# Patient Record
Sex: MALE | Race: WHITE | NOT HISPANIC OR LATINO | Employment: OTHER | URBAN - METROPOLITAN AREA
[De-identification: names, ages, dates, MRNs, and addresses within clinical notes are randomized per-mention and may not be internally consistent; named-entity substitution may affect disease eponyms.]

---

## 2018-05-09 ENCOUNTER — TRANSFERRED RECORDS (OUTPATIENT)
Dept: HEALTH INFORMATION MANAGEMENT | Facility: CLINIC | Age: 78
End: 2018-05-09

## 2019-05-06 ENCOUNTER — TRANSFERRED RECORDS (OUTPATIENT)
Dept: HEALTH INFORMATION MANAGEMENT | Facility: CLINIC | Age: 79
End: 2019-05-06

## 2021-09-30 ENCOUNTER — HOSPITAL ENCOUNTER (OUTPATIENT)
Dept: NUCLEAR MEDICINE | Facility: HOSPITAL | Age: 81
End: 2021-09-30
Attending: UROLOGY
Payer: COMMERCIAL

## 2021-09-30 ENCOUNTER — HOSPITAL ENCOUNTER (OUTPATIENT)
Dept: CT IMAGING | Facility: HOSPITAL | Age: 81
End: 2021-09-30
Attending: UROLOGY
Payer: COMMERCIAL

## 2021-09-30 DIAGNOSIS — C61 MALIGNANT NEOPLASM OF PROSTATE (H): ICD-10-CM

## 2021-09-30 LAB
CREAT BLD-MCNC: 1.3 MG/DL (ref 0.7–1.3)
GFR SERPL CREATININE-BSD FRML MDRD: 52 ML/MIN/1.73M2

## 2021-09-30 PROCEDURE — 74178 CT ABD&PLV WO CNTR FLWD CNTR: CPT

## 2021-09-30 PROCEDURE — A9503 TC99M MEDRONATE: HCPCS | Performed by: UROLOGY

## 2021-09-30 PROCEDURE — 343N000001 HC RX 343: Performed by: UROLOGY

## 2021-09-30 PROCEDURE — 250N000011 HC RX IP 250 OP 636: Performed by: UROLOGY

## 2021-09-30 PROCEDURE — 78306 BONE IMAGING WHOLE BODY: CPT

## 2021-09-30 PROCEDURE — 82565 ASSAY OF CREATININE: CPT

## 2021-09-30 RX ORDER — IOPAMIDOL 755 MG/ML
75 INJECTION, SOLUTION INTRAVASCULAR ONCE
Status: COMPLETED | OUTPATIENT
Start: 2021-09-30 | End: 2021-09-30

## 2021-09-30 RX ORDER — TC 99M MEDRONATE 20 MG/10ML
23-27 INJECTION, POWDER, LYOPHILIZED, FOR SOLUTION INTRAVENOUS ONCE
Status: COMPLETED | OUTPATIENT
Start: 2021-09-30 | End: 2021-09-30

## 2021-09-30 RX ADMIN — TC 99M MEDRONATE 26.7 MCI.: 20 INJECTION, POWDER, LYOPHILIZED, FOR SOLUTION INTRAVENOUS at 11:44

## 2021-09-30 RX ADMIN — IOPAMIDOL 75 ML: 755 INJECTION, SOLUTION INTRAVENOUS at 13:05

## 2021-10-17 ENCOUNTER — HEALTH MAINTENANCE LETTER (OUTPATIENT)
Age: 81
End: 2021-10-17

## 2022-10-01 ENCOUNTER — HEALTH MAINTENANCE LETTER (OUTPATIENT)
Age: 82
End: 2022-10-01

## 2023-02-05 ENCOUNTER — HEALTH MAINTENANCE LETTER (OUTPATIENT)
Age: 83
End: 2023-02-05

## 2023-08-02 ENCOUNTER — TRANSFERRED RECORDS (OUTPATIENT)
Dept: HEALTH INFORMATION MANAGEMENT | Facility: CLINIC | Age: 83
End: 2023-08-02

## 2023-08-09 ENCOUNTER — TRANSFERRED RECORDS (OUTPATIENT)
Dept: HEALTH INFORMATION MANAGEMENT | Facility: CLINIC | Age: 83
End: 2023-08-09

## 2023-08-10 ENCOUNTER — TRANSFERRED RECORDS (OUTPATIENT)
Dept: HEALTH INFORMATION MANAGEMENT | Facility: CLINIC | Age: 83
End: 2023-08-10

## 2023-08-23 ENCOUNTER — TRANSFERRED RECORDS (OUTPATIENT)
Dept: HEALTH INFORMATION MANAGEMENT | Facility: CLINIC | Age: 83
End: 2023-08-23
Payer: COMMERCIAL

## 2023-09-20 ENCOUNTER — MEDICAL CORRESPONDENCE (OUTPATIENT)
Dept: HEALTH INFORMATION MANAGEMENT | Facility: CLINIC | Age: 83
End: 2023-09-20
Payer: COMMERCIAL

## 2023-09-20 ENCOUNTER — TRANSFERRED RECORDS (OUTPATIENT)
Dept: HEALTH INFORMATION MANAGEMENT | Facility: CLINIC | Age: 83
End: 2023-09-20
Payer: COMMERCIAL

## 2023-09-20 ENCOUNTER — TRANSCRIBE ORDERS (OUTPATIENT)
Dept: OTHER | Age: 83
End: 2023-09-20

## 2023-09-20 DIAGNOSIS — C61 PRIMARY MALIGNANT NEOPLASM OF PROSTATE (H): Primary | ICD-10-CM

## 2023-09-21 ENCOUNTER — PRE VISIT (OUTPATIENT)
Dept: ONCOLOGY | Facility: CLINIC | Age: 83
End: 2023-09-21
Payer: COMMERCIAL

## 2023-09-21 ENCOUNTER — PATIENT OUTREACH (OUTPATIENT)
Dept: ONCOLOGY | Facility: CLINIC | Age: 83
End: 2023-09-21
Payer: COMMERCIAL

## 2023-09-21 NOTE — PROGRESS NOTES
New Patient Oncology Nurse Navigator Note     Referring provider:   Dr Parker Valente at [facility]     Referred to (specialty): Medical Oncology    Date Referral Received:   9/20/23     Evaluation for :   Prostate cancer- consideration for Pluvicto     Clinical History (per Nurse review of records provided):  Prostate cancer diagnosed 4/2009, initial treatment with XRT with Dr. Baca completed 9/23/09.    Patient had further radiation and treatment.   Now being referred for consideration of Pluvicto.    See [facility] notes for full history (media section).        Pertinent urology/oncology notes, pathology/labs & imaging bookmarked**    Records Location (Care Everywhere, Media, etc.):   [facility]  [facility]  Care Everywhere  Epic      I called Kilo to discuss referral to oncology.  I introduced my role and reviewed what this consult visit will entail/what to expect.  I reviewed the location and gave contact numbers including new patient scheduling and clinic phone numbers.   Kilo, has no other questions at this time.    I forwarded on referral with scheduling instructions for the following     PLAN: HOLD: Dr. Mason, 10/13, [facility], [phone], NEW in person    I transferred call to our new patient scheduling to finalize appointment.    Tamika Jose, RN, BSN  Oncology New Patient Nurse Navigator   [facility]  [phone]

## 2023-09-21 NOTE — TELEPHONE ENCOUNTER
Referring Provider/Location:  Dr Parker Valente at [facility]   Dx and Code: Primary malignant neoplasm of prostate (H) [C61]  - Primary  Appt Date:  10.13.23  Provider:  Peter Mason    Action September 21, 2023 7:32 AM KAMILAH   Incoming Records Records received from [facility] and sent to [facility] for upload       Action Taken  Date/Description  KAMILAH     N Navigator September 21, 2023  10:46 AM   Call to [facility] and they have images from 2018 and 2019 they are pushing over.  The most recent images were at [facility] ([facility])    Call to [facility] and they are pushing the PET from 8/2023 now  Surgical Path/Bx done here at [facility] 10.9.13 Specimen #: [id number]      Action September 22, 2023 1:18 PM KAMILAH   Incoming Records Records received from [facility] and sent to [facility] for upload     Records Requested     October 5, 2023 3:10 PM    TJ   Clinic name Comments/Action Taken   [facility] Called and placed 2nd request for images and they are sending over now.     Imaging Received  October 9, 2023    12:31 PM  KAMILAH   Action: Images from [facility] and [facility] received and resolved to PACS.

## 2023-10-11 NOTE — PROGRESS NOTES
[facility] Medical Oncology Note       Date of visit: October 13, 2023  New Outpatient Clinic Note        Assessment:     Progressive metastatic castrate resistant prostate cancer.  As I described for Samia today, unfortunately this is a disease for which we do not have a cure.  Therefore the goals of therapy are to help him live as long as possible, while at the same time have the highest quality of life.  He has done very well under the care of Dr. Parker Valente.  Currently on pembrolizumab due to his high tumor mutational burden.  There is an expectation that he is going to respond to this treatment.  It is too soon for him to feel that he is not.  While his PSA is up, they did not get 1 to my knowledge just prior to cycle 1.  He is tolerating therapy well with the occasional loose stool.  In terms of Pluvicto, he technically meets criteria for this treatment.  However we have given it with frequency here at the  [place], and we are starting to understand just who. and who does not, respond to this therapy.  It has been our experience that the more indolent the disease is at the time of the initiation, the better the responses are.  He would be absolutely appropriate to get started on this now.  However he and Ellie are going to [place] for the next 5 months.  Therefore I think it is reasonable to just continue on with the pembrolizumab.  If or when he gets back to [place], and his disease is still relatively quiescent, then I think Pluvicto would make a lot of sense.  But again, I did warn them both today that it is not something that works very well in patients whose disease is rapidly progressive.  The clinical trial upon which the approval was based but had 50% of patients who  had only received 1 prior line of therapy. Kilo has done well but has had 4 prior lines of therapy and so is not exactly representative of the population that was studied in the publication.    I had a long  and involved conversation with Kilo and his wife Ellie today.  Many questions were asked and hopefully answered to their satisfaction.  Again, I think Dr. Parker Valente has done a wonderful job in management.    Plan:     Continue with pembrolizumab  Whn pembro is completed, RTC for consideration of Pluvicto. It wouild be preferred if the disease is not rapidly progressive at that point.  Continue to follow-up with Dr. Ambrosio Mason MD, MSc  Associate Professor of Medicine  [employer]  [facility] [place] [address]  [phone]    __________________________________________________________________    CHIEF COMPLAINT       We are asked to see this patient in consultation by Dr. Stevens for consideration of Pluvicto in the setting of metastatic castrate resistant prostate cancer.    History of Present Illness:   Kilo Palomino is a delightful 83-year-old man whose oncologic history is as below: Olaparib    9/23/2009 EBRT (No ADT)  8/2013 PET shows uptake in prostate, c/w recurrence PSA 4.6 prior  11/2013 Brachytherapy PSA 0/69, 8/2014 2015 Bone mets in Lumbar spine L2-4 EBRT  2016 Mmore bone mets on PET ( 3/16, T 624)  3/23/2016--ADT  9/2016 Provenge  11/2016 Abiraterone--> Enzalutamide--> olaparib  9-10/2022 Docetaxel x 3  PSA 83 just prior.  It did drop to 6.4 on 5/17/2023.  Unfortunately it melody to 13.7, 7/27/2023.   GUARDANT 8/2023 TMB burden at 17.86 mut/MB  PSMA PET multifocal PSMA positive bone mets  Pembro started 9/20/2023  PSA was 22,  8/23/23. It was 38 when checked yesterday, prior to receiving cycle 2 of pembro.      Interval history/ROS:     Kilo has been having some loose stools that he is attributing to the pembrolizumab.  He has significant fatigue,  He has intermittent low back pain.  He is not taking a lot of medications for this.  His appetite has been little if he and it is difficult for him to gain  weight.  He and Ellie are leaving very soon to go to [place] where they are going to be for 5 months.  There is another oncologist with whom they work there.  He denies neuropathy.  He can ambulate independently.  He denies cough or shortness of breath.    Past Medical History:   I have reviewed this patient's past medical history   Past Medical History:   Diagnosis Date    Anxiety     Hypothyroid     Melanoma (H)     excised from abdomen and wrist    Melanoma of wrist (H)     Prostate cancer (H)     Thyroid disease           Past Surgical History:    This patient has no significant past surgical history       Social History:   Tobacco, ETOH, and rec drugs reviewed and as noted below with the following exceptions:  Kilo grew up in [place] and then went to [employer] where he studied education.  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  He is retired.  He is  to Ellie, whom he met on a blind date when both were in their later 20s.  They have 2 children, a son who is a pediatrician in [facility], and a daughter who is a teacher who lives nearby.  They also have a little dog named Anna who is a Pekingese.          Family History:     Family History   Problem Relation Age of Onset    Breast Cancer Mother 75            Medications:     Current Outpatient Medications   Medication Sig Dispense Refill    ALPRAZolam (XANAX PO) Take 0.5 mg by mouth Before flying      aspirin 81 MG tablet Take 1 tablet by mouth daily.      fish oil-omega-3 fatty acids (FISH OIL) 1000 MG capsule Take 2 g by mouth daily.      levothyroxine (SYNTHROID, LEVOTHROID) 100 MCG tablet Take 100 mcg by mouth daily.      Multiple Vitamin (MULTIVITAMINS) CAPS Take 1 capsule by mouth daily.      tamsulosin (FLOMAX) 0.4 MG 24 hr capsule Take 0.4 mg by mouth 2 times daily.                 Physical Exam:   There were no vitals taken for this visit.      Constitutional: WDWN male in [facility], pleasant  "and appropriate he is pretty slender, but does look younger than his age.  HEENT:  NC/AT, no icterus, OP clear, MMM  Skin: No jaundice nor ecchymoses  Lungs: CTAB, no w/r/r, nonlabored breathing  Cardiovascular: RRR, S1, S2, no m/r/g  Abdomen: +BS, soft, nontender, nondistended, no organomegaly nor masses  MSK/Extremities: Warm, well perfused. No edema  LN: no cervical, supraclavicular, axillary, nor inguinal lymphadenopathy  Neurologic: alert, answering questions appropriately, moving all extremities spontaneously. CN 2-12 grossly intact.  Psych: appropriate affect  Data:   No results for input(s): \"WBC\", \"NEUTROPHIL\", \"HGB\", \"PLT\" in the last 72936 hours.  Recent Labs   Lab Test 09/30/21  1155   CR 1.3     No results for input(s): \"MAG\", \"PHOS\", \"LDH\", \"URIC\" in the last 05117 hours.  No results for input(s): \"BILITOTAL\", \"ALKPHOS\", \"ALT\", \"AST\", \"ALBUMIN\", \"LDH\" in the last 91791 hours.  @LABRCNT(PSAtumormarker:7)    No results found for this or any previous visit (from the past 24 hour(s)).    Other Data           Labs, imaging and treatment plan reviewed with patient. All questions answered.        75 minutes spent on the date of the encounter doing chart review, review of outside records, review of test results, interpretation of tests, patient visit, documentation, discussion with other provider(s), and discussion with family           "

## 2023-10-13 ENCOUNTER — ONCOLOGY VISIT (OUTPATIENT)
Dept: ONCOLOGY | Facility: CLINIC | Age: 83
End: 2023-10-13
Attending: INTERNAL MEDICINE
Payer: COMMERCIAL

## 2023-10-13 VITALS
SYSTOLIC BLOOD PRESSURE: 125 MMHG | TEMPERATURE: 98.2 F | HEART RATE: 62 BPM | HEIGHT: 67 IN | OXYGEN SATURATION: 98 % | WEIGHT: 126 LBS | BODY MASS INDEX: 19.78 KG/M2 | DIASTOLIC BLOOD PRESSURE: 72 MMHG

## 2023-10-13 DIAGNOSIS — C61 PRIMARY MALIGNANT NEOPLASM OF PROSTATE (H): ICD-10-CM

## 2023-10-13 PROCEDURE — G0463 HOSPITAL OUTPT CLINIC VISIT: HCPCS | Performed by: INTERNAL MEDICINE

## 2023-10-13 PROCEDURE — 99205 OFFICE O/P NEW HI 60 MIN: CPT | Performed by: INTERNAL MEDICINE

## 2023-10-13 RX ORDER — VIT C/B6/B5/MAGNESIUM/HERB 173 50-5-6-5MG
1 CAPSULE ORAL DAILY
COMMUNITY

## 2023-10-13 ASSESSMENT — PAIN SCALES - GENERAL: PAINLEVEL: NO PAIN (0)

## 2023-10-13 NOTE — LETTER
10/13/2023         RE: Kilo Montiel  [address] [place] [address]        Dear Colleague,    Thank you for referring your patient, Kilo Montiel, to the [facility] [place]. Please see a copy of my visit note below.      [facility] Medical Oncology Note       Date of visit: October 13, 2023  New Outpatient Clinic Note        Assessment:     Progressive metastatic castrate resistant prostate cancer.  As I described for Samia today, unfortunately this is a disease for which we do not have a cure.  Therefore the goals of therapy are to help him live as long as possible, while at the same time have the highest quality of life.  He has done very well under the care of Dr. Parker Valente.  Currently on pembrolizumab due to his high tumor mutational burden.  There is an expectation that he is going to respond to this treatment.  It is too soon for him to feel that he is not.  While his PSA is up, they did not get 1 to my knowledge just prior to cycle 1.  He is tolerating therapy well with the occasional loose stool.  In terms of Pluvicto, he technically meets criteria for this treatment.  However we have given it with frequency here at the  [facility], and we are starting to understand just who. and who does not, respond to this therapy.  It has been our experience that the more indolent the disease is at the time of the initiation, the better the responses are.  He would be absolutely appropriate to get started on this now.  However he and Ellie are going to [place] for the next 5 months.  Therefore I think it is reasonable to just continue on with the pembrolizumab.  If or when he gets back to [place], and his disease is still relatively quiescent, then I think Pluvicto would make a lot of sense.  But again, I did warn them both today that it is not something that works very well in patients whose disease is rapidly progressive.  The clinical trial upon which  the approval was based but had 50% of patients who  had only received 1 prior line of therapy. Kilo has done well but has had 4 prior lines of therapy and so is not exactly representative of the population that was studied in the publication.    I had a long and involved conversation with Kilo and his wife Ellie today.  Many questions were asked and hopefully answered to their satisfaction.  Again, I think Dr. Parker Valente has done a wonderful job in management.    Plan:     Continue with pembrolizumab  Whn pembro is completed, RTC for consideration of Pluvicto. It wouild be preferred if the disease is not rapidly progressive at that point.  Continue to follow-up with Dr. Ambrosio Mason MD, MSc  Associate Professor of Medicine  [employer] [place] [address]  [phone]    __________________________________________________________________    CHIEF COMPLAINT       We are asked to see this patient in consultation by Dr. Stevens for consideration of Pluvicto in the setting of metastatic castrate resistant prostate cancer.    History of Present Illness:   Kilo Palomino is a delightful 83-year-old man whose oncologic history is as below: Olaparib    9/23/2009 EBRT (No ADT)  8/2013 PET shows uptake in prostate, c/w recurrence PSA 4.6 prior  11/2013 Brachytherapy PSA 0/69, 8/2014  2015 Bone mets in Lumbar spine L2-4 EBRT  2016 Mmore bone mets on PET ( 3/16, T 624)  3/23/2016--ADT  9/2016 Provenge  11/2016 Abiraterone--> Enzalutamide--> olaparib  9-10/2022 Docetaxel x 3  PSA 83 just prior.  It did drop to 6.4 on 5/17/2023.  Unfortunately it melody to 13.7, 7/27/2023.   GUARDANT 8/2023 TMB burden at 17.86 mut/MB  PSMA PET multifocal PSMA positive bone mets  Pembro started 9/20/2023  PSA was 22,  8/23/23. It was 38 when checked yesterday, prior to receiving cycle 2 of pembro.      Interval history/ROS:     Kilo has  been having some loose stools that he is attributing to the pembrolizumab.  He has significant fatigue,  He has intermittent low back pain.  He is not taking a lot of medications for this.  His appetite has been little if he and it is difficult for him to gain weight.  He and Ellie are leaving very soon to go to [place] where they are going to be for 5 months.  There is another oncologist with whom they work there.  He denies neuropathy.  He can ambulate independently.  He denies cough or shortness of breath.    Past Medical History:   I have reviewed this patient's past medical history   Past Medical History:   Diagnosis Date    Anxiety     Hypothyroid     Melanoma (H)     excised from abdomen and wrist    Melanoma of wrist (H)     Prostate cancer (H)     Thyroid disease           Past Surgical History:    This patient has no significant past surgical history       Social History:   Tobacco, ETOH, and rec drugs reviewed and as noted below with the following exceptions:  Kilo grew up in [place] and then went to [employer] where he studied education.  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  He is retired.  He is  to Ellie, whom he met on a blind date when both were in their later 20s.  They have 2 children, a son who is a pediatrician in [facility], and a daughter who is a teacher who lives nearby.  They also have a little dog named Anna who is a Pekingese.          Family History:     Family History   Problem Relation Age of Onset    Breast Cancer Mother 75            Medications:     Current Outpatient Medications   Medication Sig Dispense Refill    ALPRAZolam (XANAX PO) Take 0.5 mg by mouth Before flying      aspirin 81 MG tablet Take 1 tablet by mouth daily.      fish oil-omega-3 fatty acids (FISH OIL) 1000 MG capsule Take 2 g by mouth daily.      levothyroxine (SYNTHROID, LEVOTHROID) 100 MCG tablet Take 100 mcg by mouth daily.      Multiple  "Vitamin (MULTIVITAMINS) CAPS Take 1 capsule by mouth daily.      tamsulosin (FLOMAX) 0.4 MG 24 hr capsule Take 0.4 mg by mouth 2 times daily.                 Physical Exam:   There were no vitals taken for this visit.      Constitutional: WDWN male in NAD, pleasant and appropriate he is pretty slender, but does look younger than his age.  HEENT:  NC/AT, no icterus, OP clear, MMM  Skin: No jaundice nor ecchymoses  Lungs: CTAB, no w/r/r, nonlabored breathing  Cardiovascular: RRR, S1, S2, no m/r/g  Abdomen: +BS, soft, nontender, nondistended, no organomegaly nor masses  MSK/Extremities: Warm, well perfused. No edema  LN: no cervical, supraclavicular, axillary, nor inguinal lymphadenopathy  Neurologic: alert, answering questions appropriately, moving all extremities spontaneously. CN 2-12 grossly intact.  Psych: appropriate affect  Data:   No results for input(s): \"WBC\", \"NEUTROPHIL\", \"HGB\", \"PLT\" in the last 73362 hours.  Recent Labs   Lab Test 09/30/21  1155   CR 1.3     No results for input(s): \"MAG\", \"PHOS\", \"LDH\", \"URIC\" in the last 95349 hours.  No results for input(s): \"BILITOTAL\", \"ALKPHOS\", \"ALT\", \"AST\", \"ALBUMIN\", \"LDH\" in the last 61801 hours.  @LABNT(PSAtumormarker:7)    No results found for this or any previous visit (from the past 24 hour(s)).    Other Data           Labs, imaging and treatment plan reviewed with patient. All questions answered.        75 minutes spent on the date of the encounter doing chart review, review of outside records, review of test results, interpretation of tests, patient visit, documentation, discussion with other provider(s), and discussion with family     "

## 2023-11-08 ENCOUNTER — HOSPITAL ENCOUNTER (OUTPATIENT)
Dept: ULTRASOUND IMAGING | Facility: HOSPITAL | Age: 83
Discharge: HOME | End: 2023-11-08
Attending: NURSE PRACTITIONER | Admitting: NURSE PRACTITIONER
Payer: COMMERCIAL

## 2023-11-08 DIAGNOSIS — C61 PRIMARY MALIGNANT NEOPLASM OF PROSTATE (H): ICD-10-CM

## 2023-11-08 DIAGNOSIS — R60.0 LOWER EXTREMITY EDEMA: ICD-10-CM

## 2023-11-08 PROCEDURE — 93970 EXTREMITY STUDY: CPT

## 2024-03-03 ENCOUNTER — HEALTH MAINTENANCE LETTER (OUTPATIENT)
Age: 84
End: 2024-03-03

## 2024-04-16 ENCOUNTER — TRANSFERRED RECORDS (OUTPATIENT)
Dept: HEALTH INFORMATION MANAGEMENT | Facility: CLINIC | Age: 84
End: 2024-04-16
Payer: COMMERCIAL

## 2024-04-20 ENCOUNTER — TRANSFERRED RECORDS (OUTPATIENT)
Dept: HEALTH INFORMATION MANAGEMENT | Facility: CLINIC | Age: 84
End: 2024-04-20
Payer: COMMERCIAL

## 2024-04-20 LAB — EJECTION FRACTION: 50 %

## 2024-05-01 ENCOUNTER — APPOINTMENT (OUTPATIENT)
Dept: MRI IMAGING | Facility: HOSPITAL | Age: 84
DRG: 312 | End: 2024-05-01
Attending: EMERGENCY MEDICINE
Payer: COMMERCIAL

## 2024-05-01 ENCOUNTER — HOSPITAL ENCOUNTER (INPATIENT)
Facility: HOSPITAL | Age: 84
LOS: 6 days | Discharge: NURSING, REHAB OR LONG-TERM CARE | DRG: 312 | End: 2024-05-07
Attending: EMERGENCY MEDICINE | Admitting: HOSPITALIST
Payer: COMMERCIAL

## 2024-05-01 ENCOUNTER — APPOINTMENT (OUTPATIENT)
Dept: CT IMAGING | Facility: HOSPITAL | Age: 84
DRG: 312 | End: 2024-05-01
Attending: EMERGENCY MEDICINE
Payer: COMMERCIAL

## 2024-05-01 DIAGNOSIS — E87.1 HYPONATREMIA: ICD-10-CM

## 2024-05-01 DIAGNOSIS — K59.00 CONSTIPATION, UNSPECIFIED CONSTIPATION TYPE: ICD-10-CM

## 2024-05-01 DIAGNOSIS — B95.61 MSSA BACTEREMIA: ICD-10-CM

## 2024-05-01 DIAGNOSIS — E07.9 THYROID DISEASE: Primary | ICD-10-CM

## 2024-05-01 DIAGNOSIS — R78.81 MSSA BACTEREMIA: ICD-10-CM

## 2024-05-01 DIAGNOSIS — E87.6 HYPOKALEMIA: ICD-10-CM

## 2024-05-01 DIAGNOSIS — R55 EPISODE OF LOSS OF CONSCIOUSNESS: ICD-10-CM

## 2024-05-01 LAB
ALBUMIN SERPL BCG-MCNC: 2.8 G/DL (ref 3.5–5.2)
ALBUMIN UR-MCNC: 10 MG/DL
ALP SERPL-CCNC: 63 U/L (ref 40–150)
ALT SERPL W P-5'-P-CCNC: <5 U/L (ref 0–70)
ANION GAP SERPL CALCULATED.3IONS-SCNC: 10 MMOL/L (ref 7–15)
APPEARANCE UR: CLEAR
APTT PPP: 30 SECONDS (ref 22–38)
AST SERPL W P-5'-P-CCNC: 29 U/L (ref 0–45)
BASOPHILS # BLD AUTO: 0 10E3/UL (ref 0–0.2)
BASOPHILS NFR BLD AUTO: 0 %
BILIRUB SERPL-MCNC: 0.3 MG/DL
BILIRUB UR QL STRIP: NEGATIVE
BUN SERPL-MCNC: 13.8 MG/DL (ref 8–23)
CALCIUM SERPL-MCNC: 8.5 MG/DL (ref 8.8–10.2)
CHLORIDE SERPL-SCNC: 96 MMOL/L (ref 98–107)
COLOR UR AUTO: COLORLESS
CREAT SERPL-MCNC: 0.75 MG/DL (ref 0.67–1.17)
DEPRECATED HCO3 PLAS-SCNC: 23 MMOL/L (ref 22–29)
EGFRCR SERPLBLD CKD-EPI 2021: 90 ML/MIN/1.73M2
EOSINOPHIL # BLD AUTO: 0 10E3/UL (ref 0–0.7)
EOSINOPHIL NFR BLD AUTO: 0 %
ERYTHROCYTE [DISTWIDTH] IN BLOOD BY AUTOMATED COUNT: 16.6 % (ref 10–15)
FLUAV RNA SPEC QL NAA+PROBE: NEGATIVE
FLUBV RNA RESP QL NAA+PROBE: NEGATIVE
GLUCOSE SERPL-MCNC: 110 MG/DL (ref 70–99)
GLUCOSE UR STRIP-MCNC: NEGATIVE MG/DL
HCT VFR BLD AUTO: 25.3 % (ref 40–53)
HGB BLD-MCNC: 8.8 G/DL (ref 13.3–17.7)
HGB UR QL STRIP: ABNORMAL
IMM GRANULOCYTES # BLD: 0 10E3/UL
IMM GRANULOCYTES NFR BLD: 0 %
INR PPP: 1.16 (ref 0.85–1.15)
KETONES UR STRIP-MCNC: NEGATIVE MG/DL
LACTATE SERPL-SCNC: 1 MMOL/L (ref 0.7–2)
LEUKOCYTE ESTERASE UR QL STRIP: ABNORMAL
LIPASE SERPL-CCNC: 26 U/L (ref 13–60)
LYMPHOCYTES # BLD AUTO: 0.2 10E3/UL (ref 0.8–5.3)
LYMPHOCYTES NFR BLD AUTO: 8 %
MAGNESIUM SERPL-MCNC: 1.7 MG/DL (ref 1.7–2.3)
MCH RBC QN AUTO: 32.4 PG (ref 26.5–33)
MCHC RBC AUTO-ENTMCNC: 34.8 G/DL (ref 31.5–36.5)
MCV RBC AUTO: 93 FL (ref 78–100)
MONOCYTES # BLD AUTO: 0.3 10E3/UL (ref 0–1.3)
MONOCYTES NFR BLD AUTO: 11 %
MUCOUS THREADS #/AREA URNS LPF: PRESENT /LPF
NEUTROPHILS # BLD AUTO: 2.2 10E3/UL (ref 1.6–8.3)
NEUTROPHILS NFR BLD AUTO: 81 %
NITRATE UR QL: NEGATIVE
NRBC # BLD AUTO: 0 10E3/UL
NRBC BLD AUTO-RTO: 0 /100
PH UR STRIP: 7 [PH] (ref 5–7)
PLATELET # BLD AUTO: 167 10E3/UL (ref 150–450)
POTASSIUM SERPL-SCNC: 2.9 MMOL/L (ref 3.4–5.3)
PROT SERPL-MCNC: 5.8 G/DL (ref 6.4–8.3)
RBC # BLD AUTO: 2.72 10E6/UL (ref 4.4–5.9)
RBC URINE: 1 /HPF
RSV RNA SPEC NAA+PROBE: NEGATIVE
SARS-COV-2 RNA RESP QL NAA+PROBE: NEGATIVE
SODIUM SERPL-SCNC: 129 MMOL/L (ref 135–145)
SP GR UR STRIP: 1.03 (ref 1–1.03)
T4 FREE SERPL-MCNC: 0.59 NG/DL (ref 0.9–1.7)
TROPONIN T SERPL HS-MCNC: 28 NG/L
TROPONIN T SERPL HS-MCNC: 29 NG/L
TSH SERPL DL<=0.005 MIU/L-ACNC: 27.19 UIU/ML (ref 0.3–4.2)
UROBILINOGEN UR STRIP-MCNC: <2 MG/DL
WBC # BLD AUTO: 2.7 10E3/UL (ref 4–11)
WBC URINE: 36 /HPF

## 2024-05-01 PROCEDURE — 74177 CT ABD & PELVIS W/CONTRAST: CPT

## 2024-05-01 PROCEDURE — 87186 SC STD MICRODIL/AGAR DIL: CPT | Performed by: EMERGENCY MEDICINE

## 2024-05-01 PROCEDURE — 70553 MRI BRAIN STEM W/O & W/DYE: CPT

## 2024-05-01 PROCEDURE — 84439 ASSAY OF FREE THYROXINE: CPT | Performed by: EMERGENCY MEDICINE

## 2024-05-01 PROCEDURE — 87637 SARSCOV2&INF A&B&RSV AMP PRB: CPT | Performed by: EMERGENCY MEDICINE

## 2024-05-01 PROCEDURE — 87040 BLOOD CULTURE FOR BACTERIA: CPT | Performed by: EMERGENCY MEDICINE

## 2024-05-01 PROCEDURE — 70496 CT ANGIOGRAPHY HEAD: CPT

## 2024-05-01 PROCEDURE — 82040 ASSAY OF SERUM ALBUMIN: CPT | Performed by: EMERGENCY MEDICINE

## 2024-05-01 PROCEDURE — 83690 ASSAY OF LIPASE: CPT | Performed by: EMERGENCY MEDICINE

## 2024-05-01 PROCEDURE — 84484 ASSAY OF TROPONIN QUANT: CPT | Performed by: EMERGENCY MEDICINE

## 2024-05-01 PROCEDURE — 258N000003 HC RX IP 258 OP 636: Performed by: EMERGENCY MEDICINE

## 2024-05-01 PROCEDURE — 96361 HYDRATE IV INFUSION ADD-ON: CPT

## 2024-05-01 PROCEDURE — 36415 COLL VENOUS BLD VENIPUNCTURE: CPT | Performed by: EMERGENCY MEDICINE

## 2024-05-01 PROCEDURE — 99207 PR NO CHARGE LOS: CPT | Performed by: NURSE PRACTITIONER

## 2024-05-01 PROCEDURE — A9585 GADOBUTROL INJECTION: HCPCS | Performed by: HOSPITALIST

## 2024-05-01 PROCEDURE — 71275 CT ANGIOGRAPHY CHEST: CPT

## 2024-05-01 PROCEDURE — 250N000011 HC RX IP 250 OP 636: Performed by: HOSPITALIST

## 2024-05-01 PROCEDURE — 85041 AUTOMATED RBC COUNT: CPT | Performed by: EMERGENCY MEDICINE

## 2024-05-01 PROCEDURE — 99223 1ST HOSP IP/OBS HIGH 75: CPT | Performed by: HOSPITALIST

## 2024-05-01 PROCEDURE — 120N000004 HC R&B MS OVERFLOW

## 2024-05-01 PROCEDURE — 99285 EMERGENCY DEPT VISIT HI MDM: CPT | Mod: 25

## 2024-05-01 PROCEDURE — 83605 ASSAY OF LACTIC ACID: CPT | Performed by: EMERGENCY MEDICINE

## 2024-05-01 PROCEDURE — 255N000002 HC RX 255 OP 636: Performed by: HOSPITALIST

## 2024-05-01 PROCEDURE — 87086 URINE CULTURE/COLONY COUNT: CPT | Performed by: EMERGENCY MEDICINE

## 2024-05-01 PROCEDURE — 96365 THER/PROPH/DIAG IV INF INIT: CPT

## 2024-05-01 PROCEDURE — 83735 ASSAY OF MAGNESIUM: CPT | Performed by: EMERGENCY MEDICINE

## 2024-05-01 PROCEDURE — 258N000003 HC RX IP 258 OP 636: Performed by: HOSPITALIST

## 2024-05-01 PROCEDURE — 85610 PROTHROMBIN TIME: CPT | Performed by: EMERGENCY MEDICINE

## 2024-05-01 PROCEDURE — 250N000013 HC RX MED GY IP 250 OP 250 PS 637: Performed by: EMERGENCY MEDICINE

## 2024-05-01 PROCEDURE — 93005 ELECTROCARDIOGRAM TRACING: CPT | Performed by: EMERGENCY MEDICINE

## 2024-05-01 PROCEDURE — 81003 URINALYSIS AUTO W/O SCOPE: CPT | Performed by: EMERGENCY MEDICINE

## 2024-05-01 PROCEDURE — 250N000011 HC RX IP 250 OP 636: Performed by: EMERGENCY MEDICINE

## 2024-05-01 PROCEDURE — 250N000013 HC RX MED GY IP 250 OP 250 PS 637: Performed by: HOSPITALIST

## 2024-05-01 PROCEDURE — 84443 ASSAY THYROID STIM HORMONE: CPT | Performed by: EMERGENCY MEDICINE

## 2024-05-01 PROCEDURE — 85730 THROMBOPLASTIN TIME PARTIAL: CPT | Performed by: EMERGENCY MEDICINE

## 2024-05-01 RX ORDER — LACTULOSE 10 G/15ML
15 SOLUTION ORAL 2 TIMES DAILY PRN
Status: DISCONTINUED | OUTPATIENT
Start: 2024-05-01 | End: 2024-05-07 | Stop reason: HOSPADM

## 2024-05-01 RX ORDER — FAMOTIDINE 20 MG/1
20 TABLET, FILM COATED ORAL DAILY
Status: DISCONTINUED | OUTPATIENT
Start: 2024-05-02 | End: 2024-05-07 | Stop reason: HOSPADM

## 2024-05-01 RX ORDER — PROCHLORPERAZINE MALEATE 5 MG
5 TABLET ORAL EVERY 6 HOURS PRN
Status: DISCONTINUED | OUTPATIENT
Start: 2024-05-01 | End: 2024-05-07 | Stop reason: HOSPADM

## 2024-05-01 RX ORDER — IOPAMIDOL 755 MG/ML
67 INJECTION, SOLUTION INTRAVASCULAR ONCE
Status: COMPLETED | OUTPATIENT
Start: 2024-05-01 | End: 2024-05-01

## 2024-05-01 RX ORDER — MEGESTROL ACETATE 40 MG/ML
400 SUSPENSION ORAL DAILY
Status: DISCONTINUED | OUTPATIENT
Start: 2024-05-02 | End: 2024-05-07 | Stop reason: HOSPADM

## 2024-05-01 RX ORDER — CEFAZOLIN SODIUM 1 G/3ML
1 INJECTION, POWDER, FOR SOLUTION INTRAMUSCULAR; INTRAVENOUS ONCE
Status: COMPLETED | OUTPATIENT
Start: 2024-05-01 | End: 2024-05-02

## 2024-05-01 RX ORDER — LORAZEPAM 0.5 MG/1
1 TABLET ORAL EVERY 8 HOURS PRN
COMMUNITY

## 2024-05-01 RX ORDER — LANOLIN ALCOHOL/MO/W.PET/CERES
1000 CREAM (GRAM) TOPICAL DAILY
COMMUNITY

## 2024-05-01 RX ORDER — LORAZEPAM 0.5 MG/1
0.5 TABLET ORAL EVERY 8 HOURS PRN
Status: DISCONTINUED | OUTPATIENT
Start: 2024-05-01 | End: 2024-05-07 | Stop reason: HOSPADM

## 2024-05-01 RX ORDER — LIDOCAINE 40 MG/G
CREAM TOPICAL
Status: DISCONTINUED | OUTPATIENT
Start: 2024-05-01 | End: 2024-05-07 | Stop reason: HOSPADM

## 2024-05-01 RX ORDER — AMOXICILLIN 250 MG
3 CAPSULE ORAL 2 TIMES DAILY
Status: DISCONTINUED | OUTPATIENT
Start: 2024-05-01 | End: 2024-05-07 | Stop reason: HOSPADM

## 2024-05-01 RX ORDER — IOPAMIDOL 755 MG/ML
75 INJECTION, SOLUTION INTRAVASCULAR ONCE
Status: COMPLETED | OUTPATIENT
Start: 2024-05-01 | End: 2024-05-01

## 2024-05-01 RX ORDER — LANOLIN ALCOHOL/MO/W.PET/CERES
1000 CREAM (GRAM) TOPICAL DAILY
Status: DISCONTINUED | OUTPATIENT
Start: 2024-05-02 | End: 2024-05-07 | Stop reason: HOSPADM

## 2024-05-01 RX ORDER — CALCIUM CARBONATE 500 MG/1
1000 TABLET, CHEWABLE ORAL 4 TIMES DAILY PRN
Status: DISCONTINUED | OUTPATIENT
Start: 2024-05-01 | End: 2024-05-07 | Stop reason: HOSPADM

## 2024-05-01 RX ORDER — METHYLPHENIDATE HYDROCHLORIDE 10 MG/1
TABLET ORAL
COMMUNITY
End: 2024-05-01

## 2024-05-01 RX ORDER — CEFAZOLIN SODIUM 1 G/3ML
1 INJECTION, POWDER, FOR SOLUTION INTRAMUSCULAR; INTRAVENOUS EVERY 6 HOURS
Status: ON HOLD | COMMUNITY
Start: 2024-04-25 | End: 2024-05-07

## 2024-05-01 RX ORDER — POTASSIUM CHLORIDE 1500 MG/1
40 TABLET, EXTENDED RELEASE ORAL ONCE
Status: COMPLETED | OUTPATIENT
Start: 2024-05-01 | End: 2024-05-01

## 2024-05-01 RX ORDER — ENZALUTAMIDE 40 MG/1
CAPSULE ORAL
COMMUNITY
End: 2024-05-01

## 2024-05-01 RX ORDER — MAGNESIUM SULFATE HEPTAHYDRATE 40 MG/ML
2 INJECTION, SOLUTION INTRAVENOUS ONCE
Status: COMPLETED | OUTPATIENT
Start: 2024-05-01 | End: 2024-05-02

## 2024-05-01 RX ORDER — TAMSULOSIN HYDROCHLORIDE 0.4 MG/1
0.4 CAPSULE ORAL EVERY EVENING
Status: DISCONTINUED | OUTPATIENT
Start: 2024-05-01 | End: 2024-05-07 | Stop reason: HOSPADM

## 2024-05-01 RX ORDER — ACETAMINOPHEN 325 MG/1
650 TABLET ORAL EVERY 4 HOURS PRN
Status: DISCONTINUED | OUTPATIENT
Start: 2024-05-01 | End: 2024-05-07 | Stop reason: HOSPADM

## 2024-05-01 RX ORDER — DENOSUMAB 120 MG/1.7ML
INJECTION SUBCUTANEOUS
COMMUNITY
End: 2024-05-01

## 2024-05-01 RX ORDER — BISACODYL 10 MG
10 SUPPOSITORY, RECTAL RECTAL ONCE
Status: COMPLETED | OUTPATIENT
Start: 2024-05-01 | End: 2024-05-01

## 2024-05-01 RX ORDER — CEFAZOLIN SODIUM 1 G/3ML
1 INJECTION, POWDER, FOR SOLUTION INTRAMUSCULAR; INTRAVENOUS EVERY 8 HOURS
Status: DISCONTINUED | OUTPATIENT
Start: 2024-05-01 | End: 2024-05-01

## 2024-05-01 RX ORDER — MAGNESIUM OXIDE 400 MG/1
400 TABLET ORAL 2 TIMES DAILY
Status: DISCONTINUED | OUTPATIENT
Start: 2024-05-01 | End: 2024-05-07 | Stop reason: HOSPADM

## 2024-05-01 RX ORDER — MULTIVITAMIN WITH IRON
1 TABLET ORAL DAILY
COMMUNITY

## 2024-05-01 RX ORDER — SODIUM CHLORIDE 9 MG/ML
INJECTION, SOLUTION INTRAVENOUS CONTINUOUS
Status: DISCONTINUED | OUTPATIENT
Start: 2024-05-01 | End: 2024-05-02

## 2024-05-01 RX ORDER — FAMOTIDINE 20 MG/1
20 TABLET, FILM COATED ORAL DAILY
COMMUNITY

## 2024-05-01 RX ORDER — ASPIRIN 81 MG/1
81 TABLET ORAL DAILY
Status: DISCONTINUED | OUTPATIENT
Start: 2024-05-02 | End: 2024-05-07 | Stop reason: HOSPADM

## 2024-05-01 RX ORDER — ONDANSETRON 8 MG/1
8 TABLET, FILM COATED ORAL EVERY 8 HOURS PRN
COMMUNITY

## 2024-05-01 RX ORDER — LACTULOSE 10 G/15ML
15 SOLUTION ORAL 2 TIMES DAILY PRN
COMMUNITY
Start: 2024-04-12

## 2024-05-01 RX ORDER — ONDANSETRON 4 MG/1
8 TABLET, FILM COATED ORAL EVERY 8 HOURS PRN
Status: DISCONTINUED | OUTPATIENT
Start: 2024-05-01 | End: 2024-05-07 | Stop reason: HOSPADM

## 2024-05-01 RX ORDER — ESCITALOPRAM OXALATE 10 MG/1
10 TABLET ORAL DAILY
COMMUNITY
Start: 2023-05-03 | End: 2024-09-16

## 2024-05-01 RX ORDER — PROCHLORPERAZINE 25 MG
12.5 SUPPOSITORY, RECTAL RECTAL EVERY 12 HOURS PRN
Status: DISCONTINUED | OUTPATIENT
Start: 2024-05-01 | End: 2024-05-07 | Stop reason: HOSPADM

## 2024-05-01 RX ORDER — POTASSIUM CHLORIDE 1500 MG/1
60 TABLET, EXTENDED RELEASE ORAL ONCE
Status: COMPLETED | OUTPATIENT
Start: 2024-05-01 | End: 2024-05-01

## 2024-05-01 RX ORDER — SOD SULF/POT CHLORIDE/MAG SULF 1.479 G
TABLET ORAL
Status: ON HOLD | COMMUNITY
End: 2024-05-07

## 2024-05-01 RX ORDER — AMOXICILLIN 250 MG
2 CAPSULE ORAL DAILY
Status: ON HOLD | COMMUNITY
Start: 2024-04-29 | End: 2024-05-07

## 2024-05-01 RX ORDER — MEGESTROL ACETATE 40 MG/ML
10 SUSPENSION ORAL DAILY
COMMUNITY
Start: 2024-04-11

## 2024-05-01 RX ORDER — CEFAZOLIN SODIUM 2 G/100ML
2 INJECTION, SOLUTION INTRAVENOUS EVERY 8 HOURS
Status: DISCONTINUED | OUTPATIENT
Start: 2024-05-02 | End: 2024-05-04

## 2024-05-01 RX ORDER — GADOBUTROL 604.72 MG/ML
5 INJECTION INTRAVENOUS ONCE
Status: COMPLETED | OUTPATIENT
Start: 2024-05-01 | End: 2024-05-01

## 2024-05-01 RX ORDER — LEVOTHYROXINE SODIUM 100 UG/1
100 TABLET ORAL
Status: DISCONTINUED | OUTPATIENT
Start: 2024-05-02 | End: 2024-05-06

## 2024-05-01 RX ORDER — CEFAZOLIN SODIUM 1 G/3ML
1 INJECTION, POWDER, FOR SOLUTION INTRAMUSCULAR; INTRAVENOUS EVERY 6 HOURS
Status: DISCONTINUED | OUTPATIENT
Start: 2024-05-01 | End: 2024-05-01

## 2024-05-01 RX ORDER — ESCITALOPRAM OXALATE 10 MG/1
10 TABLET ORAL DAILY
Status: DISCONTINUED | OUTPATIENT
Start: 2024-05-02 | End: 2024-05-07 | Stop reason: HOSPADM

## 2024-05-01 RX ADMIN — GADOBUTROL 5 ML: 604.72 INJECTION INTRAVENOUS at 19:46

## 2024-05-01 RX ADMIN — SODIUM CHLORIDE 1000 ML: 9 INJECTION, SOLUTION INTRAVENOUS at 12:43

## 2024-05-01 RX ADMIN — CEFAZOLIN 1 G: 1 INJECTION, POWDER, FOR SOLUTION INTRAMUSCULAR; INTRAVENOUS at 15:51

## 2024-05-01 RX ADMIN — SENNOSIDES AND DOCUSATE SODIUM 3 TABLET: 8.6; 5 TABLET ORAL at 19:51

## 2024-05-01 RX ADMIN — TAMSULOSIN HYDROCHLORIDE 0.4 MG: 0.4 CAPSULE ORAL at 19:51

## 2024-05-01 RX ADMIN — SODIUM CHLORIDE: 9 INJECTION, SOLUTION INTRAVENOUS at 19:45

## 2024-05-01 RX ADMIN — POTASSIUM CHLORIDE 40 MEQ: 1500 TABLET, EXTENDED RELEASE ORAL at 14:29

## 2024-05-01 RX ADMIN — LORAZEPAM 0.5 MG: 0.5 TABLET ORAL at 18:27

## 2024-05-01 RX ADMIN — MAGNESIUM SULFATE HEPTAHYDRATE 2 G: 40 INJECTION, SOLUTION INTRAVENOUS at 19:45

## 2024-05-01 RX ADMIN — POTASSIUM CHLORIDE 60 MEQ: 1500 TABLET, EXTENDED RELEASE ORAL at 19:44

## 2024-05-01 RX ADMIN — IOPAMIDOL 67 ML: 755 INJECTION, SOLUTION INTRAVENOUS at 14:08

## 2024-05-01 RX ADMIN — IOPAMIDOL 75 ML: 755 INJECTION, SOLUTION INTRAVENOUS at 14:12

## 2024-05-01 ASSESSMENT — COLUMBIA-SUICIDE SEVERITY RATING SCALE - C-SSRS
1. IN THE PAST MONTH, HAVE YOU WISHED YOU WERE DEAD OR WISHED YOU COULD GO TO SLEEP AND NOT WAKE UP?: NO
6. HAVE YOU EVER DONE ANYTHING, STARTED TO DO ANYTHING, OR PREPARED TO DO ANYTHING TO END YOUR LIFE?: NO
2. HAVE YOU ACTUALLY HAD ANY THOUGHTS OF KILLING YOURSELF IN THE PAST MONTH?: NO

## 2024-05-01 ASSESSMENT — ACTIVITIES OF DAILY LIVING (ADL)
ADLS_ACUITY_SCORE: 41
ADLS_ACUITY_SCORE: 41
ADLS_ACUITY_SCORE: 35
ADLS_ACUITY_SCORE: 35
ADLS_ACUITY_SCORE: 41
ADLS_ACUITY_SCORE: 35
ADLS_ACUITY_SCORE: 35

## 2024-05-01 NOTE — MEDICATION SCRIBE - ADMISSION MEDICATION HISTORY
Medication Scribe Admission Medication History    Admission medication history is complete. The information provided in this note is only as accurate as the sources available at the time of the update.    Information Source(s): Patient, Family member, and CareEverywhere/SureScripts via in-person    Pertinent Information:     Changes made to PTA medication list:  Added: None  Deleted: None  Changed: None    Allergies reviewed with patient and updates made in EHR: yes    Medication History Completed By: ZULEMA MARINELLI 5/1/2024 6:39 PM    PTA Med List   Medication Sig Last Dose    aspirin 81 MG tablet Take 1 tablet by mouth daily. 5/1/2024 at am    ceFAZolin (ANCEF) 1 GM vial Inject 1 g into the vein every 6 hours 5/1/2024 at am    cyanocobalamin (VITAMIN B-12) 1000 MCG tablet Take 1,000 mcg by mouth daily 5/1/2024 at am    escitalopram (LEXAPRO) 10 MG tablet Take by mouth daily TAKE ONE-HALF TABLET BY MOUTH ONCE DAILY FOR 2 WEEKS THEN INCREASE TO 1 FULL TABLET DAILY THEREAFTER 4/30/2024 at pm    famotidine (PEPCID) 20 MG tablet Take 20 mg by mouth daily 5/1/2024 at am    lactulose (CHRONULAC) 10 GM/15ML solution Take 15 mLs by mouth 2 times daily as needed Unknown at prn    levothyroxine (SYNTHROID, LEVOTHROID) 100 MCG tablet Take 100 mcg by mouth daily. 5/1/2024 at am    LORazepam (ATIVAN) 0.5 MG tablet Take 1 tablet by mouth every 8 hours as needed Unknown at prn    magnesium 250 MG tablet Take 1 tablet by mouth daily 4/30/2024 at pm    megestrol (MEGACE) 40 MG/ML suspension Take 10 mLs by mouth daily 5/1/2024 at am    ondansetron (ZOFRAN) 8 MG tablet Take 8 mg by mouth every 8 hours as needed Unknown at prn    senna-docusate (SENOKOT-S/PERICOLACE) 8.6-50 MG tablet Take 2 tablets by mouth daily 4/30/2024 at pm    SUTAB 7220-456-818 MG TABS TAKE AS DIRECTED BY DOCTOR [facility] OFFICE 4/30/2024 at pm    Turmeric 500 MG CAPS  4/30/2024 at noon

## 2024-05-01 NOTE — H&P
[facility]    History and Physical - Hospitalist Service       Date of Admission:  5/1/2024    Assessment & Plan    Patient is an 83-year-old male with history of metastatic prostate cancer, prior stroke and recent MSSA bacteremia who presents after a syncopal episode.    #Syncope  Likely vasovasal response while attempting to have a bowel movement  Other differential: cardiac arrythmia in the setting of hypokalemia, doubt stroke with no focal deficits  Monitor tele  Obtain echo, MRI brain (per stroke neuro rec)    #Urinary retention  #BPH  Symptomatic and seen on CT  Place carlton if post-void bladder scan shows >300ml  Obtain UA  Resume home alpha blocker  Acute retention likely caused by constipation  If carlton catheter is placed, would plan for voiding trial after constipation improves    #Constipation  Avoid miralax, lactulose for now with hypoK  Schedule senna-colace  Suppository  Enema if needed    #Hypokalemia  #Mildly low magnesium  Started PO mag ox which will help with constipation  60meq PO KCL, continue to replace as needed    #Hyponatremia  s/p 1 L normal saline in ED  Continue with gentle NS and follow Na level    #Recent MSSA bacteremia  Admited to [facility] for about 2 weeks  Continue IV cefazolin  BCx drawn in ED    #Hypothyroid  TSH elevated, T4 low  Continue home dose synthroid, would not adjust dose according to thyroid studies taking during acute illness  Will need repeat TSH/T4 in a few weeks    #Metastatic prostate cancer  #Leukopenia  #Anemia, likely due to chemo  Mets to bone  Chemo and radiation ~3 weeks ago in [place]          Diet: Combination Diet Regular Diet Adult    DVT Prophylaxis: Pneumatic Compression Devices  Carlton Catheter: Not present  Lines: PRESENT             Cardiac Monitoring: ACTIVE order. Indication: Syncope- high cardiac risk (48 hours)  Code Status: No CPR- Do NOT Intubate      Clinically Significant Risk Factors Present on Admission        #  "Hypokalemia: Lowest K = 2.9 mmol/L in last 2 days, will replace as needed  # Hyponatremia: Lowest Na = 129 mmol/L in last 2 days, will monitor as appropriate      # Hypoalbuminemia: Lowest albumin = 2.8 g/dL at 5/1/2024 12:38 PM, will monitor as appropriate  # Coagulation Defect: INR = 1.16 (Ref range: 0.85 - 1.15) and/or PTT = 30 Seconds (Ref range: 22 - 38 Seconds), will monitor for bleeding  # Drug Induced Platelet Defect: home medication list includes an antiplatelet medication                   Disposition Plan     Medically Ready for Discharge: Anticipated in 2-4 Days           Arnel Bingham,   Hospitalist Service  [facility]  Securely message with [employer] (more info)  Text page via [facility] Paging/Directory     ______________________________________________________________________    Chief Complaint   Syncope    History is obtained from the patient, electronic health record, emergency department physician, and patient's spouse    History of Present Illness   Patient is a pleasant 83-year-old male with history of metastatic prostate cancer, prior stroke and recent hospitalization in [place] for pneumonia and MSSA bacteremia who presents emergency department after a syncopal episode at home.  Patient has been receiving IV cefazolin infusions at home and was hospitalized for about 2 weeks in [facility].  He denies fevers, chills, nausea, vomiting, chest pain, shortness of breath, diarrhea, facial drooping, focal weaknesses, vision changes or difficulty with speech.  He has had trouble emptying his bladder and says that he \"dribbles\" throughout the day.  He feels constipated, it has been several days since his last bowel movement.  He had chemotherapy about 3 weeks ago in [place].      Past Medical History    Past Medical History:   Diagnosis Date    Anxiety     Hypothyroid     Melanoma (H)     excised from abdomen and wrist    Melanoma of wrist (H)     Prostate cancer (H)     Thyroid " disease        Past Surgical History   Past Surgical History:   Procedure Laterality Date    ANKLE SURGERY      left    COLONOSCOPY      INSERT RADIATION SEEDS PROSTATE  11/8/2013    Procedure: INSERT RADIATION SEEDS PROSTATE;  Insert Radiation Seeds Prostate ;  Surgeon: Sahil Franco MD;  Location: [facility] OR    SURGICAL PATHOLOGY EXAM      melanoma removal       Prior to Admission Medications   Prior to Admission Medications   Prescriptions Last Dose Informant Patient Reported? Taking?   ALPRAZolam (XANAX PO)   Yes No   Sig: Take 0.5 mg by mouth Before flying   Multiple Vitamin (MULTIVITAMINS) CAPS   Yes No   Sig: Take 1 capsule by mouth daily.   Turmeric 500 MG CAPS   Yes No   aspirin 81 MG tablet   Yes No   Sig: Take 1 tablet by mouth daily.   fish oil-omega-3 fatty acids (FISH OIL) 1000 MG capsule   Yes No   Sig: Take 2 g by mouth daily.   levothyroxine (SYNTHROID, LEVOTHROID) 100 MCG tablet   Yes No   Sig: Take 100 mcg by mouth daily.   pembrolizumab   Yes No   Sig: Inject into the vein once   pembrolizumab (KEYTRUDA) 25 MG/ML   Yes No   Sig: Inject 200 mg into the vein once   tamsulosin (FLOMAX) 0.4 MG 24 hr capsule   Yes No   Sig: Take 0.4 mg by mouth 2 times daily.       Facility-Administered Medications: None           Physical Exam   Vital Signs: Temp: 98.1  F (36.7  C) Temp src: Oral BP: (!) 185/84 Pulse: 63   Resp: 19 SpO2: 100 % O2 Device: None (Room air)    Weight: 112 lbs 0 oz    General Appearance:  No acute distress, cachectic appearing  Respiratory: Clear to auscultation bilaterally  Cardiovascular: Regular rate and rhythm, no appreciated murmur on auscultation  GI: Normal bowel sounds, abdomen is soft with no rebound  Extremities: No peripheral edema or cyanosis  Neuro: Alert and oriented x 3, normal speech, normal facial symmetry, no focal weaknesses      Medical Decision Making             Data

## 2024-05-01 NOTE — CONSULTS
"  [facility]    Stroke Telephone Note    I was called by Liyah Suggs on 05/01/24 regarding patient Kilo Montiel. The patient is a 83 year old male with history of metastatic prostate cancer, thyroid disease, prior stroke and recent admission for bacteremia and PNA. He is currently on IV antibiotics. This morning, spouse was helping him to the bathroom when he suddenly went limp and was not responding to her. No report of seizure activity. No report of focal deficits per ED provider.     Vitals  BP: (!) 158/84   Pulse: 58   Resp: (!) 7   Temp: 98.1  F (36.7  C)   Weight: 50.8 kg (112 lb)    Imaging Findings  CT head: no acute pathology  CTA head/neck:   1.  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  2.  Mild to moderate/moderate stenoses involving distal P2/P3 segment branches of the right posterior cerebral artery.    Impression  Episode of unresponsiveness without evidence of seizure activity    Recommendations  - MRI brain w/wo to rule out metastases, acute infarct.     Case discussed with vascular neurology attending Dr. Camacho.    My recommendations are based on the information provided over the phone by Kilo Montiel's in-person providers. They are not intended to replace the clinical judgment of his in-person providers. I was not requested to personally see or examine the patient at this time.     Jennifer Beyer, CNP  Vascular Neurology    To page me or covering stroke neurology team member, click here: [facility]  Choose \"On Call\" tab at top, then select \"NEUROLOGY/ALL SITES\" from middle drop-down box, press Enter, then look for \"stroke\" or \"telestroke\" for your site.    "

## 2024-05-01 NOTE — ED TRIAGE NOTES
Pt arrives via EMS for increasing weakness. Pt has prostate CA that has metastasized to his bones, and is also currently on IV antibiotics for pneumonia. Pt states he was too weak to get out of bed for breakfast today, so ate in bed, then his wife attempted to get him up to use bathroom, once in the bathroom, Pt was unable to get up off commode. Pt's wife did not have the strength to get him off the toilet, so phoned EMS. EMS got Pt up and transferred in. Pt's FS was 172, and his PICC was accessed en rt.

## 2024-05-01 NOTE — ED PROVIDER NOTES
EMERGENCY DEPARTMENT ENCOUNTER      NAME: Kilo Montiel  AGE: 83 year old male  YOB: 1940  MRN: [id number]  EVALUATION DATE & TIME: 5/1/2024 11:26 AM    PCP: Denzel Jones    ED PROVIDER: Liyah Suggs MD      Chief Complaint   Patient presents with    Fatigue    Generalized Weakness     Pt has CA         FINAL IMPRESSION:  1. Episode of loss of consciousness    2. Hypokalemia    3. MSSA bacteremia          ED COURSE & MEDICAL DECISION MAKING:    Pertinent Labs & Imaging studies reviewed. (See chart for details)    12:05 PM I introduced myself to the patient, obtained patient history, performed a physical exam, and discussed plan for ED workup including potential diagnostic laboratory/imaging studies and interventions.  1:02 PM I rechecked and updated patient.   3:50 PM I spoke with stroke neurology.   4:17 PM I spoke with Dr. Bingham, the hospitalist. Made plans for admission.     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  Wife reports that they just returned from [place] on Friday and he was admitted down in [place] in [place] for almost 2 weeks related to an infection and has a PICC line through which he is still getting IV antibiotics until tomorrow per wife.  She is uncertain what antibiotic this is and what specific infection other than he had pneumonia.  She has difficulty recalling the details of his hospitalization.  His primary care doctor was trying to get the records.  They did sign a release so that I could contact the hospital to obtain fax records because they do not have care everywhere.  This did take a significant amount of time to obtain but did eventually obtain these and it appears that he had staff aureus MSSA bacteremia as well as pneumonia that was treated with IV  antibiotics and had infectious disease consult there.  He was discharged on IV Ancef per record review.  He had generalized weakness and a fall at that time.     Today, the patient was reportedly feeling generally weak and he has been more weak since his hospitalization using a walker which she had not in the past per wife.  She states she helped him to the bathroom and he was very weak trying to get on the toilet and then suddenly stopped responding to her with his eyes closed and it appears that he briefly lost consciousness for 30 seconds or so.  She did not note any obvious sign of seizure activity.  He was trying to have a bowel movement and has been constipated at that time.  She reports she had to hold him up on the toilet and he did not fall or strike his head.  He then relatively quickly returned to baseline in terms of being awake and alert.  And he states that here he still feels generally weak but is otherwise back to himself.  He denies any prodromal symptoms to this event and cannot really recall much of what it happened.  He has no focal neurologic deficits here.  He overall appears thin and generally chronically ill-appearing.  Differential diagnosis includes but is not limited to cardiac arrhythmia, vasovagal syncope, seizure, less likely CVA without focal deficits and the loss of consciousness, electrolyte abnormality, worsening infection, etc.  Broad laboratory workup was initiated.  We also obtained CT of the head and neck, CT of the chest abdomen and pelvis.  Did consider potential PE with syncope and his known cancer.  EKG was obtained which does not reveal any evidence of acute ischemia.  He does have sinus bradycardia.  No sign of AV block or otherwise abnormal intervals.  No sign of WPW, Brugada syndrome, or LVH.  No known cardiac history per wife.      Denies any chest pain or shortness of breath.  Troponin is 29 and on repeat 28 so slightly elevated but not in the interval of acute  ischemia.  CMP reveals a sodium of 129 with a potassium of 2.9.  Glucose 110.  Lactic acid within normal limits.  TSH is 27.19 with a free T4 that slightly low at 0.59.  He reports he has been taking his thyroid pill.  This may be potentially contributing to his weakness but do not feel that this would cause syncope.  This can be adjusted inpatient.  Lipase within normal limits.  Did obtain 2 peripheral blood cultures.  COVID-19 influenza and RSV PCR is negative.  Urinalysis pending.  Magnesium 1.7.  CBC reveals white blood cell count 2.7 with a hemoglobin of 8.8 and normal platelets.  ANC is 2.2.  It appears that his recent white blood cell count during his [place] admission has been around 3-4.  Hemoglobin appears stable.  Denies any melena, hematochezia, or hematemesis.  He does appear dry on exam and did give a liter of IV fluids here.  We also replaced his potassium initially with 40 mill equivalents orally of potassium chloride.  Also ordered his dose of Ancef that was due.    CT reveals no PE.  There is trace right-sided and small left-sided pleural effusions with adjacent opacities likely reflecting atelectasis.  Newly visualized 8 mm left lower lobe pulmonary nodule.  Diffuse osseous metastases which are similar.  No obvious pneumonia.  CT of the abdomen and pelvis reveals a distended bladder.  Overall failure to thrive with marked weight loss.  Third spacing of fluid with trace ascites new effusions and diffuse subcu edema.  Interestingly I do not obviously note this on my exam.  There is a large amount of retained stool in the colon except for the rectosigmoid.  This finding suggest colonic ileus.  No bowel obstruction.  Osseous metastasis.  CTA of the head and neck reveals no evidence of acute intracranial hemorrhage.  CTA of the neck largely unrevealing.  CT of the head reveals moderate to severe focal stenosis of superior P2 segment branch of the left posterior cerebral artery.  Mild to  moderate/moderate stenoses involving distal P2 P3 segment branches of the right posterior cerebral artery.  Otherwise no evidence for high-grade stenosis in the major intracranial arteries.  No large vessel occlusion.  No intracranial aneurysm.  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  Overall felt his symptoms were less likely to be stroke.  They agreed but did state that if he had gotten hypotensive with syncope that possibly this could have caused him to lose consciousness as well due to the stenoses here and they recommended MRI but did not feel that there be any sort of acute intervention.  Also want to rule out intracranial mass with his metastatic cancer.  MRI of the brain with and without contrast was ordered and pending at this time.  Do feel that it is appropriate that he get admitted for further workup of this episode with his complicated current medical situation and unknown cause at this point.  His vital signs have been stable here.  He has been afebrile.  Wife and patient are in agreement with this plan.  Spoke with the hospitalist accepted the patient.  He was admitted in stable condition.         At the conclusion of the encounter I discussed the results of all of the tests and the disposition. The questions were answered. The patient or family acknowledged understanding and was agreeable with the care plan.         Medical Decision Making  Obtained supplemental history:Supplemental history obtained?: No  Reviewed external records: External records reviewed?: Documented in chart  Care impacted by chronic illness:Cancer/Chemotherapy  Care significantly affected by social determinants of health:N/A  Did you consider but not order tests?: Work up considered but not performed and documented in chart, if applicable  Did you interpret images independently?: Independent interpretation of ECG and  images noted in documentation, when applicable.  Consultation discussion with other provider:Did you involve another provider (consultant, MH, pharmacy, etc.)?: I discussed the care with another health care provider, see documentation for details.  Admit.      MEDICATIONS GIVEN IN THE EMERGENCY:  Medications   lidocaine 1 % 0.1-1 mL (has no administration in time range)   lidocaine (LMX4) cream (has no administration in time range)   sodium chloride (PF) 0.9% PF flush 3 mL (3 mLs Intracatheter Not Given 5/3/24 0200)   sodium chloride (PF) 0.9% PF flush 3 mL (has no administration in time range)   calcium carbonate (TUMS) chewable tablet 1,000 mg (has no administration in time range)   acetaminophen (TYLENOL) tablet 650 mg (has no administration in time range)   senna-docusate (SENOKOT-S/PERICOLACE) 8.6-50 MG per tablet 3 tablet (3 tablets Oral $Given 5/2/24 2117)   prochlorperazine (COMPAZINE) injection 5 mg (has no administration in time range)     Or   prochlorperazine (COMPAZINE) tablet 5 mg (has no administration in time range)     Or   prochlorperazine (COMPAZINE) suppository 12.5 mg (has no administration in time range)   magnesium oxide (MAG-OX) tablet 400 mg (400 mg Oral $Given 5/2/24 2117)   sodium phosphate (FLEET ENEMA) 1 enema (has no administration in time range)   cyanocobalamin (VITAMIN B-12) tablet 1,000 mcg (1,000 mcg Oral $Given 5/2/24 0849)   escitalopram (LEXAPRO) tablet 10 mg (10 mg Oral $Given 5/2/24 0849)   famotidine (PEPCID) tablet 20 mg (20 mg Oral $Given 5/2/24 0849)   lactulose (CHRONULAC) solution 15 mL (has no administration in time range)   levothyroxine (SYNTHROID/LEVOTHROID) tablet 100 mcg (100 mcg Oral $Given 5/3/24 0650)   LORazepam (ATIVAN) tablet 0.5 mg (0.5 mg Oral $Given 5/1/24 1827)   megestrol (MEGACE) suspension 400 mg (400 mg Oral $Given 5/2/24 0849)   ondansetron (ZOFRAN) tablet 8 mg (has no administration in time range)   aspirin EC tablet 81 mg (81 mg Oral $Given 5/2/24  "0849)   tamsulosin (FLOMAX) capsule 0.4 mg (0.4 mg Oral $Given 5/2/24 2117)   ceFAZolin (ANCEF) 2 g in 100 mL D5W intermittent infusion (2 g Intravenous $New Bag 5/3/24 0015)   polyethylene glycol (MIRALAX) Packet 17 g (17 g Oral Not Given 5/2/24 1339)   sodium chloride 0.9% BOLUS 1,000 mL (0 mLs Intravenous Stopped 5/1/24 1426)   potassium chloride eduarda ER (KLOR-CON M20) CR tablet 40 mEq (40 mEq Oral $Given 5/1/24 1429)   iopamidol (ISOVUE-370) solution 67 mL (67 mLs Intravenous $Given 5/1/24 1408)   iopamidol (ISOVUE-370) solution 75 mL (75 mLs Intravenous $Given 5/1/24 1412)   ceFAZolin (ANCEF) 1 g vial to attach to  ml bag for ADULT or 50 ml bag for PEDS (0 g Intravenous Stopped 5/2/24 0843)   magnesium sulfate 2 g in 50 mL sterile water intermittent infusion (0 g Intravenous Stopped 5/2/24 0843)   potassium chloride eduarda ER (KLOR-CON M20) CR tablet 60 mEq (60 mEq Oral $Given 5/1/24 1944)   bisacodyl (DULCOLAX) suppository 10 mg ( Rectal Canceled Entry 5/2/24 1057)   gadobutrol (GADAVIST) injection 5 mL (5 mLs Intravenous $Given 5/1/24 1946)   bisacodyl (DULCOLAX) suppository 10 mg (10 mg Rectal $Given 5/2/24 1054)       NEW PRESCRIPTIONS STARTED AT TODAY'S ER VISIT  Current Discharge Medication List             =================================================================    HPI    Patient information was obtained from: patient & wife    Use of : N/A         Kilo Montiel is a 83 year old male with a pertinent history of 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     Per wife, approximately 1 hour ago the patient was trying to use the bathroom, when he \"closed his eyes\" and was not responding to his wife for 2-3 minutes. She said he slid to the side of the toilet, but he did not hit his head or fall. She tried to keep him upright, but he " "was limp and hanging his head. She said he was not shaking like a seizure when the event occurred. He the returned to baseline shortly there after. He was trying to have a bowel movement and urinate at the time. He has been very generally weak since his hospitalization in [place]. She reports he was hospitalized for approximately 2 weeks and they just returned to [place] on Friday. He is receiving IV antibiotics three times per day in a PICC line per wife but she is unsure of the name. She reports they were told he had an infection and was dehydrated but she cannot recall the details. She reports that the patient has an occasional dry cough and is nauseated from time to time.     The patient reports that he feels generally weaker than normal today, and that when he passed out, he said he lost the ability to get up. He did not confirm nor deny if he could hear his wife talking to him when he passed out. And stated that he did not feel like he was going to pass out when the ordeal occurred. Upon initial examination he said he feels \"fine,\" but he doesn't know if he could get up out of the bed, and he still feels generally weak. He ate cream of wheat this morning and took his thyroid pill. He denies abdominal pain, but said he is constipated and has not had a bowel movement in the past 5-6 days.     Patient denies use of anticoagulation or recent falls. No fevers, vomiting, dysuria or hematuria. Denies numbness or tingling, chest pain, shortness of breath or recent sick contacts. No history of heart failure or heart attacks.     Of note, the patient was in the hospital two weeks ago at [facility] in [place], [place]. He was there for pneumonia and is currently being treated with antibiotics, name unknown per family. Since being in the hospital he now has been using a walker, and is eating but drinking less. They have been trying to get home home care since then.       REVIEW OF SYSTEMS   Review of " "Systems   Pertinent positives and negatives are documented in the HPI. All other systems reviewed and are negative.      PAST MEDICAL HISTORY:  Past Medical History:   Diagnosis Date    Anxiety     Hypothyroid     Melanoma (H)     excised from abdomen and wrist    Melanoma of wrist (H)     Prostate cancer (H)     Thyroid disease        PAST SURGICAL HISTORY:  Past Surgical History:   Procedure Laterality Date    ANKLE SURGERY      left    COLONOSCOPY      INSERT RADIATION SEEDS PROSTATE  2013    Procedure: INSERT RADIATION SEEDS PROSTATE;  Insert Radiation Seeds Prostate ;  Surgeon: Sahil Franco MD;  Location: [facility] OR    SURGICAL PATHOLOGY EXAM      melanoma removal           CURRENT MEDICATIONS:    No current outpatient medications on file.      ALLERGIES:  Allergies   Allergen Reactions    Bee Pollen Other (See Comments)       FAMILY HISTORY:  Family History   Problem Relation Age of Onset    Breast Cancer Mother 75       SOCIAL HISTORY:   Social History     Socioeconomic History    Marital status:    Tobacco Use    Smoking status: Former     Current packs/day: 0.00     Average packs/day: 0.5 packs/day for 8.0 years (4.0 ttl pk-yrs)     Types: Cigarettes     Start date: 1974     Quit date: 1982     Years since quittin.9    Smokeless tobacco: Former     Quit date: 1976   Substance and Sexual Activity    Alcohol use: Yes     Comment: 2-3 per week       VITALS:  /67 (BP Location: Right arm)   Pulse 56   Temp 97.7  F (36.5  C) (Oral)   Resp 20   Ht 1.676 m (5' 6\")   Wt 50.8 kg (112 lb)   SpO2 100%   BMI 18.08 kg/m      PHYSICAL EXAM    Physical Exam  Constitutional: thin, chronically ill appearing, NAD, GCS 15  HENT: Normocephalic, Atraumatic, Bilateral external ears normal, Oropharynx normal, mucous membranes dry, Nose normal. Neck- Normal range of motion, No tenderness, Supple, No stridor.    Eyes: PERRL, EOMI, Conjunctiva normal, No discharge.   Respiratory: Normal breath " sounds, No respiratory distress, No wheezing or crackles, Speaks in full sentences easily.    Cardiovascular: Normal heart rate, Regular rhythm, No murmurs, No rubs, No gallops. 2+ radial pulses bilaterally  GI: Bowel sounds normal, Soft, No tenderness, No masses, No rebound or guarding. No CVA tenderness bilaterally    Musculoskeletal: 2+ DP pulses. No notable lower extremity edema. No cyanosis, No clubbing. Good range of motion in all major joints. No tenderness to palpation or major deformities noted. No tenderness of the CTLS spine.    Integument: Warm, Dry, No erythema, No rash. No petechiae.   Neurologic: Alert & oriented x 3, 5/5 strength in all 4 extremities bilaterally. Sensation intact to light touch in all 4 extremities and the face bilaterally. No focal deficits noted.   Psychiatric: Affect normal, Judgment normal, Mood normal. Cooperative.      LAB:  All pertinent labs reviewed and interpreted.  Results for orders placed or performed during the hospital encounter of 05/01/24   CTA Head Neck with Contrast    Impression    IMPRESSION:   HEAD CT:  1.  No CT evidence for acute intracranial process.  2.  Brain atrophy and presumed chronic microvascular ischemic changes as above.    HEAD CTA:   1.  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  2.  Mild to moderate/moderate stenoses involving distal P2/P3 segment branches of the right posterior cerebral artery.  3.  Otherwise, no evidence for high-grade stenosis of the major intracranial arteries. No large vessel occlusion identified.  4.  No intracranial aneurysm or high-flow vascular malformation.    NECK CTA:  1.  Mild bilateral carotid bifurcation atherosclerosis without significant stenosis.  2.  Patent cervical vertebral arteries without significant stenosis.  3.  Small bilateral pleural effusions partially visualized. Ill-defined soft tissue attenuation along the fat/fascial planes of the neck,  raising concern for possible anasarca. Recommend clinical correlation. Please also correlate with findings from CT   angiogram of the chest and CT abdomen and pelvis of same date.   CT Chest Pulmonary Embolism w Contrast    Impression    IMPRESSION:  1.  No pulmonary artery embolism.  2.  Trace right-sided and small left sided pleural effusions with adjacent opacities likely reflecting atelectasis.  3.  Newly visualized 8 mm left lower lobe pulmonary nodule. Recommend 3 month chest CT follow-up.  4.  Diffuse osseous metastases which are similar to the most recent comparison exam.   CT Abdomen Pelvis w Contrast    Impression    IMPRESSION:   1.  Bladder is quite distended and this likely explains the moderately dilated intrarenal collecting systems. If he cannot void, he'll need to be catheterized.  2.  Overall failure to thrive with marked weight loss since last August and extensive third spacing of fluid with new effusions, trace ascites and diffuse subcutaneous edema.  3.  There is a large amount retained stool in the colon except for the rectosigmoid. Findings suggest colonic ileus. Query medications for bone pain? No bowel obstruction.  4.  Numerous, sclerotic osseous metastasis again noted.  5.  Other noncritical findings as noted above.   MR Brain w/o & w Contrast    Impression    IMPRESSION:  1.  No acute infarct, mass, mass effect, or hemorrhage.  2.  Mild to moderate generalized volume loss and mild presumed chronic small vessel ischemic changes.     Comprehensive metabolic panel   Result Value Ref Range    Sodium 129 (L) 135 - 145 mmol/L    Potassium 2.9 (L) 3.4 - 5.3 mmol/L    Carbon Dioxide (CO2) 23 22 - 29 mmol/L    Anion Gap 10 7 - 15 mmol/L    Urea Nitrogen 13.8 8.0 - 23.0 mg/dL    Creatinine 0.75 0.67 - 1.17 mg/dL    GFR Estimate 90 >60 mL/min/1.73m2    Calcium 8.5 (L) 8.8 - 10.2 mg/dL    Chloride 96 (L) 98 - 107 mmol/L    Glucose 110 (H) 70 - 99 mg/dL    Alkaline Phosphatase 63 40 - 150 U/L    AST  29 0 - 45 U/L    ALT <5 0 - 70 U/L    Protein Total 5.8 (L) 6.4 - 8.3 g/dL    Albumin 2.8 (L) 3.5 - 5.2 g/dL    Bilirubin Total 0.3 <=1.2 mg/dL   Result Value Ref Range    Magnesium 1.7 1.7 - 2.3 mg/dL   TSH with free T4 reflex   Result Value Ref Range    TSH 27.19 (H) 0.30 - 4.20 uIU/mL   Lactic acid whole blood   Result Value Ref Range    Lactic Acid 1.0 0.7 - 2.0 mmol/L   Result Value Ref Range    Lipase 26 13 - 60 U/L   Result Value Ref Range    INR 1.16 (H) 0.85 - 1.15   Partial thromboplastin time   Result Value Ref Range    aPTT 30 22 - 38 Seconds   Result Value Ref Range    Troponin T, High Sensitivity 29 (H) <=22 ng/L   UA with Microscopic reflex to Culture    Specimen: Urine, Midstream   Result Value Ref Range    Color Urine Colorless Colorless, Straw, Light Yellow, Yellow    Appearance Urine Clear Clear    Glucose Urine Negative Negative mg/dL    Bilirubin Urine Negative Negative    Ketones Urine Negative Negative mg/dL    Specific Gravity Urine 1.032 (H) 1.001 - 1.030    Blood Urine 0.03 mg/dL (A) Negative    pH Urine 7.0 5.0 - 7.0    Protein Albumin Urine 10 (A) Negative mg/dL    Urobilinogen Urine <2.0 <2.0 mg/dL    Nitrite Urine Negative Negative    Leukocyte Esterase Urine 250 Lei/uL (A) Negative    Mucus Urine Present (A) None Seen /LPF    RBC Urine 1 <=2 /HPF    WBC Urine 36 (H) <=5 /HPF   Symptomatic Influenza A/B, RSV, & SARS-CoV2 PCR (COVID-19) Nasopharyngeal    Specimen: Nasopharyngeal; Swab   Result Value Ref Range    Influenza A PCR Negative Negative    Influenza B PCR Negative Negative    RSV PCR Negative Negative    SARS CoV2 PCR Negative Negative   CBC with platelets and differential   Result Value Ref Range    WBC Count 2.7 (L) 4.0 - 11.0 10e3/uL    RBC Count 2.72 (L) 4.40 - 5.90 10e6/uL    Hemoglobin 8.8 (L) 13.3 - 17.7 g/dL    Hematocrit 25.3 (L) 40.0 - 53.0 %    MCV 93 78 - 100 fL    MCH 32.4 26.5 - 33.0 pg    MCHC 34.8 31.5 - 36.5 g/dL    RDW 16.6 (H) 10.0 - 15.0 %    Platelet Count  167 150 - 450 10e3/uL    % Neutrophils 81 %    % Lymphocytes 8 %    % Monocytes 11 %    % Eosinophils 0 %    % Basophils 0 %    % Immature Granulocytes 0 %    NRBCs per 100 WBC 0 <1 /100    Absolute Neutrophils 2.2 1.6 - 8.3 10e3/uL    Absolute Lymphocytes 0.2 (L) 0.8 - 5.3 10e3/uL    Absolute Monocytes 0.3 0.0 - 1.3 10e3/uL    Absolute Eosinophils 0.0 0.0 - 0.7 10e3/uL    Absolute Basophils 0.0 0.0 - 0.2 10e3/uL    Absolute Immature Granulocytes 0.0 <=0.4 10e3/uL    Absolute NRBCs 0.0 10e3/uL   Result Value Ref Range    Free T4 0.59 (L) 0.90 - 1.70 ng/dL   Result Value Ref Range    Troponin T, High Sensitivity 28 (H) <=22 ng/L   Result Value Ref Range    Magnesium 2.2 1.7 - 2.3 mg/dL   Basic metabolic panel   Result Value Ref Range    Sodium 130 (L) 135 - 145 mmol/L    Potassium 3.8 3.4 - 5.3 mmol/L    Chloride 100 98 - 107 mmol/L    Carbon Dioxide (CO2) 22 22 - 29 mmol/L    Anion Gap 8 7 - 15 mmol/L    Urea Nitrogen 11.3 8.0 - 23.0 mg/dL    Creatinine 0.76 0.67 - 1.17 mg/dL    GFR Estimate 90 >60 mL/min/1.73m2    Calcium 8.4 (L) 8.8 - 10.2 mg/dL    Glucose 86 70 - 99 mg/dL   CBC with platelets and differential   Result Value Ref Range    WBC Count 1.8 (L) 4.0 - 11.0 10e3/uL    RBC Count 2.50 (L) 4.40 - 5.90 10e6/uL    Hemoglobin 8.2 (L) 13.3 - 17.7 g/dL    Hematocrit 23.4 (L) 40.0 - 53.0 %    MCV 94 78 - 100 fL    MCH 32.8 26.5 - 33.0 pg    MCHC 35.0 31.5 - 36.5 g/dL    RDW 16.9 (H) 10.0 - 15.0 %    Platelet Count 163 150 - 450 10e3/uL    % Neutrophils 75 %    % Lymphocytes 10 %    % Monocytes 14 %    % Eosinophils 0 %    % Basophils 0 %    % Immature Granulocytes 1 %    NRBCs per 100 WBC 0 <1 /100    Absolute Neutrophils 1.4 (L) 1.6 - 8.3 10e3/uL    Absolute Lymphocytes 0.2 (L) 0.8 - 5.3 10e3/uL    Absolute Monocytes 0.3 0.0 - 1.3 10e3/uL    Absolute Eosinophils 0.0 0.0 - 0.7 10e3/uL    Absolute Basophils 0.0 0.0 - 0.2 10e3/uL    Absolute Immature Granulocytes 0.0 <=0.4 10e3/uL    Absolute NRBCs 0.0 10e3/uL   CBC  with platelets   Result Value Ref Range    WBC Count 1.9 (L) 4.0 - 11.0 10e3/uL    RBC Count 2.44 (L) 4.40 - 5.90 10e6/uL    Hemoglobin 8.0 (L) 13.3 - 17.7 g/dL    Hematocrit 23.0 (L) 40.0 - 53.0 %    MCV 94 78 - 100 fL    MCH 32.8 26.5 - 33.0 pg    MCHC 34.8 31.5 - 36.5 g/dL    RDW 17.1 (H) 10.0 - 15.0 %    Platelet Count 149 (L) 150 - 450 10e3/uL   Basic metabolic panel   Result Value Ref Range    Sodium 131 (L) 135 - 145 mmol/L    Potassium 3.5 3.4 - 5.3 mmol/L    Chloride 101 98 - 107 mmol/L    Carbon Dioxide (CO2) 23 22 - 29 mmol/L    Anion Gap 7 7 - 15 mmol/L    Urea Nitrogen 14.3 8.0 - 23.0 mg/dL    Creatinine 0.80 0.67 - 1.17 mg/dL    GFR Estimate 88 >60 mL/min/1.73m2    Calcium 8.1 (L) 8.8 - 10.2 mg/dL    Glucose 87 70 - 99 mg/dL   Echocardiogram Complete   Result Value Ref Range    LVEF  55-60%    Blood Culture Peripheral Blood    Specimen: Peripheral Blood   Result Value Ref Range    Culture No growth after 1 day    Blood Culture Peripheral Blood    Specimen: Peripheral Blood   Result Value Ref Range    Culture No growth after 1 day    Urine Culture    Specimen: Urine, Midstream   Result Value Ref Range    Culture 10,000-50,000 CFU/mL Gram negative bacilli (A)        RADIOLOGY:  Reviewed all pertinent imaging. Please see official radiology report.  Echocardiogram Complete   Final Result      MR Brain w/o & w Contrast   Final Result   IMPRESSION:   1.  No acute infarct, mass, mass effect, or hemorrhage.   2.  Mild to moderate generalized volume loss and mild presumed chronic small vessel ischemic changes.         CT Abdomen Pelvis w Contrast   Final Result   IMPRESSION:    1.  Bladder is quite distended and this likely explains the moderately dilated intrarenal collecting systems. If he cannot void, he'll need to be catheterized.   2.  Overall failure to thrive with marked weight loss since last August and extensive third spacing of fluid with new effusions, trace ascites and diffuse subcutaneous edema.    3.  There is a large amount retained stool in the colon except for the rectosigmoid. Findings suggest colonic ileus. Query medications for bone pain? No bowel obstruction.   4.  Numerous, sclerotic osseous metastasis again noted.   5.  Other noncritical findings as noted above.      CT Chest Pulmonary Embolism w Contrast   Final Result   IMPRESSION:   1.  No pulmonary artery embolism.   2.  Trace right-sided and small left sided pleural effusions with adjacent opacities likely reflecting atelectasis.   3.  Newly visualized 8 mm left lower lobe pulmonary nodule. Recommend 3 month chest CT follow-up.   4.  Diffuse osseous metastases which are similar to the most recent comparison exam.      CTA Head Neck with Contrast   Final Result   IMPRESSION:    HEAD CT:   1.  No CT evidence for acute intracranial process.   2.  Brain atrophy and presumed chronic microvascular ischemic changes as above.      HEAD CTA:    1.  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   2.  Mild to moderate/moderate stenoses involving distal P2/P3 segment branches of the right posterior cerebral artery.   3.  Otherwise, no evidence for high-grade stenosis of the major intracranial arteries. No large vessel occlusion identified.   4.  No intracranial aneurysm or high-flow vascular malformation.      NECK CTA:   1.  Mild bilateral carotid bifurcation atherosclerosis without significant stenosis.   2.  Patent cervical vertebral arteries without significant stenosis.   3.  Small bilateral pleural effusions partially visualized. Ill-defined soft tissue attenuation along the fat/fascial planes of the neck, raising concern for possible anasarca. Recommend clinical correlation. Please also correlate with findings from CT    angiogram of the chest and CT abdomen and pelvis of same date.          EKG:    Performed at: 12:17:21    Impression: Sinus bradycardia, No sign of acute ischemia.      Rate: 53  Rhythm: Sinus  Axis: 63  ND Interval: 176  QRS Interval: 90  QTc Interval: 435  ST Changes: None  Comparison: No comparison ECG available.     I have independently reviewed and interpreted the EKG(s) documented above.    PROCEDURES:   None      [employer] System Documentation:   CMS Diagnoses:               I, Laura Kin, am serving as a scribe to document services personally performed by Liyah Suggs MD based on my observation and the provider's statements to me. I, Liyah Suggs MD, attest that Laura Sanderson is acting in a scribe capacity, has observed my performance of the services and has documented them in accordance with my direction.    Liyah Suggs MD  [facility] EMERGENCY DEPARTMENT  [address] [place] [id number] [phone]     Liyah Suggs MD  05/03/24 0806

## 2024-05-02 ENCOUNTER — APPOINTMENT (OUTPATIENT)
Dept: CARDIOLOGY | Facility: HOSPITAL | Age: 84
DRG: 312 | End: 2024-05-02
Attending: HOSPITALIST
Payer: COMMERCIAL

## 2024-05-02 ENCOUNTER — APPOINTMENT (OUTPATIENT)
Dept: PHYSICAL THERAPY | Facility: HOSPITAL | Age: 84
DRG: 312 | End: 2024-05-02
Attending: HOSPITALIST
Payer: COMMERCIAL

## 2024-05-02 ENCOUNTER — APPOINTMENT (OUTPATIENT)
Dept: OCCUPATIONAL THERAPY | Facility: HOSPITAL | Age: 84
DRG: 312 | End: 2024-05-02
Attending: HOSPITALIST
Payer: COMMERCIAL

## 2024-05-02 LAB
ANION GAP SERPL CALCULATED.3IONS-SCNC: 8 MMOL/L (ref 7–15)
BASOPHILS # BLD AUTO: 0 10E3/UL (ref 0–0.2)
BASOPHILS NFR BLD AUTO: 0 %
BUN SERPL-MCNC: 11.3 MG/DL (ref 8–23)
CALCIUM SERPL-MCNC: 8.4 MG/DL (ref 8.8–10.2)
CHLORIDE SERPL-SCNC: 100 MMOL/L (ref 98–107)
CREAT SERPL-MCNC: 0.76 MG/DL (ref 0.67–1.17)
DEPRECATED HCO3 PLAS-SCNC: 22 MMOL/L (ref 22–29)
EGFRCR SERPLBLD CKD-EPI 2021: 90 ML/MIN/1.73M2
EOSINOPHIL # BLD AUTO: 0 10E3/UL (ref 0–0.7)
EOSINOPHIL NFR BLD AUTO: 0 %
ERYTHROCYTE [DISTWIDTH] IN BLOOD BY AUTOMATED COUNT: 16.9 % (ref 10–15)
GLUCOSE SERPL-MCNC: 86 MG/DL (ref 70–99)
HCT VFR BLD AUTO: 23.4 % (ref 40–53)
HGB BLD-MCNC: 8.2 G/DL (ref 13.3–17.7)
IMM GRANULOCYTES # BLD: 0 10E3/UL
IMM GRANULOCYTES NFR BLD: 1 %
LVEF ECHO: NORMAL
LYMPHOCYTES # BLD AUTO: 0.2 10E3/UL (ref 0.8–5.3)
LYMPHOCYTES NFR BLD AUTO: 10 %
MAGNESIUM SERPL-MCNC: 2.2 MG/DL (ref 1.7–2.3)
MCH RBC QN AUTO: 32.8 PG (ref 26.5–33)
MCHC RBC AUTO-ENTMCNC: 35 G/DL (ref 31.5–36.5)
MCV RBC AUTO: 94 FL (ref 78–100)
MONOCYTES # BLD AUTO: 0.3 10E3/UL (ref 0–1.3)
MONOCYTES NFR BLD AUTO: 14 %
NEUTROPHILS # BLD AUTO: 1.4 10E3/UL (ref 1.6–8.3)
NEUTROPHILS NFR BLD AUTO: 75 %
NRBC # BLD AUTO: 0 10E3/UL
NRBC BLD AUTO-RTO: 0 /100
PLATELET # BLD AUTO: 163 10E3/UL (ref 150–450)
POTASSIUM SERPL-SCNC: 3.8 MMOL/L (ref 3.4–5.3)
RBC # BLD AUTO: 2.5 10E6/UL (ref 4.4–5.9)
SODIUM SERPL-SCNC: 130 MMOL/L (ref 135–145)
WBC # BLD AUTO: 1.8 10E3/UL (ref 4–11)

## 2024-05-02 PROCEDURE — 97116 GAIT TRAINING THERAPY: CPT | Mod: GP

## 2024-05-02 PROCEDURE — 97165 OT EVAL LOW COMPLEX 30 MIN: CPT | Mod: GO

## 2024-05-02 PROCEDURE — 97535 SELF CARE MNGMENT TRAINING: CPT | Mod: GO

## 2024-05-02 PROCEDURE — 250N000013 HC RX MED GY IP 250 OP 250 PS 637: Performed by: HOSPITALIST

## 2024-05-02 PROCEDURE — 83735 ASSAY OF MAGNESIUM: CPT | Performed by: HOSPITALIST

## 2024-05-02 PROCEDURE — 85025 COMPLETE CBC W/AUTO DIFF WBC: CPT | Performed by: HOSPITALIST

## 2024-05-02 PROCEDURE — 250N000011 HC RX IP 250 OP 636: Performed by: HOSPITALIST

## 2024-05-02 PROCEDURE — 80048 BASIC METABOLIC PNL TOTAL CA: CPT | Performed by: HOSPITALIST

## 2024-05-02 PROCEDURE — 93306 TTE W/DOPPLER COMPLETE: CPT

## 2024-05-02 PROCEDURE — 36415 COLL VENOUS BLD VENIPUNCTURE: CPT | Performed by: HOSPITALIST

## 2024-05-02 PROCEDURE — 258N000003 HC RX IP 258 OP 636: Performed by: HOSPITALIST

## 2024-05-02 PROCEDURE — 93306 TTE W/DOPPLER COMPLETE: CPT | Mod: 26 | Performed by: STUDENT IN AN ORGANIZED HEALTH CARE EDUCATION/TRAINING PROGRAM

## 2024-05-02 PROCEDURE — 120N000004 HC R&B MS OVERFLOW

## 2024-05-02 PROCEDURE — 99233 SBSQ HOSP IP/OBS HIGH 50: CPT | Performed by: STUDENT IN AN ORGANIZED HEALTH CARE EDUCATION/TRAINING PROGRAM

## 2024-05-02 PROCEDURE — 97530 THERAPEUTIC ACTIVITIES: CPT | Mod: GP

## 2024-05-02 PROCEDURE — 97161 PT EVAL LOW COMPLEX 20 MIN: CPT | Mod: GP

## 2024-05-02 RX ORDER — POLYETHYLENE GLYCOL 3350 17 G/17G
17 POWDER, FOR SOLUTION ORAL DAILY
Status: DISCONTINUED | OUTPATIENT
Start: 2024-05-02 | End: 2024-05-03

## 2024-05-02 RX ORDER — BISACODYL 10 MG
10 SUPPOSITORY, RECTAL RECTAL ONCE
Status: COMPLETED | OUTPATIENT
Start: 2024-05-02 | End: 2024-05-02

## 2024-05-02 RX ADMIN — CEFAZOLIN SODIUM 2 G: 2 INJECTION, SOLUTION INTRAVENOUS at 08:49

## 2024-05-02 RX ADMIN — FAMOTIDINE 20 MG: 20 TABLET ORAL at 08:49

## 2024-05-02 RX ADMIN — TAMSULOSIN HYDROCHLORIDE 0.4 MG: 0.4 CAPSULE ORAL at 21:17

## 2024-05-02 RX ADMIN — CEFAZOLIN SODIUM 2 G: 2 INJECTION, SOLUTION INTRAVENOUS at 16:18

## 2024-05-02 RX ADMIN — BISACODYL 10 MG: 10 SUPPOSITORY RECTAL at 10:54

## 2024-05-02 RX ADMIN — SENNOSIDES AND DOCUSATE SODIUM 3 TABLET: 8.6; 5 TABLET ORAL at 21:17

## 2024-05-02 RX ADMIN — CEFAZOLIN SODIUM 2 G: 2 INJECTION, SOLUTION INTRAVENOUS at 00:15

## 2024-05-02 RX ADMIN — ASPIRIN 81 MG: 81 TABLET, COATED ORAL at 08:49

## 2024-05-02 RX ADMIN — LEVOTHYROXINE SODIUM 100 MCG: 100 TABLET ORAL at 08:49

## 2024-05-02 RX ADMIN — MAGNESIUM OXIDE TAB 400 MG (241.3 MG ELEMENTAL MG) 400 MG: 400 (241.3 MG) TAB at 08:49

## 2024-05-02 RX ADMIN — CYANOCOBALAMIN TAB 1000 MCG 1000 MCG: 1000 TAB at 08:49

## 2024-05-02 RX ADMIN — ESCITALOPRAM OXALATE 10 MG: 10 TABLET ORAL at 08:49

## 2024-05-02 RX ADMIN — SENNOSIDES AND DOCUSATE SODIUM 3 TABLET: 8.6; 5 TABLET ORAL at 08:49

## 2024-05-02 RX ADMIN — MEGESTROL ACETATE 400 MG: 40 SUSPENSION ORAL at 08:49

## 2024-05-02 RX ADMIN — MAGNESIUM OXIDE TAB 400 MG (241.3 MG ELEMENTAL MG) 400 MG: 400 (241.3 MG) TAB at 21:17

## 2024-05-02 RX ADMIN — SODIUM CHLORIDE: 9 INJECTION, SOLUTION INTRAVENOUS at 10:38

## 2024-05-02 ASSESSMENT — ACTIVITIES OF DAILY LIVING (ADL)
ADLS_ACUITY_SCORE: 45
ADLS_ACUITY_SCORE: 37
WALKING_OR_CLIMBING_STAIRS: AMBULATION DIFFICULTY, ASSISTANCE 1 PERSON
ADLS_ACUITY_SCORE: 39
ADLS_ACUITY_SCORE: 37
ADLS_ACUITY_SCORE: 37
NUMBER_OF_TIMES_PATIENT_HAS_FALLEN_WITHIN_LAST_SIX_MONTHS: 2
DIFFICULTY_COMMUNICATING: NO
CHANGE_IN_FUNCTIONAL_STATUS_SINCE_ONSET_OF_CURRENT_ILLNESS/INJURY: NO
DIFFICULTY_EATING/SWALLOWING: NO
ADLS_ACUITY_SCORE: 45
ADLS_ACUITY_SCORE: 39
ADLS_ACUITY_SCORE: 39
CONCENTRATING,_REMEMBERING_OR_MAKING_DECISIONS_DIFFICULTY: NO
WEAR_GLASSES_OR_BLIND: YES
WALKING_OR_CLIMBING_STAIRS_DIFFICULTY: YES
ADLS_ACUITY_SCORE: 45
ADLS_ACUITY_SCORE: 37
VISION_MANAGEMENT: GLASSES
ADLS_ACUITY_SCORE: 37
ADLS_ACUITY_SCORE: 39
ADLS_ACUITY_SCORE: 37
DEPENDENT_IADLS:: CLEANING;SHOPPING;TRANSPORTATION
HEARING_DIFFICULTY_OR_DEAF: NO
ADLS_ACUITY_SCORE: 34
ADLS_ACUITY_SCORE: 39
ADLS_ACUITY_SCORE: 39
DOING_ERRANDS_INDEPENDENTLY_DIFFICULTY: YES
ADLS_ACUITY_SCORE: 39
ADLS_ACUITY_SCORE: 45
EQUIPMENT_CURRENTLY_USED_AT_HOME: WALKER, ROLLING
FALL_HISTORY_WITHIN_LAST_SIX_MONTHS: YES
TOILETING_ISSUES: NO
DRESSING/BATHING_DIFFICULTY: NO
ADLS_ACUITY_SCORE: 37

## 2024-05-02 NOTE — PROGRESS NOTES
OT Eval     05/02/24 1100   Appointment Info   Signing Clinician's Name / Credentials (OT) Ting Masters, OTR/L   Living Environment   People in Home spouse;child(gunner), adult  (Daughter)   Current Living Arrangements house   Home Accessibility stairs to enter home;stairs within home   Number of Stairs, Main Entrance 2   Stair Railings, Main Entrance railings safe and in good condition   Number of Stairs, Within Home, Primary seven   Stair Railings, Within Home, Primary railings safe and in good condition   Living Environment Comments Pt lives in split level home (~7/8 stairs to go upstairs, bedroom/living room/BR upstairs). Lives w/ adult daughter and spouse who provide A to pt. Walk-in shower. Own shower chair but doesn't use it.   Self-Care   Current Activity Tolerance fair   Equipment Currently Used at Home walker, standard;cane, straight  (Up until a few weeks ago, pt ambulated w/o device. Recently, since weakness start, pt using FWW more. Pt owns cane, doesn't use it.)   Fall history within last six months yes   Number of times patient has fallen within last six months 1   Activity/Exercise/Self-Care Comment Pt has A w/ ADLs from daughter/spouse; LB dressing (threading pants and socks/shoes), toileting and showering (unclear how much A for these tasks). Usually ambulated w/o device, has recently been using FWW   Instrumental Activities of Daily Living (IADL)   IADL Comments A w/ IADLs, wife helps w cleaning and housework, cooking. Pt drives but hasn't in a few weeks d/t symptoms.   General Information   Onset of Illness/Injury or Date of Surgery 05/01/24   Referring Physician Arnel Bingham, DO   Patient/Family Therapy Goal Statement (OT) Go home   Additional Occupational Profile Info/Pertinent History of Current Problem Patient is an 83-year-old male with history of metastatic prostate cancer, prior stroke and recent MSSA bacteremia who presents after a syncopal episode.   Existing Precautions/Restrictions  fall   Cognitive Status Examination   Orientation Status orientation to person, place and time   Affect/Mental Status (Cognitive) WNL   Sensory   Sensory Quick Adds sensation intact   Pain Assessment   Patient Currently in Pain No   Range of Motion Comprehensive   General Range of Motion no range of motion deficits identified   Strength Comprehensive (MMT)   General Manual Muscle Testing (MMT) Assessment no strength deficits identified   Comment, General Manual Muscle Testing (MMT) Assessment General weakness   Muscle Tone Assessment   Muscle Tone Quick Adds No deficits were identified   Coordination   Upper Extremity Coordination No deficits were identified   Bed Mobility   Bed Mobility supine-sit   Supine-Sit [place] (Bed Mobility) supervision   Transfers   Transfers sit-stand transfer   Sit-Stand Transfer   Sit-Stand [place] (Transfers) contact guard   Assistive Device (Sit-Stand Transfers) walker, standard   Activities of Daily Living   BADL Assessment/Intervention toileting;lower body dressing   Lower Body Dressing Assessment/Training   Comment, (Lower Body Dressing) Pt is dep for LB dressing at BL   [place] Level (Lower Body Dressing) dependent (less than 25% patient effort);socks   Toileting   Comment, (Toileting) Did not directly observe, can predict via clinical reasoning and observed transfer that pt is SBA-Kristopher for toilet transfer   Clinical Impression   Criteria for Skilled Therapeutic Interventions Met (OT) Yes, treatment indicated   OT Diagnosis Decreased activity tolerance, impaired functional mobility   Influenced by the following impairments Weakness, diagnosis, deconditioning   OT Problem List-Impairments impacting ADL problems related to;activity tolerance impaired;mobility;strength   Assessment of Occupational Performance 1-3 Performance Deficits   Identified Performance Deficits Toileting, transfers, functional mobility   Planned Therapy Interventions (OT) ADL  retraining;strengthening;progressive activity/exercise;home program guidelines   Clinical Decision Making Complexity (OT) problem focused assessment/low complexity   Risk & Benefits of therapy have been explained evaluation/treatment results reviewed;care plan/treatment goals reviewed   OT Total Evaluation Time   OT Eval, Low Complexity Minutes (02802) 20   OT Goals   Therapy Frequency (OT) 5 times/week   OT Predicted Duration/Target Date for Goal Attainment 05/09/24   OT Goals Transfers;Toilet Transfer/Toileting;Aerobic Activity   OT: Transfer Modified independent   OT: Toilet Transfer/Toileting Modified independent   OT: Perform aerobic activity with stable cardiovascular response continuous activity;10 minutes   Interventions   Interventions Quick Adds Self-Care/Home Management   Self-Care/Home Management   Self-Care/Home Mgmt/ADL, Compensatory, Meal Prep Minutes (74727) 15   Treatment Detail/Skilled Intervention Eval complete, treatment indicated. Pt sat unsupported EOB for ~5 min, OT answered pt's Qs regarding HC services, left message for . Pt then completed additional STS from EOB w/ CGA FWW, pt ambulated w/ FWW ~20' to BR CGA-SBA, no symptoms. Pt sat on toilet (OT and pt discussed home setup; obtaining GBs), needing A to doff brief, doff socks, complete LB bathing. Direct handoff to RN at end of session who remained in BR w/ pt.   OT Discharge Planning   OT Plan Activity tolerance w/ ADL routine, strengthening, standing tolerance/balance, standing g/h at sink.   OT Discharge Recommendation (DC Rec) home with home care occupational therapy   OT Rationale for DC Rec Pt is currently CGA-SBA, has assist w/ ADLs at baseline and has family that will be home with him daily.   OT Brief overview of current status CGA FWW   Total Session Time   Timed Code Treatment Minutes 15   Total Session Time (sum of timed and untimed services) 35

## 2024-05-02 NOTE — PROGRESS NOTES
[facility]    Medicine Progress Note - Hospitalist Service    Date of Admission:  5/1/2024    Assessment & Plan   Patient is an 83-year-old male with history of metastatic prostate cancer, prior stroke and recent MSSA bacteremia who presents after a syncopal episode.     Syncope  Likely vasovasal response while attempting to have a bowel movement  Other differential: cardiac arrythmia in the setting of hypokalemia, doubt stroke with no focal deficits  Monitor tele  Obtain echo: result pending   MRI brain: unremarkable      Urinary retention  BPH  Symptomatic and seen on CT  Place carlton if post-void bladder scan shows >300ml  Obtain UA  Resume home alpha blocker  Acute retention likely caused by constipation  If carlton catheter is placed, would plan for voiding trial after constipation improves     Constipation  Mg oxide 400 mg Po 2 times daily  Schedule senna-colace, daily miralax  Suppository  Enema if needed     Hypokalemia  Mildly low magnesium  Replace per protocol     Hyponatremia  s/p 1 L normal saline , monitor BMP     Recent MSSA bacteremia  Admited to [place] hospital for about 2 weeks  Continue IV cefazolin  BCx drawn in ED     Hypothyroidism  TSH elevated, T4 low  Continue home dose synthroid, would not adjust dose according to thyroid studies taking during acute illness  Will need repeat TSH/T4 in a few weeks     Metastatic prostate cancer  Leukopenia  Anemia, likely due to chemo  Mets to bone  Chemo and radiation ~3 weeks ago in [place]    Severe malnutrition  -Management per RD recommendation        Diet: Combination Diet Regular Diet Adult    DVT Prophylaxis: Pneumatic Compression Devices  Carlton Catheter: Not present  Lines: PRESENT      PICC Double Lumen Left Brachial vein medial-Site Assessment: WDL      Cardiac Monitoring: ACTIVE order. Indication: Syncope- high cardiac risk (48 hours)  Code Status: No CPR- Do NOT Intubate      Clinically Significant Risk Factors Present on  Admission        # Hypokalemia: Lowest K = 2.9 mmol/L in last 2 days, will replace as needed  # Hyponatremia: Lowest Na = 129 mmol/L in last 2 days, will monitor as appropriate      # Hypoalbuminemia: Lowest albumin = 2.8 g/dL at 5/1/2024 12:38 PM, will monitor as appropriate  # Coagulation Defect: INR = 1.16 (Ref range: 0.85 - 1.15) and/or PTT = 30 Seconds (Ref range: 22 - 38 Seconds), will monitor for bleeding  # Drug Induced Platelet Defect: home medication list includes an antiplatelet medication                   Disposition Plan     Medically Ready for Discharge: Anticipated Tomorrow       Lily Delgado MD  Hospitalist Service  [facility]  Securely message with [employer] (more info)  Text page via [employer] Paging/Directory   ______________________________________________________________________    Interval History   Patient boarding in ER awaiting bed assignment.  No distress noted.  Patient has not had bowel movements yet.  He denies having chest pain or palpitation.  Echocardiogram has been done results has not been released yet.  Management plan discussed with the patient and he expressed understanding.    Physical Exam   Vital Signs: Temp: 98.1  F (36.7  C) Temp src: Oral BP: 106/59 Pulse: 82   Resp: 20 SpO2: 99 % O2 Device: None (Room air)    Weight: 112 lbs 0 oz    General Appearance: No distress noted  Respiratory: Good air entry bilateral  Cardiovascular: S1 and S2 well heard, no murmur or gallop  GI: Soft abdomen, no tenderness, normoactive bowel sound  Skin: Intact and warm     Medical Decision Making       50 MINUTES SPENT BY ME on the date of service doing chart review, history, exam, documentation & further activities per the note.      Data

## 2024-05-02 NOTE — PLAN OF CARE
Goal Outcome Evaluation:      Plan of Care Reviewed With: patient    Overall Patient Progress: improvingOverall Patient Progress: improving    Outcome Evaluation: Pt denies pain. Ate 100% dinner. Pt refused Dulcolax suppository as he feels to fatigued and weak to address this tonight. States he will take it in the am.

## 2024-05-02 NOTE — PLAN OF CARE
Problem: Malnutrition  Goal: Improved Nutritional Intake  Outcome: Progressing     Problem: Pain Acute  Goal: Optimal Pain Control and Function  Outcome: Progressing  Intervention: Prevent or Manage Pain  Recent Flowsheet Documentation  Taken 5/2/2024 0500 by Buffy Carey, RN  Medication Review/Management: medications reviewed  Taken 5/1/2024 2347 by Buffy Carey, RN  Medication Review/Management: medications reviewed     Goal Outcome Evaluation: VSS overnight. Pt. Expressed no pain just that he was feeling very tired.  Assisted with cared due to urinary incontinence during the night.  Oriented and able to make needs known

## 2024-05-02 NOTE — PLAN OF CARE
Problem: Malnutrition  Goal: Improved Nutritional Intake  Outcome: Progressing     Problem: Pain Acute  Goal: Optimal Pain Control and Function  Outcome: Progressing  Intervention: Prevent or Manage Pain  Recent Flowsheet Documentation  Taken 5/2/2024 1630 by Keshia Boyce RN  Medication Review/Management: medications reviewed  Taken 5/2/2024 0900 by Keshia Boyce, RN  Medication Review/Management: medications reviewed  Up with SBA and walker. One incontinence episode of stool noted after suppository given. PT/OT working with pt. IV antibiotics given per order. Call light within reach, calls appropriately. VSS. Family at bedside, updated on plan of care.        Goal Outcome Evaluation:

## 2024-05-02 NOTE — CONSULTS
CLINICAL NUTRITION SERVICES  -  ASSESSMENT NOTE    Recommendations Ordered by Registered Dietitian (RD):   daily vanilla Ensure with breakfast   chocolate Magic Cups with lunch and dinner  Encouraged po intake, emphasizing the importance of protein to preserve lean muscle mass   Malnutrition: 5/2  % Weight Loss:  > 10% in 6 months (severe malnutrition) - 14% in 6 months   % Intake:  <75% for > 7 days (moderate malnutrition)  Subcutaneous Fat Loss:  None observed - suspect low baseline  Muscle Loss:  Temporal region moderate depletion, Clavicle bone region moderate depletion, and Upper arm region moderate depletion  Fluid Retention:  None noted    Malnutrition Diagnosis: Severe malnutrition  In Context of:  Acute illness or injury  Chronic illness or disease     REASON FOR ASSESSMENT  Kilo Montiel is a 83 year old male seen by Registered Dietitian for Provider Order - Cachectic     PMH of: metastatic prostate cancer, prior stroke and recent MSSA bacteremia who presents after a syncopal episode.   Problem list: Syncope, Urinary retention, BPH, Constipation, Hypokalemia, Mildly low magnesium, Hyponatremia, Recent MSSA, Hypothyroid, Metastatic prostate cancer    NUTRITION HISTORY  - Information obtained from chart review and pt   - Typical food/fluid intake is 3 meals/day  - Diet history - 2 weeks ago Pt didn't eat for about 3 days and went to the ER in [place]. Since then he was only finishing about 2/3 of his meals.  - Pt reported a stable wt of around 130-135 lbs about six months ago and endorsed the wt trends below.   - Supplements - Pt likes a brand from [employer] but couldn't recall the brand.  He wanted to receive a daily vanilla Ensure with breakfast and get chocolate Magic Cups with lunch and dinner    CURRENT NUTRITION ORDERS  Diet Order:   Regular     Current Intake/Tolerance: Pt liked the eggs, muffin and Cheerios he had for breakfast  - Flowsheets show a good - fair appetite and x1-2 intakes/day of %.  "    NUTRITION FOCUSED PHYSICAL ASSESSMENT FOR DIAGNOSING MALNUTRITION)  Yes    Observed:    Muscle wasting (refer to documentation in Malnutrition section)    ANTHROPOMETRICS  Height: 5' 6\"  Weight: 112 lbs 0 oz   Body mass index is 18.08 kg/m .  Weight Status:  Normal BMI - lower end of range  IBW: 63.8 kg  % IBW: 80%  Weight History: 14% in 6 months   Wt Readings from Last 10 Encounters:   05/01/24 50.8 kg (112 lb)   10/13/23 57.2 kg (126 lb)   08/22/16 68.9 kg (152 lb)   08/26/14 69.5 kg (153 lb 3.2 oz)   04/30/14 69.5 kg (153 lb 3.2 oz)   11/08/13 66.5 kg (146 lb 9.7 oz)   10/24/13 68.9 kg (152 lb)   10/09/13 67.9 kg (149 lb 11.2 oz)   08/15/13 69.4 kg (153 lb)   06/21/13 69.9 kg (154 lb)   Stable wt 6 months ago: 130-135 lbs (59.1 kg)    LABS  Labs reviewed - Na 130 (L),     MEDICATIONS  Medications reviewed - Levothyroxine, B12, Mag-Ox, IVF @ 75 ml/hr    ASSESSED NUTRITION NEEDS PER APPROVED PRACTICE GUIDELINES:  Dosing Weight 51 kg (actual)  Estimated Energy Needs: 2518-7537+ kcals (30-35+ Kcal/Kg)  Justification: repletion  Estimated Protein Needs: 77+ grams protein (1.5+ g pro/Kg)  Justification: Repletion and increased needs  Estimated Fluid Needs: 1172-3979  mL (1 mL/Kcal)  Justification: maintenance    MALNUTRITION:   % Weight Loss:  > 10% in 6 months (severe malnutrition) - 14% in 6 months   % Intake:  <75% for > 7 days (moderate malnutrition)  Subcutaneous Fat Loss:  None observed - suspect low baseline  Muscle Loss:  Temporal region moderate depletion, Clavicle bone region moderate depletion, and Upper arm region moderate depletion  Fluid Retention:  None noted    Malnutrition Diagnosis: Severe malnutrition  In Context of:  Acute illness or injury  Chronic illness or disease    NUTRITION DIAGNOSIS:  Inadequate oral intake related to Increased nutrient needs 2/2 to malignancy and infection as evidenced by pt report 66% po intake or less for the past two weeks, 14% wt loss from baseline 6 months ago, " and moderate muscle wasting    NUTRITION INTERVENTIONS  Recommendations / Nutrition Prescription  daily vanilla Ensure with breakfast   chocolate Magic Cups with lunch and dinner  Encouraged po intake, emphasizing the importance of protein to preserve lean muscle mass    Implementation  Nutrition education: Per Provider order if indicated   Medical Food Supplement    Nutrition Goals  PO intake - >75% of meal trays, or the equivalent in supplements, TID   Weight - Maintain    MONITORING AND EVALUATION:  Progress towards goals will be monitored and evaluated per protocol and Practice Guidelines    Marciano Curry RD, LD

## 2024-05-02 NOTE — CONSULTS
Care Management Initial Consult    General Information  Assessment completed with: Patient, Family,    Type of CM/SW Visit: Initial Assessment    Primary Care Provider verified and updated as needed: Yes   Readmission within the last 30 days: no previous admission in last 30 days      Reason for Consult: discharge planning  Advance Care Planning:            Communication Assessment  Patient's communication style: spoken language (English or Bilingual)             Cognitive  Cognitive/Neuro/Behavioral: WDL                      Living Environment:   People in home: spouse, child(gunner), adult     Current living Arrangements: house ([facility])      Able to return to prior arrangements: yes       Family/Social Support:  Care provided by: self, spouse/significant other  Provides care for: no one  Marital Status:   Wife, Children, Sibling(s), Neighbor          Description of Support System: Supportive, Involved    Support Assessment: Adequate family and caregiver support    Current Resources:   Patient receiving home care services: No     Community Resources: None  Equipment currently used at home: walker, rolling  Supplies currently used at home: None    Employment/Financial:  Employment Status: retired        Financial Concerns: none   Referral to Financial Worker: No       Does the patient's insurance plan have a 3 day qualifying hospital stay waiver?  No    Lifestyle & Psychosocial Needs:  Social Determinants of Health     Food Insecurity: Not on file   Depression: Not at risk (5/3/2023)    Received from [facility] [facility]    PHQ-2     PHQ-2 Score: 0   Housing Stability: Not on file   Tobacco Use: Medium Risk (5/1/2024)    Patient History     Smoking Tobacco Use: Former     Smokeless Tobacco Use: Former     Passive Exposure: Not on file   Financial Resource Strain: Not on file   Alcohol Use: Not on file   Transportation Needs: Not on file   Physical Activity: Not on file   Interpersonal Safety: Not on  file   Stress: Not on file   Social Connections: Not on file   Health Literacy: Not on file       Functional Status:  Prior to admission patient needed assistance:   Dependent ADLs:: Independent, Ambulation-walker  Dependent IADLs:: Cleaning, Shopping, Transportation (Stairs)       Mental Health Status:  Mental Health Status: No Current Concerns       Chemical Dependency Status:  Chemical Dependency Status: No Current Concerns             Values/Beliefs:  Spiritual, Cultural Beliefs, Zoroastrianism Practices, Values that affect care: no               Additional Information:  CM consulted for Initial CM Assessment and assistance with discharge planning.     3:36 PM  Initial CM Assessment completed with Pt and family members in the room. Pt lives with his wife and adult daughter in a [facility]. Pt and Ellie recently returned from [facility] in [place]. Pt is independent with basic ADLs but Ellie reports having to assist him more and more with his mobility like stairs. Ellie reported that she cannot continue to assist Pt with mobility anymore as she tore her right rotator cuff and is needing surgery. Pt and family were interested on getting additional support at home. SW provided education on home care services and family was agreeable to CM sending a referral for home care services. Family also wanted to discuss TCU but SW reported therapies are recommending home care. Family reported Pt will need to complete stairs before being able to return home as family cannot assist. SW reported CM will send home care referrals and follow for any further recommendations from therapy. Family to transport at discharge.    Home care referral sent to the [facility]. Referral pending.      BRENDEN Corral

## 2024-05-02 NOTE — UTILIZATION REVIEW
Admission Status; Secondary Review Determination   Under the authority of the Utilization Management Committee, the utilization review process indicated a secondary review on Kilo Montiel. The review outcome is based on review of the medical records, discussions with staff, and applying clinical experience noted on the date of the review.   (x) Inpatient Status Appropriate - This patient's medical care is consistent with medical management for inpatient care and reasonable inpatient medical practice.     RATIONALE FOR DETERMINATION   Kilo Montiel is an 83 yr old male with metastatic prostate cancer, hx CVA and recent MSSA bacteremia who presented after syncope event.  Marked weakness.  Noted hyponatremia and profound hypokalemia.  Also thyroid labs marked abnormal.  Most noted was leukopenia which was possible due to chemo/XRT about 3 weeks ago however this AM WBC even lower to 1.8.  Currently IV abx continue given recent bacteremia however new blood cultures in process as does have a PICC for abx.  Ongoing evaluation for syncope in progress.  IVF continues.  At this time with ongoing IVF, dropping WBC anticipate will require a second night.    At the time of admission with the information available to the attending physician more than 2 nights Hospital complex care was anticipated, based on patient risk of adverse outcome if treated as outpatient and complex care required. Inpatient admission is appropriate based on the Medicare guidelines.   The information on this document is developed by the utilization review team in order for the business office to ensure compliance. This only denotes the appropriateness of proper admission status and does not reflect the quality of care rendered.   The definitions of Inpatient Status and Observation Status used in making the determination above are those provided in the CMS Coverage Manual, Chapter 1 and Chapter 6, section 70.4.   Sincerely,   Consuelo Abraham,  MD  Utilization Review  Physician Advisor  [facility]

## 2024-05-02 NOTE — PROGRESS NOTES
"   05/02/24 6780   Appointment Info   Signing Clinician's Name / Credentials (PT) Malou Trivedi, PT, DPT   Living Environment   People in Home spouse;child(gunner), adult  (daughter)   Current Living Arrangements house   Home Accessibility stairs to enter home;stairs within home   Number of Stairs, Main Entrance 2   Stair Railings, Main Entrance none   Number of Stairs, Within Home, Primary greater than 10 stairs  (6 stairs with railing on right, landing, 6 stairs with bilateral railings)   Stair Railings, Within Home, Primary railing on right side (ascending);railings on both sides of stairs   Living Environment Comments split level home - pt daughter able to assist with stairs as needed   Self-Care   Equipment Currently Used at Home walker, rolling  (pt was not using a device until about a month ago. Recently started using a FWW)   Fall history within last six months yes   Number of times patient has fallen within last six months 1   Activity/Exercise/Self-Care Comment Increased assist with mobility recently d/t increase in weakness   General Information   Onset of Illness/Injury or Date of Surgery 05/01/24   Referring Physician Arnel Bingham,    Patient/Family Therapy Goals Statement (PT) Return to Home   Pertinent History of Current Problem (include personal factors and/or comorbidities that impact the POC) Per Chart Review H&P -\"83-year-old male with history of metastatic prostate cancer, prior stroke and recent MSSA bacteremia who presents after a syncopal episode.\"   Existing Precautions/Restrictions fall   Range of Motion (ROM)   Range of Motion ROM is WFL   Strength (Manual Muscle Testing)   Strength (Manual Muscle Testing) Deficits observed during functional mobility   Bed Mobility   Bed Mobility supine-sit   Supine-Sit [place] (Bed Mobility) supervision;verbal cues;contact guard   Transfers   Transfers sit-stand transfer   Sit-Stand Transfer   Sit-Stand [place] (Transfers) supervision;verbal " cues;contact guard   Assistive Device (Sit-Stand Transfers) walker, front-wheeled   Gait/Stairs (Locomotion)   [place] Level (Gait) supervision;verbal cues;contact guard   Assistive Device (Gait) walker, front-wheeled   Distance in Feet (Gait) 5   Pattern (Gait) step-through;swing-through   Deviations/Abnormal Patterns (Gait) gait speed decreased;gabriela decreased   Clinical Impression   Criteria for Skilled Therapeutic Intervention Yes, treatment indicated   PT Diagnosis (PT) Impaired Functional Mobility   Influenced by the following impairments weakness, impaired balance   Functional limitations due to impairments gait, transfers, stairs   Clinical Presentation (PT Evaluation Complexity) stable   Clinical Presentation Rationale pt presents as medically diagnosed   Clinical Decision Making (Complexity) low complexity   Planned Therapy Interventions (PT) balance training;bed mobility training;home exercise program;stair training;strengthening;transfer training;neuromuscular re-education   Risk & Benefits of therapy have been explained evaluation/treatment results reviewed;patient   PT Total Evaluation Time   PT Eval, Low Complexity Minutes (41012) 10   Physical Therapy Goals   PT Frequency Daily   PT Predicted Duration/Target Date for Goal Attainment 05/08/24   PT Goals Transfers;Gait;Stairs   PT: Transfers Supervision/stand-by assist;Sit to/from stand;Assistive device   PT: Gait Supervision/stand-by assist;Rolling walker;Within precautions;150 feet   PT: Stairs Supervision/stand-by assist;Within precautions;6 stairs;Rail on right   Interventions   Interventions Quick Adds Gait Training;Therapeutic Activity   Therapeutic Activity   Therapeutic Activities: dynamic activities to improve functional performance Minutes (59453) 11   Symptoms Noted During/After Treatment Fatigue   Treatment Detail/Skilled Intervention Sit to/from stand: cueing for safety/technique/hand placement, CGA - increased education about hand  placement on stable surface; Standing balance: cueing for safety/posture - B UE support from FWW, SBA balance - dependent with brief management; Navigating FWW in bathroom: cueing for safety/technique, SBA; increased time for management of lines & room set up for safe transfers. Hand off to RN at end of session   Gait Training   Gait Training Minutes (12907) 8   Symptoms Noted During/After Treatment (Gait Training) fatigue   Treatment Detail/Skilled Intervention pt ambulated in hallway. cueing for safety/FWW management/posture. no loss of balance noted in session. recommend use of FWW   Distance in Feet 100   [place] Level (Gait Training) contact guard   Physical Assistance Level (Gait Training) supervision;verbal cues   Weight Bearing (Gait Training) full weight-bearing   Assistive Device (Gait Training) rolling walker   Pattern Analysis (Gait Training) swing-through gait   Gait Analysis Deviations decreased gabriela;decreased step length   Impairments (Gait Analysis/Training) strength decreased   PT Discharge Planning   PT Plan gait, stairs, LE therex   PT Discharge Recommendation (DC Rec) home with assist;home with home care physical therapy   PT Rationale for DC Rec pending safe stair trial, anticipate pt will be able to complete required functional mobility at home with assist from family as needed. Home PT for improving activity tolerance/strength/balance   PT Brief overview of current status CGA gait with ' & sit to/from stand; no loss of balance noted in session   PT Equipment Needed at Discharge walker, rolling   Total Session Time   Timed Code Treatment Minutes 19   Total Session Time (sum of timed and untimed services) 29

## 2024-05-03 ENCOUNTER — APPOINTMENT (OUTPATIENT)
Dept: PHYSICAL THERAPY | Facility: HOSPITAL | Age: 84
DRG: 312 | End: 2024-05-03
Payer: COMMERCIAL

## 2024-05-03 ENCOUNTER — APPOINTMENT (OUTPATIENT)
Dept: OCCUPATIONAL THERAPY | Facility: HOSPITAL | Age: 84
DRG: 312 | End: 2024-05-03
Payer: COMMERCIAL

## 2024-05-03 LAB
ANION GAP SERPL CALCULATED.3IONS-SCNC: 7 MMOL/L (ref 7–15)
BACTERIA UR CULT: ABNORMAL
BLAIMP ISLT/SPM QL: NOT DETECTED
BLAKPC ISLT/SPM QL: NOT DETECTED
BLAOXA-48 ISLT/SPM QL: NOT DETECTED
BLAVIM ISLT/SPM QL: NOT DETECTED
BUN SERPL-MCNC: 14.3 MG/DL (ref 8–23)
CALCIUM SERPL-MCNC: 8.1 MG/DL (ref 8.8–10.2)
CHLORIDE SERPL-SCNC: 101 MMOL/L (ref 98–107)
CREAT SERPL-MCNC: 0.8 MG/DL (ref 0.67–1.17)
DEPRECATED HCO3 PLAS-SCNC: 23 MMOL/L (ref 22–29)
EGFRCR SERPLBLD CKD-EPI 2021: 88 ML/MIN/1.73M2
ERYTHROCYTE [DISTWIDTH] IN BLOOD BY AUTOMATED COUNT: 17.1 % (ref 10–15)
GLUCOSE SERPL-MCNC: 87 MG/DL (ref 70–99)
HCT VFR BLD AUTO: 23 % (ref 40–53)
HGB BLD-MCNC: 8 G/DL (ref 13.3–17.7)
MCH RBC QN AUTO: 32.8 PG (ref 26.5–33)
MCHC RBC AUTO-ENTMCNC: 34.8 G/DL (ref 31.5–36.5)
MCV RBC AUTO: 94 FL (ref 78–100)
NDM TARGET DNA: NOT DETECTED
PLATELET # BLD AUTO: 149 10E3/UL (ref 150–450)
POTASSIUM SERPL-SCNC: 3.5 MMOL/L (ref 3.4–5.3)
RBC # BLD AUTO: 2.44 10E6/UL (ref 4.4–5.9)
SODIUM SERPL-SCNC: 131 MMOL/L (ref 135–145)
WBC # BLD AUTO: 1.9 10E3/UL (ref 4–11)

## 2024-05-03 PROCEDURE — 99233 SBSQ HOSP IP/OBS HIGH 50: CPT | Performed by: STUDENT IN AN ORGANIZED HEALTH CARE EDUCATION/TRAINING PROGRAM

## 2024-05-03 PROCEDURE — 97530 THERAPEUTIC ACTIVITIES: CPT | Mod: GP

## 2024-05-03 PROCEDURE — 250N000011 HC RX IP 250 OP 636: Performed by: HOSPITALIST

## 2024-05-03 PROCEDURE — 85027 COMPLETE CBC AUTOMATED: CPT | Performed by: STUDENT IN AN ORGANIZED HEALTH CARE EDUCATION/TRAINING PROGRAM

## 2024-05-03 PROCEDURE — 250N000013 HC RX MED GY IP 250 OP 250 PS 637: Performed by: HOSPITALIST

## 2024-05-03 PROCEDURE — 258N000003 HC RX IP 258 OP 636: Performed by: STUDENT IN AN ORGANIZED HEALTH CARE EDUCATION/TRAINING PROGRAM

## 2024-05-03 PROCEDURE — 97535 SELF CARE MNGMENT TRAINING: CPT | Mod: GO

## 2024-05-03 PROCEDURE — 250N000013 HC RX MED GY IP 250 OP 250 PS 637: Performed by: STUDENT IN AN ORGANIZED HEALTH CARE EDUCATION/TRAINING PROGRAM

## 2024-05-03 PROCEDURE — 120N000004 HC R&B MS OVERFLOW

## 2024-05-03 PROCEDURE — 250N000011 HC RX IP 250 OP 636: Performed by: STUDENT IN AN ORGANIZED HEALTH CARE EDUCATION/TRAINING PROGRAM

## 2024-05-03 PROCEDURE — 97110 THERAPEUTIC EXERCISES: CPT | Mod: GP

## 2024-05-03 PROCEDURE — 87798 DETECT AGENT NOS DNA AMP: CPT | Performed by: STUDENT IN AN ORGANIZED HEALTH CARE EDUCATION/TRAINING PROGRAM

## 2024-05-03 PROCEDURE — 80048 BASIC METABOLIC PNL TOTAL CA: CPT | Performed by: STUDENT IN AN ORGANIZED HEALTH CARE EDUCATION/TRAINING PROGRAM

## 2024-05-03 RX ORDER — HEPARIN SODIUM 5000 [USP'U]/.5ML
5000 INJECTION, SOLUTION INTRAVENOUS; SUBCUTANEOUS EVERY 12 HOURS
Status: DISCONTINUED | OUTPATIENT
Start: 2024-05-03 | End: 2024-05-07 | Stop reason: HOSPADM

## 2024-05-03 RX ORDER — POLYETHYLENE GLYCOL 3350 17 G/17G
17 POWDER, FOR SOLUTION ORAL 2 TIMES DAILY
Status: DISCONTINUED | OUTPATIENT
Start: 2024-05-03 | End: 2024-05-07 | Stop reason: HOSPADM

## 2024-05-03 RX ORDER — SODIUM CHLORIDE 9 MG/ML
INJECTION, SOLUTION INTRAVENOUS CONTINUOUS
Status: ACTIVE | OUTPATIENT
Start: 2024-05-03 | End: 2024-05-04

## 2024-05-03 RX ADMIN — MEGESTROL ACETATE 400 MG: 40 SUSPENSION ORAL at 08:09

## 2024-05-03 RX ADMIN — CEFAZOLIN SODIUM 2 G: 2 INJECTION, SOLUTION INTRAVENOUS at 09:24

## 2024-05-03 RX ADMIN — MAGNESIUM OXIDE TAB 400 MG (241.3 MG ELEMENTAL MG) 400 MG: 400 (241.3 MG) TAB at 08:08

## 2024-05-03 RX ADMIN — SENNOSIDES AND DOCUSATE SODIUM 3 TABLET: 8.6; 5 TABLET ORAL at 20:25

## 2024-05-03 RX ADMIN — MAGNESIUM OXIDE TAB 400 MG (241.3 MG ELEMENTAL MG) 400 MG: 400 (241.3 MG) TAB at 20:26

## 2024-05-03 RX ADMIN — ASPIRIN 81 MG: 81 TABLET, COATED ORAL at 08:09

## 2024-05-03 RX ADMIN — HEPARIN SODIUM 5000 UNITS: 10000 INJECTION, SOLUTION INTRAVENOUS; SUBCUTANEOUS at 20:28

## 2024-05-03 RX ADMIN — LEVOTHYROXINE SODIUM 100 MCG: 100 TABLET ORAL at 06:50

## 2024-05-03 RX ADMIN — CYANOCOBALAMIN TAB 1000 MCG 1000 MCG: 1000 TAB at 08:09

## 2024-05-03 RX ADMIN — SODIUM CHLORIDE: 9 INJECTION, SOLUTION INTRAVENOUS at 23:38

## 2024-05-03 RX ADMIN — SODIUM CHLORIDE: 9 INJECTION, SOLUTION INTRAVENOUS at 09:27

## 2024-05-03 RX ADMIN — CEFAZOLIN SODIUM 2 G: 2 INJECTION, SOLUTION INTRAVENOUS at 16:46

## 2024-05-03 RX ADMIN — ESCITALOPRAM OXALATE 10 MG: 10 TABLET ORAL at 08:08

## 2024-05-03 RX ADMIN — POLYETHYLENE GLYCOL 3350 17 G: 17 POWDER, FOR SOLUTION ORAL at 20:40

## 2024-05-03 RX ADMIN — CEFAZOLIN SODIUM 2 G: 2 INJECTION, SOLUTION INTRAVENOUS at 23:39

## 2024-05-03 RX ADMIN — TAMSULOSIN HYDROCHLORIDE 0.4 MG: 0.4 CAPSULE ORAL at 20:26

## 2024-05-03 RX ADMIN — SENNOSIDES AND DOCUSATE SODIUM 3 TABLET: 8.6; 5 TABLET ORAL at 08:08

## 2024-05-03 RX ADMIN — POLYETHYLENE GLYCOL 3350 17 G: 17 POWDER, FOR SOLUTION ORAL at 08:08

## 2024-05-03 RX ADMIN — FAMOTIDINE 20 MG: 20 TABLET ORAL at 08:08

## 2024-05-03 RX ADMIN — CEFAZOLIN SODIUM 2 G: 2 INJECTION, SOLUTION INTRAVENOUS at 00:15

## 2024-05-03 ASSESSMENT — ACTIVITIES OF DAILY LIVING (ADL)
ADLS_ACUITY_SCORE: 41
ADLS_ACUITY_SCORE: 35
ADLS_ACUITY_SCORE: 41
ADLS_ACUITY_SCORE: 41
ADLS_ACUITY_SCORE: 35
ADLS_ACUITY_SCORE: 41
ADLS_ACUITY_SCORE: 41
ADLS_ACUITY_SCORE: 35
ADLS_ACUITY_SCORE: 41
ADLS_ACUITY_SCORE: 34
ADLS_ACUITY_SCORE: 41
ADLS_ACUITY_SCORE: 35
ADLS_ACUITY_SCORE: 41
ADLS_ACUITY_SCORE: 35
ADLS_ACUITY_SCORE: 34
ADLS_ACUITY_SCORE: 35
ADLS_ACUITY_SCORE: 34

## 2024-05-03 NOTE — PLAN OF CARE
Goal Outcome Evaluation:      Plan of Care Reviewed With: patient, spouse    Overall Patient Progress: improvingOverall Patient Progress: improving    Outcome Evaluation: Patient reports improvement in po intake and overall feeling.  Blood pressure fell when he attempted to get out of bed. PO fluids pushed and IV fluids started

## 2024-05-03 NOTE — PROGRESS NOTES
[facility]    Medicine Progress Note - Hospitalist Service    Date of Admission:  5/1/2024    Assessment & Plan   Patient is an 83-year-old male with history of metastatic prostate cancer, prior stroke and recent MSSA bacteremia who presents after a syncopal episode.     Syncope most likely due to postural hypotension vs vasovagal   Other differential: cardiac arrythmia in the setting of hypokalemia, doubt stroke with no focal deficits  Monitor tele  Echo: unremarkable   MRI brain: unremarkable   IV fluid infusion for 24 hrs as he had a significant postural drop of systolic blood pressure.  Advised to be very slow up on getting out of bed or from chair.     Urinary retention  BPH  Symptomatic and seen on CT  Place carlton if post-void bladder scan shows >300ml  UC growing Enterobacter cloaca complex.  Sensitivities pending  Resume home alpha blocker  Acute retention likely caused by constipation     Constipation  Mg oxide 400 mg Po 2 times daily  Schedule senna-colace, daily miralax  Suppository  Enema as needed   No BM so far      Hypokalemia  Mildly low magnesium  Replace per protocol     Hyponatremia  s/p 1 L normal saline , monitor BMP     Recent MSSA bacteremia  Admited to [place] hospital for about 2 weeks  Continue IV cefazolin  BCx drawn in ED     Hypothyroidism  TSH elevated, T4 low  Continue home dose synthroid, would not adjust dose according to thyroid studies taking during acute illness  Will need repeat TSH/T4 in a few weeks     Metastatic prostate cancer  Leukopenia  Anemia, likely due to chemo  Mets to bone  Chemo and radiation ~3 weeks ago in [place]    Severe malnutrition  -Management per RD recommendation        Diet: Combination Diet Regular Diet Adult  Snacks/Supplements Adult: Ensure Enlive; With Meals  Snacks/Supplements Adult: Magic Cup; With Meals    DVT Prophylaxis: Heparin SQ  [place] Catheter: Not present  Lines: PRESENT      PICC Double Lumen Left Brachial vein  medial-Site Assessment: WDL      Cardiac Monitoring: ACTIVE order. Indication: Syncope- high cardiac risk (48 hours)  Code Status: No CPR- Do NOT Intubate      Clinically Significant Risk Factors              # Hypoalbuminemia: Lowest albumin = 2.8 g/dL at 5/1/2024 12:38 PM, will monitor as appropriate  # Coagulation Defect: INR = 1.16 (Ref range: 0.85 - 1.15) and/or PTT = 30 Seconds (Ref range: 22 - 38 Seconds), will monitor for bleeding            # Severe Malnutrition: based on nutrition assessment, PRESENT ON ADMISSION   # Financial/Environmental Concerns: none         Disposition Plan     Medically Ready for Discharge: Anticipated Tomorrow         Lily Delgado MD  Hospitalist Service  [facility]  Securely message with [employer] (more info)  Text page via [employer] Paging/Directory   ______________________________________________________________________    Interval History   No distress noted.  Patient states he felt weak upon going to the bathroom earlier today.  He had significant postural drop of systolic blood pressure.  Management plan discussed with the patient and he expressed understanding.  Possible discharge tomorrow a.m.    Physical Exam   Vital Signs: Temp: 98.1  F (36.7  C) Temp src: Oral BP: 104/63 Pulse: 65   Resp: 20 SpO2: 94 % O2 Device: None (Room air)    Weight: 112 lbs 0 oz    General Appearance: No distress noted  Respiratory: Good air entry bilaterally  Cardiovascular: S1 and S2 are heard, no murmur or gallop  GI: Soft abdomen, no tenderness, normoactive bowel sounds  Skin: Intact and warm       Medical Decision Making       51 MINUTES SPENT BY ME on the date of service doing chart review, history, exam, documentation & further activities per the note.      Data

## 2024-05-03 NOTE — PLAN OF CARE
Problem: Risk for Delirium  Goal: Improved Behavioral Control  Outcome: Progressing     Problem: Pain Acute  Goal: Optimal Pain Control and Function  Outcome: Progressing   Goal Outcome Evaluation:       Pt denied pain. Heart rhythm was sinus jefry to normal sinus. Pt denied shortness of breath. Vital signs stable. Pt's kerri area cleansed. Pt walked to restroom with stand by assistance and walker. Pt is alert and oriented x4. No other concerns noted. Report called to P3 RN, Pt to transfer to room 310.  Kody Murphy RN  5/2/2024  10:02 PM

## 2024-05-03 NOTE — PROGRESS NOTES
Care Management Follow Up    Length of Stay (days): 2    Expected Discharge Date: 05/03/2024     Concerns to be Addressed: discharge planning     Patient plan of care discussed at interdisciplinary rounds: Yes    Anticipated Discharge Disposition: Home, Home Care (pending mobility 5/4)     Anticipated Discharge Services:  Home Care  Anticipated Discharge DME:  per therapy    Patient/family educated on Medicare website which has current facility and service quality ratings:  yes  Education Provided on the Discharge Plan:  yes  Patient/Family in Agreement with the Plan:  yes    Referrals Placed by CM/SW:  home care  Private pay costs discussed: Not applicable    Additional Information:  [facility] accepted for home care PT/OT/RN.     PT unable to do stair trail today due to low BP.  PT will see patient again in the AM.   Patient's spouse cannot assist with stairs and daughter works during the day.  Follow up with PTs eval and recommendations on 5/4    Social Hx:  Pt lives with his wife and adult daughter in a [facility]. Pt is independent with basic ADLs but Ellie reports having to assist him more and more with his mobility like stairs. Ellie reported that she cannot continue to assist Pt with mobility anymore as she tore her right rotator cuff and is needing surgery. Pt and family were interested on getting additional support at home or TCU.   Family to transport at discharge.       Dona Rockwell RN

## 2024-05-03 NOTE — PLAN OF CARE
Goal Outcome Evaluation:      Plan of Care Reviewed With: patient    Overall Patient Progress: improvingOverall Patient Progress: improving    Outcome Evaluation: Pt reports no pain this shift. VSS on room air. Sleeping through the night with no problems. this AM PICC dressing was changed at 0600. no BM but positive bowel sounds. Tele is NSR/ SBrady.

## 2024-05-03 NOTE — PLAN OF CARE
Goal Outcome Evaluation:      Plan of Care Reviewed With: patient    Overall Patient Progress: improving  Problem: Malnutrition  Goal: Improved Nutritional Intake  Outcome: Not Progressing     Problem: Skin Injury Risk Increased  Goal: Skin Health and Integrity  Outcome: Not Progressing  Intervention: Optimize Skin Protection  Recent Flowsheet Documentation  Taken 5/2/2024 2235 by Mariya Norman, RN  Activity Management: activity adjusted per tolerance  Taken 5/2/2024 2234 by Mariya Norman, RN  Activity Management: activity adjusted per tolerance     Problem: Adult Inpatient Plan of Care  Goal: Plan of Care Review  Description: The Plan of Care Review/Shift note should be completed every shift.  The Outcome Evaluation is a brief statement about your assessment that the patient is improving, declining, or no change.  This information will be displayed automatically on your shift  note.  Outcome: Progressing  Flowsheets (Taken 5/2/2024 2315)  Plan of Care Reviewed With: patient  Overall Patient Progress: improving     Problem: Risk for Delirium  Goal: Optimal Coping  Outcome: Progressing  Arrived from the ED this evening. Alert, oriented  and able to make needs known. No pain. No dizziness or SOB. VSS. Admit paperwork completed. Skin assessment with radiation damage on his back and several open areas on spine and sacral area.

## 2024-05-03 NOTE — PROGRESS NOTES
.CP- and Candida auris screening information    This patient was admitted to a hospital or long-term care facility in a high risk area in the prior 12 months. and is at a high risk for carrying CP- and/or Candida auris. The [place] Department of Health ([facility]) and CDC recommend that we screen this patient to prevent the spread of these organisms within our healthcare facility. Screening is voluntary and includes collecting an axilla and groin swab for C. auris and a rectal swab for CP-. Due to the potential transmission of these organisms in healthcare settings, Infection Prevention requires that this patient be placed in a private room on Contact Precautions until testing is complete. Bleach cleaning for EVS team. For inpatient units or ancillary departments: an approved disinfectant (PDI Prime or Bleach wipes are sufficient) should be used to clean the rooms and reusable equipment of patients that are being tested for Candida auris.    Please notify Infection Prevention if the patient declines testing.  Additional information and resources can be found on the Infection Prevention [employer] Sharepoint page.     5/3/2024  Tawny Watkins, Infection Prevention .6:54 AM

## 2024-05-04 ENCOUNTER — APPOINTMENT (OUTPATIENT)
Dept: PHYSICAL THERAPY | Facility: HOSPITAL | Age: 84
DRG: 312 | End: 2024-05-04
Payer: COMMERCIAL

## 2024-05-04 LAB
ANION GAP SERPL CALCULATED.3IONS-SCNC: 6 MMOL/L (ref 7–15)
BUN SERPL-MCNC: 13.7 MG/DL (ref 8–23)
C AURIS DNA SPEC QL NAA+NON-PROBE: NEGATIVE
CALCIUM SERPL-MCNC: 8.3 MG/DL (ref 8.8–10.2)
CHLORIDE SERPL-SCNC: 101 MMOL/L (ref 98–107)
CREAT SERPL-MCNC: 0.71 MG/DL (ref 0.67–1.17)
DEPRECATED HCO3 PLAS-SCNC: 23 MMOL/L (ref 22–29)
EGFRCR SERPLBLD CKD-EPI 2021: >90 ML/MIN/1.73M2
ERYTHROCYTE [DISTWIDTH] IN BLOOD BY AUTOMATED COUNT: 17.2 % (ref 10–15)
GLUCOSE SERPL-MCNC: 90 MG/DL (ref 70–99)
HCT VFR BLD AUTO: 22.7 % (ref 40–53)
HGB BLD-MCNC: 7.8 G/DL (ref 13.3–17.7)
MCH RBC QN AUTO: 32.2 PG (ref 26.5–33)
MCHC RBC AUTO-ENTMCNC: 34.4 G/DL (ref 31.5–36.5)
MCV RBC AUTO: 94 FL (ref 78–100)
PLATELET # BLD AUTO: 150 10E3/UL (ref 150–450)
POTASSIUM SERPL-SCNC: 3.4 MMOL/L (ref 3.4–5.3)
RBC # BLD AUTO: 2.42 10E6/UL (ref 4.4–5.9)
SODIUM SERPL-SCNC: 130 MMOL/L (ref 135–145)
WBC # BLD AUTO: 1.8 10E3/UL (ref 4–11)

## 2024-05-04 PROCEDURE — 250N000011 HC RX IP 250 OP 636: Performed by: HOSPITALIST

## 2024-05-04 PROCEDURE — 97116 GAIT TRAINING THERAPY: CPT | Mod: GP

## 2024-05-04 PROCEDURE — 250N000013 HC RX MED GY IP 250 OP 250 PS 637: Performed by: HOSPITALIST

## 2024-05-04 PROCEDURE — 85027 COMPLETE CBC AUTOMATED: CPT | Performed by: STUDENT IN AN ORGANIZED HEALTH CARE EDUCATION/TRAINING PROGRAM

## 2024-05-04 PROCEDURE — 250N000013 HC RX MED GY IP 250 OP 250 PS 637: Performed by: STUDENT IN AN ORGANIZED HEALTH CARE EDUCATION/TRAINING PROGRAM

## 2024-05-04 PROCEDURE — 250N000011 HC RX IP 250 OP 636: Performed by: STUDENT IN AN ORGANIZED HEALTH CARE EDUCATION/TRAINING PROGRAM

## 2024-05-04 PROCEDURE — 80048 BASIC METABOLIC PNL TOTAL CA: CPT | Performed by: STUDENT IN AN ORGANIZED HEALTH CARE EDUCATION/TRAINING PROGRAM

## 2024-05-04 PROCEDURE — 120N000004 HC R&B MS OVERFLOW

## 2024-05-04 PROCEDURE — 99233 SBSQ HOSP IP/OBS HIGH 50: CPT | Performed by: STUDENT IN AN ORGANIZED HEALTH CARE EDUCATION/TRAINING PROGRAM

## 2024-05-04 PROCEDURE — 97530 THERAPEUTIC ACTIVITIES: CPT | Mod: GP

## 2024-05-04 RX ORDER — GRANULES FOR ORAL 3 G/1
3 POWDER ORAL ONCE
Qty: 1 PACKET | Refills: 0 | Status: COMPLETED | OUTPATIENT
Start: 2024-05-04 | End: 2024-05-04

## 2024-05-04 RX ADMIN — ESCITALOPRAM OXALATE 10 MG: 10 TABLET ORAL at 09:12

## 2024-05-04 RX ADMIN — HEPARIN SODIUM 5000 UNITS: 10000 INJECTION, SOLUTION INTRAVENOUS; SUBCUTANEOUS at 09:07

## 2024-05-04 RX ADMIN — MAGNESIUM OXIDE TAB 400 MG (241.3 MG ELEMENTAL MG) 400 MG: 400 (241.3 MG) TAB at 09:12

## 2024-05-04 RX ADMIN — CEFAZOLIN SODIUM 2 G: 2 INJECTION, SOLUTION INTRAVENOUS at 09:12

## 2024-05-04 RX ADMIN — POLYETHYLENE GLYCOL 3350 17 G: 17 POWDER, FOR SOLUTION ORAL at 09:10

## 2024-05-04 RX ADMIN — MEGESTROL ACETATE 400 MG: 40 SUSPENSION ORAL at 09:09

## 2024-05-04 RX ADMIN — POLYETHYLENE GLYCOL 3350 17 G: 17 POWDER, FOR SOLUTION ORAL at 21:16

## 2024-05-04 RX ADMIN — LEVOTHYROXINE SODIUM 100 MCG: 100 TABLET ORAL at 06:41

## 2024-05-04 RX ADMIN — SENNOSIDES AND DOCUSATE SODIUM 3 TABLET: 8.6; 5 TABLET ORAL at 21:17

## 2024-05-04 RX ADMIN — MAGNESIUM OXIDE TAB 400 MG (241.3 MG ELEMENTAL MG) 400 MG: 400 (241.3 MG) TAB at 21:16

## 2024-05-04 RX ADMIN — GRANULES FOR ORAL SOLUTION 3 G: 3 POWDER ORAL at 14:53

## 2024-05-04 RX ADMIN — CEFAZOLIN SODIUM 2 G: 2 INJECTION, SOLUTION INTRAVENOUS at 16:19

## 2024-05-04 RX ADMIN — SENNOSIDES AND DOCUSATE SODIUM 3 TABLET: 8.6; 5 TABLET ORAL at 09:12

## 2024-05-04 RX ADMIN — FAMOTIDINE 20 MG: 20 TABLET ORAL at 09:12

## 2024-05-04 RX ADMIN — CYANOCOBALAMIN TAB 1000 MCG 1000 MCG: 1000 TAB at 09:12

## 2024-05-04 RX ADMIN — HEPARIN SODIUM 5000 UNITS: 10000 INJECTION, SOLUTION INTRAVENOUS; SUBCUTANEOUS at 21:20

## 2024-05-04 RX ADMIN — ASPIRIN 81 MG: 81 TABLET, COATED ORAL at 09:12

## 2024-05-04 ASSESSMENT — ACTIVITIES OF DAILY LIVING (ADL)
ADLS_ACUITY_SCORE: 37

## 2024-05-04 NOTE — PROGRESS NOTES
Care Management Follow Up    Length of Stay (days): 3    Expected Discharge Date: 05/04/2024     Concerns to be Addressed: discharge planning     Patient plan of care discussed at interdisciplinary rounds: Yes    Anticipated Discharge Disposition: Transitional Care   Anticipated Discharge Services: N/A  Anticipated Discharge DME: None    Patient/family educated on Medicare website which has current facility and service quality ratings: Yes  Education Provided on the Discharge Plan: Yes  Patient/Family in Agreement with the Plan: Yes    Referrals Placed by CM/SW: Pending  Private pay costs discussed: Not applicable    Additional Information:  SWCM reviewed updates and spoke to physical therapist following session with Pt today. PT recommendation has been revised to TCU. Discussed updates with Pt and spouse by phone, they are both in agreement with transitional care and would prefer options in the [facility] area. SWCM will drop off list of TCU options for them to review and then submit 2-3 referrals based on Pt preferences. Transportation TBD.     Liyah Torres [facility] Center[facility]

## 2024-05-04 NOTE — PROGRESS NOTES
05/04/24 0939   Quick Adds   Quick Adds Orthostatic BP   Vital Signs   Pulse 64   BP (!) 146/75   Lying Orthostatic BP   Lying Orthostatic /75   Lying Orthostatic Pulse 64 bpm   Sitting Orthostatic BP   Sitting Orthostatic BP 87/57   Sitting Orthostatic Pulse 85 bpm   Standing Orthostatic BP   Standing Orthostatic BP 89/49  (mild reports of dizziness)   Standing Orthostatic Pulse 100 bpm       9:58  Sitting BP following ambulation 85/52, HR 72  No complaints of dizziness following amb.    Ann Alexis, PT, DPT  5/4/2024

## 2024-05-04 NOTE — PLAN OF CARE
Problem: Adult Inpatient Plan of Care  Goal: Plan of Care Review  Description: The Plan of Care Review/Shift note should be completed every shift.  The Outcome Evaluation is a brief statement about your assessment that the patient is improving, declining, or no change.  This information will be displayed automatically on your shift  note.  Outcome: Progressing   Goal Outcome Evaluation:  Patient is alert and oriented x4.  Patient is cooperative with poc.  Patient's VSS, and tele has been NSR/SB.  Patient is assist of 1, walker, gait belt with activity.  Patient has utilized the primofit for for accurate output monitoring. Patient's bp dropped to 81/50 asymptomatic, after patient was up in chair for an hour,  once back to bed bp 116/60.  Dr. Koehler was updated of orthostatic bp.

## 2024-05-04 NOTE — PLAN OF CARE
Goal Outcome Evaluation:      Plan of Care Reviewed With: patient    Overall Patient Progress: improvingOverall Patient Progress: improving    Outcome Evaluation: A&Ox4.  Denies pain.  Tolerating room air.  Sinus rhythm.  Blood pressure stayed above 130/90 during evening shift but patient was kept in bed to avoid dropping BP.  Able to repositioning self independently.  Weight shifting and repositioning encouraged.      Problem: Pain Acute  Goal: Optimal Pain Control and Function  Outcome: Progressing  Intervention: Prevent or Manage Pain  Recent Flowsheet Documentation  Taken 5/3/2024 2030 by En Jerome RN  Sensory Stimulation Regulation:   care clustered   lighting decreased  Bowel Elimination Promotion: adequate fluid intake promoted  Medication Review/Management: medications reviewed  Taken 5/3/2024 1640 by En Jerome RN  Sensory Stimulation Regulation:   care clustered   lighting decreased  Bowel Elimination Promotion: adequate fluid intake promoted  Medication Review/Management: medications reviewed     Problem: Skin Injury Risk Increased  Goal: Skin Health and Integrity  Intervention: Plan: Nurse Driven Intervention: Moisture Management  Recent Flowsheet Documentation  Taken 5/3/2024 2030 by En Jerome RN  Moisture Interventions:   Encourage regular toileting   Incontinence pad  Taken 5/3/2024 1828 by En Jerome RN  Bathing/Skin Care: incontinence care  Taken 5/3/2024 1640 by En Jerome RN  Moisture Interventions:   Encourage regular toileting   Incontinence pad

## 2024-05-04 NOTE — PLAN OF CARE
Problem: Adult Inpatient Plan of Care  Goal: Absence of Hospital-Acquired Illness or Injury  Intervention: Prevent and Manage VTE (Venous Thromboembolism) Risk  Recent Flowsheet Documentation  Taken 5/3/2024 2340 by Tammy Moe RN  VTE Prevention/Management: patient refused intervention     Problem: Adult Inpatient Plan of Care  Goal: Optimal Comfort and Wellbeing  Outcome: Progressing     Problem: Risk for Delirium  Goal: Improved Sleep  Outcome: Progressing     Problem: Pain Acute  Goal: Optimal Pain Control and Function  Outcome: Progressing  Intervention: Prevent or Manage Pain  Recent Flowsheet Documentation  Taken 5/3/2024 2340 by Tammy Moe RN  Bowel Elimination Promotion: adequate fluid intake promoted  Medication Review/Management: medications reviewed   Goal Outcome Evaluation:      Plan of Care Reviewed With: patient    Overall Patient Progress: improvingOverall Patient Progress: improving       A & O X4. Denies pain. BP stable. On RA. Sinus bradycardia. Normal saline running at 75ml/hr. Refused SCDs overnight. Contact precautions maintained. Bed alarm on for safety

## 2024-05-04 NOTE — PROGRESS NOTES
[facility]    Medicine Progress Note - Hospitalist Service    Date of Admission:  5/1/2024    Assessment & Plan   Patient is an 83-year-old male with history of metastatic prostate cancer, prior stroke and recent MSSA bacteremia who presents after a syncopal episode.     Syncope most likely due to postural hypotension vs vasovagal   Echo: unremarkable   MRI brain: unremarkable   Advised to slowly get out of bed or chair   Hold flomax, compression stocking, encourage oral intake was liberalized salt  Monitor      Urinary retention  BPH  UTI vs colonization  Symptomatic and seen on CT  Place carlton if post-void bladder scan shows >300ml  UC growing Enterobacter cloaca complex.  Will give 1 dose of fosfomycin  Acute retention likely caused by constipation     Constipation  Mg oxide 400 mg Po 2 times daily  Schedule senna-colace, daily miralax  Suppository  Enema as needed   No BM so far      Hypokalemia  Mildly low magnesium  Replace per protocol     Hyponatremia  s/p 1 L normal saline , monitor BMP     Recent MSSA bacteremia  Admited to [place] hospital for about 2 weeks  Continue IV cefazolin. Completed   BCx drawn in ED     Hypothyroidism  TSH elevated, T4 low  Continue home dose synthroid, would not adjust dose according to thyroid studies taking during acute illness  Will need repeat TSH/T4 in a few weeks     Metastatic prostate cancer  Leukopenia  Anemia, likely due to chemo  Mets to bone  Chemo and radiation ~3 weeks ago in [place]    Severe malnutrition  Management per RD recommendation      Diet: Combination Diet Regular Diet Adult  Snacks/Supplements Adult: Ensure Enlive; With Meals  Snacks/Supplements Adult: Magic Cup; With Meals    DVT Prophylaxis: Heparin SQ  Carlton Catheter: Not present  Lines: PRESENT      PICC Double Lumen Left Brachial vein medial-Site Assessment: WDL      Cardiac Monitoring: None  Code Status: No CPR- Do NOT Intubate      Clinically Significant Risk Factors               # Hypoalbuminemia: Lowest albumin = 2.8 g/dL at 5/1/2024 12:38 PM, will monitor as appropriate             # Severe Malnutrition: based on nutrition assessment, PRESENT ON ADMISSION   # Financial/Environmental Concerns: none         Disposition Plan     Medically Ready for Discharge: Anticipated Tomorrow       Lily Delgado MD  Hospitalist Service  [facility]  Securely message with [employer] (more info)  Text page via [employer] Paging/Directory   ______________________________________________________________________    Interval History   No distress noted.  Patient denies having abdominal discomfort.  Denies having dizziness or lightheadedness.  He has significant postural drop disease blood pressure.  Management plan discussed with the patient and he expressed understanding.    Physical Exam   Vital Signs: Temp: 98.2  F (36.8  C) Temp src: Oral BP: 116/60 (in bed) Pulse: 82   Resp: 16 SpO2: 96 % O2 Device: None (Room air)    Weight: 114 lbs 1.6 oz    General Appearance: No distress noted  Respiratory: Good air entry bilateral  Cardiovascular: S1 and S2 were heard, no murmur or gallop  GI: Soft abdomen, no tenderness, normoactive bowel  Skin: Intact and warm      Medical Decision Making       50 MINUTES SPENT BY ME on the date of service doing chart review, history, exam, documentation & further activities per the note.      Data

## 2024-05-05 LAB
ANION GAP SERPL CALCULATED.3IONS-SCNC: 8 MMOL/L (ref 7–15)
BUN SERPL-MCNC: 16.2 MG/DL (ref 8–23)
CALCIUM SERPL-MCNC: 8.6 MG/DL (ref 8.8–10.2)
CHLORIDE SERPL-SCNC: 98 MMOL/L (ref 98–107)
CREAT SERPL-MCNC: 0.75 MG/DL (ref 0.67–1.17)
DEPRECATED HCO3 PLAS-SCNC: 23 MMOL/L (ref 22–29)
EGFRCR SERPLBLD CKD-EPI 2021: 90 ML/MIN/1.73M2
ERYTHROCYTE [DISTWIDTH] IN BLOOD BY AUTOMATED COUNT: 17.5 % (ref 10–15)
GLUCOSE SERPL-MCNC: 86 MG/DL (ref 70–99)
HCT VFR BLD AUTO: 24.1 % (ref 40–53)
HGB BLD-MCNC: 8.4 G/DL (ref 13.3–17.7)
HOLD SPECIMEN: NORMAL
MCH RBC QN AUTO: 32.8 PG (ref 26.5–33)
MCHC RBC AUTO-ENTMCNC: 34.9 G/DL (ref 31.5–36.5)
MCV RBC AUTO: 94 FL (ref 78–100)
PLATELET # BLD AUTO: 145 10E3/UL (ref 150–450)
POTASSIUM SERPL-SCNC: 3.7 MMOL/L (ref 3.4–5.3)
RBC # BLD AUTO: 2.56 10E6/UL (ref 4.4–5.9)
SODIUM SERPL-SCNC: 129 MMOL/L (ref 135–145)
SODIUM UR-SCNC: 58 MMOL/L
WBC # BLD AUTO: 1.9 10E3/UL (ref 4–11)

## 2024-05-05 PROCEDURE — 80048 BASIC METABOLIC PNL TOTAL CA: CPT | Performed by: STUDENT IN AN ORGANIZED HEALTH CARE EDUCATION/TRAINING PROGRAM

## 2024-05-05 PROCEDURE — 210N000001 HC R&B IMCU HEART CARE

## 2024-05-05 PROCEDURE — 85048 AUTOMATED LEUKOCYTE COUNT: CPT | Performed by: HOSPITALIST

## 2024-05-05 PROCEDURE — 250N000013 HC RX MED GY IP 250 OP 250 PS 637: Performed by: HOSPITALIST

## 2024-05-05 PROCEDURE — 250N000009 HC RX 250: Performed by: UROLOGY

## 2024-05-05 PROCEDURE — 250N000013 HC RX MED GY IP 250 OP 250 PS 637: Performed by: STUDENT IN AN ORGANIZED HEALTH CARE EDUCATION/TRAINING PROGRAM

## 2024-05-05 PROCEDURE — 250N000011 HC RX IP 250 OP 636: Performed by: STUDENT IN AN ORGANIZED HEALTH CARE EDUCATION/TRAINING PROGRAM

## 2024-05-05 PROCEDURE — 84300 ASSAY OF URINE SODIUM: CPT | Performed by: STUDENT IN AN ORGANIZED HEALTH CARE EDUCATION/TRAINING PROGRAM

## 2024-05-05 PROCEDURE — 250N000009 HC RX 250: Performed by: HOSPITALIST

## 2024-05-05 PROCEDURE — 99233 SBSQ HOSP IP/OBS HIGH 50: CPT | Performed by: STUDENT IN AN ORGANIZED HEALTH CARE EDUCATION/TRAINING PROGRAM

## 2024-05-05 RX ORDER — LIDOCAINE HYDROCHLORIDE 20 MG/ML
JELLY TOPICAL ONCE
Status: COMPLETED | OUTPATIENT
Start: 2024-05-05 | End: 2024-05-05

## 2024-05-05 RX ORDER — BISACODYL 10 MG
10 SUPPOSITORY, RECTAL RECTAL DAILY PRN
Status: DISCONTINUED | OUTPATIENT
Start: 2024-05-05 | End: 2024-05-07 | Stop reason: HOSPADM

## 2024-05-05 RX ORDER — LIDOCAINE HYDROCHLORIDE 20 MG/ML
JELLY TOPICAL
Status: DISCONTINUED | OUTPATIENT
Start: 2024-05-05 | End: 2024-05-07 | Stop reason: HOSPADM

## 2024-05-05 RX ADMIN — FAMOTIDINE 20 MG: 20 TABLET ORAL at 08:49

## 2024-05-05 RX ADMIN — CYANOCOBALAMIN TAB 1000 MCG 1000 MCG: 1000 TAB at 08:49

## 2024-05-05 RX ADMIN — POLYETHYLENE GLYCOL 3350 17 G: 17 POWDER, FOR SOLUTION ORAL at 08:49

## 2024-05-05 RX ADMIN — ESCITALOPRAM OXALATE 10 MG: 10 TABLET ORAL at 08:49

## 2024-05-05 RX ADMIN — SENNOSIDES AND DOCUSATE SODIUM 3 TABLET: 8.6; 5 TABLET ORAL at 20:35

## 2024-05-05 RX ADMIN — POLYETHYLENE GLYCOL 3350 17 G: 17 POWDER, FOR SOLUTION ORAL at 20:34

## 2024-05-05 RX ADMIN — LEVOTHYROXINE SODIUM 100 MCG: 100 TABLET ORAL at 08:49

## 2024-05-05 RX ADMIN — MEGESTROL ACETATE 400 MG: 40 SUSPENSION ORAL at 08:49

## 2024-05-05 RX ADMIN — LIDOCAINE HYDROCHLORIDE: 20 JELLY TOPICAL at 10:00

## 2024-05-05 RX ADMIN — HEPARIN SODIUM 5000 UNITS: 10000 INJECTION, SOLUTION INTRAVENOUS; SUBCUTANEOUS at 20:35

## 2024-05-05 RX ADMIN — HEPARIN SODIUM 5000 UNITS: 10000 INJECTION, SOLUTION INTRAVENOUS; SUBCUTANEOUS at 08:49

## 2024-05-05 RX ADMIN — ASPIRIN 81 MG: 81 TABLET, COATED ORAL at 08:49

## 2024-05-05 RX ADMIN — MAGNESIUM OXIDE TAB 400 MG (241.3 MG ELEMENTAL MG) 400 MG: 400 (241.3 MG) TAB at 20:34

## 2024-05-05 RX ADMIN — LIDOCAINE HYDROCHLORIDE: 20 JELLY TOPICAL at 04:46

## 2024-05-05 RX ADMIN — SENNOSIDES AND DOCUSATE SODIUM 3 TABLET: 8.6; 5 TABLET ORAL at 08:49

## 2024-05-05 RX ADMIN — MAGNESIUM OXIDE TAB 400 MG (241.3 MG ELEMENTAL MG) 400 MG: 400 (241.3 MG) TAB at 08:49

## 2024-05-05 ASSESSMENT — ACTIVITIES OF DAILY LIVING (ADL)
ADLS_ACUITY_SCORE: 37

## 2024-05-05 NOTE — PROGRESS NOTES
Care Management Follow Up    Length of Stay (days): 4    Expected Discharge Date: 05/06/2024     Concerns to be Addressed: discharge planning     Patient plan of care discussed at interdisciplinary rounds: Yes    Anticipated Discharge Disposition: Transitional Care   Anticipated Discharge Services: None  Anticipated Discharge DME: None    Patient/family educated on Medicare website which has current facility and service quality ratings: Yes  Education Provided on the Discharge Plan: Yes  Patient/Family in Agreement with the Plan: Yes    Referrals Placed by CM/SW: Yes  Private pay costs discussed: Not applicable    Additional Information:  SWCM stopped in Pt's room to follow up on TCU choices. Pt visited with family earlier this afternoon. Due to proximity to home, Pt's top choices are [facility] [facility] or [place]. If no availability, also chose [facility] or [facility] as options. [facility] submitted these referrals and team will await responses. Encouraged Pt to keep TCU list in case additional options are needed. Transport TBD.    CORBIN Schaefer

## 2024-05-05 NOTE — PLAN OF CARE
"  Problem: Adult Inpatient Plan of Care  Goal: Plan of Care Review  Description: The Plan of Care Review/Shift note should be completed every shift.  The Outcome Evaluation is a brief statement about your assessment that the patient is improving, declining, or no change.  This information will be displayed automatically on your shift  note.  Outcome: Progressing  Flowsheets (Taken 5/5/2024 0748)  Plan of Care Reviewed With:   patient   family  Goal: Patient-Specific Goal (Individualized)  Description: You can add care plan individualizations to a care plan. Examples of Individualization might be:  \"Parent requests to be called daily at 9am for status\", \"I have a hard time hearing out of my right ear\", or \"Do not touch me to wake me up as it startles  me\".  Outcome: Progressing  Goal: Absence of Hospital-Acquired Illness or Injury  Outcome: Progressing  Intervention: Identify and Manage Fall Risk  Recent Flowsheet Documentation  Taken 5/5/2024 0000 by Shawn Ross RN  Safety Promotion/Fall Prevention: activity supervised  Intervention: Prevent and Manage VTE (Venous Thromboembolism) Risk  Recent Flowsheet Documentation  Taken 5/5/2024 0000 by Shawn Ross RN  VTE Prevention/Management:   SCDs (sequential compression devices) on   compression stockings on  Intervention: Prevent Infection  Recent Flowsheet Documentation  Taken 5/5/2024 0000 by Shawn Ross RN  Infection Prevention: hand hygiene promoted  Goal: Optimal Comfort and Wellbeing  Outcome: Progressing  Goal: Readiness for Transition of Care  Outcome: Progressing     Problem: Risk for Delirium  Goal: Optimal Coping  Outcome: Progressing  Goal: Improved Behavioral Control  Outcome: Progressing  Intervention: Minimize Safety Risk  Recent Flowsheet Documentation  Taken 5/5/2024 0000 by Shawn Ross RN  Communication Enhancement Strategies: extra time allowed for response  Enhanced Safety Measures:   room near unit station   assistive devices when " indicated  Goal: Improved Attention and Thought Clarity  Outcome: Progressing  Intervention: Maximize Cognitive Function  Recent Flowsheet Documentation  Taken 5/5/2024 0000 by Shawn Ross RN  Sensory Stimulation Regulation: care clustered  Reorientation Measures: clock in view  Goal: Improved Sleep  Outcome: Progressing     Problem: Malnutrition  Goal: Improved Nutritional Intake  Outcome: Progressing     Problem: Pain Acute  Goal: Optimal Pain Control and Function  Outcome: Progressing  Intervention: Prevent or Manage Pain  Recent Flowsheet Documentation  Taken 5/5/2024 0000 by Shawn Ross RN  Sensory Stimulation Regulation: care clustered  Medication Review/Management: medications reviewed     Problem: Skin Injury Risk Increased  Goal: Skin Health and Integrity  Outcome: Progressing  Intervention: Plan: Nurse Driven Intervention: Moisture Management  Recent Flowsheet Documentation  Taken 5/5/2024 0000 by Shawn Ross RN  Moisture Interventions: Encourage regular toileting  Intervention: Plan: Nurse Driven Intervention: Friction and Shear  Recent Flowsheet Documentation  Taken 5/5/2024 0000 by Shawn Ross RN  Friction/Shear Interventions: HOB 30 degrees or less      Goal Outcome Evaluation:      Plan of Care Reviewed With: patient, family    Pt A/O x 4. Vitally stable & saturating well on RA - lungs clear & pt denies SOB. Off tele. Flomax held yesterday due to pt low BP - now retaining urine overnight - PVR obtained at 461 mL. Attempted placement of coude-tipped carlton cath twice without success due to prostate obstruction - HO notified of this & ordered urology consult. Pt denies pain, SOB, dizziness, or N/V. Will continue to monitor.     Shawn Ross RN on 5/5/2024 at 7:51 AM

## 2024-05-05 NOTE — PLAN OF CARE
"  Problem: Adult Inpatient Plan of Care  Goal: Plan of Care Review  Description: The Plan of Care Review/Shift note should be completed every shift.  The Outcome Evaluation is a brief statement about your assessment that the patient is improving, declining, or no change.  This information will be displayed automatically on your shift  note.  Outcome: Progressing  Goal: Patient-Specific Goal (Individualized)  Description: You can add care plan individualizations to a care plan. Examples of Individualization might be:  \"Parent requests to be called daily at 9am for status\", \"I have a hard time hearing out of my right ear\", or \"Do not touch me to wake me up as it startles  me\".  Outcome: Progressing  Goal: Absence of Hospital-Acquired Illness or Injury  Outcome: Progressing  Intervention: Identify and Manage Fall Risk  Recent Flowsheet Documentation  Taken 5/4/2024 1613 by Qing Hamilton RN  Safety Promotion/Fall Prevention: activity supervised  Intervention: Prevent Skin Injury  Recent Flowsheet Documentation  Taken 5/4/2024 1613 by Qing Hamilton RN  Body Position: position changed independently  Device Skin Pressure Protection: absorbent pad utilized/changed  Intervention: Prevent and Manage VTE (Venous Thromboembolism) Risk  Recent Flowsheet Documentation  Taken 5/4/2024 1613 by Qing Hamilton RN  VTE Prevention/Management:   SCDs (sequential compression devices) on   compression stockings on  Intervention: Prevent Infection  Recent Flowsheet Documentation  Taken 5/4/2024 1613 by Qing Hamilton RN  Infection Prevention: hand hygiene promoted  Goal: Optimal Comfort and Wellbeing  Outcome: Progressing  Goal: Readiness for Transition of Care  Outcome: Progressing     Problem: Risk for Delirium  Goal: Optimal Coping  Outcome: Progressing  Intervention: Optimize Psychosocial Adjustment to Delirium  Recent Flowsheet Documentation  Taken 5/4/2024 1613 by Qing Hamilton RN  Supportive Measures: active listening utilized  Goal: " Improved Behavioral Control  Outcome: Progressing  Intervention: Minimize Safety Risk  Recent Flowsheet Documentation  Taken 5/4/2024 1613 by Joy, Qing VELAZQUEZ RN  Enhanced Safety Measures:   room near unit station   assistive devices when indicated  Goal: Improved Attention and Thought Clarity  Outcome: Progressing  Intervention: Maximize Cognitive Function  Recent Flowsheet Documentation  Taken 5/4/2024 1613 by Joy, Qing VELAZQUEZ RN  Sensory Stimulation Regulation: care clustered  Reorientation Measures: clock in view  Goal: Improved Sleep  Outcome: Progressing     Problem: Malnutrition  Goal: Improved Nutritional Intake  Outcome: Progressing     Problem: Pain Acute  Goal: Optimal Pain Control and Function  Outcome: Progressing  Intervention: Prevent or Manage Pain  Recent Flowsheet Documentation  Taken 5/4/2024 1613 by Qing Hamilton RN  Sensory Stimulation Regulation: care clustered  Intervention: Optimize Psychosocial Wellbeing  Recent Flowsheet Documentation  Taken 5/4/2024 1613 by Jyo, Qing VELAZQUEZ RN  Supportive Measures: active listening utilized     Problem: Skin Injury Risk Increased  Goal: Skin Health and Integrity  Outcome: Progressing  Intervention: Plan: Nurse Driven Intervention: Moisture Management  Recent Flowsheet Documentation  Taken 5/4/2024 1613 by Qing Hamilton RN  Moisture Interventions: Encourage regular toileting  Intervention: Plan: Nurse Driven Intervention: Friction and Shear  Recent Flowsheet Documentation  Taken 5/4/2024 1613 by Qing Hamilton RN  Friction/Shear Interventions: HOB 30 degrees or less  Intervention: Optimize Skin Protection  Recent Flowsheet Documentation  Taken 5/4/2024 1613 by Qing Hamilton RN  Pressure Reduction Techniques: frequent weight shift encouraged  Pressure Reduction Devices: positioning supports utilized  Head of Bed (HOB) Positioning: HOB at 30 degrees   Goal Outcome Evaluation:  Pt up in chair and walked in hallway. Per family and patient was suppose to be done on IV  antibiotic last Thursday. Dr notified and discontinue. Patient on Flomax and can cause ortho hypotension. Dr notified and flomax stopped. After walking bp 90/64. Denies dizziness. No pain

## 2024-05-05 NOTE — PLAN OF CARE
Problem: Urinary Retention  Goal: Effective Urinary Elimination  Outcome: Progressing   Goal Outcome Evaluation:  Forte catheter placed by urology.  Draining crescencio colored urine.  No clots noted.  Denies pain and shortness of breath.  Ate 50% of meals.

## 2024-05-05 NOTE — CONSULTS
CONSULTATION NOTE  Kilo Montiel   [address] [facility] [address]  83 year old  male  Admission Date/Time: 5/1/2024 11:26 AM  Primary Care Provider:  Denzel Jones    I was asked to see this patient by Dr. Rodriguez for evaluation of retention.     HPI: 83 year old male a long history of prostate cancer (now metastatic) who was admitted on 5/1/24 for syncope. Per notes, syncope is felt to be due to postural hypotension vs vasovagal. He was also found to have an UTI.  He's been constipated too and has urinary retention.  Flomax was stopped yesterday.  Catheter was attempted to be placed overnight but was not successful. Last PVR (~2-3 hours ago) was 460 mL.  CT imaging on 5/1/24 showed that his bladder was quite distended (on my measurements, 10 cm transverse x 12.5 cm coronal x 15.5 mm sagittal) and he had bilateral hydronephrosis.    He states that he's been having leaking of urine for the last 3 weeks. He has a very weak stream. Getting radiation for some bone mets.    Prostate cancer history: Diagnosed in 2009 and treated with radiation. S/p brachytherapy 2013 for a biochemical recurrence and PET uptake in the prostate.  Found to have bone mets in his lumbar spine and was treated with radiation in 2015. Further bone mets found in 2016 and he was started on androgen deprivation therapy. Had Provenge in 2016. Has been on multiple oral medications for his prostate cancer including abiraterone, enzalutamide, and olaparib. Had docetaxal in 2022. Started Pembro in 9/2023 for multiple bone mets seen on PSMA PET CT.   PSA 4/5/24 (from [place]) = 218.7 ng/mL.  PSA 3/1/24 ([place]) = 93.9 ng/mL.      REVIEW OF SYSTEMS:  Pertinent items are noted in HPI.    Past Medical History:   Diagnosis Date    Anxiety     Hypothyroid     Melanoma (H)     excised from abdomen and wrist    Melanoma of wrist (H)     Prostate cancer (H)     Thyroid disease      Past Surgical History:   Procedure Laterality Date    ANKLE  "SURGERY      left    COLONOSCOPY      INSERT RADIATION SEEDS PROSTATE  2013    Procedure: INSERT RADIATION SEEDS PROSTATE;  Insert Radiation Seeds Prostate ;  Surgeon: Sahil Franco MD;  Location: [facility] OR    SURGICAL PATHOLOGY EXAM      melanoma removal     Family History   Problem Relation Age of Onset    Breast Cancer Mother 75     Social History     Socioeconomic History    Marital status:      Spouse name: Not on file    Number of children: Not on file    Years of education: Not on file    Highest education level: Not on file   Occupational History    Not on file   Tobacco Use    Smoking status: Former     Current packs/day: 0.00     Average packs/day: 0.5 packs/day for 8.0 years (4.0 ttl pk-yrs)     Types: Cigarettes     Start date: 1974     Quit date: 1982     Years since quittin.9    Smokeless tobacco: Former     Quit date: 1976   Substance and Sexual Activity    Alcohol use: Yes     Comment: 2-3 per week    Drug use: Not on file    Sexual activity: Not on file   Other Topics Concern    Parent/sibling w/ CABG, MI or angioplasty before 65F 55M? Not Asked   Social History Narrative    Not on file     Social Determinants of Health     Financial Resource Strain: Not on file   Food Insecurity: Not on file   Transportation Needs: Not on file   Physical Activity: Not on file   Stress: Not on file   Social Connections: Not on file   Interpersonal Safety: Not on file   Housing Stability: Not on file     No current outpatient medications on file.       ALLERGIES/SENSITIVITIES:   Allergies   Allergen Reactions    Bee Pollen Other (See Comments)       PHYSICAL EXAM:   /75 (BP Location: Right arm)   Pulse 70   Temp 97.8  F (36.6  C) (Oral)   Resp 14   Ht 1.676 m (5' 6\")   Wt 50.5 kg (111 lb 4.8 oz)   SpO2 99%   BMI 17.96 kg/m    Body mass index is 17.96 kg/m .  General Appearance:   Nontoxic, comfortable  Unlabored breathing  Soft abdomen, palpable bladder, uncirc penis      WBC " Count   Date Value Ref Range Status   05/05/2024 1.9 (L) 4.0 - 11.0 10e3/uL Final   05/04/2024 1.8 (L) 4.0 - 11.0 10e3/uL Final   05/03/2024 1.9 (L) 4.0 - 11.0 10e3/uL Final     Hemoglobin   Date Value Ref Range Status   05/05/2024 8.4 (L) 13.3 - 17.7 g/dL Final   05/04/2024 7.8 (L) 13.3 - 17.7 g/dL Final   05/03/2024 8.0 (L) 13.3 - 17.7 g/dL Final     Platelet Count   Date Value Ref Range Status   05/05/2024 145 (L) 150 - 450 10e3/uL Final   05/04/2024 150 150 - 450 10e3/uL Final   05/03/2024 149 (L) 150 - 450 10e3/uL Final     Recent Labs   Lab Test 05/05/24  0501 05/04/24  0521 05/03/24  0522   * 130* 131*   POTASSIUM 3.7 3.4 3.5   CHLORIDE 98 101 101   CO2 23 23 23   BUN 16.2 13.7 14.3   CR 0.75 0.71 0.80   ANIONGAP 8 6* 7   SOPHIA 8.6* 8.3* 8.1*   GLC 86 90 87           EXAM: CT ABDOMEN PELVIS W CONTRAST  LOCATION: [facility]  DATE: 5/1/2024     INDICATION: abdominal pain, metastatic prostate cancer, lack of BM  COMPARISON: CTA chest prior to this exam, Prostate PMSA PET CT 8/9/2023, CT AP 1/10/2022 and older studies  TECHNIQUE: CT scan of the abdomen and pelvis was performed following injection of IV contrast. Multiplanar reformats were obtained. Dose reduction techniques were used.  CONTRAST: isovue 370 75ml     FINDINGS: Patient is clearly lost weight since last August.     LOWER CHEST: New bilateral pleural effusions, trace on the right and small on the left. Small hiatal hernia. Calcified granuloma in the right lower lobe.     HEPATOBILIARY: Calcified granulomas in the liver.     PANCREAS: Normal.     SPLEEN: Numerous calcified granulomas in the spleen.     ADRENAL GLANDS: Normal.     KIDNEYS/BLADDER: Contrast in both intrarenal collecting systems which are mild to moderately distended. Bladder is markedly distended.     BOWEL: New, trace ascites adjacent to the liver. Entire intra-abdominal fat increased density. Quite redundant sigmoid colon all but the rectosigmoid distended  with stool. Small bowel is of normal caliber.     LYMPH NODES: Normal.     VASCULATURE: Moderate arterial calcifications. No aneurysm. Abdominal aorta is ectatic.     PELVIC ORGANS: Radioactive prostatic seed. Seeds.     MUSCULOSKELETAL: There is now paucity of the subcutaneous fat which is is diffuse increased density throughout. Innumerable sclerotic metastasis are seen with compression fractures of L2-L4 with up to 50% loss of vertebral body height, unchanged.                                                                      IMPRESSION:   1.  Bladder is quite distended and this likely explains the moderately dilated intrarenal collecting systems. If he cannot void, he'll need to be catheterized.  2.  Overall failure to thrive with marked weight loss since last August and extensive third spacing of fluid with new effusions, trace ascites and diffuse subcutaneous edema.  3.  There is a large amount retained stool in the colon except for the rectosigmoid. Findings suggest colonic ileus. Query medications for bone pain? No bowel obstruction.  4.  Numerous, sclerotic osseous metastasis again noted.  5.  Other noncritical findings as noted above.      PROCEDURE: Under sterile conditions, I attempted placement of a 16 Fr catheter in the normal fashion but the catheter would not pass what I felt was the prostate.  Flexible cystoscopy was then performed.  The entire prostatic fossa had stricture but I was able to get the flexible cystoscope into the bladder with a little pressure at the apex.  The prostatic fossa did have a small channel that I could see into the bladder.  A Bentson wire was placed into the bladder and the flexible cystoscope was removed.  Over the wire, I was able to place a 16 Fr Councill catheter without the need for a dilation (I suspect that I dilated enough with the flexible cystoscope).  The balloon was filled with 10 mL of water.  A total of 1500 mL of clear yellow urine drained.  The catheter  was secured with a Stat Lock.    CONSULTATION ASSESSMENT AND PLAN:    83 year old male who was admitted for syncope and has been found to be in retention. He has a long history of prostate cancer.  I was able to get a catheter into his bladder as noted above.  Discussed that with his symptoms and his history, I suspect that his bladder does not squeeze well enough to empty and/or the prostatic urethral stricture is too obstructive for his bladder to empty.  His incontinence is likely overflow incontinence.  I recommend keeping the catheter and follow up in ~2 weeks. Patient specifically requests the [place] office (message sent to [facility]).  Discussed with him that I do not think that prostate surgery may not be a good idea given his prior brachytherapy and radiation. Discussed that I do not know if he'll be able to get catheter free. Could consider doing urodynamics but if no prostate surgery would be done, probably not worth doing the urodynamics.  Long term, it may be best to have a suprapubic catheter placed.  His renal function is normal which is good and I suspect that the hydronephrosis will resolve with catheter placement.    Leroy Mendez MD

## 2024-05-05 NOTE — PROGRESS NOTES
[facility]    Medicine Progress Note - Hospitalist Service    Date of Admission:  5/1/2024    Assessment & Plan   Patient is an 83-year-old male with history of metastatic prostate cancer, prior stroke and recent MSSA bacteremia who presents after a syncopal episode.     Syncope most likely due to postural hypotension vs vasovagal   Echo: unremarkable   MRI brain: unremarkable   Advised to slowly get out of bed or chair   Hold flomax, compression stocking, encourage oral intake was liberalized salt  Monitor     Urinary retention  BPH  UTI vs colonization  Symptomatic and seen on CT  Place carlton if post-void bladder scan shows >300ml  UC growing Enterobacter cloaca complex. Treated with fosfomycin  Acute retention likely caused by constipation.  Carlton catheter placed by urologist after RN attempted with no success.     Constipation  Mg oxide 400 mg Po 2 times daily  Schedule senna-colace, daily miralax  Suppository  Enema as needed   No BM so far      Hypokalemia  Mildly low magnesium  Replace per protocol     Hyponatremia, likely chronic   Monitor      Recent MSSA bacteremia  Admited to [place], [place] hospital for about 2 weeks  Continue IV cefazolin. Completed      Hypothyroidism  TSH elevated, T4 low  Continue home dose synthroid, would not adjust dose according to thyroid studies taking during acute illness  Will need repeat TSH/T4 in a few weeks     Metastatic prostate cancer  Leukopenia  Anemia, likely due to chemo  Mets to bone  Chemo and radiation ~3 weeks ago in [place]    Severe malnutrition  Management per RD recommendation        Diet: Combination Diet Regular Diet Adult  Snacks/Supplements Adult: Ensure Enlive; With Meals  Snacks/Supplements Adult: Magic Cup; With Meals    DVT Prophylaxis: Heparin SQ  Carlton Catheter: PRESENT, indication: Acute retention or obstruction  Lines: PRESENT      PICC Double Lumen Left Brachial vein medial-Site Assessment: WDL      Cardiac Monitoring: None  Code  "Status: No CPR- Do NOT Intubate      Clinically Significant Risk Factors         # Hyponatremia: Lowest Na = 129 mmol/L in last 2 days, will monitor as appropriate      # Hypoalbuminemia: Lowest albumin = 2.8 g/dL at 5/1/2024 12:38 PM, will monitor as appropriate            # Cachexia: Estimated body mass index is 17.96 kg/m  as calculated from the following:    Height as of this encounter: 1.676 m (5' 6\").    Weight as of this encounter: 50.5 kg (111 lb 4.8 oz)., PRESENT ON ADMISSION  # Severe Malnutrition: based on nutrition assessment, PRESENT ON ADMISSION   # Financial/Environmental Concerns: none         Disposition Plan     Medically Ready for Discharge: Anticipated Tomorrow       Lily Delgado MD  Hospitalist Service  [facility]  Securely message with [employer] (more info)  Text page via [employer] Paging/Directory   ______________________________________________________________________    Interval History   No distress noted.  No bowel movement so far.  Waiting for urologist to place Forte catheter.  Blood pressure has improved.  Management plan discussed with the patient and he expressed understanding    Physical Exam   Vital Signs: Temp: 97.8  F (36.6  C) Temp src: Oral BP: 137/75 Pulse: 70   Resp: 14 SpO2: 99 % O2 Device: None (Room air)    Weight: 111 lbs 4.8 oz    General Appearance: No distress noted  Respiratory: Good air entry bilateral  Cardiovascular: S1 and S2 were heard  GI: Soft abdomen, no tenderness, normoactive bowel sounds   Skin: Intact and warm       Medical Decision Making       50 MINUTES SPENT BY ME on the date of service doing chart review, history, exam, documentation & further activities per the note.      Data     "

## 2024-05-06 ENCOUNTER — APPOINTMENT (OUTPATIENT)
Dept: PHYSICAL THERAPY | Facility: HOSPITAL | Age: 84
DRG: 312 | End: 2024-05-06
Payer: COMMERCIAL

## 2024-05-06 ENCOUNTER — APPOINTMENT (OUTPATIENT)
Dept: OCCUPATIONAL THERAPY | Facility: HOSPITAL | Age: 84
DRG: 312 | End: 2024-05-06
Payer: COMMERCIAL

## 2024-05-06 LAB
ANION GAP SERPL CALCULATED.3IONS-SCNC: 9 MMOL/L (ref 7–15)
BACTERIA BLD CULT: NO GROWTH
BACTERIA BLD CULT: NO GROWTH
BUN SERPL-MCNC: 14.5 MG/DL (ref 8–23)
CALCIUM SERPL-MCNC: 8.3 MG/DL (ref 8.8–10.2)
CHLORIDE SERPL-SCNC: 98 MMOL/L (ref 98–107)
CREAT SERPL-MCNC: 0.73 MG/DL (ref 0.67–1.17)
DEPRECATED HCO3 PLAS-SCNC: 23 MMOL/L (ref 22–29)
EGFRCR SERPLBLD CKD-EPI 2021: >90 ML/MIN/1.73M2
ERYTHROCYTE [DISTWIDTH] IN BLOOD BY AUTOMATED COUNT: 18.1 % (ref 10–15)
GLUCOSE SERPL-MCNC: 85 MG/DL (ref 70–99)
HCT VFR BLD AUTO: 24 % (ref 40–53)
HGB BLD-MCNC: 8.2 G/DL (ref 13.3–17.7)
MCH RBC QN AUTO: 32.3 PG (ref 26.5–33)
MCHC RBC AUTO-ENTMCNC: 34.2 G/DL (ref 31.5–36.5)
MCV RBC AUTO: 95 FL (ref 78–100)
PLATELET # BLD AUTO: 152 10E3/UL (ref 150–450)
POTASSIUM SERPL-SCNC: 3.9 MMOL/L (ref 3.4–5.3)
RBC # BLD AUTO: 2.54 10E6/UL (ref 4.4–5.9)
SODIUM SERPL-SCNC: 130 MMOL/L (ref 135–145)
WBC # BLD AUTO: 2.6 10E3/UL (ref 4–11)

## 2024-05-06 PROCEDURE — 210N000001 HC R&B IMCU HEART CARE

## 2024-05-06 PROCEDURE — 97116 GAIT TRAINING THERAPY: CPT | Mod: GP

## 2024-05-06 PROCEDURE — 85027 COMPLETE CBC AUTOMATED: CPT | Performed by: STUDENT IN AN ORGANIZED HEALTH CARE EDUCATION/TRAINING PROGRAM

## 2024-05-06 PROCEDURE — 99233 SBSQ HOSP IP/OBS HIGH 50: CPT | Performed by: STUDENT IN AN ORGANIZED HEALTH CARE EDUCATION/TRAINING PROGRAM

## 2024-05-06 PROCEDURE — 97110 THERAPEUTIC EXERCISES: CPT | Mod: GO

## 2024-05-06 PROCEDURE — 97530 THERAPEUTIC ACTIVITIES: CPT | Mod: GP

## 2024-05-06 PROCEDURE — 97530 THERAPEUTIC ACTIVITIES: CPT | Mod: GO

## 2024-05-06 PROCEDURE — 250N000011 HC RX IP 250 OP 636: Performed by: STUDENT IN AN ORGANIZED HEALTH CARE EDUCATION/TRAINING PROGRAM

## 2024-05-06 PROCEDURE — 250N000013 HC RX MED GY IP 250 OP 250 PS 637: Performed by: HOSPITALIST

## 2024-05-06 PROCEDURE — 250N000013 HC RX MED GY IP 250 OP 250 PS 637: Performed by: STUDENT IN AN ORGANIZED HEALTH CARE EDUCATION/TRAINING PROGRAM

## 2024-05-06 PROCEDURE — 97110 THERAPEUTIC EXERCISES: CPT | Mod: GP

## 2024-05-06 PROCEDURE — 80048 BASIC METABOLIC PNL TOTAL CA: CPT | Performed by: STUDENT IN AN ORGANIZED HEALTH CARE EDUCATION/TRAINING PROGRAM

## 2024-05-06 RX ORDER — LEVOTHYROXINE SODIUM 125 UG/1
125 TABLET ORAL
Status: DISCONTINUED | OUTPATIENT
Start: 2024-05-07 | End: 2024-05-07 | Stop reason: HOSPADM

## 2024-05-06 RX ORDER — SODIUM CHLORIDE 1 G/1
1 TABLET ORAL
Status: DISCONTINUED | OUTPATIENT
Start: 2024-05-06 | End: 2024-05-07 | Stop reason: HOSPADM

## 2024-05-06 RX ADMIN — POLYETHYLENE GLYCOL 3350 17 G: 17 POWDER, FOR SOLUTION ORAL at 20:13

## 2024-05-06 RX ADMIN — MEGESTROL ACETATE 400 MG: 40 SUSPENSION ORAL at 10:16

## 2024-05-06 RX ADMIN — BISACODYL 10 MG: 10 SUPPOSITORY RECTAL at 12:59

## 2024-05-06 RX ADMIN — HEPARIN SODIUM 5000 UNITS: 10000 INJECTION, SOLUTION INTRAVENOUS; SUBCUTANEOUS at 09:02

## 2024-05-06 RX ADMIN — ESCITALOPRAM OXALATE 10 MG: 10 TABLET ORAL at 09:02

## 2024-05-06 RX ADMIN — MAGNESIUM OXIDE TAB 400 MG (241.3 MG ELEMENTAL MG) 400 MG: 400 (241.3 MG) TAB at 09:01

## 2024-05-06 RX ADMIN — SODIUM CHLORIDE TAB 1 GM 1 G: 1 TAB at 12:21

## 2024-05-06 RX ADMIN — FAMOTIDINE 20 MG: 20 TABLET ORAL at 09:01

## 2024-05-06 RX ADMIN — CYANOCOBALAMIN TAB 1000 MCG 1000 MCG: 1000 TAB at 09:02

## 2024-05-06 RX ADMIN — POLYETHYLENE GLYCOL 3350 17 G: 17 POWDER, FOR SOLUTION ORAL at 09:02

## 2024-05-06 RX ADMIN — LEVOTHYROXINE SODIUM 100 MCG: 100 TABLET ORAL at 06:05

## 2024-05-06 RX ADMIN — SENNOSIDES AND DOCUSATE SODIUM 3 TABLET: 8.6; 5 TABLET ORAL at 09:02

## 2024-05-06 RX ADMIN — SODIUM CHLORIDE TAB 1 GM 1 G: 1 TAB at 16:15

## 2024-05-06 RX ADMIN — MAGNESIUM OXIDE TAB 400 MG (241.3 MG ELEMENTAL MG) 400 MG: 400 (241.3 MG) TAB at 20:13

## 2024-05-06 RX ADMIN — SENNOSIDES AND DOCUSATE SODIUM 3 TABLET: 8.6; 5 TABLET ORAL at 20:13

## 2024-05-06 RX ADMIN — ASPIRIN 81 MG: 81 TABLET, COATED ORAL at 09:01

## 2024-05-06 RX ADMIN — HEPARIN SODIUM 5000 UNITS: 10000 INJECTION, SOLUTION INTRAVENOUS; SUBCUTANEOUS at 20:14

## 2024-05-06 ASSESSMENT — ACTIVITIES OF DAILY LIVING (ADL)
ADLS_ACUITY_SCORE: 37
ADLS_ACUITY_SCORE: 41
ADLS_ACUITY_SCORE: 41
ADLS_ACUITY_SCORE: 37
ADLS_ACUITY_SCORE: 41
ADLS_ACUITY_SCORE: 41
ADLS_ACUITY_SCORE: 37
ADLS_ACUITY_SCORE: 41
ADLS_ACUITY_SCORE: 41
ADLS_ACUITY_SCORE: 37
ADLS_ACUITY_SCORE: 41
ADLS_ACUITY_SCORE: 37
ADLS_ACUITY_SCORE: 41
ADLS_ACUITY_SCORE: 37
ADLS_ACUITY_SCORE: 41
ADLS_ACUITY_SCORE: 37
ADLS_ACUITY_SCORE: 37

## 2024-05-06 NOTE — PLAN OF CARE
Problem: Malnutrition  Goal: Improved Nutritional Intake  Outcome: Progressing   Goal Outcome Evaluation:  Po intake improving a lot x3 days. Meeting needs with meals + supplements x1 day.

## 2024-05-06 NOTE — PROGRESS NOTES
Care Management Follow Up    Length of Stay (days): 5    Expected Discharge Date: 05/07/2024     Concerns to be Addressed: discharge planning     Patient plan of care discussed at interdisciplinary rounds: Yes    Anticipated Discharge Disposition: transitional care      Anticipated Discharge Services:  skilled nursing, therapy   Anticipated Discharge DME:  per treatment team     Patient/family educated on Medicare website which has current facility and service quality ratings:  yes  Education Provided on the Discharge Plan:  yes  Patient/Family in Agreement with the Plan:  yes    Referrals Placed by CM/SW:  post acute care facilities  Private pay costs discussed: Not applicable    Additional Information:  SW reviewed chart and discussed updates with MD. Per MD, patient needs to have BM prior to discharge.  Seeking TCU.  SW following up on pending referrals (listed in pt's order of preference) :  [facility]: left voice mail for admissions requesting call back  [facility]: left voice mail for admissions requesting call back  [facility]: spoke with Mandy in admissions, facility could likely accept pending when pt is ready for discharge    Social History:  Pt lives with his wife and adult daughter in a [facility]. Pt is independent with basic ADLs but Ellie reports having to assist him more and more with his mobility like stairs. Ellie reported that she cannot continue to assist Pt with mobility anymore as she tore her right rotator cuff and is needing surgery. Pt and family were interested on getting additional support at home or TCU. Family to transport at discharge.    BRENDEN Conroy

## 2024-05-06 NOTE — PROGRESS NOTES
[facility]    Medicine Progress Note - Hospitalist Service    Date of Admission:  5/1/2024    Assessment & Plan   Patient is an 83-year-old male with history of metastatic prostate cancer, prior stroke and recent MSSA bacteremia who presents after a syncopal episode.     Syncope most likely due to postural hypotension vs vasovagal   Echo: unremarkable   MRI brain: unremarkable   Advised to slowly get out of bed or chair   Hold flomax, compression stocking, encourage oral intake was liberalized salt , sodium chloride tablets added  Monitor     Urinary retention  BPH  UTI vs colonization  Symptomatic and seen on CT  UC growing Enterobacter cloaca complex. Treated with fosfomycin  Acute retention likely caused by constipation.  Forte catheter placed by urologist after RN attempted with no success.  Red-tinged urine on the catheter bag     Constipation  Mg oxide 400 mg Po 2 times daily  Schedule senna-colace, daily miralax  Suppository  Enema as needed   No BM so far      Hypokalemia  Mildly low magnesium  Replace per protocol     Hyponatremia, likely chronic   Concern for possible SIADH  Urine sodium is 58  Monitor      Recent MSSA bacteremia  Admited to [place], [place] hospital for about 2 weeks  Continue IV cefazolin. Completed      Hypothyroidism  TSH elevated, T4 low  Increase prolactin to 125 mcg oral daily  Will need repeat TSH/T4 in a few weeks     Metastatic prostate cancer  Leukopenia  Anemia, likely due to chemo  Mets to bone  Chemo and radiation ~3 weeks ago in [place]    Severe malnutrition  Management per RD recommendation        Diet: Combination Diet Regular Diet Adult  Snacks/Supplements Adult: Ensure Enlive; With Meals  Snacks/Supplements Adult: Magic Cup; With Meals    DVT Prophylaxis: Heparin SQ  Forte Catheter: PRESENT, indication: Acute retention or obstruction  Lines: PRESENT      PICC Double Lumen Left Brachial vein medial-Site Assessment: WDL      Cardiac Monitoring: None  Code  Status: No CPR- Do NOT Intubate      Clinically Significant Risk Factors         # Hyponatremia: Lowest Na = 129 mmol/L in last 2 days, will monitor as appropriate      # Hypoalbuminemia: Lowest albumin = 2.8 g/dL at 5/1/2024 12:38 PM, will monitor as appropriate             # Severe Malnutrition: based on nutrition assessment    # Financial/Environmental Concerns: none         Disposition Plan     Medically Ready for Discharge: Anticipated Tomorrow       Lily Delgado MD  Hospitalist Service  [facility]  Securely message with [employer] (more info)  Text page via [employer] Paging/Directory   ______________________________________________________________________    Interval History   No distress noted.  Patient denies having pain or discomfort.  Postural hypotension still ongoing.  No bowel movement thus far.  Patient is not medically ready for discharge.  Management plan discussed with the patient and he expressed understanding.  LVM to his daughter    Physical Exam   Vital Signs: Temp: 98.1  F (36.7  C) Temp src: Oral BP: 108/62 Pulse: 73   Resp: 20 SpO2: 97 % O2 Device: None (Room air)    Weight: 112 lbs 10.48 oz    General Appearance:  No distress noted  Respiratory: Good air entry bilateral  Cardiovascular: S1 and S2 were heard  GI: Soft abdomen, no tenderness, normoactive bowel sounds   Skin: Intact and warm        Medical Decision Making       50 MINUTES SPENT BY ME on the date of service doing chart review, history, exam, documentation & further activities per the note.      Data

## 2024-05-06 NOTE — PROGRESS NOTES
Place of Service:  [facility]     Reason for follow up: Urinary retention, s/p difficult catheter placement 5/5, gross hematuria    SUBJECTIVE:  Events:  RN reports some gross hematuria noted with small clots after carlton scoped by Dr. Mendez on 5/5. Pt denies abdominal and bilateral flank pain, fever, chills.     Good UO, tolerating catheter.     OBJECTIVE:  PHYSICAL EXAM:  Temp: 98.1  F (36.7  C) Temp src: Oral BP: 130/70 Pulse: 73   Resp: 20 SpO2: 97 % O2 Device: None (Room air)    General: NAD, alert, cooperative  Head: normocephalic, without abnormality / atraumatic  Abdomen: soft, firm lower abdomen, non tender, non distended. no suprapubic fullness, no suprapubic tenderness. No bilateral CVA tenderness.   Genitourinary: Uncircumcised penis without erythema or edema. Carlton catheter draining cherry to watermelon colored urine with small clots noted in tubing. Manually irrigated with aspiration of a few small clots, then translucent light pink urine.   Musculoskeletal: moves all four extremities equally  Psychological: alert and oriented, answers questions appropriately    LABS:  Creatinine   Date Value Ref Range Status   05/06/2024 0.73 0.67 - 1.17 mg/dL Final     WBC Count   Date Value Ref Range Status   05/06/2024 2.6 (L) 4.0 - 11.0 10e3/uL Final     Hemoglobin   Date Value Ref Range Status   05/06/2024 8.2 (L) 13.3 - 17.7 g/dL Final     Platelet Count   Date Value Ref Range Status   05/06/2024 152 150 - 450 10e3/uL Final       UA:  UA RESULTS:  Recent Labs   Lab Test 05/01/24  1700   COLOR Colorless   APPEARANCE Clear   URINEGLC Negative   URINEBILI Negative   URINEKETONE Negative   SG 1.032*   UBLD 0.03 mg/dL*   URINEPH 7.0   PROTEIN 10*   NITRITE Negative   LEUKEST 250 Lei/uL*   RBCU 1   WBCU 36*     Cultures:  Urine 5/1/24: 10,000-50,000 CFU/mL Enterobacter cloacae complex   Susceptibility     Enterobacter cloacae complex     ANAY     Ampicillin  Resistant 1     Ampicillin/ Sulbactam  Resistant 1      Cefazolin  Resistant 2     Cefepime <=1 ug/mL Susceptible     Cefoxitin  Resistant 1     Ceftazidime  Resistant     Ceftriaxone  Resistant     Ciprofloxacin <=0.25 ug/mL Susceptible     Gentamicin <=1 ug/mL Susceptible     Levofloxacin <=0.12 ug/mL Susceptible     Nitrofurantoin 32 ug/mL Susceptible     Piperacillin/Tazobactam  Resistant     Tobramycin <=1 ug/mL Susceptible     Trimethoprim/Sulfamethoxazole <=1/19 ug/mL Susceptible        Blood 5/1: NGTD    Lab Results: personally reviewed.     ASSESSMENT/PLAN:  Kilo Montiel is being seen by [facility] for Urinary retention, s/p difficult catheter placement 5/5, gross hematuria    - 83 yr old male with hx of prostate CA s/p radiation '09, brachytherapy '13 for biochemical recurrence and PET uptake in prostate, bone mets tx with multiple meds, ADT, Provenge. 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 Mendez 5/5 after failed attempts by nursing.   - Mild GH noted on exam today. Not unexpected given difficult carlton, hx of radiation therapy. Few small clots aspirated with manual irrigation. Carlton draining well. PRN irrigation.  - Creatinine 0.73.   - Maintain carlton catheter at hospital discharge. RN to teach carlton cares. Dr. Mendez arranging follow up at [facility]. Carlton catheter discharge instructions and [facility] contact info added to discharge instructions.   -[facility] will sign off. Please re-consult with any new or worsening urologic concerns.    Ramakrishna Miranda, CNP  [facility]   [phone]

## 2024-05-06 NOTE — PLAN OF CARE
"  Problem: Adult Inpatient Plan of Care  Goal: Plan of Care Review  Description: The Plan of Care Review/Shift note should be completed every shift.  The Outcome Evaluation is a brief statement about your assessment that the patient is improving, declining, or no change.  This information will be displayed automatically on your shift  note.  Outcome: Progressing  Flowsheets (Taken 5/6/2024 1455)  Plan of Care Reviewed With:   patient   family  Goal: Patient-Specific Goal (Individualized)  Description: You can add care plan individualizations to a care plan. Examples of Individualization might be:  \"Parent requests to be called daily at 9am for status\", \"I have a hard time hearing out of my right ear\", or \"Do not touch me to wake me up as it startles  me\".  Outcome: Progressing  Goal: Readiness for Transition of Care  Outcome: Progressing     Problem: Risk for Delirium  Goal: Optimal Coping  Outcome: Progressing  Goal: Improved Behavioral Control  Outcome: Progressing  Intervention: Minimize Safety Risk  Recent Flowsheet Documentation  Taken 5/6/2024 0800 by Shawn Ross RN  Communication Enhancement Strategies: call light answered in person  Enhanced Safety Measures: room near unit station  Goal: Improved Sleep  Outcome: Progressing     Problem: Malnutrition  Goal: Improved Nutritional Intake  Outcome: Progressing     Problem: Skin Injury Risk Increased  Goal: Skin Health and Integrity  Outcome: Progressing     Problem: Urinary Retention  Goal: Effective Urinary Elimination  Outcome: Progressing     Goal Outcome Evaluation:      Plan of Care Reviewed With: patient, family    Pt A/O X 4. Vitally stable & saturating well on RA. Off tele. Pt developed hematuria this AM - MD & urology notified. Orders now in place for carlton irrigation PRN. Attempted ambulation with pt today & he made it USP down the rose before endorsing dizziness/lightheadedness, LOC was altered with this episode & he seemed to be nearing " another syncopal episode. Pt was seated & returned to room - BP/sats were both in range. MD aware. Will continue to monitor.     Shawn Ross RN on 5/6/2024 at 3:02 PM

## 2024-05-06 NOTE — PLAN OF CARE
Problem: Adult Inpatient Plan of Care  Goal: Absence of Hospital-Acquired Illness or Injury  Outcome: Met  Intervention: Identify and Manage Fall Risk  Recent Flowsheet Documentation  Taken 5/6/2024 0030 by Ksenia Pappas RN  Safety Promotion/Fall Prevention: activity supervised  Intervention: Prevent Skin Injury  Recent Flowsheet Documentation  Taken 5/6/2024 0414 by Kseina Pappas RN  Body Position: turned  Taken 5/6/2024 0231 by Ksenia Pappas RN  Body Position: turned  Taken 5/6/2024 0030 by Ksenia Pappas RN  Body Position: position changed independently  Device Skin Pressure Protection: absorbent pad utilized/changed  Intervention: Prevent and Manage VTE (Venous Thromboembolism) Risk  Recent Flowsheet Documentation  Taken 5/6/2024 0030 by Ksenia Pappas RN  VTE Prevention/Management:   foot pump device on   SCDs (sequential compression devices) on  Intervention: Prevent Infection  Recent Flowsheet Documentation  Taken 5/6/2024 0030 by Ksenia Pappas RN  Infection Prevention: hand hygiene promoted  Goal: Optimal Comfort and Wellbeing  Outcome: Met     Problem: Risk for Delirium  Goal: Improved Attention and Thought Clarity  Outcome: Met  Intervention: Maximize Cognitive Function  Recent Flowsheet Documentation  Taken 5/6/2024 0030 by Ksenia Pappas RN  Sensory Stimulation Regulation:   care clustered   lighting decreased  Reorientation Measures:   clock in view   calendar in view   Goal Outcome Evaluation:       Alert and oriented. Med surg. Forte draining well. Urology following. Vital signs stable. Slept between cares. Discharging to a TCU when medically cleared. Using call light for all needs. Pleasant with all cares. Will continue to monitor.    .Ksenia Pappas RN

## 2024-05-06 NOTE — PLAN OF CARE
"  Problem: Adult Inpatient Plan of Care  Goal: Plan of Care Review  Description: The Plan of Care Review/Shift note should be completed every shift.  The Outcome Evaluation is a brief statement about your assessment that the patient is improving, declining, or no change.  This information will be displayed automatically on your shift  note.  Outcome: Progressing  Goal: Patient-Specific Goal (Individualized)  Description: You can add care plan individualizations to a care plan. Examples of Individualization might be:  \"Parent requests to be called daily at 9am for status\", \"I have a hard time hearing out of my right ear\", or \"Do not touch me to wake me up as it startles  me\".  Outcome: Progressing  Goal: Absence of Hospital-Acquired Illness or Injury  Outcome: Progressing  Intervention: Identify and Manage Fall Risk  Recent Flowsheet Documentation  Taken 5/5/2024 1700 by Qing Hamilton RN  Safety Promotion/Fall Prevention: activity supervised  Intervention: Prevent Skin Injury  Recent Flowsheet Documentation  Taken 5/5/2024 1700 by Qing Hamilton RN  Device Skin Pressure Protection: absorbent pad utilized/changed  Intervention: Prevent and Manage VTE (Venous Thromboembolism) Risk  Recent Flowsheet Documentation  Taken 5/5/2024 1700 by Qing Hamilton RN  VTE Prevention/Management:   foot pump device on   SCDs (sequential compression devices) on  Intervention: Prevent Infection  Recent Flowsheet Documentation  Taken 5/5/2024 1700 by Qing Hamilton RN  Infection Prevention: hand hygiene promoted  Goal: Optimal Comfort and Wellbeing  Outcome: Progressing  Goal: Readiness for Transition of Care  Outcome: Progressing     Problem: Risk for Delirium  Goal: Optimal Coping  Outcome: Progressing  Intervention: Optimize Psychosocial Adjustment to Delirium  Recent Flowsheet Documentation  Taken 5/5/2024 1700 by Qing Hamilton RN  Supportive Measures: active listening utilized  Goal: Improved Behavioral Control  Outcome: " Progressing  Intervention: Minimize Safety Risk  Recent Flowsheet Documentation  Taken 5/5/2024 1700 by Qing Hamilton RN  Communication Enhancement Strategies: call light answered in person  Enhanced Safety Measures: room near unit station  Goal: Improved Attention and Thought Clarity  Outcome: Progressing  Intervention: Maximize Cognitive Function  Recent Flowsheet Documentation  Taken 5/5/2024 1700 by Qing Hamilton RN  Sensory Stimulation Regulation: care clustered  Reorientation Measures: clock in view  Goal: Improved Sleep  Outcome: Progressing     Problem: Malnutrition  Goal: Improved Nutritional Intake  Outcome: Progressing     Problem: Skin Injury Risk Increased  Goal: Skin Health and Integrity  Outcome: Progressing  Intervention: Optimize Skin Protection  Recent Flowsheet Documentation  Taken 5/5/2024 1700 by Qing Hamilton RN  Pressure Reduction Techniques: frequent weight shift encouraged  Pressure Reduction Devices: positioning supports utilized     Problem: Urinary Retention  Goal: Effective Urinary Elimination  Outcome: Progressing   Goal Outcome Evaluation:      Patient denies pain and sob. Up in chair for dinner.walked in hallway. Forte patient

## 2024-05-07 ENCOUNTER — APPOINTMENT (OUTPATIENT)
Dept: PHYSICAL THERAPY | Facility: HOSPITAL | Age: 84
DRG: 312 | End: 2024-05-07
Payer: COMMERCIAL

## 2024-05-07 ENCOUNTER — APPOINTMENT (OUTPATIENT)
Dept: RADIOLOGY | Facility: HOSPITAL | Age: 84
DRG: 312 | End: 2024-05-07
Attending: STUDENT IN AN ORGANIZED HEALTH CARE EDUCATION/TRAINING PROGRAM
Payer: COMMERCIAL

## 2024-05-07 VITALS
WEIGHT: 110 LBS | DIASTOLIC BLOOD PRESSURE: 76 MMHG | HEART RATE: 72 BPM | BODY MASS INDEX: 17.68 KG/M2 | SYSTOLIC BLOOD PRESSURE: 132 MMHG | HEIGHT: 66 IN | RESPIRATION RATE: 20 BRPM | TEMPERATURE: 98.8 F | OXYGEN SATURATION: 97 %

## 2024-05-07 LAB
ANION GAP SERPL CALCULATED.3IONS-SCNC: 5 MMOL/L (ref 7–15)
BUN SERPL-MCNC: 13.8 MG/DL (ref 8–23)
CALCIUM SERPL-MCNC: 8.8 MG/DL (ref 8.8–10.2)
CHLORIDE SERPL-SCNC: 101 MMOL/L (ref 98–107)
CREAT SERPL-MCNC: 0.71 MG/DL (ref 0.67–1.17)
DEPRECATED HCO3 PLAS-SCNC: 24 MMOL/L (ref 22–29)
EGFRCR SERPLBLD CKD-EPI 2021: >90 ML/MIN/1.73M2
ERYTHROCYTE [DISTWIDTH] IN BLOOD BY AUTOMATED COUNT: 18.4 % (ref 10–15)
GLUCOSE SERPL-MCNC: 77 MG/DL (ref 70–99)
HCT VFR BLD AUTO: 23.8 % (ref 40–53)
HGB BLD-MCNC: 8.2 G/DL (ref 13.3–17.7)
MAGNESIUM SERPL-MCNC: 2 MG/DL (ref 1.7–2.3)
MCH RBC QN AUTO: 32.7 PG (ref 26.5–33)
MCHC RBC AUTO-ENTMCNC: 34.5 G/DL (ref 31.5–36.5)
MCV RBC AUTO: 95 FL (ref 78–100)
PLATELET # BLD AUTO: 158 10E3/UL (ref 150–450)
POTASSIUM SERPL-SCNC: 4 MMOL/L (ref 3.4–5.3)
RBC # BLD AUTO: 2.51 10E6/UL (ref 4.4–5.9)
SODIUM SERPL-SCNC: 130 MMOL/L (ref 135–145)
WBC # BLD AUTO: 2.2 10E3/UL (ref 4–11)

## 2024-05-07 PROCEDURE — 71045 X-RAY EXAM CHEST 1 VIEW: CPT

## 2024-05-07 PROCEDURE — 250N000013 HC RX MED GY IP 250 OP 250 PS 637: Performed by: HOSPITALIST

## 2024-05-07 PROCEDURE — 82374 ASSAY BLOOD CARBON DIOXIDE: CPT | Performed by: STUDENT IN AN ORGANIZED HEALTH CARE EDUCATION/TRAINING PROGRAM

## 2024-05-07 PROCEDURE — 97116 GAIT TRAINING THERAPY: CPT | Mod: GP

## 2024-05-07 PROCEDURE — 250N000013 HC RX MED GY IP 250 OP 250 PS 637: Performed by: STUDENT IN AN ORGANIZED HEALTH CARE EDUCATION/TRAINING PROGRAM

## 2024-05-07 PROCEDURE — 97530 THERAPEUTIC ACTIVITIES: CPT | Mod: GP

## 2024-05-07 PROCEDURE — 99239 HOSP IP/OBS DSCHRG MGMT >30: CPT | Performed by: STUDENT IN AN ORGANIZED HEALTH CARE EDUCATION/TRAINING PROGRAM

## 2024-05-07 PROCEDURE — 85041 AUTOMATED RBC COUNT: CPT | Performed by: STUDENT IN AN ORGANIZED HEALTH CARE EDUCATION/TRAINING PROGRAM

## 2024-05-07 PROCEDURE — 97110 THERAPEUTIC EXERCISES: CPT | Mod: GP

## 2024-05-07 PROCEDURE — 83735 ASSAY OF MAGNESIUM: CPT | Performed by: STUDENT IN AN ORGANIZED HEALTH CARE EDUCATION/TRAINING PROGRAM

## 2024-05-07 RX ORDER — LEVOTHYROXINE SODIUM 125 UG/1
125 TABLET ORAL
DISCHARGE
Start: 2024-05-08 | End: 2024-09-16 | Stop reason: DRUGHIGH

## 2024-05-07 RX ORDER — SODIUM CHLORIDE 1 G/1
1 TABLET ORAL
DISCHARGE
Start: 2024-05-07

## 2024-05-07 RX ORDER — POLYETHYLENE GLYCOL 3350 17 G/17G
17 POWDER, FOR SOLUTION ORAL DAILY
DISCHARGE
Start: 2024-05-07 | End: 2024-09-16

## 2024-05-07 RX ORDER — AMOXICILLIN 250 MG
2 CAPSULE ORAL 2 TIMES DAILY
DISCHARGE
Start: 2024-05-07 | End: 2024-09-16

## 2024-05-07 RX ADMIN — SODIUM CHLORIDE TAB 1 GM 1 G: 1 TAB at 08:57

## 2024-05-07 RX ADMIN — SENNOSIDES AND DOCUSATE SODIUM 3 TABLET: 8.6; 5 TABLET ORAL at 08:56

## 2024-05-07 RX ADMIN — SODIUM CHLORIDE TAB 1 GM 1 G: 1 TAB at 16:33

## 2024-05-07 RX ADMIN — SODIUM CHLORIDE TAB 1 GM 1 G: 1 TAB at 11:49

## 2024-05-07 RX ADMIN — POLYETHYLENE GLYCOL 3350 17 G: 17 POWDER, FOR SOLUTION ORAL at 10:43

## 2024-05-07 RX ADMIN — FAMOTIDINE 20 MG: 20 TABLET ORAL at 08:57

## 2024-05-07 RX ADMIN — MEGESTROL ACETATE 400 MG: 40 SUSPENSION ORAL at 08:58

## 2024-05-07 RX ADMIN — ASPIRIN 81 MG: 81 TABLET, COATED ORAL at 08:56

## 2024-05-07 RX ADMIN — CYANOCOBALAMIN TAB 1000 MCG 1000 MCG: 1000 TAB at 08:57

## 2024-05-07 RX ADMIN — ESCITALOPRAM OXALATE 10 MG: 10 TABLET ORAL at 08:57

## 2024-05-07 RX ADMIN — MAGNESIUM OXIDE TAB 400 MG (241.3 MG ELEMENTAL MG) 400 MG: 400 (241.3 MG) TAB at 08:57

## 2024-05-07 RX ADMIN — LEVOTHYROXINE SODIUM 125 MCG: 125 TABLET ORAL at 08:57

## 2024-05-07 ASSESSMENT — ACTIVITIES OF DAILY LIVING (ADL)
ADLS_ACUITY_SCORE: 41

## 2024-05-07 NOTE — PROGRESS NOTES
Pt. A&O x4 and on room air. Denies pain, numbness, tingling or tenderness. Forte in place. Call light within reach.     1720 - Discharge information given and explained to pt. And family. All belongings returned. TCU packet given to daughter. All questions answered.

## 2024-05-07 NOTE — PLAN OF CARE
Physical Therapy Discharge Summary    Reason for therapy discharge:    Discharged to transitional care facility.    Progress towards therapy goal(s). See goals on Care Plan in [facility] electronic health record for goal details.  Goals not met.  Barriers to achieving goals:   limited tolerance for therapy and discharge from facility.    Therapy recommendation(s):    Continued therapy is recommended.  Rationale/Recommendations:  Recommend TCU to help pt return to PLOF.

## 2024-05-07 NOTE — PLAN OF CARE
Goal Outcome Evaluation:      Plan of Care Reviewed With: patient    Overall Patient Progress: no changeOverall Patient Progress: no change     Pt. doing well, slept some overnight. VSS, denied pain, nausea, dyspnea, or dizziness. Continue to monitor.

## 2024-05-07 NOTE — PROGRESS NOTES
Care Management Discharge Note    Discharge Date: 05/07/2024       Discharge Disposition: Transitional Care    Discharge Services: None    Discharge DME: None    Discharge Transportation: family or friend will provide    Private pay costs discussed: Not applicable    Does the patient's insurance plan have a 3 day qualifying hospital stay waiver?  No    PAS Confirmation Code: [id number]  Patient/family educated on Medicare website which has current facility and service quality ratings: yes    Education Provided on the Discharge Plan: Yes  Persons Notified of Discharge Plans: patient   Patient/Family in Agreement with the Plan: yes    Handoff Referral Completed: Yes    Additional Information:  SW spoke with MD; patient is medically ready for discharge to TCU.  Patient accepted at [facility]. SW met with patient to discuss discharge plan. Patient reports agreement with discharge to TCU today; this facility was not his first choice, however verbalizes understanding that his first / second choices are both full.  Patient asks if SW recommends this facility; SW discussed that our department is not able to give recommends.  Patient states understanding and reports agreement with discharge to [facility].  Patient's daughter will transport around 1730.  Updated bedside RN.  PAS completed and on chart.     BRENDEN Conroy

## 2024-05-07 NOTE — PROGRESS NOTES
[facility]    Left arm PICC Tip verified in [facility] by Dr. Terrence Castillo to use. Primary RN notified.

## 2024-05-07 NOTE — PLAN OF CARE
Occupational Therapy Discharge Summary    Reason for therapy discharge:    Discharged to transitional care facility.    Progress towards therapy goal(s). See goals on Care Plan in [facility] electronic health record for goal details.  Goals partially met.  Barriers to achieving goals:   limited tolerance for therapy.    Therapy recommendation(s):    Continued therapy is recommended.  Rationale/Recommendations:  maximize ADL IND prior to discharge home.

## 2024-05-07 NOTE — DISCHARGE SUMMARY
"[facility]  Hospitalist Discharge Summary      Date of Admission:  5/1/2024  Date of Discharge:  5/7/2024  Discharging Provider: Lily Delgado MD  Discharge Service: Hospitalist Service    Discharge Diagnoses   Syncope most likely due to postural hypotension versus vasovagal syncope  Urinary retention  BPH  UTI versus colonization  Constipation  Hypokalemia  Mildly low magnesium  Hyponatremia, concern for possible SIADH  Recent MSSA bacteremia  Hypothyroidism  Metastatic prostatic CA  Severe malnutrition    Clinically Significant Risk Factors     # Cachexia: Estimated body mass index is 17.75 kg/m  as calculated from the following:    Height as of this encounter: 1.676 m (5' 6\").    Weight as of this encounter: 49.9 kg (110 lb).  # Severe Malnutrition: based on nutrition assessment      Follow-ups Needed After Discharge   Follow-up Appointments     Follow Up and recommended labs and tests      Follow up with Nursing home physician.  No follow up labs or test are   needed.        Follow-up and recommended labs and tests       [facility] will call you to arrange outpatient follow up in 1-2 weeks.   You may call to confirm appointment as needed. [phone]          Unresulted Labs Ordered in the Past 30 Days of this Admission       No orders found from 4/1/2024 to 5/2/2024.        These results will be followed up by   Discharge Disposition   Discharged to short-term care facility  Condition at discharge: Stable    Hospital Course   Patient is an 83-year-old male with history of metastatic prostate cancer, prior stroke and recent MSSA bacteremia who presents after a syncopal episode.   Syncope most likely due to postural hypotension vs vasovagal   Echo: unremarkable   MRI brain: unremarkable   Advised to slowly get out of bed or chair   Hold flomax, compression stocking, encourage oral intake was liberalized salt , sodium chloride tablets added  Urinary retention  BPH  UTI vs " colonization  Symptomatic and seen on CT  UC growing Enterobacter cloaca complex. Treated with fosfomycin  Acute retention likely caused by constipation.  Forte catheter placed by urologist after RN attempted with no success.  Voiding trial after discharge with urologist  Urine color is getting clearer   Constipation  Schedule senna-colace, daily miralax  Suppository  Enema as needed   Had very loose small amount of bowel movement today  Continue the bowel regimen   Hypokalemia  Mildly low magnesium  Replace per protocol   Hyponatremia, likely chronic   Concern for possible SIADH  Urine sodium is 58  Sodium chloride 1 g oral 3 times daily   Recent MSSA bacteremia  [facility]mited to [facility] for about 2 [facility]  Completed IV cefazolin   Hypothyroidism  TSH elevated, T4 low  Increase prolactin to 125 mcg oral daily  Will need repeat TSH/T4 in a few weeks   Metastatic prostate cancer  Leukopenia  Anemia, likely due to chemo  Mets to bone  Chemo and radiation ~3 weeks ago in [place]  Severe malnutrition  Management per RD recommendation    Patient is clinically stable enough to discharge to TCU.  Medication reconciliation has been done.  Patient needs thyroid function tests to be done in 3 to 4 weeks.    Consultations This Hospital Stay   PHYSICAL THERAPY ADULT IP CONSULT  OCCUPATIONAL THERAPY ADULT IP CONSULT  CARE MANAGEMENT / SOCIAL WORK IP CONSULT  NUTRITION SERVICES ADULT IP CONSULT  UROLOGY IP CONSULT    Code Status   No CPR- Do NOT Intubate    Time Spent on this Encounter   I, Lily Delgado MD, personally saw the patient today and spent greater than 30 minutes discharging this patient.       Lily Delgado MD  [facility]  [address] [place] [id number]  Phone: [phone]  Fax: [phone]  ______________________________________________________________________    Physical Exam   Vital Signs: Temp: 98.1  F (36.7  C) Temp src: Oral BP: 117/72 Pulse:  72   Resp: 20 SpO2: 97 % O2 Device: None (Room air)    Weight: 110 lbs 0 oz    General Appearance:  No distress noted  Respiratory: Good air entry bilateral  Cardiovascular: S1 and S2 were heard  GI: Soft abdomen, no tenderness, normoactive bowel sounds   Skin: Intact and warm        Primary Care Physician   Denzel Jones    Discharge Orders      Reason for your hospital stay    You had a carlton catheter placed for urinary retention.     Follow-up and recommended labs and tests     [facility] will call you to arrange outpatient follow up in 1-2 weeks. You may call to confirm appointment as needed. [phone]     Activity    Your activity upon discharge: Having a carlton catheter does not limit your activity, however, it is recommended you do not drive with a carlton catheter in place.     Tubes and drains    You are going home with the following tubes or drains: Carlton catheter    CATHETER CARE   You are being discharged with a carlton catheter. You may choose to alternate between a leg (day) bag and large (night) bag. Drain urine from the bag when it is about 2/3rds full. Use plain soap and water to wash urethral/groin area daily. Males - you may apply a small amount of Bacitracin or Neosporin ointment to the tip of the penis three times a day for comfort (decreases friction).     ACTIVITY   Having a carlton catheter does not limit your activities, however, it is recommended you do not drive with a carlton catheter in place.     Seek medial evaluation if:  - You are feeling chilled or feverish, temperature >100.5.    - You develop thick cherry colored urine, clots or carlton catheter is not draining well.   - You develop purulent drainage from the urethra.     FOLLOW UP:   [facility] will call you to arrange for your catheter removed in our office, typically 1-2 weeks after discharge from the hospital. Alternatively, this may be completed at a transitional care unit if a bladder scanner is available.     General info for  SNF    Length of Stay Estimate: Short Term Care: Estimated # of Days <30  Condition at Discharge: Improving  Level of care:skilled   Rehabilitation Potential: Good  Admission H&P remains valid and up-to-date: Yes  Recent Chemotherapy: N/A  Use Nursing Home Standing Orders: Yes     Follow Up and recommended labs and tests    Follow up with Nursing home physician.  No follow up labs or test are needed.     Reason for your hospital stay    Syncope     Activity - Up with nursing assistance     Fall precautions     Diet    Follow this diet upon discharge: Orders Placed This Encounter      Snacks/Supplements Adult: Ensure Enlive; With Meals      Snacks/Supplements Adult: Magic Cup; With Meals      Combination Diet Regular Diet Adult       Significant Results and Procedures     Discharge Medications   Current Discharge Medication List        START taking these medications    Details   polyethylene glycol (MIRALAX) 17 GM/Dose powder Take 17 g by mouth daily    Associated Diagnoses: Hyponatremia      sodium chloride 1 GM tablet Take 1 tablet (1 g) by mouth 3 times daily (with meals)    Associated Diagnoses: Hyponatremia           CONTINUE these medications which have CHANGED    Details   levothyroxine (SYNTHROID/LEVOTHROID) 125 MCG tablet Take 1 tablet (125 mcg) by mouth every morning (before breakfast)    Associated Diagnoses: Thyroid disease      senna-docusate (SENOKOT-S/PERICOLACE) 8.6-50 MG tablet Take 2 tablets by mouth 2 times daily    Associated Diagnoses: Constipation, unspecified constipation type           CONTINUE these medications which have NOT CHANGED    Details   aspirin 81 MG tablet Take 1 tablet by mouth daily.      cyanocobalamin (VITAMIN B-12) 1000 MCG tablet Take 1,000 mcg by mouth daily      escitalopram (LEXAPRO) 10 MG tablet Take by mouth daily TAKE ONE-HALF TABLET BY MOUTH ONCE DAILY FOR 2 WEEKS THEN INCREASE TO 1 FULL TABLET DAILY THEREAFTER      famotidine (PEPCID) 20 MG tablet Take 20 mg by mouth  daily      lactulose (CHRONULAC) 10 GM/15ML solution Take 15 mLs by mouth 2 times daily as needed      LORazepam (ATIVAN) 0.5 MG tablet Take 1 tablet by mouth every 8 hours as needed      magnesium 250 MG tablet Take 1 tablet by mouth daily      megestrol (MEGACE) 40 MG/ML suspension Take 10 mLs by mouth daily      ondansetron (ZOFRAN) 8 MG tablet Take 8 mg by mouth every 8 hours as needed      Turmeric 500 MG CAPS            STOP taking these medications       ceFAZolin (ANCEF) 1 GM vial Comments:   Reason for Stopping:         SUTAB 7834-742-337 MG TABS Comments:   Reason for Stopping:             Allergies   Allergies   Allergen Reactions    Bee Pollen Other (See Comments)

## 2024-05-07 NOTE — PROGRESS NOTES
[facility] Vascular [facility]    S: Question to heparin lock open ended PICC.  B: Patient was admitted with an existing open ended PICC from other facility ([place]), told RN that they use heparin to lock PICC.  A: Patient has DL PICC in left arm, dressing dry and intact. He was admitted since may first and PICC has been saline lock    since admission. PICC patent and good blood return  per latest flowsheet documentation.  R: Since PICC was placed outside facility, I recommend chest xray to verify PICC location. PICC also need to heparin lock before discharge. Discussed with primary RN.

## 2024-05-07 NOTE — PLAN OF CARE
Problem: Adult Inpatient Plan of Care  Goal: Readiness for Transition of Care  Outcome: Not Progressing   Goal Outcome Evaluation:    Continues to have red urine output. Denies discomfort at carlton site or bladder. Calm and pleasant. Positive orthostatic with sitting position especially. Participated in physical therapy.

## 2024-05-07 NOTE — PLAN OF CARE
"  Patient is A/O, denies pain.  Patient is orthostatic, and does not overestimate his ability to ambulate.  Patient will be discharging to TCU today.  His daughter will give him a ride when she is done with work, this will approximately be 1730.  Patient had a loose stool this morning.  PICC is removed.  Patient will discharge with Forte in place, urine color has improved to a yellow color.  Education on catheter care and changing drainage bag was given, patient demonstrates proper technique.     Problem: Adult Inpatient Plan of Care  Goal: Plan of Care Review  Description: The Plan of Care Review/Shift note should be completed every shift.  The Outcome Evaluation is a brief statement about your assessment that the patient is improving, declining, or no change.  This information will be displayed automatically on your shift  note.  Outcome: Met  Goal: Patient-Specific Goal (Individualized)  Description: You can add care plan individualizations to a care plan. Examples of Individualization might be:  \"Parent requests to be called daily at 9am for status\", \"I have a hard time hearing out of my right ear\", or \"Do not touch me to wake me up as it startles  me\".  Outcome: Met  Goal: Readiness for Transition of Care  Outcome: Met     Problem: Risk for Delirium  Goal: Optimal Coping  Outcome: Met  Intervention: Optimize Psychosocial Adjustment to Delirium  Recent Flowsheet Documentation  Taken 5/7/2024 0843 by Leroy Hammer, RN  Supportive Measures: active listening utilized  Goal: Improved Behavioral Control  Outcome: Met  Intervention: Prevent and Manage Agitation  Recent Flowsheet Documentation  Taken 5/7/2024 0843 by Leroy Hammer, RN  Environment Familiarity/Consistency: daily routine followed  Intervention: Minimize Safety Risk  Recent Flowsheet Documentation  Taken 5/7/2024 0843 by Leroy Hammer, RN  Communication Enhancement Strategies: call light answered in person  Enhanced Safety Measures: room near " unit station  Goal: Improved Sleep  Outcome: Met     Problem: Malnutrition  Goal: Improved Nutritional Intake  Outcome: Met     Problem: Skin Injury Risk Increased  Goal: Skin Health and Integrity  Outcome: Met  Intervention: Plan: Nurse Driven Intervention: Moisture Management  Recent Flowsheet Documentation  Taken 5/7/2024 0843 by Leroy Hammer, RN  Moisture Interventions:   Incontinence pad   Urinary collection device  Bathing/Skin Care: back care  Intervention: Plan: Nurse Driven Intervention: Friction and Shear  Recent Flowsheet Documentation  Taken 5/7/2024 0843 by Leroy Hammer, RN  Friction/Shear Interventions: HOB 30 degrees or less  Intervention: Optimize Skin Protection  Recent Flowsheet Documentation  Taken 5/7/2024 0843 by Leroy Hammer, RN  Activity Management:   activity adjusted per tolerance   bedrest     Problem: Urinary Retention  Goal: Effective Urinary Elimination  Outcome: Met    Arsenio Hammer RN

## 2024-05-23 ENCOUNTER — APPOINTMENT (OUTPATIENT)
Dept: CT IMAGING | Facility: HOSPITAL | Age: 84
DRG: 522 | End: 2024-05-23
Attending: STUDENT IN AN ORGANIZED HEALTH CARE EDUCATION/TRAINING PROGRAM
Payer: COMMERCIAL

## 2024-05-23 ENCOUNTER — HOSPITAL ENCOUNTER (INPATIENT)
Facility: HOSPITAL | Age: 84
LOS: 11 days | Discharge: NURSING, REHAB OR LONG-TERM CARE | DRG: 522 | End: 2024-06-03
Attending: EMERGENCY MEDICINE | Admitting: INTERNAL MEDICINE
Payer: COMMERCIAL

## 2024-05-23 ENCOUNTER — APPOINTMENT (OUTPATIENT)
Dept: RADIOLOGY | Facility: HOSPITAL | Age: 84
DRG: 522 | End: 2024-05-23
Attending: STUDENT IN AN ORGANIZED HEALTH CARE EDUCATION/TRAINING PROGRAM
Payer: COMMERCIAL

## 2024-05-23 DIAGNOSIS — D64.9 ANEMIA, UNSPECIFIED TYPE: ICD-10-CM

## 2024-05-23 DIAGNOSIS — R31.9 URINARY TRACT INFECTION WITH HEMATURIA, SITE UNSPECIFIED: Primary | ICD-10-CM
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
## 2024-05-23 DIAGNOSIS — M25.552 HIP PAIN, LEFT: ICD-10-CM

## 2024-05-23 DIAGNOSIS — N39.0 URINARY TRACT INFECTION WITH HEMATURIA, SITE UNSPECIFIED: Primary | ICD-10-CM

## 2024-05-23 DIAGNOSIS — S72.012A CLOSED SUBCAPITAL FRACTURE OF LEFT FEMUR, INITIAL ENCOUNTER (H): ICD-10-CM

## 2024-05-23 LAB
ABO/RH(D): NORMAL
ALBUMIN SERPL BCG-MCNC: 3.1 G/DL (ref 3.5–5.2)
ALP SERPL-CCNC: 58 U/L (ref 40–150)
ALT SERPL W P-5'-P-CCNC: 7 U/L (ref 0–70)
ANION GAP SERPL CALCULATED.3IONS-SCNC: 10 MMOL/L (ref 7–15)
ANTIBODY SCREEN: NEGATIVE
APTT PPP: 30 SECONDS (ref 22–38)
AST SERPL W P-5'-P-CCNC: 21 U/L (ref 0–45)
BASOPHILS # BLD AUTO: 0 10E3/UL (ref 0–0.2)
BASOPHILS NFR BLD AUTO: 1 %
BILIRUB SERPL-MCNC: 0.2 MG/DL
BLD PROD TYP BPU: NORMAL
BLOOD COMPONENT TYPE: NORMAL
BUN SERPL-MCNC: 30.7 MG/DL (ref 8–23)
CALCIUM SERPL-MCNC: 8.7 MG/DL (ref 8.8–10.2)
CHLORIDE SERPL-SCNC: 104 MMOL/L (ref 98–107)
CODING SYSTEM: NORMAL
CREAT SERPL-MCNC: 0.8 MG/DL (ref 0.67–1.17)
CROSSMATCH: NORMAL
DEPRECATED HCO3 PLAS-SCNC: 22 MMOL/L (ref 22–29)
EGFRCR SERPLBLD CKD-EPI 2021: 88 ML/MIN/1.73M2
EOSINOPHIL # BLD AUTO: 0 10E3/UL (ref 0–0.7)
EOSINOPHIL NFR BLD AUTO: 0 %
ERYTHROCYTE [DISTWIDTH] IN BLOOD BY AUTOMATED COUNT: 23.3 % (ref 10–15)
GLUCOSE SERPL-MCNC: 102 MG/DL (ref 70–99)
HCT VFR BLD AUTO: 20.4 % (ref 40–53)
HGB BLD-MCNC: 6.8 G/DL (ref 13.3–17.7)
HOLD SPECIMEN: NORMAL
HOLD SPECIMEN: NORMAL
IMM GRANULOCYTES # BLD: 0 10E3/UL
IMM GRANULOCYTES NFR BLD: 1 %
INR PPP: 1.1 (ref 0.85–1.15)
ISSUE DATE AND TIME: NORMAL
LYMPHOCYTES # BLD AUTO: 0.6 10E3/UL (ref 0.8–5.3)
LYMPHOCYTES NFR BLD AUTO: 18 %
MCH RBC QN AUTO: 34.5 PG (ref 26.5–33)
MCHC RBC AUTO-ENTMCNC: 33.3 G/DL (ref 31.5–36.5)
MCV RBC AUTO: 104 FL (ref 78–100)
MONOCYTES # BLD AUTO: 0.5 10E3/UL (ref 0–1.3)
MONOCYTES NFR BLD AUTO: 13 %
NEUTROPHILS # BLD AUTO: 2.5 10E3/UL (ref 1.6–8.3)
NEUTROPHILS NFR BLD AUTO: 67 %
NRBC # BLD AUTO: 0 10E3/UL
NRBC BLD AUTO-RTO: 0 /100
PLATELET # BLD AUTO: 204 10E3/UL (ref 150–450)
POTASSIUM SERPL-SCNC: 4.1 MMOL/L (ref 3.4–5.3)
PROT SERPL-MCNC: 6.1 G/DL (ref 6.4–8.3)
RBC # BLD AUTO: 1.97 10E6/UL (ref 4.4–5.9)
SODIUM SERPL-SCNC: 136 MMOL/L (ref 135–145)
SPECIMEN EXPIRATION DATE: NORMAL
UNIT ABO/RH: NORMAL
UNIT NUMBER: NORMAL
UNIT STATUS: NORMAL
UNIT TYPE ISBT: 6200
WBC # BLD AUTO: 3.6 10E3/UL (ref 4–11)

## 2024-05-23 PROCEDURE — 86923 COMPATIBILITY TEST ELECTRIC: CPT | Performed by: STUDENT IN AN ORGANIZED HEALTH CARE EDUCATION/TRAINING PROGRAM

## 2024-05-23 PROCEDURE — 999N000104 CT LUMBAR SPINE RECONSTRUCTED

## 2024-05-23 PROCEDURE — 85730 THROMBOPLASTIN TIME PARTIAL: CPT | Performed by: EMERGENCY MEDICINE

## 2024-05-23 PROCEDURE — 82374 ASSAY BLOOD CARBON DIOXIDE: CPT | Performed by: STUDENT IN AN ORGANIZED HEALTH CARE EDUCATION/TRAINING PROGRAM

## 2024-05-23 PROCEDURE — 86900 BLOOD TYPING SEROLOGIC ABO: CPT | Performed by: STUDENT IN AN ORGANIZED HEALTH CARE EDUCATION/TRAINING PROGRAM

## 2024-05-23 PROCEDURE — 85610 PROTHROMBIN TIME: CPT | Performed by: EMERGENCY MEDICINE

## 2024-05-23 PROCEDURE — 86923 COMPATIBILITY TEST ELECTRIC: CPT

## 2024-05-23 PROCEDURE — 36415 COLL VENOUS BLD VENIPUNCTURE: CPT | Performed by: EMERGENCY MEDICINE

## 2024-05-23 PROCEDURE — 120N000001 HC R&B MED SURG/OB

## 2024-05-23 PROCEDURE — 72170 X-RAY EXAM OF PELVIS: CPT

## 2024-05-23 PROCEDURE — 85004 AUTOMATED DIFF WBC COUNT: CPT | Performed by: STUDENT IN AN ORGANIZED HEALTH CARE EDUCATION/TRAINING PROGRAM

## 2024-05-23 PROCEDURE — 250N000013 HC RX MED GY IP 250 OP 250 PS 637: Performed by: INTERNAL MEDICINE

## 2024-05-23 PROCEDURE — 73560 X-RAY EXAM OF KNEE 1 OR 2: CPT | Mod: LT

## 2024-05-23 PROCEDURE — 82040 ASSAY OF SERUM ALBUMIN: CPT | Performed by: STUDENT IN AN ORGANIZED HEALTH CARE EDUCATION/TRAINING PROGRAM

## 2024-05-23 PROCEDURE — 250N000011 HC RX IP 250 OP 636: Performed by: STUDENT IN AN ORGANIZED HEALTH CARE EDUCATION/TRAINING PROGRAM

## 2024-05-23 PROCEDURE — 99223 1ST HOSP IP/OBS HIGH 75: CPT | Performed by: INTERNAL MEDICINE

## 2024-05-23 PROCEDURE — 36415 COLL VENOUS BLD VENIPUNCTURE: CPT | Performed by: STUDENT IN AN ORGANIZED HEALTH CARE EDUCATION/TRAINING PROGRAM

## 2024-05-23 PROCEDURE — 72125 CT NECK SPINE W/O DYE: CPT

## 2024-05-23 PROCEDURE — 74177 CT ABD & PELVIS W/CONTRAST: CPT

## 2024-05-23 PROCEDURE — 70450 CT HEAD/BRAIN W/O DYE: CPT

## 2024-05-23 PROCEDURE — 73552 X-RAY EXAM OF FEMUR 2/>: CPT | Mod: LT

## 2024-05-23 RX ORDER — NALOXONE HYDROCHLORIDE 0.4 MG/ML
0.4 INJECTION, SOLUTION INTRAMUSCULAR; INTRAVENOUS; SUBCUTANEOUS
Status: DISCONTINUED | OUTPATIENT
Start: 2024-05-23 | End: 2024-06-03 | Stop reason: HOSPADM

## 2024-05-23 RX ORDER — ONDANSETRON 2 MG/ML
4 INJECTION INTRAMUSCULAR; INTRAVENOUS
Status: COMPLETED | OUTPATIENT
Start: 2024-05-23 | End: 2024-05-23

## 2024-05-23 RX ORDER — CHLORHEXIDINE GLUCONATE ORAL RINSE 1.2 MG/ML
15 SOLUTION DENTAL 2 TIMES DAILY PRN
COMMUNITY

## 2024-05-23 RX ORDER — OXYCODONE HYDROCHLORIDE 5 MG/1
5 TABLET ORAL EVERY 4 HOURS PRN
Status: DISCONTINUED | OUTPATIENT
Start: 2024-05-23 | End: 2024-06-03 | Stop reason: HOSPADM

## 2024-05-23 RX ORDER — METHOCARBAMOL 500 MG/1
500 TABLET, FILM COATED ORAL 4 TIMES DAILY PRN
Status: DISCONTINUED | OUTPATIENT
Start: 2024-05-23 | End: 2024-06-03 | Stop reason: HOSPADM

## 2024-05-23 RX ORDER — NALOXONE HYDROCHLORIDE 0.4 MG/ML
0.2 INJECTION, SOLUTION INTRAMUSCULAR; INTRAVENOUS; SUBCUTANEOUS
Status: DISCONTINUED | OUTPATIENT
Start: 2024-05-23 | End: 2024-06-03 | Stop reason: HOSPADM

## 2024-05-23 RX ORDER — HYDROMORPHONE HCL IN WATER/PF 6 MG/30 ML
0.2 PATIENT CONTROLLED ANALGESIA SYRINGE INTRAVENOUS
Status: DISCONTINUED | OUTPATIENT
Start: 2024-05-23 | End: 2024-05-25 | Stop reason: ALTCHOICE

## 2024-05-23 RX ORDER — HYDROMORPHONE HYDROCHLORIDE 1 MG/ML
0.5 INJECTION, SOLUTION INTRAMUSCULAR; INTRAVENOUS; SUBCUTANEOUS ONCE
Status: COMPLETED | OUTPATIENT
Start: 2024-05-23 | End: 2024-05-23

## 2024-05-23 RX ORDER — IOPAMIDOL 755 MG/ML
60 INJECTION, SOLUTION INTRAVASCULAR ONCE
Status: COMPLETED | OUTPATIENT
Start: 2024-05-23 | End: 2024-05-23

## 2024-05-23 RX ADMIN — ONDANSETRON 4 MG: 2 INJECTION INTRAMUSCULAR; INTRAVENOUS at 14:10

## 2024-05-23 RX ADMIN — HYDROMORPHONE HYDROCHLORIDE 0.5 MG: 1 INJECTION, SOLUTION INTRAMUSCULAR; INTRAVENOUS; SUBCUTANEOUS at 14:11

## 2024-05-23 RX ADMIN — OXYCODONE HYDROCHLORIDE 2.5 MG: 5 TABLET ORAL at 23:58

## 2024-05-23 RX ADMIN — OXYCODONE HYDROCHLORIDE 2.5 MG: 5 TABLET ORAL at 23:04

## 2024-05-23 RX ADMIN — IOPAMIDOL 60 ML: 755 INJECTION, SOLUTION INTRAVENOUS at 17:49

## 2024-05-23 RX ADMIN — METHOCARBAMOL 500 MG: 500 TABLET, FILM COATED ORAL at 21:37

## 2024-05-23 ASSESSMENT — ACTIVITIES OF DAILY LIVING (ADL)
ADLS_ACUITY_SCORE: 42
ADLS_ACUITY_SCORE: 38
ADLS_ACUITY_SCORE: 42
ADLS_ACUITY_SCORE: 38

## 2024-05-23 NOTE — ED TRIAGE NOTES
Pt tripped over cat at 0730 today falling to floor on Lt hip/elbow.  No thinners, no loss of conciousness. C collar applied by ems.Having Lt hip pain.     Triage Assessment (Adult)       Row Name 05/23/24 1349          Triage Assessment    Airway WDL WDL        Respiratory WDL    Respiratory WDL WDL        Skin Circulation/Temperature WDL    Skin Circulation/Temperature WDL WDL        Cardiac WDL    Cardiac WDL WDL        Peripheral/Neurovascular WDL    Peripheral Neurovascular WDL WDL        Cognitive/Neuro/Behavioral WDL    Cognitive/Neuro/Behavioral WDL WDL        [place] Coma Scale    Best Eye Response 4-->(E4) spontaneous     Best Motor Response 6-->(M6) obeys commands     Best Verbal Response 5-->(V5) oriented     [place] Coma Scale Score 15

## 2024-05-23 NOTE — ED NOTES
Patient requesting something to drink- advised patient we need imaging results back first.  Patient was agreeable to this.

## 2024-05-23 NOTE — ED NOTES
Patient remains waiting for radiology.  Asked [facility] to call to see when they will be able to take him.

## 2024-05-23 NOTE — ED NOTES
Bed: [facility]  Expected date:   Expected time:   Means of arrival: Ambulance  Comments:   [facility] fall with Left hip pain

## 2024-05-23 NOTE — ED NOTES
Patient states needs to void.  Given urinal but patient unable to stand due to pain/injury.  Patient able to void approx. 75 mLs.  Bladder scan showed 877 mLs post void.  Providers made aware- awaiting further orders.

## 2024-05-23 NOTE — ED PROVIDER NOTES
Emergency Department Midlevel Supervisory Note     I had a face to face encounter with this patient seen by the Advanced Practice Provider (BR[facility]). I personally made/approved the management plan and take responsibility for the patient management. I personally saw patient and performed a substantive portion of the visit including all aspects of the medical decision making.     ED Course:  4:41 PM Annamarie Howell PA-C staffed patient with me. I agree with their assessment and plan of management, and I will see the patient.  4:46 PM I met with the patient to introduce myself, gather additional history, perform my initial exam, and discuss the plan.     Brief HPI:     Kilo Montiel is a 83 year old male who presents for evaluation of a fall.     Patient endorses left hip pain following a fall at 0730 this morning. He notes that he was able to stand following the fall, but unable to bear weight secondary to pain. He did not hit his head or lose consciousness. No headache, nausea, vomiting, dizziness, or vision changes. He notes that he hit his left elbow and has a small abrasion. No anticoagulation. See BR[facility] note for more detailed history.     I, Nishi Burnette, am serving as a scribe to document services personally performed by Kurt Glynn MD, based on my observation and the provider's statements to me. I, Kurt Glynn MD attest that Nishi Burnette is acting in a scribe capacity, has observed my performance of the services and has documented them in accordance with my direction.          MDM:    Plain films do not show any obvious fracture or dislocation.  However CT imaging of the abdomen and pelvis does demonstrate a mildly comminuted impacted subcapital fracture of the left femur.  Additionally urinary bladder markedly distended.  Patient attempted to void was only able to get about 75 mL out therefore a Forte catheter was placed.  Discussed with orthopedics and they will consider possible surgical intervention once patient's  hemoglobin is stabilized.  Discussed with hospitalist who agrees admit the patient at this point time    ED Course as of 05/23/24 2337   u May 23, 2024   1612 Patient fell and has painful range of motion of the left hip concerning for fracture.  Lab called critically low hemoglobin.  Has metastatic cancer.  Will transfuse a unit of blood.  Plan for admission for further evaluation of anemia and management of likely left hip fracture   2331 XR Femur Left 2 Views       1. Fall, initial encounter    2. Hip pain, left    3. Anemia, unspecified type    4. Closed subcapital fracture of left femur, initial encounter (H)        Consults:      Labs and Imaging:  Results for orders placed or performed during the hospital encounter of 05/23/24   CT Head w/o Contrast    Impression    IMPRESSION:    1.  No evidence of acute intracranial hemorrhage or mass effect.  2.  Moderate nonspecific white matter changes.  3.  Moderate brain parenchymal volume loss.   CT Cervical Spine w/o Contrast    Impression    IMPRESSION:  1.  No evidence of acute fracture or subluxation of the cervical spine by CT imaging.  2.  Degenerative cervical spondylosis as described above.   XR Femur Left 2 Views    Impression    IMPRESSION: Both hips negative for fracture or dislocation. There is degenerative change. There is some lucency within the left acetabulum which may be artifactual/projectional. No cortical step-off. Prostate brachytherapy seeds. Fracture of the L5   vertebral body may be chronic. Recommend correlation. The left femur is negative for fracture. Chronic enthesitis superior pole of the left patella. Small left knee joint effusion.   XR Pelvis 1/2 Views    Impression    IMPRESSION: Both hips negative for fracture or dislocation. There is degenerative change. There is some lucency within the left acetabulum which may be artifactual/projectional. No cortical step-off. Prostate brachytherapy seeds. Fracture of the L5   vertebral body may  be chronic. Recommend correlation. The left femur is negative for fracture. Chronic enthesitis superior pole of the left patella. Small left knee joint effusion.   XR Knee Left 1/2 Views    Impression    IMPRESSION: Both hips negative for fracture or dislocation. There is degenerative change. There is some lucency within the left acetabulum which may be artifactual/projectional. No cortical step-off. Prostate brachytherapy seeds. Fracture of the L5   vertebral body may be chronic. Recommend correlation. The left femur is negative for fracture. Chronic enthesitis superior pole of the left patella. Small left knee joint effusion.   CT Abdomen Pelvis w Contrast    Impression    IMPRESSION:     1.  Mildly comminuted impacted subcapital fracture proximal left femur.  2.  Moderate to severe distention of the urinary bladder associated with symmetric hydroureter and hydronephrosis. Urinary retention potentially secondary to flow limitation at the level of the prostate gland.  3.  Multifocal sclerosis of the ribs, spine, and pelvis consistent with patchy bone metastases. Compression deformities of the lumbar spine are unchanged from 5/1/2024.  4.  Focal consolidation in the anterior basal left lower lobe has improved from 5/1/2024 with residual atelectasis and 9 mm nodule in this location. The nodule could be reassessed with chest CT in 3-6 months.   CT Lumbar Spine Reconstructed    Impression    IMPRESSION:  1.  Diffuse osseous metastatic disease.  2.  Chronic T12, L1, L2, L3, L4, and L5 anterior compression fractures are unchanged from comparison exam.  3.  Severe spinal canal stenosis L1-2, secondary to bony retropulsion of the L2 vertebral body. Moderate to severe spinal canal stenosis L4-5.  4.  Partially imaged distended urinary bladder with severe right and mild left hydronephrosis.   Extra Green Top (Lithium Heparin) Tube   Result Value Ref Range    Hold Specimen [facility]    Extra Purple Top Tube   Result Value Ref Range     Hold Specimen [facility]    Comprehensive metabolic panel   Result Value Ref Range    Sodium 136 135 - 145 mmol/L    Potassium 4.1 3.4 - 5.3 mmol/L    Carbon Dioxide (CO2) 22 22 - 29 mmol/L    Anion Gap 10 7 - 15 mmol/L    Urea Nitrogen 30.7 (H) 8.0 - 23.0 mg/dL    Creatinine 0.80 0.67 - 1.17 mg/dL    GFR Estimate 88 >60 mL/min/1.73m2    Calcium 8.7 (L) 8.8 - 10.2 mg/dL    Chloride 104 98 - 107 mmol/L    Glucose 102 (H) 70 - 99 mg/dL    Alkaline Phosphatase 58 40 - 150 U/L    AST 21 0 - 45 U/L    ALT 7 0 - 70 U/L    Protein Total 6.1 (L) 6.4 - 8.3 g/dL    Albumin 3.1 (L) 3.5 - 5.2 g/dL    Bilirubin Total 0.2 <=1.2 mg/dL   Result Value Ref Range    INR 1.10 0.85 - 1.15   Result Value Ref Range    aPTT 30 22 - 38 Seconds   CBC with platelets and differential   Result Value Ref Range    WBC Count 3.6 (L) 4.0 - 11.0 10e3/uL    RBC Count 1.97 (L) 4.40 - 5.90 10e6/uL    Hemoglobin 6.8 (LL) 13.3 - 17.7 g/dL    Hematocrit 20.4 (L) 40.0 - 53.0 %     (H) 78 - 100 fL    MCH 34.5 (H) 26.5 - 33.0 pg    MCHC 33.3 31.5 - 36.5 g/dL    RDW 23.3 (H) 10.0 - 15.0 %    Platelet Count 204 150 - 450 10e3/uL    % Neutrophils 67 %    % Lymphocytes 18 %    % Monocytes 13 %    % Eosinophils 0 %    % Basophils 1 %    % Immature Granulocytes 1 %    NRBCs per 100 WBC 0 <1 /100    Absolute Neutrophils 2.5 1.6 - 8.3 10e3/uL    Absolute Lymphocytes 0.6 (L) 0.8 - 5.3 10e3/uL    Absolute Monocytes 0.5 0.0 - 1.3 10e3/uL    Absolute Eosinophils 0.0 0.0 - 0.7 10e3/uL    Absolute Basophils 0.0 0.0 - 0.2 10e3/uL    Absolute Immature Granulocytes 0.0 <=0.4 10e3/uL    Absolute NRBCs 0.0 10e3/uL   Adult Type and Screen   Result Value Ref Range    ABO/RH(D) A POS     Antibody Screen Negative Negative    SPECIMEN EXPIRATION DATE [id number]    Prepare red blood cells (unit)   Result Value Ref Range    Blood Component Type Red Blood Cells     Product Code [id number]     Unit Status Ready for issue     Unit Number [id number]     CROSSMATCH Compatible      CODING SYSTEM [id number]        I have reviewed the relevant laboratory studies above.    I independently interpreted the following imaging study(s):       EKG: I reviewed and independently interpreted the patient's EKG, with comments made as listed below. Please see scanned EKG for full report.       Procedures:  I was present for the key portions of procedures documented in [facility]/midlevel note, see midlevel note for further details.    Kurt Glynn MD  [facility] EMERGENCY DEPARTMENT  [address] [place] [id number] [phone]       Kurt Glynn MD  05/23/24 8604

## 2024-05-23 NOTE — ED NOTES
AIDET performed.  Care assumed.  Patient resting comfortably on exam cart.  States minimal pain to left leg/hip at rest.  States pain is 6/10 with movement.  Informed patient to left staff know if pain increases.  He verbalized understanding.  Updated on plan of care, awaiting to go to imaging.  He again verbalized understanding.  Will continue plan of care.

## 2024-05-23 NOTE — PLAN OF CARE
Called by ED for left subcapital femoral neck fracture after tripping over a cat. There is no report on the status of the cat.     He is anemic in the ER requiring transfusion. Has a history of metastatic prostate cancer as well as a recent history of admission for malnutrition and failure to thrive. He was subsequently admitted to a TCU and only discharged home a day or so ago. He otherwise lives and ambulates independently.      Would tentatively plan for operative intervention tomorrow, pending medical clearance.     NPO at midnight.     Guy Mcmahon MD   [facility]  5/23/2024

## 2024-05-23 NOTE — ED NOTES
AIDET performed.  Care assumed.  Patient resting comfortably on exam cart.  States left leg/hip pain is minimal at rest.  States pain is 6/10 at this time with movement- but is tolerating well.  Updated on plan of care, awaiting radiology.  He verbalized understanding

## 2024-05-23 NOTE — ED PROVIDER NOTES
Emergency Department Encounter   NAME: Kilo Montiel ; AGE: 83 year old male ; YOB: 1940 ; MRN: [id number] ; PCP: Denzel Jones   ED PROVIDER: Annamarie Howell PA-C    Evaluation Date & Time:   5/23/2024  1:35 PM    CHIEF COMPLAINT:  Fall        Impression and Plan   FINAL IMPRESSION:    ICD-10-CM    1. Fall, initial encounter  W19.XXXA Case Request: HEMIARTHROPLASTY, HIP, BIPOLAR     Case Request: HEMIARTHROPLASTY, HIP, BIPOLAR      2. Hip pain, left  M25.552 Case Request: HEMIARTHROPLASTY, HIP, BIPOLAR     Case Request: HEMIARTHROPLASTY, HIP, BIPOLAR      3. Anemia, unspecified type  D64.9       4. Closed subcapital fracture of left femur, initial encounter (H)  S72.012A           MDM:  Kilo is an 83 year old male with PMH of anxiety, anemia, metatstatic prostate cancer, constipation, and CKD 3 presenting to the emergency department via ambulance for evaluation of left hip pain after a fall that occurred at home around 730 this morning.  He states his cat ran out in front of him, causing him to lose his balance and fall onto his left hip.  Endorses being able to stand following the fall but was unable to put much weight on the left side due to pain. He did not hit his head or lose consciousness. No headache currently, nausea, vomiting, dizziness, or vision changes. He states he also thinks he hit his left elbow on the ground when he fell and he has a small abrasion over the left elbow.  Denies any swelling or pain with range of motion of the left elbow.  Denies any paresthesias in the upper or lower extremities. His main area of concern currently is his left hip as this hurts anytime he moves it slightly.  He is not currently on any blood thinners.     Per chart review, patient was admitted here at [facility] from 5/1 to 5/7 for syncope and malnutrition. He was discharged to TCU and states he just got home yesterday from TCU.    Vitals reviewed and unremarkable aside from a /72. On  exam he is resting comfortably, gets sharp bouts of pain with any movement of the left leg. Differential diagnosis includes but not limited to hip contusion, hip fracture, hip dislocation, pelvic fracture, intra-abdominal bleed.  He was given IV Dilaudid upon arrival for pain. NEXUS C-spine criteria is negative. Given he is elderly, CT head and cervical spine were ordered.  He does not have any spinal tenderness, step-off deformities, or overlying skin changes of the spine. He has significant tenderness of the left-sided hip and pelvis as well as left knee. No left calf, ankle, or foot tenderness. There is no obvious deformity of the left hip or lower extremity and no overlying skin changes, abrasions, or lacerations. The left lower extremity is warm and pink with 2+ DP and PT pulses when compared to the right. No paresthesias of the left lower extremity. XR of both hips and femur are negative for fracture or dislocation via radiology read, they do note some lucency within the left acetabulum which may be artifactual/projectional. There is a small left knee joint effusion but no evidence of fracture in the left knee either. There is also a fracture of L5 vertebral body. Patient states that this is chronic and has been present for some time. CT head and cervical spine are negative CMP without significant abnormalities.  PTT and INR within normal range.    CBC finds hemoglobin of 6.8.  Per chart review, his hemoglobin was 8.2 two weeks ago. He was consented for blood transfusion and given 1 unit of blood. Patient has endorsed needing blood transfusions in the past and he does have history of metastatic prostate cancer so this anemia may be related to cancer. Cannot rule out pelvic fracture as a cause for the bleed without CT so will proceed with this. Additionally, he continues to endorse severe left hip pain and I suspect there are some subtle XR changes to the left femoral head so I think CT may find fracture.    CT  lumbar spine finds several chronic anterior compression fractures that are unchanged from previous exam May 1. CT abdomen pelvis does find a mildly comminuted impacted subcapital fracture of the proximal left femur. There is also moderate to severe distention of the urinary bladder associated with bilateral symmetric hydroureter and hydronephrosis. Patient stated he needed to void so he was given a urinal. He voided approximately 75 mLs. Bladder scan finds 877 mL postvoid so urinary catheter was placed.    I spoke with Dr. Lisandro Franklin. Given patient's hemoglobin of 6.8 it is unclear at this time if patient will be a good surgical candidate. Especially considering a left-sided partial hip replacement is the most likely treatment and there is some blood loss involved with the procedure. Plan to admit patient for pain control and continued blood product replacement. Will make patient NPO at midnight with plan for Ortho to take to OR tomorrow pending improvement in his hemoglobin and clearance for surgery.  Patient is agreeable to this plan.      History:  Supplemental history from: Documented in chart, Family Member/Significant Other, and EMS  External Record(s) reviewed: I personally performed chart review of hospital admission from 5/1/2024    Work Up:  Chart documentation includes differential considered and any EKGs or imaging independently interpreted by provider, where specified.  In additional to work up documented, I considered the following work up: Documented in chart, if applicable.    External consultation:  Discussion of management with another provider: Dr. Bose, Dr. Glynn    Complicating factors:  Care impacted by chronic illness: Other: Metastatic prostate cancer  Care affected by social determinants of health: N/A    Disposition considerations: Admit.      ED COURSE:  1:53 PM I met and introduced myself to the patient. I gathered initial history and performed my physical exam. We discussed plan  for initial workup.   1:53 PM I have staffed the patient with Dr. Bose, ED MD, who has evaluated the patient and agrees with all aspects of today's care.     7:33 PM I called and spoke to hospitalist and have admitted patient      At the conclusion of the encounter I discussed the results of all the tests and the disposition. The questions were answered. The patient or family acknowledged understanding and was agreeable with the care plan.      Critical Care     Performed by: Annamarie Howell PA-C  Authorized by: Dr Sinan Bose  Total critical care time: 50 minutes  Critical care was necessary to treat or prevent imminent or life-threatening deterioration of the following conditions: anemia requiring blood transfusing  Critical care was time spent personally by me on the following activities: development of treatment plan with patient or surrogate, discussions with consultants, examination of patient, evaluation of patient's response to treatment, obtaining history from patient or surrogate, ordering and performing treatments and interventions, ordering and review of laboratory studies, ordering and review of radiographic studies, re-evaluation of patient's condition and monitoring for potential decompensation.  Critical care time was exclusive of separately billable procedures and treating other patients.        MEDICATIONS GIVEN IN THE EMERGENCY DEPARTMENT:  Medications   HYDROmorphone (PF) (DILAUDID) injection 0.5 mg (0.5 mg Intravenous $Given 5/23/24 1411)   ondansetron (ZOFRAN) injection 4 mg (4 mg Intravenous $Given 5/23/24 1410)   iopamidol (ISOVUE-370) solution 60 mL (60 mLs Intravenous $Given 5/23/24 1749)         NEW PRESCRIPTIONS STARTED AT TODAY'S ED VISIT:  New Prescriptions    No medications on file         HPI   Patient information was obtained from: patient, EMS  Use of Intrepreter: N/A     Kilo is an 83 year old male with PMH of anxiety, anemia, prostate cancer, constipation, and CKD 3 presenting to  the emergency department via ambulance for evaluation of left hip pain after a fall that occurred at home around 730 this morning.  He states his cat ran out in front of him, causing him to lose his balance and fall onto his left hip.  Endorses being able to stand following the fall but was unable to put much weight on the left side due to pain.  He did not hit his head or lose consciousness. No headache currently, nausea, vomiting, dizziness, or vision changes. He states he also thinks he hit his left elbow on the ground when he fell and he has a small abrasion over the left elbow.  Denies any swelling or pain with range of motion of the left elbow.  Denies any paresthesias in the upper or lower extremities. His main area of concern currently is his left hip as this hurts anytime he moves it slightly.  He is not currently on any blood thinners.         Medical History     Past Medical History:   Diagnosis Date    Anxiety     Hypothyroid     Melanoma (H)     Melanoma of wrist (H)     Prostate cancer (H)     Thyroid disease        Past Surgical History:   Procedure Laterality Date    ANKLE SURGERY      left    COLONOSCOPY      INSERT RADIATION SEEDS PROSTATE  2013    Procedure: INSERT RADIATION SEEDS PROSTATE;  Insert Radiation Seeds Prostate ;  Surgeon: Sahil Franco MD;  Location: [facility] OR    SURGICAL PATHOLOGY EXAM      melanoma removal       Family History   Problem Relation Age of Onset    Breast Cancer Mother 75       Social History     Tobacco Use    Smoking status: Former     Current packs/day: 0.00     Average packs/day: 0.5 packs/day for 8.0 years (4.0 ttl pk-yrs)     Types: Cigarettes     Start date: 1974     Quit date: 1982     Years since quittin.9    Smokeless tobacco: Former     Quit date: 1976   Substance Use Topics    Alcohol use: Yes     Comment: 2-3 per week       aspirin 81 MG tablet  chlorhexidine (PERIDEX) 0.12 % solution  cyanocobalamin (VITAMIN B-12) 1000 MCG  "tablet  escitalopram (LEXAPRO) 10 MG tablet  famotidine (PEPCID) 20 MG tablet  lactulose (CHRONULAC) 10 GM/15ML solution  levothyroxine (SYNTHROID/LEVOTHROID) 125 MCG tablet  LORazepam (ATIVAN) 0.5 MG tablet  magnesium 250 MG tablet  megestrol (MEGACE) 40 MG/ML suspension  ondansetron (ZOFRAN) 8 MG tablet  polyethylene glycol (MIRALAX) 17 GM/Dose powder  senna-docusate (SENOKOT-S/PERICOLACE) 8.6-50 MG tablet  sodium chloride 1 GM tablet  Turmeric 500 MG CAPS          Physical Exam     First Vitals:  Patient Vitals for the past 24 hrs:   BP Temp Temp src Pulse Resp SpO2 Height Weight   05/23/24 1722 133/74 -- -- 72 -- 100 % -- --   05/23/24 1530 (!) 155/75 -- -- 67 -- 99 % -- --   05/23/24 1424 (!) 140/68 -- -- 70 -- 98 % -- --   05/23/24 1420 -- -- -- 73 16 99 % -- --   05/23/24 1347 (!) 141/72 99.1  F (37.3  C) Oral 73 18 99 % 1.676 m (5' 6\") 49.9 kg (110 lb)         PHYSICAL EXAM    General Appearance:  Alert, cooperative, no distress, appears stated age.  HENT: Normocephalic without obvious deformity, atraumatic. Mucous membranes moist.  No hematomas, abrasions, or lacerations of the scalp.  No Scott sign or raccoon eyes.  Eyes: Conjunctiva clear, Lids normal. No discharge.   Respiratory: No distress. Lungs clear to ausculation bilaterally. No wheezes, rhonchi or stridor  Cardiovascular: Regular rate and rhythm, no murmur. Normal cap refill. No peripheral edema  GI: Abdomen soft, nontender, normal bowel sounds  : No CVA tenderness  Musculoskeletal: Moving all extremities. No gross deformities.  No midline spinal tenderness or step-off deformities.  No overlying skin changes of the back or cervical spine. Patient is able to achieve very minimal left hip flexion before he develops muscle spasms and severe pain.  He is able to achieve full hip extension.  He is tender to palpation over the left side hip and knee.  No obvious deformities or overlying skin changes.  No pain with AP compression of the chest wall or " rib tenderness.  No right-sided hip pain or tenderness.  Integument: Warm, dry, no rashes or lesions  Neurologic: Alert and orientated x3. No focal deficits.  Psych: Normal mood and affect        Results     LAB:  All pertinent labs reviewed and interpreted  Labs Ordered and Resulted from Time of ED Arrival to Time of ED Departure   COMPREHENSIVE METABOLIC PANEL - Abnormal       Result Value    Sodium 136      Potassium 4.1      Carbon Dioxide (CO2) 22      Anion Gap 10      Urea Nitrogen 30.7 (*)     Creatinine 0.80      GFR Estimate 88      Calcium 8.7 (*)     Chloride 104      Glucose 102 (*)     Alkaline Phosphatase 58      AST 21      ALT 7      Protein Total 6.1 (*)     Albumin 3.1 (*)     Bilirubin Total 0.2     CBC WITH PLATELETS AND DIFFERENTIAL - Abnormal    WBC Count 3.6 (*)     RBC Count 1.97 (*)     Hemoglobin 6.8 (*)     Hematocrit 20.4 (*)      (*)     MCH 34.5 (*)     MCHC 33.3      RDW 23.3 (*)     Platelet Count 204      % Neutrophils 67      % Lymphocytes 18      % Monocytes 13      % Eosinophils 0      % Basophils 1      % Immature Granulocytes 1      NRBCs per 100 WBC 0      Absolute Neutrophils 2.5      Absolute Lymphocytes 0.6 (*)     Absolute Monocytes 0.5      Absolute Eosinophils 0.0      Absolute Basophils 0.0      Absolute Immature Granulocytes 0.0      Absolute NRBCs 0.0     INR - Normal    INR 1.10     PARTIAL THROMBOPLASTIN TIME - Normal    aPTT 30     TYPE AND SCREEN, ADULT    ABO/RH(D) A POS      Antibody Screen Negative      SPECIMEN EXPIRATION DATE [id number]     PREPARE RED BLOOD CELLS (UNIT)   RED BLOOD CELLS HAVE AVAILABLE (UNIT)   TRANSFUSE RED BLOOD CELLS (UNIT)   ABO/RH TYPE AND SCREEN       RADIOLOGY:  CT Lumbar Spine Reconstructed   Final Result   IMPRESSION:   1.  Diffuse osseous metastatic disease.   2.  Chronic T12, L1, L2, L3, L4, and L5 anterior compression fractures are unchanged from comparison exam.   3.  Severe spinal canal stenosis L1-2, secondary to  bony retropulsion of the L2 vertebral body. Moderate to severe spinal canal stenosis L4-5.   4.  Partially imaged distended urinary bladder with severe right and mild left hydronephrosis.      CT Abdomen Pelvis w Contrast   Final Result   IMPRESSION:       1.  Mildly comminuted impacted subcapital fracture proximal left femur.   2.  Moderate to severe distention of the urinary bladder associated with symmetric hydroureter and hydronephrosis. Urinary retention potentially secondary to flow limitation at the level of the prostate gland.   3.  Multifocal sclerosis of the ribs, spine, and pelvis consistent with patchy bone metastases. Compression deformities of the lumbar spine are unchanged from 5/1/2024.   4.  Focal consolidation in the anterior basal left lower lobe has improved from 5/1/2024 with residual atelectasis and 9 mm nodule in this location. The nodule could be reassessed with chest CT in 3-6 months.      XR Femur Left 2 Views   Final Result   IMPRESSION: Both hips negative for fracture or dislocation. There is degenerative change. There is some lucency within the left acetabulum which may be artifactual/projectional. No cortical step-off. Prostate brachytherapy seeds. Fracture of the L5    vertebral body may be chronic. Recommend correlation. The left femur is negative for fracture. Chronic enthesitis superior pole of the left patella. Small left knee joint effusion.      XR Pelvis 1/2 Views   Final Result   IMPRESSION: Both hips negative for fracture or dislocation. There is degenerative change. There is some lucency within the left acetabulum which may be artifactual/projectional. No cortical step-off. Prostate brachytherapy seeds. Fracture of the L5    vertebral body may be chronic. Recommend correlation. The left femur is negative for fracture. Chronic enthesitis superior pole of the left patella. Small left knee joint effusion.      XR Knee Left 1/2 Views   Final Result   IMPRESSION: Both hips  negative for fracture or dislocation. There is degenerative change. There is some lucency within the left acetabulum which may be artifactual/projectional. No cortical step-off. Prostate brachytherapy seeds. Fracture of the L5    vertebral body may be chronic. Recommend correlation. The left femur is negative for fracture. Chronic enthesitis superior pole of the left patella. Small left knee joint effusion.      CT Cervical Spine w/o Contrast   Final Result   IMPRESSION:   1.  No evidence of acute fracture or subluxation of the cervical spine by CT imaging.   2.  Degenerative cervical spondylosis as described above.      CT Head w/o Contrast   Final Result   IMPRESSION:     1.  No evidence of acute intracranial hemorrhage or mass effect.   2.  Moderate nonspecific white matter changes.   3.  Moderate brain parenchymal volume loss.            ECG:  N/A      PROCEDURES:  N/A      Annamarie Howell PA-C   Emergency Medicine   [facility] EMERGENCY DEPARTMENT       Annamarie Howell PA-C  05/23/24 2054

## 2024-05-23 NOTE — ED PROVIDER NOTES
"Emergency Department Midlevel Supervisory Note     I had a face to face encounter with this patient seen by the Advanced Practice Provider (BRICE). I personally made/approved the management plan and take responsibility for the patient management. I personally saw patient and performed a substantive portion of the visit including all aspects of the medical decision making.     ED Course:  2:02 PM Annamarie Howell PA-C staffed patient with me. I agree with their assessment and plan of management, and I will see the patient.  2:02 PM I met with the patient to introduce myself, gather additional history, perform my initial exam, and discuss the plan.     Brief HPI:     Kilo Montiel is a 83 year old male who presents for evaluation of mechanical fall at 7:30 AM today. The patient reports left hip pain. No loss of consciousness or blood thinners.    I, Chapis Her, am serving as a scribe to document services personally performed by Alvaro Bose MD, based on my observations and the provider's statements to me.   I, Alvaro Bose MD, attest that Chapis Her was acting in a scribe capacity, has observed my performance of the services and has documented them in accordance with my direction.    Brief Physical Exam: /57 (BP Location: Left arm)   Pulse 73   Temp 97.9  F (36.6  C) (Oral)   Resp 16   Ht 1.676 m (5' 6\")   Wt 49.9 kg (110 lb)   SpO2 94%   BMI 17.75 kg/m    Constitutional:  Alert, in no acute distress  EYES: Conjunctivae clear  HENT:  Atraumatic  Respiratory:  Respirations even, unlabored, in no acute respiratory distress  Cardiovascular:  Regular rate and rhythm, good peripheral perfusion  GI: Soft, non-distended, non-tender  Musculoskeletal: Painful range of motion of left hip.  Tenderness left hip  Integument: Warm & dry. No appreciable rash, erythema.  Neurologic:  Alert & oriented, speech clear and fluent, no focal deficits noted  Psych: Normal mood and affect       MDM:      ED Course as of 05/28/24 1521 "   Thu May 23, 2024   1612 Patient fell and has painful range of motion of the left hip concerning for fracture.  Lab called critically low hemoglobin.  Has metastatic cancer.  Will transfuse a unit of blood.  Plan for admission for further evaluation of anemia and management of likely left hip fracture   2331 XR Femur Left 2 Views       1. Fall, initial encounter    2. Hip pain, left    3. Anemia, unspecified type    4. Closed subcapital fracture of left femur, initial encounter (H)            Labs and Imaging:  Results for orders placed or performed during the hospital encounter of 05/23/24   CT Head w/o Contrast    Impression    IMPRESSION:    1.  No evidence of acute intracranial hemorrhage or mass effect.  2.  Moderate nonspecific white matter changes.  3.  Moderate brain parenchymal volume loss.   CT Cervical Spine w/o Contrast    Impression    IMPRESSION:  1.  No evidence of acute fracture or subluxation of the cervical spine by CT imaging.  2.  Degenerative cervical spondylosis as described above.   XR Femur Left 2 Views    Impression    IMPRESSION: Both hips negative for fracture or dislocation. There is degenerative change. There is some lucency within the left acetabulum which may be artifactual/projectional. No cortical step-off. Prostate brachytherapy seeds. Fracture of the L5   vertebral body may be chronic. Recommend correlation. The left femur is negative for fracture. Chronic enthesitis superior pole of the left patella. Small left knee joint effusion.   XR Pelvis 1/2 Views    Impression    IMPRESSION: Both hips negative for fracture or dislocation. There is degenerative change. There is some lucency within the left acetabulum which may be artifactual/projectional. No cortical step-off. Prostate brachytherapy seeds. Fracture of the L5   vertebral body may be chronic. Recommend correlation. The left femur is negative for fracture. Chronic enthesitis superior pole of the left patella. Small left knee  joint effusion.   XR Knee Left 1/2 Views    Impression    IMPRESSION: Both hips negative for fracture or dislocation. There is degenerative change. There is some lucency within the left acetabulum which may be artifactual/projectional. No cortical step-off. Prostate brachytherapy seeds. Fracture of the L5   vertebral body may be chronic. Recommend correlation. The left femur is negative for fracture. Chronic enthesitis superior pole of the left patella. Small left knee joint effusion.   CT Abdomen Pelvis w Contrast    Impression    IMPRESSION:     1.  Mildly comminuted impacted subcapital fracture proximal left femur.  2.  Moderate to severe distention of the urinary bladder associated with symmetric hydroureter and hydronephrosis. Urinary retention potentially secondary to flow limitation at the level of the prostate gland.  3.  Multifocal sclerosis of the ribs, spine, and pelvis consistent with patchy bone metastases. Compression deformities of the lumbar spine are unchanged from 5/1/2024.  4.  Focal consolidation in the anterior basal left lower lobe has improved from 5/1/2024 with residual atelectasis and 9 mm nodule in this location. The nodule could be reassessed with chest CT in 3-6 months.   CT Lumbar Spine Reconstructed    Impression    IMPRESSION:  1.  Diffuse osseous metastatic disease.  2.  Chronic T12, L1, L2, L3, L4, and L5 anterior compression fractures are unchanged from comparison exam.  3.  Severe spinal canal stenosis L1-2, secondary to bony retropulsion of the L2 vertebral body. Moderate to severe spinal canal stenosis L4-5.  4.  Partially imaged distended urinary bladder with severe right and mild left hydronephrosis.   XR Pelvis w Hip Port Left  1 View    Impression    IMPRESSION: Postoperative change related to interval placement of a left hip hemiarthroplasty for management of left proximal femur fracture. The component projects in the expected position. No acute displaced periprosthetic  fracture. Expected recent   postoperative soft tissue and intra-articular gas with left hip soft tissue swelling. Mild degenerative change right hip. Prostate gland brachytherapy seeds. No displaced pelvic fracture. Diffuse bone demineralization.     US Renal Complete Non-Vascular    Impression    IMPRESSION:  1.  No hydronephrosis.    2.  Right renal atrophy with diffuse cortical thinning.    3.  Marked diffuse bladder wall thickening with debris within the mildly distended bladder lumen. Forte catheter within the bladder.   Extra Green Top (Lithium Heparin) Tube   Result Value Ref Range    Hold Specimen JIC    Extra Purple Top Tube   Result Value Ref Range    Hold Specimen JIC    Comprehensive metabolic panel   Result Value Ref Range    Sodium 136 135 - 145 mmol/L    Potassium 4.1 3.4 - 5.3 mmol/L    Carbon Dioxide (CO2) 22 22 - 29 mmol/L    Anion Gap 10 7 - 15 mmol/L    Urea Nitrogen 30.7 (H) 8.0 - 23.0 mg/dL    Creatinine 0.80 0.67 - 1.17 mg/dL    GFR Estimate 88 >60 mL/min/1.73m2    Calcium 8.7 (L) 8.8 - 10.2 mg/dL    Chloride 104 98 - 107 mmol/L    Glucose 102 (H) 70 - 99 mg/dL    Alkaline Phosphatase 58 40 - 150 U/L    AST 21 0 - 45 U/L    ALT 7 0 - 70 U/L    Protein Total 6.1 (L) 6.4 - 8.3 g/dL    Albumin 3.1 (L) 3.5 - 5.2 g/dL    Bilirubin Total 0.2 <=1.2 mg/dL   Result Value Ref Range    INR 1.10 0.85 - 1.15   Result Value Ref Range    aPTT 30 22 - 38 Seconds   CBC with platelets and differential   Result Value Ref Range    WBC Count 3.6 (L) 4.0 - 11.0 10e3/uL    RBC Count 1.97 (L) 4.40 - 5.90 10e6/uL    Hemoglobin 6.8 (LL) 13.3 - 17.7 g/dL    Hematocrit 20.4 (L) 40.0 - 53.0 %     (H) 78 - 100 fL    MCH 34.5 (H) 26.5 - 33.0 pg    MCHC 33.3 31.5 - 36.5 g/dL    RDW 23.3 (H) 10.0 - 15.0 %    Platelet Count 204 150 - 450 10e3/uL    % Neutrophils 67 %    % Lymphocytes 18 %    % Monocytes 13 %    % Eosinophils 0 %    % Basophils 1 %    % Immature Granulocytes 1 %    NRBCs per 100 WBC 0 <1 /100    Absolute  Neutrophils 2.5 1.6 - 8.3 10e3/uL    Absolute Lymphocytes 0.6 (L) 0.8 - 5.3 10e3/uL    Absolute Monocytes 0.5 0.0 - 1.3 10e3/uL    Absolute Eosinophils 0.0 0.0 - 0.7 10e3/uL    Absolute Basophils 0.0 0.0 - 0.2 10e3/uL    Absolute Immature Granulocytes 0.0 <=0.4 10e3/uL    Absolute NRBCs 0.0 10e3/uL   CBC with platelets   Result Value Ref Range    WBC Count 4.0 4.0 - 11.0 10e3/uL    RBC Count 2.73 (L) 4.40 - 5.90 10e6/uL    Hemoglobin 8.8 (L) 13.3 - 17.7 g/dL    Hematocrit 26.8 (L) 40.0 - 53.0 %    MCV 98 78 - 100 fL    MCH 32.2 26.5 - 33.0 pg    MCHC 32.8 31.5 - 36.5 g/dL    RDW 23.9 (H) 10.0 - 15.0 %    Platelet Count 217 150 - 450 10e3/uL   CBC with platelets   Result Value Ref Range    WBC Count 3.2 (L) 4.0 - 11.0 10e3/uL    RBC Count 2.35 (L) 4.40 - 5.90 10e6/uL    Hemoglobin 7.8 (L) 13.3 - 17.7 g/dL    Hematocrit 23.3 (L) 40.0 - 53.0 %    MCV 99 78 - 100 fL    MCH 33.2 (H) 26.5 - 33.0 pg    MCHC 33.5 31.5 - 36.5 g/dL    RDW 23.3 (H) 10.0 - 15.0 %    Platelet Count 186 150 - 450 10e3/uL   Basic metabolic panel   Result Value Ref Range    Sodium 130 (L) 135 - 145 mmol/L    Potassium 4.4 3.4 - 5.3 mmol/L    Chloride 98 98 - 107 mmol/L    Carbon Dioxide (CO2) 23 22 - 29 mmol/L    Anion Gap 9 7 - 15 mmol/L    Urea Nitrogen 23.3 (H) 8.0 - 23.0 mg/dL    Creatinine 0.86 0.67 - 1.17 mg/dL    GFR Estimate 86 >60 mL/min/1.73m2    Calcium 8.4 (L) 8.8 - 10.2 mg/dL    Glucose 104 (H) 70 - 99 mg/dL   Glucose by meter   Result Value Ref Range    GLUCOSE BY METER POCT 102 (H) 70 - 99 mg/dL   Result Value Ref Range    Hemoglobin 7.5 (L) 13.3 - 17.7 g/dL   Result Value Ref Range    Hemoglobin 6.7 (LL) 13.3 - 17.7 g/dL   Extra Green Top (Lithium Heparin) Tube   Result Value Ref Range    Hold Specimen JIC    Glucose by meter   Result Value Ref Range    GLUCOSE BY METER POCT 116 (H) 70 - 99 mg/dL   Result Value Ref Range    Hemoglobin 8.8 (L) 13.3 - 17.7 g/dL   Glucose by meter   Result Value Ref Range    GLUCOSE BY METER POCT 118  (H) 70 - 99 mg/dL   Glucose by meter   Result Value Ref Range    GLUCOSE BY METER POCT 135 (H) 70 - 99 mg/dL   Renal panel   Result Value Ref Range    Sodium 132 (L) 135 - 145 mmol/L    Potassium 4.1 3.4 - 5.3 mmol/L    Chloride 101 98 - 107 mmol/L    Carbon Dioxide (CO2) 24 22 - 29 mmol/L    Anion Gap 7 7 - 15 mmol/L    Glucose 112 (H) 70 - 99 mg/dL    Urea Nitrogen 21.1 8.0 - 23.0 mg/dL    Creatinine 0.83 0.67 - 1.17 mg/dL    GFR Estimate 87 >60 mL/min/1.73m2    Calcium 8.1 (L) 8.8 - 10.2 mg/dL    Albumin 2.6 (L) 3.5 - 5.2 g/dL    Phosphorus 1.6 (L) 2.5 - 4.5 mg/dL   CBC with platelets   Result Value Ref Range    WBC Count 3.3 (L) 4.0 - 11.0 10e3/uL    RBC Count 2.25 (L) 4.40 - 5.90 10e6/uL    Hemoglobin 7.3 (L) 13.3 - 17.7 g/dL    Hematocrit 21.5 (L) 40.0 - 53.0 %    MCV 96 78 - 100 fL    MCH 32.4 26.5 - 33.0 pg    MCHC 34.0 31.5 - 36.5 g/dL    RDW 22.4 (H) 10.0 - 15.0 %    Platelet Count 155 150 - 450 10e3/uL   Result Value Ref Range    Phosphorus 1.6 (L) 2.5 - 4.5 mg/dL   CBC with platelets   Result Value Ref Range    WBC Count 3.6 (L) 4.0 - 11.0 10e3/uL    RBC Count 2.31 (L) 4.40 - 5.90 10e6/uL    Hemoglobin 7.2 (L) 13.3 - 17.7 g/dL    Hematocrit 22.0 (L) 40.0 - 53.0 %    MCV 95 78 - 100 fL    MCH 31.2 26.5 - 33.0 pg    MCHC 32.7 31.5 - 36.5 g/dL    RDW 22.3 (H) 10.0 - 15.0 %    Platelet Count 191 150 - 450 10e3/uL   Basic metabolic panel   Result Value Ref Range    Sodium 132 (L) 135 - 145 mmol/L    Potassium 4.8 3.4 - 5.3 mmol/L    Chloride 102 98 - 107 mmol/L    Carbon Dioxide (CO2) 23 22 - 29 mmol/L    Anion Gap 7 7 - 15 mmol/L    Urea Nitrogen 21.9 8.0 - 23.0 mg/dL    Creatinine 0.80 0.67 - 1.17 mg/dL    GFR Estimate 88 >60 mL/min/1.73m2    Calcium 8.5 (L) 8.8 - 10.2 mg/dL    Glucose 106 (H) 70 - 99 mg/dL   Result Value Ref Range    Phosphorus 2.2 (L) 2.5 - 4.5 mg/dL   Result Value Ref Range    Hemoglobin 8.1 (L) 13.3 - 17.7 g/dL   UA with Microscopic reflex to Culture    Specimen: Urine, Catheter    Result Value Ref Range    Color Urine Brown (A) Colorless, Straw, Light Yellow, Yellow    Appearance Urine Cloudy (A) Clear    Glucose Urine Negative Negative mg/dL    Bilirubin Urine Negative Negative    Ketones Urine Negative Negative mg/dL    Specific Gravity Urine 1.022 1.001 - 1.030    Blood Urine >1.0 mg/dL (A) Negative    pH Urine 7.5 (H) 5.0 - 7.0    Protein Albumin Urine 300 (A) Negative mg/dL    Urobilinogen Urine <2.0 <2.0 mg/dL    Nitrite Urine Negative Negative    Leukocyte Esterase Urine 250 Lei/uL (A) Negative    Mucus Urine Present (A) None Seen /LPF    RBC Urine >182 (H) <=2 /HPF    WBC Urine 0 <=5 /HPF   Adult Type and Screen   Result Value Ref Range    ABO/RH(D) A POS     Antibody Screen Negative Negative    SPECIMEN EXPIRATION DATE [id number]    Prepare red blood cells (unit)   Result Value Ref Range    Blood Component Type Red Blood Cells     Product Code F9838E05     Unit Status Transfused     Unit Number [id number]     CROSSMATCH Compatible     CODING SYSTEM [id number]     ISSUE DATE AND TIME [id number]     UNIT ABO/RH A+     UNIT TYPE ISBT 6200    Prepare red blood cells (unit)   Result Value Ref Range    Blood Component Type Red Blood Cells     Product Code X4422D94     Unit Status Transfused     Unit Number [id number]     CROSSMATCH Compatible     CODING SYSTEM [id number]     ISSUE DATE AND TIME [id number]     UNIT ABO/RH O-     UNIT TYPE ISBT 9500        I have reviewed the relevant laboratory studies above.          Procedures:  I was present for the key portions of procedures documented in BRICE/midlevel note, see midlevel note for further details.    Alvaro Bose MD  [facility] EMERGENCY DEPARTMENT  [address] [place] [id number]  [phone]       Alvaro Bose MD  05/23/24 1618       Kurt Glynn MD  05/28/24 5671

## 2024-05-23 NOTE — ED NOTES
Blood bank called and stated blood products for patient need to be ordered- will take 2 hours to get blood.  Provider made aware.

## 2024-05-23 NOTE — MEDICATION SCRIBE - ADMISSION MEDICATION HISTORY
Medication Scribe Admission Medication History    Admission medication history is complete. The information provided in this note is only as accurate as the sources available at the time of the update.    Information Source(s): Patient and CareEverywhere/SureScripts via in-person    Pertinent Information: -pt did not take any of their morning medications today, due to getting tripped by their cat after breakfast and coming to the ED.  -Escitalopram 10mg started 5/3/24 at 1/2 tab daily for 2 weeks, then increase to 1 tablet daily. Pt currently taking 10mg daily.  -Levothyroxine 125mcg shows no dispense history, most recent dispense history is 90ds of 100mcg back on 3/26/24. Prior admission in [facility] ED on 5/1 shows a change in this medication to 125mcg.  -pt had Magic Mouth Wash filled back in 3/24, but not using.  Changes made to PTA medication list:  Added: Chlorhexidene 0.12% prn.  Deleted: None  Changed: Escitalopram 10mg to 1 full tab daily    Allergies reviewed with patient and updates made in EHR: yes    Medication History Completed By: Marcin Jewell 5/23/2024 5:57 PM    PTA Med List   Medication Sig Last Dose    aspirin 81 MG tablet Take 1 tablet by mouth daily. 5/22/2024 at AM    chlorhexidine (PERIDEX) 0.12 % solution Swish and spit 15 mLs in mouth 2 times daily as needed (PRN) Past Week at prn    cyanocobalamin (VITAMIN B-12) 1000 MCG tablet Take 1,000 mcg by mouth daily 5/22/2024 at AM    escitalopram (LEXAPRO) 10 MG tablet Take 10 mg by mouth daily 5/22/2024 at AM    famotidine (PEPCID) 20 MG tablet Take 20 mg by mouth daily 5/22/2024 at AM    lactulose (CHRONULAC) 10 GM/15ML solution Take 15 mLs by mouth 2 times daily as needed Past Week at unknown    levothyroxine (SYNTHROID/LEVOTHROID) 125 MCG tablet Take 1 tablet (125 mcg) by mouth every morning (before breakfast) Unknown at unknown    LORazepam (ATIVAN) 0.5 MG tablet Take 1 tablet by mouth every 8 hours as needed More than a month at unknown     magnesium 250 MG tablet Take 1 tablet by mouth daily 5/22/2024 at AM    megestrol (MEGACE) 40 MG/ML suspension Take 10 mLs by mouth daily 5/22/2024 at AM    ondansetron (ZOFRAN) 8 MG tablet Take 8 mg by mouth every 8 hours as needed 5/22/2024 at unknown    polyethylene glycol (MIRALAX) 17 GM/Dose powder Take 17 g by mouth daily 5/22/2024 at AM    senna-docusate (SENOKOT-S/PERICOLACE) 8.6-50 MG tablet Take 2 tablets by mouth 2 times daily 5/22/2024 at Unknown    sodium chloride 1 GM tablet Take 1 tablet (1 g) by mouth 3 times daily (with meals) 5/22/2024 at dinner    Turmeric 500 MG CAPS Take 1 capsule by mouth daily 5/22/2024 at AM

## 2024-05-24 ENCOUNTER — APPOINTMENT (OUTPATIENT)
Dept: RADIOLOGY | Facility: HOSPITAL | Age: 84
DRG: 522 | End: 2024-05-24
Attending: STUDENT IN AN ORGANIZED HEALTH CARE EDUCATION/TRAINING PROGRAM
Payer: COMMERCIAL

## 2024-05-24 ENCOUNTER — ANESTHESIA (OUTPATIENT)
Dept: SURGERY | Facility: HOSPITAL | Age: 84
DRG: 522 | End: 2024-05-24
Payer: COMMERCIAL

## 2024-05-24 ENCOUNTER — ANESTHESIA EVENT (OUTPATIENT)
Dept: SURGERY | Facility: HOSPITAL | Age: 84
DRG: 522 | End: 2024-05-24
Payer: COMMERCIAL

## 2024-05-24 LAB
ERYTHROCYTE [DISTWIDTH] IN BLOOD BY AUTOMATED COUNT: 23.9 % (ref 10–15)
HCT VFR BLD AUTO: 26.8 % (ref 40–53)
HGB BLD-MCNC: 8.8 G/DL (ref 13.3–17.7)
MCH RBC QN AUTO: 32.2 PG (ref 26.5–33)
MCHC RBC AUTO-ENTMCNC: 32.8 G/DL (ref 31.5–36.5)
MCV RBC AUTO: 98 FL (ref 78–100)
PLATELET # BLD AUTO: 217 10E3/UL (ref 150–450)
RBC # BLD AUTO: 2.73 10E6/UL (ref 4.4–5.9)
WBC # BLD AUTO: 4 10E3/UL (ref 4–11)

## 2024-05-24 PROCEDURE — 258N000003 HC RX IP 258 OP 636: Performed by: ANESTHESIOLOGY

## 2024-05-24 PROCEDURE — 999N000065 XR PELVIS AND HIP PORTABLE LEFT 1 VIEW

## 2024-05-24 PROCEDURE — 250N000011 HC RX IP 250 OP 636: Performed by: ORTHOPAEDIC SURGERY

## 2024-05-24 PROCEDURE — 999N000141 HC STATISTIC PRE-PROCEDURE NURSING ASSESSMENT: Performed by: STUDENT IN AN ORGANIZED HEALTH CARE EDUCATION/TRAINING PROGRAM

## 2024-05-24 PROCEDURE — 250N000013 HC RX MED GY IP 250 OP 250 PS 637: Performed by: STUDENT IN AN ORGANIZED HEALTH CARE EDUCATION/TRAINING PROGRAM

## 2024-05-24 PROCEDURE — P9040 RBC LEUKOREDUCED IRRADIATED: HCPCS | Performed by: STUDENT IN AN ORGANIZED HEALTH CARE EDUCATION/TRAINING PROGRAM

## 2024-05-24 PROCEDURE — 258N000003 HC RX IP 258 OP 636: Performed by: STUDENT IN AN ORGANIZED HEALTH CARE EDUCATION/TRAINING PROGRAM

## 2024-05-24 PROCEDURE — 250N000009 HC RX 250: Performed by: ANESTHESIOLOGY

## 2024-05-24 PROCEDURE — 85027 COMPLETE CBC AUTOMATED: CPT | Performed by: PHYSICIAN ASSISTANT

## 2024-05-24 PROCEDURE — 36415 COLL VENOUS BLD VENIPUNCTURE: CPT | Performed by: PHYSICIAN ASSISTANT

## 2024-05-24 PROCEDURE — 250N000011 HC RX IP 250 OP 636: Performed by: ANESTHESIOLOGY

## 2024-05-24 PROCEDURE — 120N000001 HC R&B MED SURG/OB

## 2024-05-24 PROCEDURE — 710N000009 HC RECOVERY PHASE 1, LEVEL 1, PER MIN: Performed by: STUDENT IN AN ORGANIZED HEALTH CARE EDUCATION/TRAINING PROGRAM

## 2024-05-24 PROCEDURE — 250N000013 HC RX MED GY IP 250 OP 250 PS 637: Performed by: INTERNAL MEDICINE

## 2024-05-24 PROCEDURE — 88311 DECALCIFY TISSUE: CPT | Mod: TC | Performed by: STUDENT IN AN ORGANIZED HEALTH CARE EDUCATION/TRAINING PROGRAM

## 2024-05-24 PROCEDURE — 250N000009 HC RX 250: Performed by: ORTHOPAEDIC SURGERY

## 2024-05-24 PROCEDURE — 99222 1ST HOSP IP/OBS MODERATE 55: CPT | Performed by: NURSE PRACTITIONER

## 2024-05-24 PROCEDURE — 370N000017 HC ANESTHESIA TECHNICAL FEE, PER MIN: Performed by: STUDENT IN AN ORGANIZED HEALTH CARE EDUCATION/TRAINING PROGRAM

## 2024-05-24 PROCEDURE — 272N000001 HC OR GENERAL SUPPLY STERILE: Performed by: STUDENT IN AN ORGANIZED HEALTH CARE EDUCATION/TRAINING PROGRAM

## 2024-05-24 PROCEDURE — C1713 ANCHOR/SCREW BN/BN,TIS/BN: HCPCS | Performed by: STUDENT IN AN ORGANIZED HEALTH CARE EDUCATION/TRAINING PROGRAM

## 2024-05-24 PROCEDURE — 360N000077 HC SURGERY LEVEL 4, PER MIN: Performed by: STUDENT IN AN ORGANIZED HEALTH CARE EDUCATION/TRAINING PROGRAM

## 2024-05-24 PROCEDURE — 0SRS019 REPLACEMENT OF LEFT HIP JOINT, FEMORAL SURFACE WITH METAL SYNTHETIC SUBSTITUTE, CEMENTED, OPEN APPROACH: ICD-10-PCS | Performed by: STUDENT IN AN ORGANIZED HEALTH CARE EDUCATION/TRAINING PROGRAM

## 2024-05-24 PROCEDURE — C1776 JOINT DEVICE (IMPLANTABLE): HCPCS | Performed by: STUDENT IN AN ORGANIZED HEALTH CARE EDUCATION/TRAINING PROGRAM

## 2024-05-24 PROCEDURE — 250N000011 HC RX IP 250 OP 636: Performed by: STUDENT IN AN ORGANIZED HEALTH CARE EDUCATION/TRAINING PROGRAM

## 2024-05-24 PROCEDURE — 250N000013 HC RX MED GY IP 250 OP 250 PS 637: Performed by: ANESTHESIOLOGY

## 2024-05-24 DEVICE — ARTICUL/EZE FEMORAL HEAD DIAMETER 28MM +8.5 12/14 TAPER
Type: IMPLANTABLE DEVICE | Site: HIP | Status: FUNCTIONAL
Brand: ARTICUL/EZE

## 2024-05-24 DEVICE — BONE CEMENT SIMPLEX FULL DOSE 6191-1-001: Type: IMPLANTABLE DEVICE | Site: HIP | Status: FUNCTIONAL

## 2024-05-24 DEVICE — CEMENTRALIZER STEM CENTRALIZER 10.0MM CEMENTED
Type: IMPLANTABLE DEVICE | Site: HIP | Status: FUNCTIONAL
Brand: CEMENTRALIZER

## 2024-05-24 DEVICE — SELF CENTERING BI-POLAR HEAD 28MM ID 45MM OD
Type: IMPLANTABLE DEVICE | Site: HIP | Status: FUNCTIONAL
Brand: SELF CENTERING

## 2024-05-24 DEVICE — SUMMIT FEMORAL STEM 12/14 TAPER CEMENTED SIZE 5 HI 120MM
Type: IMPLANTABLE DEVICE | Site: HIP | Status: FUNCTIONAL
Brand: SUMMIT

## 2024-05-24 DEVICE — 25MM BUCK CEMENT PLUG WITH                                    DISPOSABLE INSERTER
Type: IMPLANTABLE DEVICE | Site: HIP | Status: FUNCTIONAL
Brand: BUCK

## 2024-05-24 RX ORDER — ESCITALOPRAM OXALATE 10 MG/1
10 TABLET ORAL DAILY
Status: DISCONTINUED | OUTPATIENT
Start: 2024-05-24 | End: 2024-06-03 | Stop reason: HOSPADM

## 2024-05-24 RX ORDER — POLYETHYLENE GLYCOL 3350 17 G/17G
17 POWDER, FOR SOLUTION ORAL DAILY
Status: DISCONTINUED | OUTPATIENT
Start: 2024-05-25 | End: 2024-05-30

## 2024-05-24 RX ORDER — EPHEDRINE SULFATE 50 MG/ML
INJECTION, SOLUTION INTRAMUSCULAR; INTRAVENOUS; SUBCUTANEOUS PRN
Status: DISCONTINUED | OUTPATIENT
Start: 2024-05-24 | End: 2024-05-24

## 2024-05-24 RX ORDER — SODIUM CHLORIDE, SODIUM LACTATE, POTASSIUM CHLORIDE, CALCIUM CHLORIDE 600; 310; 30; 20 MG/100ML; MG/100ML; MG/100ML; MG/100ML
INJECTION, SOLUTION INTRAVENOUS CONTINUOUS PRN
Status: DISCONTINUED | OUTPATIENT
Start: 2024-05-24 | End: 2024-05-24

## 2024-05-24 RX ORDER — NALOXONE HYDROCHLORIDE 1 MG/ML
0.1 INJECTION INTRAMUSCULAR; INTRAVENOUS; SUBCUTANEOUS
Status: DISCONTINUED | OUTPATIENT
Start: 2024-05-24 | End: 2024-05-24 | Stop reason: HOSPADM

## 2024-05-24 RX ORDER — ACETAMINOPHEN 325 MG/1
650 TABLET ORAL EVERY 4 HOURS PRN
Status: DISCONTINUED | OUTPATIENT
Start: 2024-05-27 | End: 2024-06-03 | Stop reason: HOSPADM

## 2024-05-24 RX ORDER — AMOXICILLIN 250 MG
2 CAPSULE ORAL 2 TIMES DAILY
Status: DISCONTINUED | OUTPATIENT
Start: 2024-05-24 | End: 2024-05-26

## 2024-05-24 RX ORDER — FAMOTIDINE 20 MG/1
20 TABLET, FILM COATED ORAL DAILY
Status: DISCONTINUED | OUTPATIENT
Start: 2024-05-24 | End: 2024-06-03 | Stop reason: HOSPADM

## 2024-05-24 RX ORDER — MEGESTROL ACETATE 40 MG/ML
400 SUSPENSION ORAL DAILY
Status: DISCONTINUED | OUTPATIENT
Start: 2024-05-24 | End: 2024-06-03 | Stop reason: HOSPADM

## 2024-05-24 RX ORDER — ONDANSETRON 4 MG/1
8 TABLET, FILM COATED ORAL EVERY 8 HOURS PRN
Status: DISCONTINUED | OUTPATIENT
Start: 2024-05-24 | End: 2024-06-03 | Stop reason: HOSPADM

## 2024-05-24 RX ORDER — LANOLIN ALCOHOL/MO/W.PET/CERES
1000 CREAM (GRAM) TOPICAL DAILY
Status: DISCONTINUED | OUTPATIENT
Start: 2024-05-24 | End: 2024-06-03 | Stop reason: HOSPADM

## 2024-05-24 RX ORDER — HYDROMORPHONE HCL IN WATER/PF 6 MG/30 ML
0.2 PATIENT CONTROLLED ANALGESIA SYRINGE INTRAVENOUS
Status: DISCONTINUED | OUTPATIENT
Start: 2024-05-24 | End: 2024-05-31

## 2024-05-24 RX ORDER — AMOXICILLIN 250 MG
1 CAPSULE ORAL 2 TIMES DAILY
Status: DISCONTINUED | OUTPATIENT
Start: 2024-05-24 | End: 2024-06-03 | Stop reason: HOSPADM

## 2024-05-24 RX ORDER — PROCHLORPERAZINE MALEATE 5 MG
5 TABLET ORAL EVERY 6 HOURS PRN
Status: DISCONTINUED | OUTPATIENT
Start: 2024-05-24 | End: 2024-06-03 | Stop reason: HOSPADM

## 2024-05-24 RX ORDER — SODIUM CHLORIDE, SODIUM LACTATE, POTASSIUM CHLORIDE, CALCIUM CHLORIDE 600; 310; 30; 20 MG/100ML; MG/100ML; MG/100ML; MG/100ML
INJECTION, SOLUTION INTRAVENOUS CONTINUOUS
Status: DISCONTINUED | OUTPATIENT
Start: 2024-05-24 | End: 2024-05-24 | Stop reason: HOSPADM

## 2024-05-24 RX ORDER — CEFAZOLIN SODIUM/WATER 2 G/20 ML
2 SYRINGE (ML) INTRAVENOUS
Status: COMPLETED | OUTPATIENT
Start: 2024-05-24 | End: 2024-05-24

## 2024-05-24 RX ORDER — ONDANSETRON 2 MG/ML
4 INJECTION INTRAMUSCULAR; INTRAVENOUS EVERY 30 MIN PRN
Status: DISCONTINUED | OUTPATIENT
Start: 2024-05-24 | End: 2024-05-24 | Stop reason: HOSPADM

## 2024-05-24 RX ORDER — POLYETHYLENE GLYCOL 3350 17 G/17G
17 POWDER, FOR SOLUTION ORAL DAILY
Status: DISCONTINUED | OUTPATIENT
Start: 2024-05-24 | End: 2024-05-24

## 2024-05-24 RX ORDER — DEXAMETHASONE SODIUM PHOSPHATE 4 MG/ML
4 INJECTION, SOLUTION INTRA-ARTICULAR; INTRALESIONAL; INTRAMUSCULAR; INTRAVENOUS; SOFT TISSUE
Status: DISCONTINUED | OUTPATIENT
Start: 2024-05-24 | End: 2024-05-24 | Stop reason: HOSPADM

## 2024-05-24 RX ORDER — CHLORHEXIDINE GLUCONATE ORAL RINSE 1.2 MG/ML
15 SOLUTION DENTAL 2 TIMES DAILY PRN
Status: DISCONTINUED | OUTPATIENT
Start: 2024-05-24 | End: 2024-06-03 | Stop reason: HOSPADM

## 2024-05-24 RX ORDER — BUPIVACAINE HYDROCHLORIDE 7.5 MG/ML
INJECTION, SOLUTION INTRASPINAL
Status: COMPLETED | OUTPATIENT
Start: 2024-05-24 | End: 2024-05-24

## 2024-05-24 RX ORDER — HYDROMORPHONE HCL IN WATER/PF 6 MG/30 ML
0.4 PATIENT CONTROLLED ANALGESIA SYRINGE INTRAVENOUS
Status: DISCONTINUED | OUTPATIENT
Start: 2024-05-24 | End: 2024-05-30

## 2024-05-24 RX ORDER — CEFAZOLIN SODIUM 2 G/100ML
2 INJECTION, SOLUTION INTRAVENOUS EVERY 8 HOURS
Status: COMPLETED | OUTPATIENT
Start: 2024-05-24 | End: 2024-05-25

## 2024-05-24 RX ORDER — CEFAZOLIN SODIUM/WATER 2 G/20 ML
2 SYRINGE (ML) INTRAVENOUS SEE ADMIN INSTRUCTIONS
Status: DISCONTINUED | OUTPATIENT
Start: 2024-05-24 | End: 2024-05-24 | Stop reason: HOSPADM

## 2024-05-24 RX ORDER — KETAMINE HYDROCHLORIDE 10 MG/ML
INJECTION INTRAMUSCULAR; INTRAVENOUS PRN
Status: DISCONTINUED | OUTPATIENT
Start: 2024-05-24 | End: 2024-05-24

## 2024-05-24 RX ORDER — ONDANSETRON 2 MG/ML
INJECTION INTRAMUSCULAR; INTRAVENOUS PRN
Status: DISCONTINUED | OUTPATIENT
Start: 2024-05-24 | End: 2024-05-24

## 2024-05-24 RX ORDER — SODIUM CHLORIDE, SODIUM LACTATE, POTASSIUM CHLORIDE, CALCIUM CHLORIDE 600; 310; 30; 20 MG/100ML; MG/100ML; MG/100ML; MG/100ML
INJECTION, SOLUTION INTRAVENOUS CONTINUOUS
Status: DISCONTINUED | OUTPATIENT
Start: 2024-05-24 | End: 2024-05-25

## 2024-05-24 RX ORDER — PROPOFOL 10 MG/ML
INJECTION, EMULSION INTRAVENOUS CONTINUOUS PRN
Status: DISCONTINUED | OUTPATIENT
Start: 2024-05-24 | End: 2024-05-24

## 2024-05-24 RX ORDER — FENTANYL CITRATE 50 UG/ML
25 INJECTION, SOLUTION INTRAMUSCULAR; INTRAVENOUS EVERY 5 MIN PRN
Status: DISCONTINUED | OUTPATIENT
Start: 2024-05-24 | End: 2024-05-24 | Stop reason: HOSPADM

## 2024-05-24 RX ORDER — LIDOCAINE 40 MG/G
CREAM TOPICAL
Status: DISCONTINUED | OUTPATIENT
Start: 2024-05-24 | End: 2024-05-24 | Stop reason: HOSPADM

## 2024-05-24 RX ORDER — ASPIRIN 81 MG/1
81 TABLET ORAL 2 TIMES DAILY
Status: DISCONTINUED | OUTPATIENT
Start: 2024-05-24 | End: 2024-06-03 | Stop reason: HOSPADM

## 2024-05-24 RX ORDER — FENTANYL CITRATE 50 UG/ML
50 INJECTION, SOLUTION INTRAMUSCULAR; INTRAVENOUS EVERY 5 MIN PRN
Status: DISCONTINUED | OUTPATIENT
Start: 2024-05-24 | End: 2024-05-24 | Stop reason: HOSPADM

## 2024-05-24 RX ORDER — HYDROMORPHONE HYDROCHLORIDE 4 MG/1
4 TABLET ORAL EVERY 4 HOURS PRN
Status: DISCONTINUED | OUTPATIENT
Start: 2024-05-24 | End: 2024-05-25 | Stop reason: ALTCHOICE

## 2024-05-24 RX ORDER — VANCOMYCIN HYDROCHLORIDE 1 G/20ML
INJECTION, POWDER, LYOPHILIZED, FOR SOLUTION INTRAVENOUS PRN
Status: DISCONTINUED | OUTPATIENT
Start: 2024-05-24 | End: 2024-05-24 | Stop reason: HOSPADM

## 2024-05-24 RX ORDER — SODIUM CHLORIDE 1 G/1
1 TABLET ORAL
Status: DISCONTINUED | OUTPATIENT
Start: 2024-05-24 | End: 2024-06-03 | Stop reason: HOSPADM

## 2024-05-24 RX ORDER — ONDANSETRON 4 MG/1
4 TABLET, ORALLY DISINTEGRATING ORAL EVERY 30 MIN PRN
Status: DISCONTINUED | OUTPATIENT
Start: 2024-05-24 | End: 2024-05-24 | Stop reason: HOSPADM

## 2024-05-24 RX ORDER — BISACODYL 10 MG
10 SUPPOSITORY, RECTAL RECTAL DAILY PRN
Status: DISCONTINUED | OUTPATIENT
Start: 2024-05-27 | End: 2024-06-03 | Stop reason: HOSPADM

## 2024-05-24 RX ORDER — ONDANSETRON 2 MG/ML
4 INJECTION INTRAMUSCULAR; INTRAVENOUS EVERY 6 HOURS PRN
Status: DISCONTINUED | OUTPATIENT
Start: 2024-05-24 | End: 2024-06-03 | Stop reason: HOSPADM

## 2024-05-24 RX ORDER — LORAZEPAM 0.5 MG/1
0.5 TABLET ORAL EVERY 8 HOURS PRN
Status: DISCONTINUED | OUTPATIENT
Start: 2024-05-24 | End: 2024-06-03 | Stop reason: HOSPADM

## 2024-05-24 RX ORDER — LACTULOSE 10 G/15ML
15 SOLUTION ORAL 2 TIMES DAILY PRN
Status: DISCONTINUED | OUTPATIENT
Start: 2024-05-24 | End: 2024-06-03 | Stop reason: HOSPADM

## 2024-05-24 RX ORDER — ACETAMINOPHEN 325 MG/1
975 TABLET ORAL EVERY 8 HOURS
Qty: 27 TABLET | Refills: 0 | Status: COMPLETED | OUTPATIENT
Start: 2024-05-24 | End: 2024-05-27

## 2024-05-24 RX ORDER — KETOROLAC TROMETHAMINE 30 MG/ML
INJECTION, SOLUTION INTRAMUSCULAR; INTRAVENOUS PRN
Status: DISCONTINUED | OUTPATIENT
Start: 2024-05-24 | End: 2024-05-24

## 2024-05-24 RX ORDER — CEFAZOLIN SODIUM/WATER 2 G/20 ML
2 SYRINGE (ML) INTRAVENOUS EVERY 8 HOURS
Qty: 40 ML | Refills: 0 | Status: DISCONTINUED | OUTPATIENT
Start: 2024-05-24 | End: 2024-05-24

## 2024-05-24 RX ORDER — ONDANSETRON 4 MG/1
4 TABLET, ORALLY DISINTEGRATING ORAL EVERY 6 HOURS PRN
Status: DISCONTINUED | OUTPATIENT
Start: 2024-05-24 | End: 2024-06-03 | Stop reason: HOSPADM

## 2024-05-24 RX ORDER — ACETAMINOPHEN 325 MG/1
975 TABLET ORAL ONCE
Status: COMPLETED | OUTPATIENT
Start: 2024-05-24 | End: 2024-05-24

## 2024-05-24 RX ORDER — HYDROMORPHONE HYDROCHLORIDE 2 MG/1
2 TABLET ORAL EVERY 4 HOURS PRN
Status: DISCONTINUED | OUTPATIENT
Start: 2024-05-24 | End: 2024-05-25 | Stop reason: ALTCHOICE

## 2024-05-24 RX ORDER — LIDOCAINE 40 MG/G
CREAM TOPICAL
Status: DISCONTINUED | OUTPATIENT
Start: 2024-05-24 | End: 2024-06-03 | Stop reason: HOSPADM

## 2024-05-24 RX ORDER — PROPOFOL 10 MG/ML
INJECTION, EMULSION INTRAVENOUS PRN
Status: DISCONTINUED | OUTPATIENT
Start: 2024-05-24 | End: 2024-05-24

## 2024-05-24 RX ADMIN — METHOCARBAMOL 500 MG: 500 TABLET, FILM COATED ORAL at 03:43

## 2024-05-24 RX ADMIN — SODIUM CHLORIDE, POTASSIUM CHLORIDE, SODIUM LACTATE AND CALCIUM CHLORIDE: 600; 310; 30; 20 INJECTION, SOLUTION INTRAVENOUS at 18:23

## 2024-05-24 RX ADMIN — Medication 8 MCG: at 14:55

## 2024-05-24 RX ADMIN — FAMOTIDINE 20 MG: 20 TABLET ORAL at 12:31

## 2024-05-24 RX ADMIN — SODIUM CHLORIDE, POTASSIUM CHLORIDE, SODIUM LACTATE AND CALCIUM CHLORIDE: 600; 310; 30; 20 INJECTION, SOLUTION INTRAVENOUS at 14:53

## 2024-05-24 RX ADMIN — Medication 4 MCG: at 15:01

## 2024-05-24 RX ADMIN — ASPIRIN 81 MG: 81 TABLET, COATED ORAL at 21:28

## 2024-05-24 RX ADMIN — SODIUM CHLORIDE, POTASSIUM CHLORIDE, SODIUM LACTATE AND CALCIUM CHLORIDE: 600; 310; 30; 20 INJECTION, SOLUTION INTRAVENOUS at 19:59

## 2024-05-24 RX ADMIN — ACETAMINOPHEN 975 MG: 325 TABLET ORAL at 13:39

## 2024-05-24 RX ADMIN — KETAMINE HYDROCHLORIDE 10 MG: 10 INJECTION INTRAMUSCULAR; INTRAVENOUS at 14:59

## 2024-05-24 RX ADMIN — Medication 1 G: at 14:49

## 2024-05-24 RX ADMIN — METHOCARBAMOL 500 MG: 500 TABLET, FILM COATED ORAL at 22:40

## 2024-05-24 RX ADMIN — Medication 1 G: at 15:14

## 2024-05-24 RX ADMIN — TRANEXAMIC ACID 1 G: 1 INJECTION, SOLUTION INTRAVENOUS at 16:14

## 2024-05-24 RX ADMIN — PROPOFOL 50 MG: 10 INJECTION, EMULSION INTRAVENOUS at 15:01

## 2024-05-24 RX ADMIN — Medication 5 MG: at 15:25

## 2024-05-24 RX ADMIN — CEFAZOLIN SODIUM 2 G: 2 INJECTION, SOLUTION INTRAVENOUS at 22:40

## 2024-05-24 RX ADMIN — OXYCODONE HYDROCHLORIDE 2.5 MG: 5 TABLET ORAL at 12:31

## 2024-05-24 RX ADMIN — METHOCARBAMOL 500 MG: 500 TABLET, FILM COATED ORAL at 12:30

## 2024-05-24 RX ADMIN — KETAMINE HYDROCHLORIDE 10 MG: 10 INJECTION INTRAMUSCULAR; INTRAVENOUS at 14:57

## 2024-05-24 RX ADMIN — PROPOFOL 75 MCG/KG/MIN: 10 INJECTION, EMULSION INTRAVENOUS at 15:15

## 2024-05-24 RX ADMIN — Medication 10 MG: at 15:50

## 2024-05-24 RX ADMIN — SENNOSIDES AND DOCUSATE SODIUM 1 TABLET: 8.6; 5 TABLET ORAL at 21:28

## 2024-05-24 RX ADMIN — OXYCODONE HYDROCHLORIDE 5 MG: 5 TABLET ORAL at 06:59

## 2024-05-24 RX ADMIN — ACETAMINOPHEN 975 MG: 325 TABLET ORAL at 21:27

## 2024-05-24 RX ADMIN — KETAMINE HYDROCHLORIDE 10 MG: 10 INJECTION INTRAMUSCULAR; INTRAVENOUS at 14:55

## 2024-05-24 RX ADMIN — Medication 10 MG: at 15:32

## 2024-05-24 RX ADMIN — BUPIVACAINE HYDROCHLORIDE IN DEXTROSE 1.6 ML: 7.5 INJECTION, SOLUTION SUBARACHNOID at 15:12

## 2024-05-24 RX ADMIN — SODIUM CHLORIDE, POTASSIUM CHLORIDE, SODIUM LACTATE AND CALCIUM CHLORIDE: 600; 310; 30; 20 INJECTION, SOLUTION INTRAVENOUS at 13:28

## 2024-05-24 RX ADMIN — ONDANSETRON 4 MG: 2 INJECTION INTRAMUSCULAR; INTRAVENOUS at 16:14

## 2024-05-24 RX ADMIN — TRANEXAMIC ACID 1 G: 1 INJECTION, SOLUTION INTRAVENOUS at 15:14

## 2024-05-24 RX ADMIN — HYDROMORPHONE HYDROCHLORIDE 2 MG: 2 TABLET ORAL at 18:58

## 2024-05-24 ASSESSMENT — ACTIVITIES OF DAILY LIVING (ADL)
ADLS_ACUITY_SCORE: 42
ADLS_ACUITY_SCORE: 47
ADLS_ACUITY_SCORE: 47
ADLS_ACUITY_SCORE: 42
ADLS_ACUITY_SCORE: 41
ADLS_ACUITY_SCORE: 42
ADLS_ACUITY_SCORE: 41
ADLS_ACUITY_SCORE: 42
ADLS_ACUITY_SCORE: 41
ADLS_ACUITY_SCORE: 40
ADLS_ACUITY_SCORE: 40
DEPENDENT_IADLS:: CLEANING;SHOPPING;TRANSPORTATION
ADLS_ACUITY_SCORE: 42
ADLS_ACUITY_SCORE: 42
ADLS_ACUITY_SCORE: 40

## 2024-05-24 NOTE — PLAN OF CARE
Problem: Adult Inpatient Plan of Care  Goal: Absence of Hospital-Acquired Illness or Injury  Outcome: Progressing  Intervention: Identify and Manage Fall Risk  Recent Flowsheet Documentation  Taken 5/24/2024 0945 by Keshia Victor RN  Safety Promotion/Fall Prevention:   room door open   lighting adjusted   patient and family education   clutter free environment maintained  Intervention: Prevent Skin Injury  Recent Flowsheet Documentation  Taken 5/24/2024 0945 by Keshia Victor RN  Body Position:   weight shifting   supine  Intervention: Prevent Infection  Recent Flowsheet Documentation  Taken 5/24/2024 0945 by Keshia Victor RN  Infection Prevention: rest/sleep promoted  Goal: Optimal Comfort and Wellbeing  Outcome: Progressing  Intervention: Monitor Pain and Promote Comfort  Recent Flowsheet Documentation  Taken 5/24/2024 1231 by Keshia Victor RN  Pain Management Interventions: medication (see MAR)  Goal: Readiness for Transition of Care  Outcome: Progressing     Problem: Risk for Delirium  Goal: Optimal Coping  Outcome: Progressing  Goal: Improved Behavioral Control  Intervention: Minimize Safety Risk  Recent Flowsheet Documentation  Taken 5/24/2024 0945 by Keshia Victor RN  Enhanced Safety Measures:   pain management   review medications for side effects with activity   room near unit station     Problem: Orthopaedic Fracture  Goal: Absence of Bleeding  Outcome: Progressing  Goal: Absence of Embolism Signs and Symptoms  Outcome: Progressing  Goal: Fracture Stability  Outcome: Progressing  Goal: Optimal Functional Ability  Intervention: Optimize Functional Ability  Recent Flowsheet Documentation  Taken 5/24/2024 0945 by Keshia Victor RN  Activity Management: bedrest  Positioning/Transfer Devices:   in use   pillows  Goal: Effective Tissue Perfusion  Outcome: Progressing  Goal: Optimal Pain Control and Function  Outcome: Progressing  Intervention: Manage Acute Orthopaedic-Related Pain  Recent Flowsheet  Documentation  Taken 5/24/2024 1231 by Keshia Victor RN  Pain Management Interventions: medication (see MAR)  Goal: Effective Oxygenation and Ventilation  Outcome: Progressing  Intervention: Promote Airway Secretion Clearance  Recent Flowsheet Documentation  Taken 5/24/2024 0945 by Keshia Victor RN  Activity Management: bedrest  Intervention: Optimize Oxygenation and Ventilation  Recent Flowsheet Documentation  Taken 5/24/2024 0945 by Keshia Victor RN  Head of Bed (HOB) Positioning: HOB at 20 degrees     Problem: Pain Acute  Goal: Optimal Pain Control and Function  Outcome: Progressing  Intervention: Develop Pain Management Plan  Recent Flowsheet Documentation  Taken 5/24/2024 1231 by Keshia Victor RN  Pain Management Interventions: medication (see MAR)  Intervention: Prevent or Manage Pain  Recent Flowsheet Documentation  Taken 5/24/2024 0945 by Keshia Victor RN  Medication Review/Management: medications reviewed     Problem: Anemia  Goal: Anemia Symptom Improvement  Outcome: Progressing  Intervention: Monitor and Manage Anemia  Recent Flowsheet Documentation  Taken 5/24/2024 0945 by Keshia Victor RN  Safety Promotion/Fall Prevention:   room door open   lighting adjusted   patient and family education   clutter free environment maintained   Goal Outcome Evaluation:       Pt a/o x4, pleasant, cooperative. Reports pain intermittently in left hip area 8/10. Prn meds given, see mar. Pt resting in bed, on bedrest. Vss. On room air. Wife and family at bedside since late morning, supportive. Hgb up to 8.8. carlton patent.  Pt transferring to pre-op area now via bed, report given to RN. Wife accompanying pt to concepcion. No complaints.

## 2024-05-24 NOTE — OP NOTE
Operative Report    PATIENT Kilo Montiel   DATE OF SURGERY:  5/23/2024 - 5/24/2024      PREOPERATIVE DIAGNOSIS   Fall, initial encounter [W19.XXXA]  Hip pain, left [M25.552].    POSTOPERATIVE DIAGNOSIS   Fall, initial encounter [W19.XXXA]  Hip pain, left [M25.552].    PROCEDURE PERFORMED   left hip hemiarthroplasty    IMPLANTS  Implant Name Type Inv. Item Serial No.  Lot No. LRB No. Used Action   BONE CEMENT SIMPLEX FULL DOSE 6191-1-001 - [id number] Cement, Bone BONE CEMENT SIMPLEX FULL DOSE 6191-1-001  [employer] [id number] Left 2 Implanted   BONE CEMENT SIMPLEX FULL DOSE 6191-1-001 - [id number] Cement, Bone BONE CEMENT SIMPLEX FULL DOSE 6191-1-001  [employer] [id number] Left 1 Implanted   PLUG CEMENT PENA W/INSERT 25MM 8773-5545 - [id number] Total Joint Component/Insert PLUG CEMENT PENA W/INSERT 25MM 5699-2605  [employer] [id number] Left 1 Implanted   IMP STEM CENTRALIZER DEPUY 10.0MM 1376- - [id number] Total Joint Component/Insert IMP STEM CENTRALIZER DEPUY 10.0MM 1376-  [employer]- [id number] Left 1 Implanted   STEM FEMORAL SUMMIT HO CEMENT SZ 5 - [id number] Total Joint Component/Insert STEM FEMORAL SUMMIT HO CEMENT SZ 5  [employer]-  Left 1 Implanted   IMP HEAD FEMORAL DEPUY [place] 28MM +8.5MM 1365- - [id number] Total Joint Component/Insert IMP HEAD FEMORAL DEPUY [place] 28MM +8.5MM 1365-  [employer]- [id number] Left 1 Implanted   IMP HEAD FEMORAL DEPUY BIPOLAR [id number] - [id number] Total Joint Component/Insert IMP HEAD FEMORAL DEPUY BIPOLAR [id number]  [employer]-  Left 1 Implanted       SURGEON  Everett Sherman MD MD    ASSISTANT   Peter Omalley PA-C; assistant was required for patient positioning, surgical assistance, wound closure and monitoring patient's safety throughout the case.    ANESTHESIA  Spinal with Block      FINDINGS:  Subcapital femoral neck fracture     SPECIMENS:  Femoral head    ESTIMATED  BLOOD LOSS:  100 cc    COMPLICATIONS   None.        INDICATION FOR PROCEDURE  Kilo Montiel is a 83 year old male with a history notable for metastatic prostate cancer.  He underwent recent hospitalization for pneumonia failure to thrive and malnutrition and was discharged to a TCU.  Upon arriving home he tripped over his cat and sustained a left subcapital femoral neck fracture.  He does have a history of radiation to this area for his prostate cancer and after discussing the risk, benefits and alternatives of surgical intervention we both felt that proceeding with a left hip hemiarthroplasty was the most predictable option to decrease his reoperation rate and risk of complication.      INFORMED CONSENT  Kilo Montiel was identified in the preoperative holding area and was identified using medical record number, name, and date of birth, all of which were confirmed. The operative extremity was marked using an indelible marker. Once again, all risks and benefits as well as alternatives to surgical intervention were discussed with the patient in detail and all their questions were answered. Risks discussed included but were not limited to:  persistent pain, infection, bleeding, scarring, stiffness, thromboembolic events, fracture, malalignment/malrotation, malunion/nonunion, limb lengthening, implant complications, severe limb dysfunction, loss of limb, and loss of life. The patient signed informed consent and wished to proceed with surgery as scheduled.     DESCRIPTION OF PROCEDURE   Kilo Montiel was brought back to the operating room.  Spinal anesthesia was achieved without difficulty.  The patient was then transferred to the OR table and into the lateral decubitus position.  All bony prominences were well-padded and an axillary roll was placed.  The patient was then prepped and draped in the usual sterile fashion.    A timeout was performed prior to the procedure.  Three separate staff members confirmed the  patient's name, correct site and side of surgery and procedure being performed.  Antibiotics were confirmed to be given prior to incision.     We utilized a standard posterior approach.  We came sharply down through the skin.  Electrocautery was used through the soft tissues.  We identified the IT band.  This was then divided.  A Charnley retractor was placed.  Identified the posterior superior corner of the greater trochanter.  Bursal tissue was taken down.  Identified the posterior capsule.  A hockey-stick capsulotomy was utilized.  The capsule was tagged.  Hematoma from the fracture was encountered on entering the capsule.  The remaining femoral neck was rotated up and out.  A neck cut was made at about 17 mm above the lesser trochanter.  We then removed the remaining femoral head with a corkscrew.  The acetabulum was appropriately sized for the bipolar head at a 45 mm.  We then turned our attention back to femoral preparation.  The canal was accessed with a canal finder.  We subsequently reamed and broached to a size 5.  I went ahead and prepared to cement in the high offset size 5.  We then mixed the cement on the back table and prepped the femoral canal for cementing.  Cement restrictor was placed.  The canal was thoroughly brushed irrigated and dried.  We utilized a retrograde cementing technique.  We pressurized the cement.  We inserted the femoral stem into the canal with about 15 to 20 degrees of anteversion as we had when we broached.  We held the stem in place and allow the cement to cure.  The excess cement was removed from around the stem and within the acetabulum.  Following this we went ahead and trialed a +5 head.  This had excellent stability with no dislocation noted at 90 degrees of flexion 90 degrees of internal rotation.  It was completely stable in the position of sleep.  There was no dislocation or subluxation with external rotation and extension.  Given this patient's high fall risk I did go  ahead and select a +8.5, 28 mm inner head and opened the final component.  This was assembled on the trunnion and impacted.  This was subsequently reduced.  Stability testing revealed the same parameters as noted above.  We were happy with this.  Following this we turned our attention to irrigation and closure.  The wound was copiously irrigated with Irrisept followed by saline rinse.  1 g of vancomycin was placed deep in the wound.  The capsule and short external rotators were repaired to the abductor tendon as well as the greater trochanter.  The IT band was repaired with interrupted figure of 8 #1's followed by stratafix barbed sutur. 0 vicryl was placed in the deep layer, and 2-0 Vicryl's were then used in the deep dermal layer 2-0 nylon sutures were placed in the skin given the history of radiation and the potential for leaving the sutures in for a prolonged period of time.  All sponge, needle, and instrument counts were correct at the conclusion of our procedure      The patient was rolled from the lateral decubitus position taken to the recovery room in stable condition.    POSTOPERATIVE EXAM:  Doing well in the recovery area, conversant and appropriate    Focused examination of the left lower extremity demonstrates no obvious abnormality per area 2+ DP pulse.  Neuromotor exam deferred given spinal    POSTOPERATIVE PLAN:  Return to medicine team  Weightbearing as tolerated left lower extremity with no precautions  Perioperative antibiotics  Aspirin 81 mg for DVT prophylaxis  Postoperative x-rays to be obtained in the PACU  P.o. pain medication  General care, will require follow-up with me in 2 weeks for a wound check      Everett Sherman MD  [facility]

## 2024-05-24 NOTE — ANESTHESIA POSTPROCEDURE EVALUATION
Patient: Kilo Montiel    Procedure: Procedure(s):  HEMIARTHROPLASTY, HIP, BIPOLAR       Anesthesia Type:  Spinal    Note:  Disposition: Inpatient   Postop Pain Control: Uneventful            Sign Out: Well controlled pain   PONV: No   Neuro/Psych: Uneventful            Sign Out: Acceptable/Baseline neuro status   Airway/Respiratory: Uneventful            Sign Out: Acceptable/Baseline resp. status   CV/Hemodynamics: Uneventful            Sign Out: Acceptable CV status; No obvious hypovolemia; No obvious fluid overload   Other NRE: NONE   DID A NON-ROUTINE EVENT OCCUR? No       Last vitals:  Vitals Value Taken Time   /65 05/24/24 1645   Temp 37.1  C (98.8  F) 05/24/24 1644   Pulse 68 05/24/24 1656   Resp 9 05/24/24 1656   SpO2 100 % 05/24/24 1656   Vitals shown include unfiled device data.    Electronically Signed By: Austin Vogel MD  May 24, 2024  4:57 PM

## 2024-05-24 NOTE — CONSULTS
"Palliative Care Consultation Note  [facility]      Patient: Kilo Montiel  Date of Admission:  5/23/2024    Requesting Clinician / Team: Dr. Sherman, Peter Omalley PA-C / [facility]  Reason for consult: Symptom management  Goals of care       Recommendations & Counseling     GOALS OF CARE:   Restorative with limits   Kilo and Lizet have a good understanding of underlying metastatic prostate cancer that he has been treated for over the last 14 years.  Although most recent treatment in [place] was \"unsuccessful\"  they remain hopeful that there is one more treatment offered that may be helpful.  Lizet understands this treatment to come with a fair amount of medical burden which Kilo would still consider.  Kilo has just completed TCU after recent hospitalization, he felt this was successful and would be willing to go through more rehab if it would help him regain some strength and function.   Main concern today is the upcoming surgery and recovery.    ADVANCE CARE PLANNING:  No health care directive on file. Per  informed consent policy, next of kin should be involved if patient becomes unable.  There is no POLST form on file, recommend to complete prior to DC.  Code status: No CPR- Do NOT Intubate    MEDICAL MANAGEMENT:   We are not actively managing symptoms at this time.    PSYCHOSOCIAL/SPIRITUAL SUPPORT:  Family Spouse Lizet and daughter Casie live locally.  There eis also a son in [place] who is a pediatric emergency physician.   [place] community: Jainism   Would appreciate Spiritual Health Services, Consult has been placed for support     Palliative Care will continue to follow. Thank you for the consult and allowing us to aid in the care of Kilo Montiel.    DARÍO Clark CNP  [facility], Palliative Care  Securely message with the Vocera Web Console (learn more here) or  Text page via [facility] Paging/Directory         Assessment      Kilo Montiel is a 83 " "year old male with a past medical history of metastatic prostate cancer, hypothyroidism, and syncope who presented on 5/23 after a fall, tripping over his cat Bodchilango. Found to have left hip fracture, plan for surgical repair this afternoon.         History of Present Illness   Met with Kilo at bedside and per his request later spoke with Lizet on the phone.    Introduced the role of palliative care as an interdisciplinary team that cares for patients with serious illness to help support symptom management, communication, coping for patients and their families as well as support with medical decision making.    Prognosis, Goals, & Planning:   Functional Status just prior to this current hospitalization:  ECOG2 (Ambulatory and capable of all selfcare but unable to carry out any work activities; may need help with IADLs up and about > 50% of waking hours)   Walks with 4ww, just completed TCU stay after last hospitalization.    Prognosis, Goals, and/or Advance Care Planning:  Kilo has been treating his prostate cancer for the past 14 years.  He states the goal of his care for prostate cancer is to find time of stability. He states the recent treatment in [place] was unsuccessful, he and Lizet remain hopeful for \"one last treatment\"  that has been offered although Lizet indicates this therapy comes with a large medical burden, Kilo would need to stay away from his family and pets for some time due to risk of radiation.   We discussed hip fracture as a setback and one that is often a challenge in the setting of serious illness and advanced age.  Today Kilo and Lizet both share the main concern of getting through the surgery and recovery period.     Code Status was addressed today:   Yes, confirmed DNR/DNI.  Kilo has not yet discussed management of this with surgeon, but we did discuss the need for intubation and the difference between requirement of ventilatory support in a controlled setting like surgery vs in " the setting of acute respiratory failure.    Patient's decision making preferences: shared with support from loved ones        Patient has decision-making capacity today for complex decisions: Intact          Coping, Meaning, & Spirituality:   Mood, coping, and/or meaning in the context of serious illness were addressed today: Yes  Katey feels well supported by his family.  He notes being raised Judaism but attending many different churches over his lifetime.  While Katey and Lizet do not have a home Confucianism now, they would very much appreciate spiritual support while in the hospital.     Social:   Living situation:lives with significant other/spouse, spends isaacs in their home in [place], [place].  Important relationships/caregivers:Spouse and adult children    Medications:  Reviewed this patient's medication profile and medications from this hospitalization. Notable medications:lexapro,  Ativan PRN  [place] Board of Pharmacy Data Base Reviewed: Yes:   reviewed - no record of controlled substances prescribed.    ROS:  Comprehensive ROS is reviewed and is negative except as here & per HPI:     Physical Exam   Vital Signs with Ranges  Temp:  [97.9  F (36.6  C)-99.1  F (37.3  C)] 97.9  F (36.6  C)  Pulse:  [67-80] 78  Resp:  [16-22] 22  BP: (132-159)/(68-79) 159/68  SpO2:  [97 %-100 %] 97 %  Wt Readings from Last 10 Encounters:   05/23/24 49.9 kg (110 lb)   05/07/24 49.9 kg (110 lb)   10/13/23 57.2 kg (126 lb)   08/22/16 68.9 kg (152 lb)   08/26/14 69.5 kg (153 lb 3.2 oz)   04/30/14 69.5 kg (153 lb 3.2 oz)   11/08/13 66.5 kg (146 lb 9.7 oz)   10/24/13 68.9 kg (152 lb)   10/09/13 67.9 kg (149 lb 11.2 oz)   08/15/13 69.4 kg (153 lb)     110 lbs 0 oz    PHYSICAL EXAM:  GEN:  Alert, oriented x 3, appears comfortable, NAD.  HEENT:  Normocephalic/atraumatic, no scleral icterus, no nasal discharge, mouth moist.  LUNGS:  non labored breathing.  Symmetric chest rise on inhalation noted.  EXT:  No edema or cyanosis.   No joint synovitis noted.  SKIN:  Dry to touch, no exanthems noted in the visualized areas.     Data reviewed:  Recent Labs   Lab 05/24/24  1054 05/23/24  1417 05/23/24  1359   WBC 4.0  --  3.6*   HGB 8.8*  --  6.8*   MCV 98  --  104*     --  204   INR  --  1.10  --    NA  --   --  136   POTASSIUM  --   --  4.1   CHLORIDE  --   --  104   CO2  --   --  22   BUN  --   --  30.7*   CR  --   --  0.80   ANIONGAP  --   --  10   SOPHIA  --   --  8.7*   GLC  --   --  102*   ALBUMIN  --   --  3.1*   PROTTOTAL  --   --  6.1*   BILITOTAL  --   --  0.2   ALKPHOS  --   --  58   ALT  --   --  7   AST  --   --  21       Recent Results (from the past 24 hour(s))   CT Head w/o Contrast    Narrative    EXAM: CT HEAD W/O CONTRAST  LOCATION: [facility]  DATE: 5/23/2024    INDICATION: fall, elderly  COMPARISON: None.  TECHNIQUE: Routine CT Head without IV contrast. Multiplanar reformats. Dose reduction techniques were used.    FINDINGS:  INTRACRANIAL CONTENTS: No evidence of acute intracranial hemorrhage or mass effect. Few scattered foci of decreased attenuation within the cerebral hemispheric white matter which are nonspecific, though most commonly ascribed to chronic small vessel   ischemic disease. The ventricles and sulci are prominent consistent with moderate brain parenchymal volume loss. Gray-white matter differentiation is maintained. The basilar cisterns are patent.    VISUALIZED ORBITS/SINUSES/MASTOIDS: The globes are unremarkable. The partially imaged paranasal sinuses and mastoid air cells are unremarkable.     BONES/SOFT TISSUES: The visualized skull base and calvarium are unremarkable.      Impression    IMPRESSION:    1.  No evidence of acute intracranial hemorrhage or mass effect.  2.  Moderate nonspecific white matter changes.  3.  Moderate brain parenchymal volume loss.   CT Cervical Spine w/o Contrast    Narrative    EXAM: CT CERVICAL SPINE W/O CONTRAST  LOCATION: [facility]  DATE: 5/23/2024    INDICATION: fall, elderly  COMPARISON: None.  TECHNIQUE: Routine CT Cervical Spine without IV contrast. Multiplanar reformats. Dose reduction techniques were used.    FINDINGS:  No evidence of acute fracture or subluxation of the cervical spine by CT imaging. Anterolisthesis of C7 on T1 measuring 2 mm. The vertebral bodies of the cervical spine otherwise have normal stature and alignment. Multilevel degenerative disc disease of   the cervical spine with disc height loss, most pronounced at C4/C5-T1/T2. No extraspinal abnormality.     Craniovertebral junction and C1-C2: The odontoid process is well approximated with the anterior body of C1 and well aligned between the lateral masses of C1.    C2-C3: No posterior disc bulge or spinal canal narrowing. No neural foraminal narrowing.     C3-C4: No posterior disc bulge or spinal canal narrowing. Uncovertebral joint disease and facet arthropathy with severe bilateral neural foraminal narrowing.     C4-C5: No posterior disc bulge or spinal canal narrowing. Uncovertebral joint disease and facet arthropathy with severe bilateral neural foraminal narrowing.     C5-C6: No posterior disc bulge or spinal canal narrowing. Uncovertebral joint disease and facet arthropathy with severe bilateral neural foraminal narrowing.     C6-C7: No posterior disc bulge or spinal canal narrowing. No neural foraminal narrowing.     C7-T1: No posterior disc bulge or spinal canal narrowing. No neural foraminal narrowing.       Impression    IMPRESSION:  1.  No evidence of acute fracture or subluxation of the cervical spine by CT imaging.  2.  Degenerative cervical spondylosis as described above.   XR Knee Left 1/2 Views    Narrative    EXAM: XR PELVIS 1/2 VIEWS, XR FEMUR LEFT 2 VIEWS, XR KNEE LEFT 1/2 VIEWS  LOCATION: [facility] [place]  DATE: 5/23/2024    INDICATION: Left hip pain after fall directly onto left hip this AM.  COMPARISON: None.       Impression    IMPRESSION: Both hips negative for fracture or dislocation. There is degenerative change. There is some lucency within the left acetabulum which may be artifactual/projectional. No cortical step-off. Prostate brachytherapy seeds. Fracture of the L5   vertebral body may be chronic. Recommend correlation. The left femur is negative for fracture. Chronic enthesitis superior pole of the left patella. Small left knee joint effusion.   XR Pelvis 1/2 Views    Narrative    EXAM: XR PELVIS 1/2 VIEWS, XR FEMUR LEFT 2 VIEWS, XR KNEE LEFT 1/2 VIEWS  LOCATION: [facility]  DATE: 5/23/2024    INDICATION: Left hip pain after fall directly onto left hip this AM.  COMPARISON: None.      Impression    IMPRESSION: Both hips negative for fracture or dislocation. There is degenerative change. There is some lucency within the left acetabulum which may be artifactual/projectional. No cortical step-off. Prostate brachytherapy seeds. Fracture of the L5   vertebral body may be chronic. Recommend correlation. The left femur is negative for fracture. Chronic enthesitis superior pole of the left patella. Small left knee joint effusion.   XR Femur Left 2 Views    Narrative    EXAM: XR PELVIS 1/2 VIEWS, XR FEMUR LEFT 2 VIEWS, XR KNEE LEFT 1/2 VIEWS  LOCATION: [facility]  DATE: 5/23/2024    INDICATION: Left hip pain after fall directly onto left hip this AM.  COMPARISON: None.      Impression    IMPRESSION: Both hips negative for fracture or dislocation. There is degenerative change. There is some lucency within the left acetabulum which may be artifactual/projectional. No cortical step-off. Prostate brachytherapy seeds. Fracture of the L5   vertebral body may be chronic. Recommend correlation. The left femur is negative for fracture. Chronic enthesitis superior pole of the left patella. Small left knee joint effusion.   CT Abdomen Pelvis w Contrast    Narrative    EXAM: CT ABDOMEN  PELVIS W CONTRAST  LOCATION: [facility]  DATE: 5/23/2024    INDICATION: left hip pain and possible fracture, hemoglobin drop to 6  COMPARISON: CT of the abdomen and pelvis 5/1/2024  TECHNIQUE: CT scan of the abdomen and pelvis was performed following injection of IV contrast. Multiplanar reformats were obtained. Dose reduction techniques were used.  CONTRAST: isovue 370  60ml    FINDINGS:   LOWER CHEST: In the territory of left lower lobe airspace consolidation present 5/1/2023 there is now a 9 mm nodule (series 3, image 11) with an adjacent band of atelectasis. Additional bands of atelectasis are present in both lower lobes along the   posterior costal pleura. Endoluminal secretions are present with multiple subsegmental airways in the right middle lobe and anterior basal segment right lower lobe. Few benign calcified granuloma are present in the right lower lobe. Small hiatal hernia.    HEPATOBILIARY: Few scattered benign calcifications in the liver. No focal liver lesions. Gallbladder is nondistended without wall thickening. No calcified gallstones. No bile duct enlargement.    PANCREAS: Normal.    SPLEEN: Normal in size containing multiple calcified granulomata.    ADRENAL GLANDS: Normal.    KIDNEYS/BLADDER: Moderate to severe distention of the urinary bladder. The bladder has a smooth wall of uniform thickness. Symmetric hydroureteronephrosis without urothelial thickening or radiopaque calculi.    BOWEL: There is a large amount of stool throughout the colon. No bowel wall thickening. No inflammatory stranding in the mesentery or peritoneal thickening.    LYMPH NODES: Normal.    VASCULATURE: There are patchy atheromatous calcifications throughout the abdominal aorta and iliac arteries. Minimal focal ectasia of the right lateral wall of the infrarenal abdominal aorta but no abdominal aortic aneurysm.    PELVIC ORGANS: There are multiple metallic seeds within the substance of the prostate  gland. Small phleboliths are present in the low pelvis.    MUSCULOSKELETAL: Globally decreased bone marrow attenuation consistent with demineralization. Heterogeneous sclerosis of the medullary spaces of the low thoracic and lumbar spine, iliac bones as well as several lower ribs consistent with multifocal bone   metastases. Moderate compression deformities of L2, L3, and L4 with mild compression deformities of L1 and L4. There is an acute mildly comminuted and impacted subcapital fracture of the proximal left femur.      Impression    IMPRESSION:     1.  Mildly comminuted impacted subcapital fracture proximal left femur.  2.  Moderate to severe distention of the urinary bladder associated with symmetric hydroureter and hydronephrosis. Urinary retention potentially secondary to flow limitation at the level of the prostate gland.  3.  Multifocal sclerosis of the ribs, spine, and pelvis consistent with patchy bone metastases. Compression deformities of the lumbar spine are unchanged from 5/1/2024.  4.  Focal consolidation in the anterior basal left lower lobe has improved from 5/1/2024 with residual atelectasis and 9 mm nodule in this location. The nodule could be reassessed with chest CT in 3-6 months.   CT Lumbar Spine Reconstructed    Narrative    EXAM: CT LUMBAR SPINE RECONSTRUCTED  LOCATION: [facility]  DATE: 5/23/2024    INDICATION: Possible L5 fracture  COMPARISON: CT abdomen and pelvis May 1, 2024.  TECHNIQUE: Routine noncontrast CT of the lumbar spine. Dose reduction techniques were used.     FINDINGS:  VERTEBRA: Slight lumbar kyphosis. Grade 1 anterolisthesis L4 on L5 and L5 on S1. There are numerous sclerotic lesions throughout the visualized spine, ribs, and pelvis compatible with osseous metastatic disease. Anterior compression fractures of the T12,   L1, L2, L3, L4 and L5 vertebra are unchanged from May 1. Unchanged bony retropulsion L2 measuring approximately 5 mm No new or  worsening anterior compression fracture     CANAL/FORAMINA: Severe spinal canal stenosis L1-2, in part due to bony retropulsion. Moderate to severe spinal canal stenosis L4-5.    PARASPINAL: Severe right-sided hydronephrosis. Mild left hydronephrosis. Partially imaged distended urinary bladder.      Impression    IMPRESSION:  1.  Diffuse osseous metastatic disease.  2.  Chronic T12, L1, L2, L3, L4, and L5 anterior compression fractures are unchanged from comparison exam.  3.  Severe spinal canal stenosis L1-2, secondary to bony retropulsion of the L2 vertebral body. Moderate to severe spinal canal stenosis L4-5.  4.  Partially imaged distended urinary bladder with severe right and mild left hydronephrosis.       Medical Decision Making       Please see A&P for additional details of medical decision making.  NOTE(S)/MEDICAL RECORDS REVIEWED over the past 24 hours: Hospitalist, ortho surgery, outpatient oncology  Tests REVIEWED in the past 24 hours:  - See lab/imaging results included in the data section of the note  SUPPLEMENTAL HISTORY, in addition to the patient's history, over the past 24 hours obtained from:   - Spouse or significant other

## 2024-05-24 NOTE — ANESTHESIA PREPROCEDURE EVALUATION
Anesthesia Pre-Procedure Evaluation    Patient: Kilo Montiel   MRN: [id number] : 1940        Procedure : Procedure(s):  HEMIARTHROPLASTY, HIP, BIPOLAR          Past Medical History:   Diagnosis Date    Anxiety     Hypothyroid     Melanoma (H)     excised from abdomen and wrist    Melanoma of wrist (H)     Prostate cancer (H)     Thyroid disease       Past Surgical History:   Procedure Laterality Date    ANKLE SURGERY      left    COLONOSCOPY      INSERT RADIATION SEEDS PROSTATE  2013    Procedure: INSERT RADIATION SEEDS PROSTATE;  Insert Radiation Seeds Prostate ;  Surgeon: Sahil Franco MD;  Location: [facility] OR    SURGICAL PATHOLOGY EXAM      melanoma removal      Allergies   Allergen Reactions    Bee Pollen Other (See Comments)      Social History     Tobacco Use    Smoking status: Former     Current packs/day: 0.00     Average packs/day: 0.5 packs/day for 8.0 years (4.0 ttl pk-yrs)     Types: Cigarettes     Start date: 1974     Quit date: 1982     Years since quittin.9    Smokeless tobacco: Former     Quit date: 1976   Substance Use Topics    Alcohol use: Yes     Comment: 2-3 per week      Wt Readings from Last 1 Encounters:   24 49.9 kg (110 lb)        Anesthesia Evaluation   Pt has had prior anesthetic.     No history of anesthetic complications       ROS/MED HX  ENT/Pulmonary:       Neurologic:       Cardiovascular:       METS/Exercise Tolerance:     Hematologic:       Musculoskeletal:       GI/Hepatic:       Renal/Genitourinary:       Endo:     (+)          thyroid problem,            Psychiatric/Substance Use:       Infectious Disease:       Malignancy:       Other:            Physical Exam    Airway        Mallampati: I    Neck ROM: full     Respiratory Devices and Support         Dental       (+) Minor Abnormalities - some fillings, tiny chips      Cardiovascular   cardiovascular exam normal          Pulmonary   pulmonary exam normal                OUTSIDE LABS:  CBC:  "  Lab Results   Component Value Date    WBC 4.0 05/24/2024    WBC 3.6 (L) 05/23/2024    HGB 8.8 (L) 05/24/2024    HGB 6.8 (LL) 05/23/2024    HCT 26.8 (L) 05/24/2024    HCT 20.4 (L) 05/23/2024     05/24/2024     05/23/2024     BMP:   Lab Results   Component Value Date     05/23/2024     (L) 05/07/2024    POTASSIUM 4.1 05/23/2024    POTASSIUM 4.0 05/07/2024    CHLORIDE 104 05/23/2024    CHLORIDE 101 05/07/2024    CO2 22 05/23/2024    CO2 24 05/07/2024    BUN 30.7 (H) 05/23/2024    BUN 13.8 05/07/2024    CR 0.80 05/23/2024    CR 0.71 05/07/2024     (H) 05/23/2024    GLC 77 05/07/2024     COAGS:   Lab Results   Component Value Date    PTT 30 05/23/2024    INR 1.10 05/23/2024     POC: No results found for: \"BGM\", \"HCG\", \"HCGS\"  HEPATIC:   Lab Results   Component Value Date    ALBUMIN 3.1 (L) 05/23/2024    PROTTOTAL 6.1 (L) 05/23/2024    ALT 7 05/23/2024    AST 21 05/23/2024    ALKPHOS 58 05/23/2024    BILITOTAL 0.2 05/23/2024     OTHER:   Lab Results   Component Value Date    LACT 1.0 05/01/2024    SOPHIA 8.7 (L) 05/23/2024    MAG 2.0 05/07/2024    LIPASE 26 05/01/2024    TSH 27.19 (H) 05/01/2024    T4 0.59 (L) 05/01/2024       Anesthesia Plan    ASA Status:  3       Anesthesia Type: Spinal.              Consents    Anesthesia Plan(s) and associated risks, benefits, and realistic alternatives discussed. Questions answered and patient/representative(s) expressed understanding.     - Discussed: Risks, Benefits and Alternatives for the PROCEDURE were discussed     - Discussed with:  Patient      - Extended Intubation/Ventilatory Support Discussed: No.      - Patient is DNR/DNI Status: No     Use of blood products discussed: No .     Postoperative Care    Pain management: Neuraxial analgesia.   PONV prophylaxis: Ondansetron (or other 5HT-3), Dexamethasone or Solumedrol     Comments:               Wendy Doe MD    I have reviewed the pertinent notes and labs in the chart from the past 30 days " "and (re)examined the patient.  Any updates or changes from those notes are reflected in this note.    # Hypokalemia: Lowest K = 2.9 mmol/L in last 30 days, will replace as needed  # Hyponatremia: Lowest Na = 129 mmol/L in last 30 days, will monitor as appropriate      # Hypoalbuminemia: Lowest albumin = 3.1 g/dL at 5/23/2024  1:59 PM, will monitor as appropriate    # Drug Induced Platelet Defect: home medication list includes an antiplatelet medication  # Cachexia: Estimated body mass index is 17.75 kg/m  as calculated from the following:    Height as of this encounter: 1.676 m (5' 6\").    Weight as of this encounter: 49.9 kg (110 lb).      "

## 2024-05-24 NOTE — PROGRESS NOTES
[facility]    After midnight - Hospitalist Service    Assessment and Plan    Active Problems:    Hip pain, left    Closed subcapital fracture of left femur, initial encounter (H)    Fall, initial encounter    Anemia, unspecified type    Patient admitted after midnight, this is follow up   Kilo Montiel is a 83 year old old male with h/o metastatic prostate cancer, hypothyroidism, and syncope who was admitted on 5/23/2024 after a mechanical fall with left hip fracture.      Left hip fracture  Mechanical fall, initial encounter  -CT head negative for acute abnormality  -CT ABD/PEL 5/23: Mildly comminuted impacted subcapital fracture of the left proximal femur  -Ortho consulted still in surgery   - post surgery care per ortho and VTE  - would resume diet   - will need PT/OT      Urinary retention with bilateral hydronephrosis status post Carlton catheter  Metastatic prostate cancer  -CT ABD/PEL 5/23: Moderate to severe distention of the urinary bladder with symmetric hydroureter and hydronephrosis, evidence of bone metastases on the ribs, spine, and pelvis  -Carlton catheter placed in the ED     Acute on chronic anemia  Pancytopenia-chronic (thrombocytopenia is variable)  -Has intermittent hematuria, also chronic disease  -Baseline hemoglobin usually in the eights  -CBC on admission: WBC 3.6, hemoglobin 6.8, platelets 204  -Transfused 1 unit PRBCs 5/24  - trend labs      Chronic compression fractures  -CT lumbar spine 5/23: Chronic T12, L1, L2, L3, L4, and L5 anterior compression fractures, severe spinal canal stenosis at L1-L2 with retropulsion of the L2 vertebral body, moderate to severe spinal stenosis at L4-L5       VTE prophylaxis:  defer to ortho after surgery   DIET: Orders Placed This Encounter      NPO per Anesthesia Guidelines for Procedure/Surgery Except for: Meds    Drains/Lines: carlton  Weight bearing status: bedrest for now   Disposition/Barriers to discharge: pending post op course    Code Status:No CPR- Do NOT Intubate    Subjective:  Still in surgery     B/P: 155/80, T: 99.9, P: 70, R: 9     PERTINENT LABS  Imaging results reviewed over the past 24 hrs:   Recent Results (from the past 24 hour(s))   XR Knee Left 1/2 Views    Narrative    EXAM: XR PELVIS 1/2 VIEWS, XR FEMUR LEFT 2 VIEWS, XR KNEE LEFT 1/2 VIEWS  LOCATION: [facility]  DATE: 5/23/2024    INDICATION: Left hip pain after fall directly onto left hip this AM.  COMPARISON: None.      Impression    IMPRESSION: Both hips negative for fracture or dislocation. There is degenerative change. There is some lucency within the left acetabulum which may be artifactual/projectional. No cortical step-off. Prostate brachytherapy seeds. Fracture of the L5   vertebral body may be chronic. Recommend correlation. The left femur is negative for fracture. Chronic enthesitis superior pole of the left patella. Small left knee joint effusion.   XR Pelvis 1/2 Views    Narrative    EXAM: XR PELVIS 1/2 VIEWS, XR FEMUR LEFT 2 VIEWS, XR KNEE LEFT 1/2 VIEWS  LOCATION: [facility]  DATE: 5/23/2024    INDICATION: Left hip pain after fall directly onto left hip this AM.  COMPARISON: None.      Impression    IMPRESSION: Both hips negative for fracture or dislocation. There is degenerative change. There is some lucency within the left acetabulum which may be artifactual/projectional. No cortical step-off. Prostate brachytherapy seeds. Fracture of the L5   vertebral body may be chronic. Recommend correlation. The left femur is negative for fracture. Chronic enthesitis superior pole of the left patella. Small left knee joint effusion.   XR Femur Left 2 Views    Narrative    EXAM: XR PELVIS 1/2 VIEWS, XR FEMUR LEFT 2 VIEWS, XR KNEE LEFT 1/2 VIEWS  LOCATION: [facility]  DATE: 5/23/2024    INDICATION: Left hip pain after fall directly onto left hip this AM.  COMPARISON: None.      Impression     IMPRESSION: Both hips negative for fracture or dislocation. There is degenerative change. There is some lucency within the left acetabulum which may be artifactual/projectional. No cortical step-off. Prostate brachytherapy seeds. Fracture of the L5   vertebral body may be chronic. Recommend correlation. The left femur is negative for fracture. Chronic enthesitis superior pole of the left patella. Small left knee joint effusion.   CT Abdomen Pelvis w Contrast    Narrative    EXAM: CT ABDOMEN PELVIS W CONTRAST  LOCATION: [facility]  DATE: 5/23/2024    INDICATION: left hip pain and possible fracture, hemoglobin drop to 6  COMPARISON: CT of the abdomen and pelvis 5/1/2024  TECHNIQUE: CT scan of the abdomen and pelvis was performed following injection of IV contrast. Multiplanar reformats were obtained. Dose reduction techniques were used.  CONTRAST: isovue 370  60ml    FINDINGS:   LOWER CHEST: In the territory of left lower lobe airspace consolidation present 5/1/2023 there is now a 9 mm nodule (series 3, image 11) with an adjacent band of atelectasis. Additional bands of atelectasis are present in both lower lobes along the   posterior costal pleura. Endoluminal secretions are present with multiple subsegmental airways in the right middle lobe and anterior basal segment right lower lobe. Few benign calcified granuloma are present in the right lower lobe. Small hiatal hernia.    HEPATOBILIARY: Few scattered benign calcifications in the liver. No focal liver lesions. Gallbladder is nondistended without wall thickening. No calcified gallstones. No bile duct enlargement.    PANCREAS: Normal.    SPLEEN: Normal in size containing multiple calcified granulomata.    ADRENAL GLANDS: Normal.    KIDNEYS/BLADDER: Moderate to severe distention of the urinary bladder. The bladder has a smooth wall of uniform thickness. Symmetric hydroureteronephrosis without urothelial thickening or radiopaque  calculi.    BOWEL: There is a large amount of stool throughout the colon. No bowel wall thickening. No inflammatory stranding in the mesentery or peritoneal thickening.    LYMPH NODES: Normal.    VASCULATURE: There are patchy atheromatous calcifications throughout the abdominal aorta and iliac arteries. Minimal focal ectasia of the right lateral wall of the infrarenal abdominal aorta but no abdominal aortic aneurysm.    PELVIC ORGANS: There are multiple metallic seeds within the substance of the prostate gland. Small phleboliths are present in the low pelvis.    MUSCULOSKELETAL: Globally decreased bone marrow attenuation consistent with demineralization. Heterogeneous sclerosis of the medullary spaces of the low thoracic and lumbar spine, iliac bones as well as several lower ribs consistent with multifocal bone   metastases. Moderate compression deformities of L2, L3, and L4 with mild compression deformities of L1 and L4. There is an acute mildly comminuted and impacted subcapital fracture of the proximal left femur.      Impression    IMPRESSION:     1.  Mildly comminuted impacted subcapital fracture proximal left femur.  2.  Moderate to severe distention of the urinary bladder associated with symmetric hydroureter and hydronephrosis. Urinary retention potentially secondary to flow limitation at the level of the prostate gland.  3.  Multifocal sclerosis of the ribs, spine, and pelvis consistent with patchy bone metastases. Compression deformities of the lumbar spine are unchanged from 5/1/2024.  4.  Focal consolidation in the anterior basal left lower lobe has improved from 5/1/2024 with residual atelectasis and 9 mm nodule in this location. The nodule could be reassessed with chest CT in 3-6 months.   CT Lumbar Spine Reconstructed    Narrative    EXAM: CT LUMBAR SPINE RECONSTRUCTED  LOCATION: [facility]  DATE: 5/23/2024    INDICATION: Possible L5 fracture  COMPARISON: CT abdomen and pelvis  May 1, 2024.  TECHNIQUE: Routine noncontrast CT of the lumbar spine. Dose reduction techniques were used.     FINDINGS:  VERTEBRA: Slight lumbar kyphosis. Grade 1 anterolisthesis L4 on L5 and L5 on S1. There are numerous sclerotic lesions throughout the visualized spine, ribs, and pelvis compatible with osseous metastatic disease. Anterior compression fractures of the T12,   L1, L2, L3, L4 and L5 vertebra are unchanged from May 1. Unchanged bony retropulsion L2 measuring approximately 5 mm No new or worsening anterior compression fracture     CANAL/FORAMINA: Severe spinal canal stenosis L1-2, in part due to bony retropulsion. Moderate to severe spinal canal stenosis L4-5.    PARASPINAL: Severe right-sided hydronephrosis. Mild left hydronephrosis. Partially imaged distended urinary bladder.      Impression    IMPRESSION:  1.  Diffuse osseous metastatic disease.  2.  Chronic T12, L1, L2, L3, L4, and L5 anterior compression fractures are unchanged from comparison exam.  3.  Severe spinal canal stenosis L1-2, secondary to bony retropulsion of the L2 vertebral body. Moderate to severe spinal canal stenosis L4-5.  4.  Partially imaged distended urinary bladder with severe right and mild left hydronephrosis.     Recent Labs   Lab 05/24/24  1054 05/23/24  1417 05/23/24  1359   WBC 4.0  --  3.6*   HGB 8.8*  --  6.8*   MCV 98  --  104*     --  204   INR  --  1.10  --    NA  --   --  136   POTASSIUM  --   --  4.1   CHLORIDE  --   --  104   CO2  --   --  22   BUN  --   --  30.7*   CR  --   --  0.80   ANIONGAP  --   --  10   SOPHIA  --   --  8.7*   GLC  --   --  102*   ALBUMIN  --   --  3.1*   PROTTOTAL  --   --  6.1*   BILITOTAL  --   --  0.2   ALKPHOS  --   --  58   ALT  --   --  7   AST  --   --  21       Joyce Rodriguez MD  [facility] Medicine Service  [phone]

## 2024-05-24 NOTE — H&P
[facility]    History and Physical - Hospitalist Service       Date of Admission:  5/23/2024    Assessment & Plan      Kilo Montiel is a 83 year old male with past medical history significant for metastatic prostate cancer, hypothyroidism, and syncope who was admitted on 5/23/2024 after a mechanical fall with left hip fracture.     Left hip fracture  Mechanical fall, initial encounter  -Tripped over his cat  -CT head negative for acute abnormality  -CT ABD/PEL 5/23: Mildly comminuted impacted subcapital fracture of the left proximal femur  -Ortho consulted: Will need partial hip replacement, hemoglobin needs to stabilize prior to surgery given risks of blood loss during surgery    Urinary retention with bilateral hydronephrosis status post Forte catheter  Metastatic prostate cancer  -CT ABD/PEL 5/23: Moderate to severe distention of the urinary bladder with symmetric hydroureter and hydronephrosis, evidence of bone metastases on the ribs, spine, and pelvis  -Forte catheter placed in the ED    Acute on chronic anemia  Pancytopenia-chronic (thrombocytopenia is variable)  -Has intermittent hematuria, also chronic disease  -Baseline hemoglobin usually in the eights  -CBC on admission: WBC 3.6, hemoglobin 6.8, platelets 204  -Transfused 1 unit PRBCs 5/24    Chronic compression fractures  -CT lumbar spine 5/23: Chronic T12, L1, L2, L3, L4, and L5 anterior compression fractures, severe spinal canal stenosis at L1-L2 with retropulsion of the L2 vertebral body, moderate to severe spinal stenosis at L4-L5        Diet: NPO for Medical/Clinical Reasons Except for: Meds    DVT Prophylaxis: Pneumatic Compression Devices  Forte Catheter: PRESENT, indication: Acute retention or obstruction  Lines: None     Cardiac Monitoring: None  Code Status:  previously DNR/DNI    Clinically Significant Risk Factors Present on Admission              # Hypoalbuminemia: Lowest albumin = 3.1 g/dL at 5/23/2024  1:59 PM,  "will monitor as appropriate   # Drug Induced Platelet Defect: home medication list includes an antiplatelet medication        # Cachexia: Estimated body mass index is 17.75 kg/m  as calculated from the following:    Height as of this encounter: 1.676 m (5' 6\").    Weight as of this encounter: 49.9 kg (110 lb).       # Financial/Environmental Concerns:           Disposition Plan     Medically Ready for Discharge: Anticipated in 2-4 Days           Liyah Olmos MD  Hospitalist Service  [facility]  Securely message with BDA (more info)  Text page via [employer] Paging/Directory     ______________________________________________________________________    Chief Complaint   fall    History is obtained from the patient, electronic health record, and emergency department physician    History of Present Illness   Kilo Montiel is a 83 year old male with past medical history significant for metastatic prostate cancer, hypothyroidism, and syncope who was admitted on 5/23/2024 after a mechanical fall with left hip fracture.  He was at home when his cat ran out in front of him causing him to lose his balance and fell to his left side landing on his left hip.  He denies loss of consciousness or hitting his head.  He was recently here admitted from 5/1 to 5/7 after a syncopal event.  After that hospitalization he was sent to TCU and just got home 1 day prior to arrival.  He denies any symptoms associated with his fall including dizziness, lightheadedness, or vision changes.  He denies any GI or respiratory symptoms.  He has chronic difficulty with urination related to his metastatic prostate cancer.      Past Medical History    Past Medical History:   Diagnosis Date    Anxiety     Hypothyroid     Melanoma (H)     excised from abdomen and wrist    Melanoma of wrist (H)     Prostate cancer (H)     Thyroid disease        Past Surgical History   Past Surgical History:   Procedure Laterality Date    ANKLE " SURGERY      left    COLONOSCOPY      INSERT RADIATION SEEDS PROSTATE  11/8/2013    Procedure: INSERT RADIATION SEEDS PROSTATE;  Insert Radiation Seeds Prostate ;  Surgeon: Sahil Franco MD;  Location: [facility] OR    SURGICAL PATHOLOGY EXAM      melanoma removal       Prior to Admission Medications   Prior to Admission Medications   Prescriptions Last Dose Informant Patient Reported? Taking?   LORazepam (ATIVAN) 0.5 MG tablet More than a month at unknown  Yes Yes   Sig: Take 1 tablet by mouth every 8 hours as needed   Turmeric 500 MG CAPS 5/22/2024 at AM  Yes Yes   Sig: Take 1 capsule by mouth daily   aspirin 81 MG tablet 5/22/2024 at AM  Yes Yes   Sig: Take 1 tablet by mouth daily.   chlorhexidine (PERIDEX) 0.12 % solution Past Week at prn  Yes Yes   Sig: Swish and spit 15 mLs in mouth 2 times daily as needed (PRN)   cyanocobalamin (VITAMIN B-12) 1000 MCG tablet 5/22/2024 at AM  Yes Yes   Sig: Take 1,000 mcg by mouth daily   escitalopram (LEXAPRO) 10 MG tablet 5/22/2024 at AM  Yes Yes   Sig: Take 10 mg by mouth daily   famotidine (PEPCID) 20 MG tablet 5/22/2024 at AM  Yes Yes   Sig: Take 20 mg by mouth daily   lactulose (CHRONULAC) 10 GM/15ML solution Past Week at unknown  Yes Yes   Sig: Take 15 mLs by mouth 2 times daily as needed   levothyroxine (SYNTHROID/LEVOTHROID) 125 MCG tablet Unknown at unknown  No Yes   Sig: Take 1 tablet (125 mcg) by mouth every morning (before breakfast)   magnesium 250 MG tablet 5/22/2024 at AM  Yes Yes   Sig: Take 1 tablet by mouth daily   megestrol (MEGACE) 40 MG/ML suspension 5/22/2024 at AM  Yes Yes   Sig: Take 10 mLs by mouth daily   ondansetron (ZOFRAN) 8 MG tablet 5/22/2024 at unknown  Yes Yes   Sig: Take 8 mg by mouth every 8 hours as needed   polyethylene glycol (MIRALAX) 17 GM/Dose powder 5/22/2024 at AM  No Yes   Sig: Take 17 g by mouth daily   senna-docusate (SENOKOT-S/PERICOLACE) 8.6-50 MG tablet 5/22/2024 at Unknown  No Yes   Sig: Take 2 tablets by mouth 2 times daily    sodium chloride 1 GM tablet 2024 at dinner  No Yes   Sig: Take 1 tablet (1 g) by mouth 3 times daily (with meals)      Facility-Administered Medications: None        Review of Systems    The 10 point Review of Systems is negative other than noted in the HPI.    Social History   I have reviewed this patient's social history and updated it with pertinent information if needed.  Social History     Tobacco Use    Smoking status: Former     Current packs/day: 0.00     Average packs/day: 0.5 packs/day for 8.0 years (4.0 ttl pk-yrs)     Types: Cigarettes     Start date: 1974     Quit date: 1982     Years since quittin.9    Smokeless tobacco: Former     Quit date: 1976   Substance Use Topics    Alcohol use: Yes     Comment: 2-3 per week         Family History   I have reviewed this patient's family history and updated it with pertinent information if needed.  Family History   Problem Relation Age of Onset    Breast Cancer Mother 75         Allergies   Allergies   Allergen Reactions    Bee Pollen Other (See Comments)        Physical Exam   Vital Signs: Temp: 99.1  F (37.3  C) Temp src: Oral BP: 133/74 Pulse: 72   Resp: 16 SpO2: 100 % O2 Device: None (Room air)    Weight: 110 lbs 0 oz    Constitutional: awake, alert, cooperative, no apparent distress, frail  Eyes: Lids and lashes normal, pupils equal, round, extra ocular muscles intact, sclera clear, conjunctiva normal  Respiratory: No increased work of breathing, good air exchange, clear to auscultation bilaterally, no crackles or wheezing  Cardiovascular: regular rate and rhythm  GI: normal bowel sounds, soft, non-distended, non-tender  Skin: normal skin color, texture, turgor, no rashes and no jaundice  Musculoskeletal: External rotation of left leg  Neurologic: Awake, alert, no gross focal abnormalities   Neuropsychiatric: Appropriate mood and affect        Medical Decision Making       75 MINUTES SPENT BY ME on the date of service doing chart  review, history, exam, documentation & further activities per the note.      Data     I have personally reviewed the following data over the past 24 hrs:    3.6 (L)  \   6.8 (LL)   / 204     136 104 30.7 (H) /  102 (H)   4.1 22 0.80 \     ALT: 7 AST: 21 AP: 58 TBILI: 0.2   ALB: 3.1 (L) TOT PROTEIN: 6.1 (L) LIPASE: N/A     INR:  1.10 PTT:  30   D-dimer:  N/A Fibrinogen:  N/A       Imaging results reviewed over the past 24 hrs:   Recent Results (from the past 24 hour(s))   CT Head w/o Contrast    Narrative    EXAM: CT HEAD W/O CONTRAST  LOCATION: [facility]  DATE: 5/23/2024    INDICATION: fall, elderly  COMPARISON: None.  TECHNIQUE: Routine CT Head without IV contrast. Multiplanar reformats. Dose reduction techniques were used.    FINDINGS:  INTRACRANIAL CONTENTS: No evidence of acute intracranial hemorrhage or mass effect. Few scattered foci of decreased attenuation within the cerebral hemispheric white matter which are nonspecific, though most commonly ascribed to chronic small vessel   ischemic disease. The ventricles and sulci are prominent consistent with moderate brain parenchymal volume loss. Gray-white matter differentiation is maintained. The basilar cisterns are patent.    VISUALIZED ORBITS/SINUSES/MASTOIDS: The globes are unremarkable. The partially imaged paranasal sinuses and mastoid air cells are unremarkable.     BONES/SOFT TISSUES: The visualized skull base and calvarium are unremarkable.      Impression    IMPRESSION:    1.  No evidence of acute intracranial hemorrhage or mass effect.  2.  Moderate nonspecific white matter changes.  3.  Moderate brain parenchymal volume loss.   CT Cervical Spine w/o Contrast    Narrative    EXAM: CT CERVICAL SPINE W/O CONTRAST  LOCATION: [facility]  DATE: 5/23/2024    INDICATION: fall, elderly  COMPARISON: None.  TECHNIQUE: Routine CT Cervical Spine without IV contrast. Multiplanar reformats. Dose reduction techniques  were used.    FINDINGS:  No evidence of acute fracture or subluxation of the cervical spine by CT imaging. Anterolisthesis of C7 on T1 measuring 2 mm. The vertebral bodies of the cervical spine otherwise have normal stature and alignment. Multilevel degenerative disc disease of   the cervical spine with disc height loss, most pronounced at C4/C5-T1/T2. No extraspinal abnormality.     Craniovertebral junction and C1-C2: The odontoid process is well approximated with the anterior body of C1 and well aligned between the lateral masses of C1.    C2-C3: No posterior disc bulge or spinal canal narrowing. No neural foraminal narrowing.     C3-C4: No posterior disc bulge or spinal canal narrowing. Uncovertebral joint disease and facet arthropathy with severe bilateral neural foraminal narrowing.     C4-C5: No posterior disc bulge or spinal canal narrowing. Uncovertebral joint disease and facet arthropathy with severe bilateral neural foraminal narrowing.     C5-C6: No posterior disc bulge or spinal canal narrowing. Uncovertebral joint disease and facet arthropathy with severe bilateral neural foraminal narrowing.     C6-C7: No posterior disc bulge or spinal canal narrowing. No neural foraminal narrowing.     C7-T1: No posterior disc bulge or spinal canal narrowing. No neural foraminal narrowing.       Impression    IMPRESSION:  1.  No evidence of acute fracture or subluxation of the cervical spine by CT imaging.  2.  Degenerative cervical spondylosis as described above.   XR Knee Left 1/2 Views    Narrative    EXAM: XR PELVIS 1/2 VIEWS, XR FEMUR LEFT 2 VIEWS, XR KNEE LEFT 1/2 VIEWS  LOCATION: [facility]  DATE: 5/23/2024    INDICATION: Left hip pain after fall directly onto left hip this AM.  COMPARISON: None.      Impression    IMPRESSION: Both hips negative for fracture or dislocation. There is degenerative change. There is some lucency within the left acetabulum which may be  artifactual/projectional. No cortical step-off. Prostate brachytherapy seeds. Fracture of the L5   vertebral body may be chronic. Recommend correlation. The left femur is negative for fracture. Chronic enthesitis superior pole of the left patella. Small left knee joint effusion.   XR Pelvis 1/2 Views    Narrative    EXAM: XR PELVIS 1/2 VIEWS, XR FEMUR LEFT 2 VIEWS, XR KNEE LEFT 1/2 VIEWS  LOCATION: [facility]  DATE: 5/23/2024    INDICATION: Left hip pain after fall directly onto left hip this AM.  COMPARISON: None.      Impression    IMPRESSION: Both hips negative for fracture or dislocation. There is degenerative change. There is some lucency within the left acetabulum which may be artifactual/projectional. No cortical step-off. Prostate brachytherapy seeds. Fracture of the L5   vertebral body may be chronic. Recommend correlation. The left femur is negative for fracture. Chronic enthesitis superior pole of the left patella. Small left knee joint effusion.   XR Femur Left 2 Views    Narrative    EXAM: XR PELVIS 1/2 VIEWS, XR FEMUR LEFT 2 VIEWS, XR KNEE LEFT 1/2 VIEWS  LOCATION: [facility]  DATE: 5/23/2024    INDICATION: Left hip pain after fall directly onto left hip this AM.  COMPARISON: None.      Impression    IMPRESSION: Both hips negative for fracture or dislocation. There is degenerative change. There is some lucency within the left acetabulum which may be artifactual/projectional. No cortical step-off. Prostate brachytherapy seeds. Fracture of the L5   vertebral body may be chronic. Recommend correlation. The left femur is negative for fracture. Chronic enthesitis superior pole of the left patella. Small left knee joint effusion.   CT Abdomen Pelvis w Contrast    Narrative    EXAM: CT ABDOMEN PELVIS W CONTRAST  LOCATION: [facility]  DATE: 5/23/2024    INDICATION: left hip pain and possible fracture, hemoglobin drop to 6  COMPARISON:  CT of the abdomen and pelvis 5/1/2024  TECHNIQUE: CT scan of the abdomen and pelvis was performed following injection of IV contrast. Multiplanar reformats were obtained. Dose reduction techniques were used.  CONTRAST: isovue 370  60ml    FINDINGS:   LOWER CHEST: In the territory of left lower lobe airspace consolidation present 5/1/2023 there is now a 9 mm nodule (series 3, image 11) with an adjacent band of atelectasis. Additional bands of atelectasis are present in both lower lobes along the   posterior costal pleura. Endoluminal secretions are present with multiple subsegmental airways in the right middle lobe and anterior basal segment right lower lobe. Few benign calcified granuloma are present in the right lower lobe. Small hiatal hernia.    HEPATOBILIARY: Few scattered benign calcifications in the liver. No focal liver lesions. Gallbladder is nondistended without wall thickening. No calcified gallstones. No bile duct enlargement.    PANCREAS: Normal.    SPLEEN: Normal in size containing multiple calcified granulomata.    ADRENAL GLANDS: Normal.    KIDNEYS/BLADDER: Moderate to severe distention of the urinary bladder. The bladder has a smooth wall of uniform thickness. Symmetric hydroureteronephrosis without urothelial thickening or radiopaque calculi.    BOWEL: There is a large amount of stool throughout the colon. No bowel wall thickening. No inflammatory stranding in the mesentery or peritoneal thickening.    LYMPH NODES: Normal.    VASCULATURE: There are patchy atheromatous calcifications throughout the abdominal aorta and iliac arteries. Minimal focal ectasia of the right lateral wall of the infrarenal abdominal aorta but no abdominal aortic aneurysm.    PELVIC ORGANS: There are multiple metallic seeds within the substance of the prostate gland. Small phleboliths are present in the low pelvis.    MUSCULOSKELETAL: Globally decreased bone marrow attenuation consistent with demineralization. Heterogeneous  sclerosis of the medullary spaces of the low thoracic and lumbar spine, iliac bones as well as several lower ribs consistent with multifocal bone   metastases. Moderate compression deformities of L2, L3, and L4 with mild compression deformities of L1 and L4. There is an acute mildly comminuted and impacted subcapital fracture of the proximal left femur.      Impression    IMPRESSION:     1.  Mildly comminuted impacted subcapital fracture proximal left femur.  2.  Moderate to severe distention of the urinary bladder associated with symmetric hydroureter and hydronephrosis. Urinary retention potentially secondary to flow limitation at the level of the prostate gland.  3.  Multifocal sclerosis of the ribs, spine, and pelvis consistent with patchy bone metastases. Compression deformities of the lumbar spine are unchanged from 5/1/2024.  4.  Focal consolidation in the anterior basal left lower lobe has improved from 5/1/2024 with residual atelectasis and 9 mm nodule in this location. The nodule could be reassessed with chest CT in 3-6 months.   CT Lumbar Spine Reconstructed    Narrative    EXAM: CT LUMBAR SPINE RECONSTRUCTED  LOCATION: [facility]  DATE: 5/23/2024    INDICATION: Possible L5 fracture  COMPARISON: CT abdomen and pelvis May 1, 2024.  TECHNIQUE: Routine noncontrast CT of the lumbar spine. Dose reduction techniques were used.     FINDINGS:  VERTEBRA: Slight lumbar kyphosis. Grade 1 anterolisthesis L4 on L5 and L5 on S1. There are numerous sclerotic lesions throughout the visualized spine, ribs, and pelvis compatible with osseous metastatic disease. Anterior compression fractures of the T12,   L1, L2, L3, L4 and L5 vertebra are unchanged from May 1. Unchanged bony retropulsion L2 measuring approximately 5 mm No new or worsening anterior compression fracture     CANAL/FORAMINA: Severe spinal canal stenosis L1-2, in part due to bony retropulsion. Moderate to severe spinal canal stenosis  L4-5.    PARASPINAL: Severe right-sided hydronephrosis. Mild left hydronephrosis. Partially imaged distended urinary bladder.      Impression    IMPRESSION:  1.  Diffuse osseous metastatic disease.  2.  Chronic T12, L1, L2, L3, L4, and L5 anterior compression fractures are unchanged from comparison exam.  3.  Severe spinal canal stenosis L1-2, secondary to bony retropulsion of the L2 vertebral body. Moderate to severe spinal canal stenosis L4-5.  4.  Partially imaged distended urinary bladder with severe right and mild left hydronephrosis.

## 2024-05-24 NOTE — ED NOTES
Per provider, carlton catheter ordered.  Patient states hx of prostate issues and problems with catheter.  16 Fr coude used.  Initially met resistance- able to pull back and readvance the catheter to the end of it without resistance.  Small amount of urine obtained- approx. 5 mLs of urine noted.  Reported off to oncoming nurse who will reassess catheter placement.

## 2024-05-24 NOTE — CONSULTS
Care Management Initial Consult    General Information  Assessment completed with: Patient, Spouse or significant other, patient, spouse Lizet  Type of CM/SW Visit: Initial Assessment    Primary Care Provider verified and updated as needed: Yes   Readmission within the last 30 days: current reason for admission unrelated to previous admission   Return Category: New Diagnosis  Reason for Consult: discharge planning  Advance Care Planning: Advance Care Planning Reviewed: no concerns identified          Communication Assessment  Patient's communication style: spoken language (English or Bilingual)             Cognitive  Cognitive/Neuro/Behavioral: WDL                      Living Environment:   People in home: spouse, child(gunner), adult     Current living Arrangements: town home      Able to return to prior arrangements: other (see comments) (TBD)       Family/Social Support:  Care provided by: self, spouse/significant other  Provides care for: no one  Marital Status:   Wife, Children  Lizet       Description of Support System: Supportive, Involved    Support Assessment: Caregiver difficulty providing physical care/safety    Current Resources:   Patient receiving home care services: No     Community Resources: None  Equipment currently used at home: walker, rolling  Supplies currently used at home: None    Employment/Financial:  Employment Status: retired        Financial Concerns:             Does the patient's insurance plan have a 3 day qualifying hospital stay waiver?  No    Lifestyle & Psychosocial Needs:  Social Determinants of Health     Food Insecurity: Not on file   Depression: Not at risk (5/3/2023)    Received from [facility], [facility]    PHQ-2     PHQ-2 Score: 0   Housing Stability: Not on file   Tobacco Use: Medium Risk (5/1/2024)    Patient History     Smoking Tobacco Use: Former     Smokeless Tobacco Use: Former     Passive Exposure: Not on file   Financial Resource Strain: Not on file    Alcohol Use: Not on file   Transportation Needs: Not on file   Physical Activity: Not on file   Interpersonal Safety: Not on file   Stress: Not on file   Social Connections: Not on file   Health Literacy: Not on file       Functional Status:  Prior to admission patient needed assistance:   Dependent ADLs:: Independent, Ambulation-walker  Dependent IADLs:: Cleaning, Shopping, Transportation  Assesssment of Functional Status: Not at baseline with mobility    Mental Health Status:          Chemical Dependency Status:                Values/Beliefs:  Spiritual, Cultural Beliefs, Caodaism Practices, Values that affect care:                 Additional Information:  Met with patient and spouse at the bedside for initial assessment. Introduced self and care management role. Patient lives with spouse and adult daughter Casie. Patient was recently discharged to [facility] TCU after and admission for generalized weakness. He was home for a day and fell. Surgery scheduled 5/24 afternoon. Patient is open to returning to [facility] for TCU services.     CM to follow hospital progression, treatment team recommendations and assist with discharge planning. Transportation TBD.     Skye Triplett RN

## 2024-05-24 NOTE — ANESTHESIA PROCEDURE NOTES
"Intrathecal Procedure Note    Pre-Procedure   Staff -        Anesthesiologist:  Jono Dorado MD       Performed By: anesthesiologist       Location: OR       Procedure Start/Stop Times: 5/24/2024 3:05 PM and 5/24/2024 3:12 PM       Pre-Anesthestic Checklist: patient identified, IV checked, risks and benefits discussed, informed consent, monitors and equipment checked, pre-op evaluation and at physician/surgeon's request  Timeout:       Correct Patient: Yes        Correct Procedure: Yes        Correct Site: Yes        Correct Position: Yes   Procedure Documentation  Procedure: intrathecal       Patient Position: sitting       Skin prep: Chloraprep       Insertion Site: L3-4. (midline approach).       Needle Gauge: 24.        Needle Length (Inches): 4        Spinal Needle Type: Pencan       Introducer used       Introducer: 20 G       # of attempts: 3 and  # of redirects:  3    Assessment/Narrative         Paresthesias: No.       CSF fluid: clear.       Opening pressure was cmH2O while  Lateral.      Medication(s) Administered   0.75% Hyperbaric Bupivacaine (Intrathecal) - Intrathecal   1.6 mL - 5/24/2024 3:12:00 PM  Medication Administration Time: 5/24/2024 3:05 PM      FOR [facility] [place] [place]:   Pain Team Contact information: please page the Pain Team Via [email or web address] Search \"Pain\". During daytime hours, please page the attending first. At night please page the resident first.      "

## 2024-05-24 NOTE — ANESTHESIA CARE TRANSFER NOTE
Patient: Kilo Montiel    Procedure: Procedure(s):  HEMIARTHROPLASTY, HIP, BIPOLAR       Diagnosis: Fall, initial encounter [W19.XXXA]  Hip pain, left [M25.552]  Diagnosis Additional Information: No value filed.    Anesthesia Type:   Spinal     Note:    Oropharynx: oropharynx clear of all foreign objects  Level of Consciousness: awake  Oxygen Supplementation: face mask  Level of Supplemental Oxygen (L/min / FiO2): 6  Independent Airway: airway patency satisfactory and stable  Dentition: dentition unchanged  Vital Signs Stable: post-procedure vital signs reviewed and stable  Report to RN Given: handoff report given  Patient transferred to: PACU    Handoff Report: Identifed the Patient, Identified the Reponsible Provider, Reviewed the pertinent medical history, Discussed the surgical course, Reviewed Intra-OP anesthesia mangement and issues during anesthesia, Set expectations for post-procedure period and Allowed opportunity for questions and acknowledgement of understanding      Vitals:  Vitals Value Taken Time   /63 05/24/24 1644   Temp 37.1  C (98.8  F) 05/24/24 1644   Pulse 68 05/24/24 1644   Resp 12 05/24/24 1644   SpO2 98 % 05/24/24 1644       Electronically Signed By: DARÍO Alexandre CRNA  May 24, 2024  4:46 PM

## 2024-05-24 NOTE — CONSULTS
ORTHOPEDIC CONSULTATION    Consultation  Kilo Montiel,  1940, MRN [id number]    Fall, initial encounter  Hip pain, left  Anemia, unspecified type  Closed subcapital fracture of left femur, initial encounter (H)    PCP: Denzel Jones, [phone]   Code status:  No CPR- Do NOT Intubate       Extended Emergency Contact Information  Primary Emergency Contact: Lizet Montiel  Address: [address] [facility] [place] [address] [place]  Mobile Phone: [phone]  Relation: Spouse  Secondary Emergency Contact: Casie Montiel  Address: [address] [place] [address] [facility]  Mobile Phone: [phone]  Relation: Daughter         IMPRESSION:  Left acute closed mildly comminuted impacted subcapital fracture     PLAN:  This patient was discussed with Dr. Stein, on-call surgeon for [facility] and they are in agreement with the following plan.   -Will plan for hip pinning procedure of the left hip with Dr. Stein.  I discussed risk/benefits of the procedure with the patient and he is in agreement with proceeding.  Will further discuss with Dr. Stein.  -We did also place a palliative care consult as patient is fairly sick with current anemia and active metastatic prostate cancer to discuss goals of care with the patient.  -Medical optimization per primary team.  Patient was transfused yesterday.  Hemoglobin today is pending.  Hip pinning procedure should result in less blood loss then a hemiarthroplasty  -Nonweightbearing left lower extremity  -Pain control per primary team  -N.p.o. until surgery    Thank you for including [facility] in the care of Kilo Montiel. It has been a pleasure participating in their care.        CHIEF COMPLAINT: <principal problem not specified>    HISTORY OF PRESENT ILLNESS:  The patient is seen in orthopedic consultation at the request of Joyce Rodriguez MD for left hip pain.  The patient is a 83 year old  "male    Today patient is experiencing left hip pain following a fall yesterday.  He tripped over his cat and fell directly onto his left hip.  He had severe pain, was able to get up to a chair with difficulty and then came directly into the emergency department for further evaluation.  CT scan did demonstrate a left hip fracture.  He notes pain in the lateral and posterior aspect of his left hip, he does not have any radicular pain or numbness/tingling in his left lower extremity.  He is not on any anticoagulation.  He does have a history of active metastatic prostate cancer.  His last treatment cycle was 6 weeks ago.  He denies fever/chills at this time.  He does not note any history of surgeries on his left hip in the past.    ALLERGIES:   Review of patient's allergies indicates   Allergies   Allergen Reactions    Bee Pollen Other (See Comments)         MEDICATIONS UPON ADMISSION:  Medications were reviewed.  They include:   (Not in a hospital admission)        SOCIAL HISTORY:   he  reports that he quit smoking about 41 years ago. His smoking use included cigarettes. He started smoking about 50 years ago. He has a 4 pack-year smoking history. He quit smokeless tobacco use about 48 years ago. He reports current alcohol use.      FAMILY HISTORY:  family history includes Breast Cancer (age of onset: 75) in his mother.      REVIEW OF SYSTEMS:   Reviewed with patient. See HPI, otherwise negative       PHYSICAL EXAMINATION:  Vitals: BP (!) 159/68   Pulse 78   Temp 97.9  F (36.6  C) (Oral)   Resp 22   Ht 1.676 m (5' 6\")   Wt 49.9 kg (110 lb)   SpO2 97%   BMI 17.75 kg/m    General: On examination, the patient is resting comfortably, NAD, awake, and alert and oriented to person, place, time, and, and general circumstances   SKIN: There is no evidence of erythema, warmth, swelling, deformity, crepitus, break to the skin, open wound.  Pulses:  Dorsalis pedis and posterior tibialis pulse is intact and equal " bilaterally  Sensation: intact and equal bilaterally to the distal lower extremities.  Tenderness: Tender to palpation of the left hip  ROM: DF/PF intact and wiggles toes.  Deferred knee and hip range of motion    Contralateral side= Full range of motion, Negative joint instability findings, 5/5 motor groups about the joint, Non-tender.       RADIOGRAPHIC EVALUATION:  Personally reviewed  EXAM: CT ABDOMEN PELVIS W CONTRAST  LOCATION: [facility]  DATE: 5/23/2024     INDICATION: left hip pain and possible fracture, hemoglobin drop to 6  COMPARISON: CT of the abdomen and pelvis 5/1/2024  TECHNIQUE: CT scan of the abdomen and pelvis was performed following injection of IV contrast. Multiplanar reformats were obtained. Dose reduction techniques were used.  CONTRAST: isovue 370  60ml     FINDINGS:   LOWER CHEST: In the territory of left lower lobe airspace consolidation present 5/1/2023 there is now a 9 mm nodule (series 3, image 11) with an adjacent band of atelectasis. Additional bands of atelectasis are present in both lower lobes along the   posterior costal pleura. Endoluminal secretions are present with multiple subsegmental airways in the right middle lobe and anterior basal segment right lower lobe. Few benign calcified granuloma are present in the right lower lobe. Small hiatal hernia.     HEPATOBILIARY: Few scattered benign calcifications in the liver. No focal liver lesions. Gallbladder is nondistended without wall thickening. No calcified gallstones. No bile duct enlargement.     PANCREAS: Normal.     SPLEEN: Normal in size containing multiple calcified granulomata.     ADRENAL GLANDS: Normal.     KIDNEYS/BLADDER: Moderate to severe distention of the urinary bladder. The bladder has a smooth wall of uniform thickness. Symmetric hydroureteronephrosis without urothelial thickening or radiopaque calculi.     BOWEL: There is a large amount of stool throughout the colon. No bowel wall  thickening. No inflammatory stranding in the mesentery or peritoneal thickening.     LYMPH NODES: Normal.     VASCULATURE: There are patchy atheromatous calcifications throughout the abdominal aorta and iliac arteries. Minimal focal ectasia of the right lateral wall of the infrarenal abdominal aorta but no abdominal aortic aneurysm.     PELVIC ORGANS: There are multiple metallic seeds within the substance of the prostate gland. Small phleboliths are present in the low pelvis.     MUSCULOSKELETAL: Globally decreased bone marrow attenuation consistent with demineralization. Heterogeneous sclerosis of the medullary spaces of the low thoracic and lumbar spine, iliac bones as well as several lower ribs consistent with multifocal bone   metastases. Moderate compression deformities of L2, L3, and L4 with mild compression deformities of L1 and L4. There is an acute mildly comminuted and impacted subcapital fracture of the proximal left femur.                                                                      IMPRESSION:      1.  Mildly comminuted impacted subcapital fracture proximal left femur.  2.  Moderate to severe distention of the urinary bladder associated with symmetric hydroureter and hydronephrosis. Urinary retention potentially secondary to flow limitation at the level of the prostate gland.  3.  Multifocal sclerosis of the ribs, spine, and pelvis consistent with patchy bone metastases. Compression deformities of the lumbar spine are unchanged from 5/1/2024.  4.  Focal consolidation in the anterior basal left lower lobe has improved from 5/1/2024 with residual atelectasis and 9 mm nodule in this location. The nodule could be reassessed with chest CT in 3-6 months.    PERTINENT LABS:  Personally reviewed  Recent Labs   Lab Test 05/23/24  1417 05/23/24  1359   INR 1.10  --    HGB  --  6.8*   PLT  --  204         SILVIA FERNANDES PA-C  Date: 5/24/2024  Time: 10:07 AM  [facility]    CC1:   White-John[place],  Joyce PARKER MD

## 2024-05-25 ENCOUNTER — APPOINTMENT (OUTPATIENT)
Dept: PHYSICAL THERAPY | Facility: HOSPITAL | Age: 84
DRG: 522 | End: 2024-05-25
Attending: STUDENT IN AN ORGANIZED HEALTH CARE EDUCATION/TRAINING PROGRAM
Payer: COMMERCIAL

## 2024-05-25 ENCOUNTER — APPOINTMENT (OUTPATIENT)
Dept: OCCUPATIONAL THERAPY | Facility: HOSPITAL | Age: 84
DRG: 522 | End: 2024-05-25
Payer: COMMERCIAL

## 2024-05-25 PROBLEM — R31.0 GROSS HEMATURIA: Status: ACTIVE | Noted: 2024-05-25

## 2024-05-25 LAB
ANION GAP SERPL CALCULATED.3IONS-SCNC: 9 MMOL/L (ref 7–15)
BUN SERPL-MCNC: 23.3 MG/DL (ref 8–23)
CALCIUM SERPL-MCNC: 8.4 MG/DL (ref 8.8–10.2)
CHLORIDE SERPL-SCNC: 98 MMOL/L (ref 98–107)
CREAT SERPL-MCNC: 0.86 MG/DL (ref 0.67–1.17)
DEPRECATED HCO3 PLAS-SCNC: 23 MMOL/L (ref 22–29)
EGFRCR SERPLBLD CKD-EPI 2021: 86 ML/MIN/1.73M2
ERYTHROCYTE [DISTWIDTH] IN BLOOD BY AUTOMATED COUNT: 23.3 % (ref 10–15)
GLUCOSE BLDC GLUCOMTR-MCNC: 102 MG/DL (ref 70–99)
GLUCOSE SERPL-MCNC: 104 MG/DL (ref 70–99)
HCT VFR BLD AUTO: 23.3 % (ref 40–53)
HGB BLD-MCNC: 7.5 G/DL (ref 13.3–17.7)
HGB BLD-MCNC: 7.8 G/DL (ref 13.3–17.7)
MCH RBC QN AUTO: 33.2 PG (ref 26.5–33)
MCHC RBC AUTO-ENTMCNC: 33.5 G/DL (ref 31.5–36.5)
MCV RBC AUTO: 99 FL (ref 78–100)
PLATELET # BLD AUTO: 186 10E3/UL (ref 150–450)
POTASSIUM SERPL-SCNC: 4.4 MMOL/L (ref 3.4–5.3)
RBC # BLD AUTO: 2.35 10E6/UL (ref 4.4–5.9)
SODIUM SERPL-SCNC: 130 MMOL/L (ref 135–145)
WBC # BLD AUTO: 3.2 10E3/UL (ref 4–11)

## 2024-05-25 PROCEDURE — 250N000013 HC RX MED GY IP 250 OP 250 PS 637

## 2024-05-25 PROCEDURE — 120N000001 HC R&B MED SURG/OB

## 2024-05-25 PROCEDURE — 250N000013 HC RX MED GY IP 250 OP 250 PS 637: Performed by: STUDENT IN AN ORGANIZED HEALTH CARE EDUCATION/TRAINING PROGRAM

## 2024-05-25 PROCEDURE — 85027 COMPLETE CBC AUTOMATED: CPT | Performed by: STUDENT IN AN ORGANIZED HEALTH CARE EDUCATION/TRAINING PROGRAM

## 2024-05-25 PROCEDURE — 85018 HEMOGLOBIN: CPT | Performed by: INTERNAL MEDICINE

## 2024-05-25 PROCEDURE — 80048 BASIC METABOLIC PNL TOTAL CA: CPT | Performed by: STUDENT IN AN ORGANIZED HEALTH CARE EDUCATION/TRAINING PROGRAM

## 2024-05-25 PROCEDURE — 250N000011 HC RX IP 250 OP 636: Performed by: STUDENT IN AN ORGANIZED HEALTH CARE EDUCATION/TRAINING PROGRAM

## 2024-05-25 PROCEDURE — 36415 COLL VENOUS BLD VENIPUNCTURE: CPT | Performed by: INTERNAL MEDICINE

## 2024-05-25 PROCEDURE — 97161 PT EVAL LOW COMPLEX 20 MIN: CPT | Mod: GP

## 2024-05-25 PROCEDURE — 97166 OT EVAL MOD COMPLEX 45 MIN: CPT | Mod: GO

## 2024-05-25 PROCEDURE — 97116 GAIT TRAINING THERAPY: CPT | Mod: GP

## 2024-05-25 PROCEDURE — 97530 THERAPEUTIC ACTIVITIES: CPT | Mod: GP

## 2024-05-25 PROCEDURE — 97110 THERAPEUTIC EXERCISES: CPT | Mod: GO

## 2024-05-25 PROCEDURE — 99232 SBSQ HOSP IP/OBS MODERATE 35: CPT | Performed by: INTERNAL MEDICINE

## 2024-05-25 PROCEDURE — 36415 COLL VENOUS BLD VENIPUNCTURE: CPT | Performed by: STUDENT IN AN ORGANIZED HEALTH CARE EDUCATION/TRAINING PROGRAM

## 2024-05-25 RX ADMIN — ACETAMINOPHEN 975 MG: 325 TABLET ORAL at 13:27

## 2024-05-25 RX ADMIN — SODIUM CHLORIDE TAB 1 GM 1 G: 1 TAB at 12:27

## 2024-05-25 RX ADMIN — CEFAZOLIN SODIUM 2 G: 2 INJECTION, SOLUTION INTRAVENOUS at 06:23

## 2024-05-25 RX ADMIN — ACETAMINOPHEN 975 MG: 325 TABLET ORAL at 22:15

## 2024-05-25 RX ADMIN — HYDROMORPHONE HYDROCHLORIDE 2 MG: 2 TABLET ORAL at 07:55

## 2024-05-25 RX ADMIN — POLYETHYLENE GLYCOL 3350 17 G: 17 POWDER, FOR SOLUTION ORAL at 07:58

## 2024-05-25 RX ADMIN — SENNOSIDES AND DOCUSATE SODIUM 1 TABLET: 8.6; 5 TABLET ORAL at 20:33

## 2024-05-25 RX ADMIN — ACETAMINOPHEN 975 MG: 325 TABLET ORAL at 05:31

## 2024-05-25 RX ADMIN — ESCITALOPRAM OXALATE 10 MG: 10 TABLET ORAL at 07:57

## 2024-05-25 RX ADMIN — CYANOCOBALAMIN TAB 1000 MCG 1000 MCG: 1000 TAB at 07:55

## 2024-05-25 RX ADMIN — ASPIRIN 81 MG: 81 TABLET, COATED ORAL at 20:34

## 2024-05-25 RX ADMIN — METHOCARBAMOL 500 MG: 500 TABLET, FILM COATED ORAL at 22:16

## 2024-05-25 RX ADMIN — HYDROMORPHONE HYDROCHLORIDE 4 MG: 4 TABLET ORAL at 00:40

## 2024-05-25 RX ADMIN — SODIUM CHLORIDE TAB 1 GM 1 G: 1 TAB at 17:03

## 2024-05-25 RX ADMIN — SODIUM CHLORIDE TAB 1 GM 1 G: 1 TAB at 07:56

## 2024-05-25 RX ADMIN — ASPIRIN 81 MG: 81 TABLET, COATED ORAL at 07:55

## 2024-05-25 RX ADMIN — FAMOTIDINE 20 MG: 20 TABLET ORAL at 07:55

## 2024-05-25 RX ADMIN — OXYCODONE HYDROCHLORIDE 5 MG: 5 TABLET ORAL at 13:28

## 2024-05-25 RX ADMIN — Medication 1 LOZENGE: at 00:47

## 2024-05-25 RX ADMIN — METHOCARBAMOL 500 MG: 500 TABLET, FILM COATED ORAL at 15:54

## 2024-05-25 RX ADMIN — MEGESTROL ACETATE 400 MG: 40 SUSPENSION ORAL at 07:58

## 2024-05-25 RX ADMIN — SENNOSIDES AND DOCUSATE SODIUM 1 TABLET: 8.6; 5 TABLET ORAL at 07:58

## 2024-05-25 RX ADMIN — LEVOTHYROXINE SODIUM 125 MCG: 0.03 TABLET ORAL at 06:23

## 2024-05-25 RX ADMIN — OXYCODONE HYDROCHLORIDE 5 MG: 5 TABLET ORAL at 18:35

## 2024-05-25 RX ADMIN — METHOCARBAMOL 500 MG: 500 TABLET, FILM COATED ORAL at 05:31

## 2024-05-25 ASSESSMENT — ACTIVITIES OF DAILY LIVING (ADL)
ADLS_ACUITY_SCORE: 47

## 2024-05-25 NOTE — PLAN OF CARE
Problem: Adult Inpatient Plan of Care  Goal: Plan of Care Review  Description: The Plan of Care Review/Shift note should be completed every shift.  The Outcome Evaluation is a brief statement about your assessment that the patient is improving, declining, or no change.  This information will be displayed automatically on your shift  note.  Outcome: Progressing  Goal: Absence of Hospital-Acquired Illness or Injury  Intervention: Identify and Manage Fall Risk  Recent Flowsheet Documentation  Taken 5/25/2024 0030 by Bryce rAt RN  Safety Promotion/Fall Prevention: safety round/check completed  Taken 5/24/2024 2030 by Bryce Art RN  Safety Promotion/Fall Prevention: safety round/check completed  Intervention: Prevent Skin Injury  Recent Flowsheet Documentation  Taken 5/25/2024 0030 by Bryce Art RN  Body Position: supine, head elevated  Taken 5/24/2024 2030 by Bryce Art RN  Body Position: supine, head elevated  Goal: Optimal Comfort and Wellbeing  Intervention: Monitor Pain and Promote Comfort  Recent Flowsheet Documentation  Taken 5/25/2024 0040 by Bryce Art RN  Pain Management Interventions: medication (see MAR)  Taken 5/24/2024 2127 by Bryce Art RN  Pain Management Interventions: medication (see MAR)     Problem: Orthopaedic Fracture  Goal: Absence of Bleeding  Outcome: Progressing     Problem: Orthopaedic Fracture  Goal: Fracture Stability  Outcome: Progressing     Problem: Orthopaedic Fracture  Goal: Optimal Pain Control and Function  Outcome: Progressing     Problem: Pain Acute  Goal: Optimal Pain Control and Function  Outcome: Progressing     Problem: Comorbidity Management  Goal: Blood Pressure in Desired Range  Outcome: Progressing   Goal Outcome Evaluation:       Pt alert & oriented. IVF LR running at 75ml/hr. Forte cath in place with reddish urine. Pulse ox continuous monitoring, O2 sats above 92%. Prn dilaudid given for  pain with relief. On room air, Vital Signs stable.

## 2024-05-25 NOTE — PLAN OF CARE
Problem: Adult Inpatient Plan of Care  Goal: Plan of Care Review  Description: The Plan of Care Review/Shift note should be completed every shift.  The Outcome Evaluation is a brief statement about your assessment that the patient is improving, declining, or no change.  This information will be displayed automatically on your shift  note.  Outcome: Progressing   Goal Outcome Evaluation:       Pt has been up in chair with assist of 2 and walker. Pt receiving oral dilaudid for pain. Pt continues to have bloody urine in [place]. Urology following and ordered US. Pt getting bladder irrigation PRN.

## 2024-05-25 NOTE — CONSULTS
"[facility] CONSULTATION NOTE      Kilo Montiel   [address] [facility] [address]  83 year old  male  Admission Date/Time: 5/23/2024  1:35 PM  Primary Care Provider:  Denzel Jones      HPI:   83-year-old male with metastatic castrate resistant prostate cancer, urinary retention with prostatic fossa stricture, admitted after mechanical fall with left femur fracture status post orthopedic repair.    Patient recently seen in emergency department for urinary retention 5/5/2024 where a catheter was able to be passed using cystoscopic guidance over a wire.  Prostatic fossa stricture noted at that time.    Prostate cancer has been managed by his urologist in [place].  Cancer history significant for prostate cancer initially treated in 2009 with radiation, and subsequent brachytherapy in 2013 for biochemical recurrence.  He subsequently developed bony metastases had radiation to his lumbar spine in 2015.  Further progression of his disease with ADT and Provenge in 2016.  Patient has been on multiple additional oral medications including abiraterone, enzalutamide, olaparib, as well as docetaxel and pembrolizumab more recently in 2022 and 2023.    Patient developed urinary retention at the time of this current presentation.  CT revealed bilateral hydronephrosis with a distended bladder, and catheter was placed, draining dark hematuric urine.      ALLERGIES/SENSITIVITIES:   Allergies   Allergen Reactions    Bee Pollen Other (See Comments)       Current inpatient medications reviewed    PHYSICAL EXAM:     General Appearance:   Cachectic, no acute distress  /66 (BP Location: Right arm)   Pulse 72   Temp 98.6  F (37  C) (Oral)   Resp 18   Ht 1.676 m (5' 6\")   Wt 49.9 kg (110 lb)   SpO2 97%   BMI 17.75 kg/m    Body mass index is 17.75 kg/m .  HEAD, EARS, NOSE, MOUTH, AND THROAT: [place]/Moist  RESPIRATORY: Normal excursion, no accessory muscles, no audible wheeze  CARDIOVASCULAR: NO JVD or " visible edema  MUSCULOSKELETAL: Normal ROM observed  NEUROLOGIC: Grossly intact, symmetric fascial CN and voice.  PSYCHIATRIC: Affect appropriate, answers are clear and linear.  ABD: Soft, non-tender. No masses or rebound.  : 16 Bengali Forte catheter in place draining dark red urine with old clots        CONSULTATION ASSESSMENT AND PLAN:    83-year-old male with metastatic castrate resistant prostate cancer, urinary retention with prostatic fossa stricture, admitted after mechanical fall with left femur fracture status post orthopedic repair, seen in consultation for urinary retention with bilateral hydronephrosis status post catheter insertion.    Recommendations:  -Patient should maintain Forte catheter through discharge  -Recommend renal ultrasound in 24 to 48 hours to confirm resolution of hydronephrosis (ordered)  -Urology will follow-up on an outpatient basis for further discussion of long-term bladder management, patient likely would benefit from suprapubic catheter insertion    Thank you for the consult and allowing me to participate in the care of your patient. Feel free to call me with any questions.

## 2024-05-25 NOTE — PROGRESS NOTES
"Ortho Progress Note      Post-operative Day: 1 Day Post-Op  S/P Procedure(s):  HEMIARTHROPLASTY, HIP, BIPOLAR      Subjective:  Pain: Eating breakfast right now.  Pain appears well controlled at rest.    Chest pain, SOB:  No  Nausea, vomiting:  No   Lightheadedness, dizziness:  None reported   Neuro:  Patient denies new onset numbness or paresthesias    Objective:  /66 (BP Location: Right arm)   Pulse 72   Temp 98.6  F (37  C) (Oral)   Resp 18   Ht 1.676 m (5' 6\")   Wt 49.9 kg (110 lb)   SpO2 97%   BMI 17.75 kg/m    Gen: A&O x 3. NAD. Appears comfortable   Wound status: Covered.  Dressing is clean, no drainage.    Circulation, motion and sensation: Dorsiflexion/plantarflexion intact and equal bilaterally; distal lower extremity sensation is intact and equal bilaterally   Swelling: Mild swelling left hip region   Calf tenderness: calves are soft and non-tender bilaterally     Pertinent Labs   Lab Results:   Lab Results   Component Value Date    INR 1.10 05/23/2024    INR 1.16 (H) 05/01/2024     Lab Results   Component Value Date    WBC 3.2 (L) 05/25/2024    HGB 7.8 (L) 05/25/2024    HCT 23.3 (L) 05/25/2024    MCV 99 05/25/2024     05/25/2024     Lab Results   Component Value Date     (L) 05/25/2024    CO2 23 05/25/2024       Assessment: POD #1 S/P left hip hemiarthroplasty     Plan:   Continue PT/OT  Weightbearing status: WBAT  Anticoagulation: ASA 81 mg in addition to SCDs, tomas stockings and early ambulation.  Discharge planning: Pending     Report completed by:  JOO VILLASENOR PA-C   Date: 5/25/2024  Time: 7:43 AM  "

## 2024-05-25 NOTE — PROGRESS NOTES
05/25/24 0907   Appointment Info   Signing Clinician's Name / Credentials (PT) Bong Jara, PT, DPT   Rehab Comments (PT) Patient left lying supine with HOB elevated, bed alarm on, pillow support under BLE and BUE, needs within reach.   Living Environment   People in Home spouse;child(gunner), adult  (dtr)   Current Living Arrangements house   Home Accessibility stairs to enter home;stairs within home   Number of Stairs, Main Entrance 2   Stair Railings, Main Entrance none   Number of Stairs, Within Home, Primary greater than 10 stairs  (12; 6 to landing, then 6 more)   Stair Railings, Within Home, Primary railing on right side (ascending);railings on both sides of stairs   Transportation Anticipated agency;family or friend will provide   Living Environment Comments Pt lives in split level home (~7/8 stairs to go upstairs, bedroom/living room/BR upstairs). Lives w/ adult daughter and spouse who provide A to pt. Walk-in shower. Own shower chair but doesn't use it.   Self-Care   Fall history within last six months yes   Number of times patient has fallen within last six months 2   Activity/Exercise/Self-Care Comment Per recent admission; Pt has A w/ ADLs from daughter/spouse; LB dressing (threading pants and socks/shoes), toileting and showering (unclear how much A for these tasks). Usually ambulated w/o device, has recently been using FWW   General Information   Onset of Illness/Injury or Date of Surgery 05/23/24   Referring Physician Everett Sherman MD   Pertinent History of Current Problem (include personal factors and/or comorbidities that impact the POC) left hip hemiarthroplasty 5/24/24   Existing Precautions/Restrictions fall;weight bearing  (no hip precautions)   Weight-Bearing Status - LLE weight-bearing as tolerated   Pain Assessment   Patient Currently in Pain Yes, see Vital Sign flowsheet   Range of Motion (ROM)   Range of Motion ROM deficits secondary to pain   Strength (Manual Muscle Testing)   Strength  (Manual Muscle Testing) Deficits observed during functional mobility   Bed Mobility   Bed Mobility sit-supine   Sit-Supine [place] (Bed Mobility) maximum assist (25% patient effort);2 person assist;verbal cues;nonverbal cues (demo/gesture)   Comment, (Bed Mobility) patient attempted sit>supine but due to pain PT assisted   Transfers   Transfers sit-stand transfer   Sit-Stand Transfer   Sit-Stand [place] (Transfers) minimum assist (75% patient effort);2 person assist;verbal cues;nonverbal cues (demo/gesture)   Assistive Device (Sit-Stand Transfers) walker, front-wheeled   Gait/Stairs (Locomotion)   [place] Level (Gait) minimum assist (75% patient effort);2 person assist;verbal cues;nonverbal cues (demo/gesture)   Assistive Device (Gait) walker, front-wheeled   Distance in Feet (Gait) initial 2ft fwd and back   Comment, (Gait/Stairs) not appropriate to attempt stairs at this time   Clinical Impression   Criteria for Skilled Therapeutic Intervention Yes, treatment indicated   PT Diagnosis (PT) impaired functional mobility   Influenced by the following impairments pain, impaired strength, impaired balance, impaired activity tolerance   Functional limitations due to impairments impaired bed mobility, impaired transfers, impaired gait   Clinical Presentation (PT Evaluation Complexity) stable   Clinical Presentation Rationale clinical judgement   Clinical Decision Making (Complexity) moderate complexity   Planned Therapy Interventions (PT) balance training;bed mobility training;gait training;home exercise program;neuromuscular re-education;patient/family education;stair training;strengthening;transfer training;progressive activity/exercise   Risk & Benefits of therapy have been explained evaluation/treatment results reviewed;care plan/treatment goals reviewed;participants voiced agreement with care plan;participants included;patient   PT Total Evaluation Time   PT Eval, Low Complexity Minutes (01530)  "10  (co-eval with OT)   Physical Therapy Goals   PT Frequency 6x/week   PT Predicted Duration/Target Date for Goal Attainment 06/01/24   PT Goals Bed Mobility;Transfers;Gait   PT: Bed Mobility Minimal assist;Supine to/from sit   PT: Transfers Supervision/stand-by assist;Sit to/from stand;Bed to/from chair;Assistive device   PT: Gait Supervision/stand-by assist;Rolling walker;50 feet  (FWW)   Therapeutic Activity   Therapeutic Activities: dynamic activities to improve functional performance Minutes (79707) 12   Symptoms Noted During/After Treatment Fatigue   Treatment Detail/Skilled Intervention Partial co-treat with OT: With OT present, patient required cues for standing balance to prevent posterior lean, hand over hand cues for pushing up from the bed for transfers, and cues/assist for LLE positioning in sitting. Discussed keeping LLE in neutral position for improved healing, as patient prefers to have his LLE resting in hip IR. Without OT present, patient able to perform transfers with CGA>Noah with FWW/elevated bed height with fair carryover of hand placement without cues. Patient able to perform minimal excursion seated scooting at EOB with adjusted bed height due to increased difficulty with high bed height used during transfers. Increased time required during scooting due to increased pain. Patient initiated sit>supine but due to pain, PT and therapy aide assisted patient back into bed. Patient able to slowly extend BLE, alternating between each LE for partial extension at a time. Discussed focusing on reducing amount of time spent in a \"sitting posture\" when in bed to allow for improved hip flexor stretch and decreased pain with transitional movements. Patient is eager to participate in therapies and was encouraged to sit in the recliner for meals. Discussed with RN after session.   Gait Training   Gait Training Minutes (45314) 8   Symptoms Noted During/After Treatment (Gait Training) fatigue   Treatment " Detail/Skilled Intervention Following OT departure, patient able to ambulate another bout of 2-3ft forward and back with FWW with CGA. Patient demonstrated improved posterior lean compared to during eval/first bout of gait. Patient demonstrating improved standing balance during ambulation.   Distance in Feet additional 2-3ft fwd/back   [place] Level (Gait Training) contact guard   Physical Assistance Level (Gait Training) verbal cues;nonverbal cues (demo/gestures);1 person assist   Assistive Device (Gait Training) rolling walker  (FWW)   PT Discharge Planning   PT Plan progress bed mobility, progress transfers with FWW/elevated bed height, general strengthening, short distance gait (in room then progress to into rose with wc follow)   PT Discharge Recommendation (DC Rec) (S)  Transitional Care Facility   PT Rationale for DC Rec Patient is currently requiring one to two person assist for functional mobility and is not able to ambulate household distances at this time. Patient is highly motivated to participate with therapies and demonstrated functional progression within first PT session this admission. Recommend TCU to progress mobility, strength, balance, safety, and independence.   PT Brief overview of current status sit>supine maxAx2 due to pain, sit<>stand Noah FWW, amb 2-3ft fwd and back x 2 with FWW   Total Session Time   Timed Code Treatment Minutes 20   Total Session Time (sum of timed and untimed services) 30

## 2024-05-25 NOTE — PROGRESS NOTES
ANDREW Richardson     05/25/24 0800   Appointment Info   Signing Clinician's Name / Credentials (OT) Ting Masters OTR/L   Living Environment   People in Home spouse;child(gunner), adult  (daughter)   Current Living Arrangements house   Home Accessibility stairs to enter home;stairs within home   Number of Stairs, Main Entrance 2   Stair Railings, Main Entrance none   Number of Stairs, Within Home, Primary greater than 10 stairs  (12; 6 to landing, then 6 more)   Stair Railings, Within Home, Primary railing on right side (ascending);railings on both sides of stairs   Living Environment Comments Pt lives in split level home (~7/8 stairs to go upstairs, bedroom/living room/BR upstairs). Lives w/ adult daughter and spouse who provide A to pt. Walk-in shower. Own shower chair but doesn't use it.   Self-Care   Current Activity Tolerance fair   Equipment Currently Used at Home walker, brando   Fall history within last six months yes   Number of times patient has fallen within last six months 2   Activity/Exercise/Self-Care Comment Per recent admission; Pt has A w/ ADLs from daughter/spouse; LB dressing (threading pants and socks/shoes), toileting and showering (unclear how much A for these tasks). Usually ambulated w/o device, has recently been using FWW   Instrumental Activities of Daily Living (IADL)   IADL Comments A w/ IADLs, wife helps w cleaning and housework, cooking. Pt drives but hasn't in a few weeks d/t symptoms.   General Information   Onset of Illness/Injury or Date of Surgery 05/23/24   Referring Physician Everett Sherman MD   Additional Occupational Profile Info/Pertinent History of Current Problem Kilo Montiel is a 83 year old male with past medical history significant for metastatic prostate cancer, hypothyroidism, and syncope who was admitted on 5/23/2024 after a mechanical fall with left hip fracture.   Existing Precautions/Restrictions fall  (Post-hip; no precautions for hip)   Left Lower Extremity  (Weight-bearing Status) weight-bearing as tolerated (WBAT)   Cognitive Status Examination   Affect/Mental Status (Cognitive) WFL   Follows Commands WFL   Sensory   Sensory Quick Adds sensation intact   Pain Assessment   Patient Currently in Pain No   Range of Motion Comprehensive   General Range of Motion no range of motion deficits identified   Comment, General Range of Motion UB ROM not impacted by diagnosis/procedure; No ROM precautions for R hip /RLE   Strength Comprehensive (MMT)   General Manual Muscle Testing (MMT) Assessment no strength deficits identified   Muscle Tone Assessment   Muscle Tone Quick Adds No deficits were identified   Coordination   Upper Extremity Coordination No deficits were identified   Bed Mobility   Bed Mobility supine-sit   Supine-Sit [place] (Bed Mobility) moderate assist (50% patient effort);2 person assist   Assistive Device (Bed Mobility) bed rails   Comment (Bed Mobility) Fully raised HOB; Min-Mod Ax2 to slowly lower BLEs to floor, support UB   Transfers   Transfers sit-stand transfer   Sit-Stand Transfer   Sit-Stand [place] (Transfers) contact guard;2 person assist   Assistive Device (Sit-Stand Transfers) walker, brando   Balance   Balance Assessment sitting static balance;standing static balance;standing dynamic balance   Sitting Balance: Static supervision   Position, Sitting Balance sitting edge of bed   Standing Balance: Static contact guard;2-person assist   Standing Balance: Dynamic minimal assist;2-person assist   Position/Device Used, Standing Balance walker, brando   Activities of Daily Living   BADL Assessment/Intervention toileting   Lower Body Dressing Assessment/Training   [place] Level (Lower Body Dressing) don;socks;dependent (less than 25% patient effort)   Toileting   Comment, (Toileting) Did not observe during eval portion of session today; Per observed transfers and clinical reasoning, pt likely CGAx2 for SPT w/ FWW to commode, Min A w/ clothing,  hygiene   Clinical Impression   Criteria for Skilled Therapeutic Interventions Met (OT) Yes, treatment indicated   OT Diagnosis Impaired ADLs and functional mobility, transfers   Influenced by the following impairments Pain, deconditioning, hip fx   OT Problem List-Impairments impacting ADL problems related to;activity tolerance impaired;balance;mobility;range of motion (ROM);strength;pain   Assessment of Occupational Performance 3-5 Performance Deficits   Identified Performance Deficits Toileting, transfers, functional mobility, bed mobility   Planned Therapy Interventions (OT) ADL retraining;strengthening;progressive activity/exercise;home program guidelines;transfer training   Clinical Decision Making Complexity (OT) detailed assessment/moderate complexity   Risk & Benefits of therapy have been explained evaluation/treatment results reviewed;care plan/treatment goals reviewed;patient   OT Total Evaluation Time   OT Eval, Moderate Complexity Minutes (05607) 15   OT Goals   Therapy Frequency (OT) 6 times/week   OT Predicted Duration/Target Date for Goal Attainment 06/01/24   OT Goals Toilet Transfer/Toileting   OT: Transfer Supervision/stand-by assist  (Car transfer and shower chair transfer)   OT: Toilet Transfer/Toileting using adaptive equipment;cleaning and garment management;Supervision/stand-by assist   Interventions   Interventions Quick Adds Therapeutic Procedures/Exercise   Therapeutic Procedures/Exercise   Therapeutic Procedure: strength, endurance, ROM, flexibillity minutes (58729) 10   Symptoms Noted During/After Treatment none   Treatment Detail/Skilled Intervention Eval complete, treatment indicated. Pt CGAx2 STS from raised EOB, completed ~5 min of standing w/ FWW, including taking ~4 steps forwards CGAx2, ~4 steps backwards CGA-Min Ax2 some slight instability noted, and lateral side steps towards HOB. Pt reporting min increase in pain, pt educated by therapist on WBAT and body mechanics for pain  management, educated that pt has no movement precautions at this time. VCs for posture during activity and hand placement during transfers. Session ended w/ direct handoff to PT in room w/ pt seated EOB.   OT Discharge Planning   OT Plan Standing tolerance, bed mobility + STS, SPTs to commode/recliner, progress functional ambulation. (L Hip)   OT Discharge Recommendation (DC Rec) Transitional Care Facility   OT Rationale for DC Rec At this point, pt is far below baseline, only able to take a few steps w/ Ax1-2. To enter pt's home, he has stairs. Pt would likely benefit from time in TCU prior to DC home when pt is able to tolerate more activity and complete stairs.   OT Brief overview of current status CGAx2 STS and static stand FWW,   Total Session Time   Timed Code Treatment Minutes 10   Total Session Time (sum of timed and untimed services) 25

## 2024-05-25 NOTE — PROGRESS NOTES
[facility]    PROGRESS NOTE - Hospitalist Service    Assessment and Plan    Active Problems:    Malignant neoplasm of prostate (H)    Hip pain, left    Closed subcapital fracture of left femur, initial encounter (H)    Fall, initial encounter    Anemia, unspecified type    Gross hematuria    Kilo Montiel is a 83 year old male with h/o metastatic prostate cancer, hypothyroidism, and syncope who was admitted on 5/23/2024 after a mechanical fall with left hip fracture.      Left hip fracture  Mechanical fall, initial encounter  -CT head negative for acute abnormality  -CT ABD/PEL 5/23: Mildly comminuted impacted subcapital fracture of the left proximal femur  -Ortho consulted still in surgery   - post surgery care per ortho and VTE  - would resume diet   - PT/OT      Urinary retention with bilateral hydronephrosis and gross hematuria, h/o Prostate cancer   - status post Carlton catheter  -CT ABD/PEL 5/23: Moderate to severe distention of the urinary bladder with symmetric hydroureter and hydronephrosis, evidence of bone metastases on the ribs, spine, and pelvis  - having gross hematuria and urology consulted   - appreciate urology consult   - keep carlton in place after discharge per urology  - trend Hgb   - US ordered to check hydro per Urology   - if Hgb drops or continues to bleed might need to hold ASA      Acute on chronic anemia  Pancytopenia-chronic (thrombocytopenia is variable)  -Has intermittent hematuria, also chronic disease  -Baseline hemoglobin usually in the eights  -CBC on admission: WBC 3.6, hemoglobin 6.8, platelets 204  -Transfused 1 unit PRBCs 5/24  - trend labs      Chronic compression fractures  -CT lumbar spine 5/23: Chronic T12, L1, L2, L3, L4, and L5 anterior compression fractures, severe spinal canal stenosis at L1-L2 with retropulsion of the L2 vertebral body, moderate to severe spinal stenosis at L4-L5  - Pt/Ot      Clinically Significant Risk Factors              #  "Hypoalbuminemia: Lowest albumin = 3.1 g/dL at 5/23/2024  1:59 PM, will monitor as appropriate            # Cachexia: Estimated body mass index is 17.75 kg/m  as calculated from the following:    Height as of this encounter: 1.676 m (5' 6\").    Weight as of this encounter: 49.9 kg (110 lb)., PRESENT ON ADMISSION       # Financial/Environmental Concerns:             COVID-19 PCR Results          5/1/2024    13:30   COVID-19 PCR Results   SARS CoV2 PCR Negative      COVID-19 Antibody Results, Testing for Immunity           No data to display               Code Status: Full Code  VTE prophylaxis:  Pneumatic Compression Devices and and ASA 81mg BID  DIET: Orders Placed This Encounter      Advance Diet as Tolerated: Regular Diet Adult    Drains/Lines: Patient has No line.    Forte Catheter: PRESENT, indication: Acute retention or obstruction    Weight bearing status: WBAt    Expected Discharge Date: 05/25/2024      Destination: inpatient rehabilitation facility        Subjective:  Having significant hematuria today     PHYSICAL EXAM  Temp:  [97.9  F (36.6  C)-99.9  F (37.7  C)] 98.6  F (37  C)  Pulse:  [65-79] 72  Resp:  [9-22] 18  BP: (124-163)/(63-91) 124/66  SpO2:  [95 %-100 %] 97 %  Wt Readings from Last 1 Encounters:   05/23/24 49.9 kg (110 lb)       Intake/Output Summary (Last 24 hours) at 5/25/2024 0737  Last data filed at 5/24/2024 2221  Gross per 24 hour   Intake 960 ml   Output 1200 ml   Net -240 ml      Body mass index is 17.75 kg/m .    Physical Exam  Vitals and nursing note reviewed.   Constitutional:       General: He is not in acute distress.     Appearance: Normal appearance.   HENT:      Head: Normocephalic and atraumatic.   Cardiovascular:      Rate and Rhythm: Normal rate and regular rhythm.      Pulses: Normal pulses.   Pulmonary:      Effort: Pulmonary effort is normal. No respiratory distress.      Breath sounds: Normal breath sounds. No wheezing or rales.   Abdominal:      General: Bowel sounds are " normal. There is no distension.      Palpations: Abdomen is soft.      Tenderness: There is no abdominal tenderness. There is no guarding.   Genitourinary:     Comments: Forte in place and cherry colored urine  Musculoskeletal:      Comments: Left knee pain and spasms   Skin:     General: Skin is warm and dry.      Capillary Refill: Capillary refill takes less than 2 seconds.   Neurological:      General: No focal deficit present.      Mental Status: He is alert and oriented to person, place, and time. Mental status is at baseline.       PERTINENT LABS/IMAGING:  Results for orders placed or performed during the hospital encounter of 05/23/24   CT Head w/o Contrast    Impression    IMPRESSION:    1.  No evidence of acute intracranial hemorrhage or mass effect.  2.  Moderate nonspecific white matter changes.  3.  Moderate brain parenchymal volume loss.   CT Cervical Spine w/o Contrast    Impression    IMPRESSION:  1.  No evidence of acute fracture or subluxation of the cervical spine by CT imaging.  2.  Degenerative cervical spondylosis as described above.   XR Femur Left 2 Views    Impression    IMPRESSION: Both hips negative for fracture or dislocation. There is degenerative change. There is some lucency within the left acetabulum which may be artifactual/projectional. No cortical step-off. Prostate brachytherapy seeds. Fracture of the L5   vertebral body may be chronic. Recommend correlation. The left femur is negative for fracture. Chronic enthesitis superior pole of the left patella. Small left knee joint effusion.   XR Pelvis 1/2 Views    Impression    IMPRESSION: Both hips negative for fracture or dislocation. There is degenerative change. There is some lucency within the left acetabulum which may be artifactual/projectional. No cortical step-off. Prostate brachytherapy seeds. Fracture of the L5   vertebral body may be chronic. Recommend correlation. The left femur is negative for fracture. Chronic enthesitis  superior pole of the left patella. Small left knee joint effusion.   XR Knee Left 1/2 Views    Impression    IMPRESSION: Both hips negative for fracture or dislocation. There is degenerative change. There is some lucency within the left acetabulum which may be artifactual/projectional. No cortical step-off. Prostate brachytherapy seeds. Fracture of the L5   vertebral body may be chronic. Recommend correlation. The left femur is negative for fracture. Chronic enthesitis superior pole of the left patella. Small left knee joint effusion.   CT Abdomen Pelvis w Contrast    Impression    IMPRESSION:     1.  Mildly comminuted impacted subcapital fracture proximal left femur.  2.  Moderate to severe distention of the urinary bladder associated with symmetric hydroureter and hydronephrosis. Urinary retention potentially secondary to flow limitation at the level of the prostate gland.  3.  Multifocal sclerosis of the ribs, spine, and pelvis consistent with patchy bone metastases. Compression deformities of the lumbar spine are unchanged from 5/1/2024.  4.  Focal consolidation in the anterior basal left lower lobe has improved from 5/1/2024 with residual atelectasis and 9 mm nodule in this location. The nodule could be reassessed with chest CT in 3-6 months.   CT Lumbar Spine Reconstructed    Impression    IMPRESSION:  1.  Diffuse osseous metastatic disease.  2.  Chronic T12, L1, L2, L3, L4, and L5 anterior compression fractures are unchanged from comparison exam.  3.  Severe spinal canal stenosis L1-2, secondary to bony retropulsion of the L2 vertebral body. Moderate to severe spinal canal stenosis L4-5.  4.  Partially imaged distended urinary bladder with severe right and mild left hydronephrosis.   XR Pelvis w Hip Port Left  1 View    Impression    IMPRESSION: Postoperative change related to interval placement of a left hip hemiarthroplasty for management of left proximal femur fracture. The component projects in the  "expected position. No acute displaced periprosthetic fracture. Expected recent   postoperative soft tissue and intra-articular gas with left hip soft tissue swelling. Mild degenerative change right hip. Prostate gland brachytherapy seeds. No displaced pelvic fracture. Diffuse bone demineralization.         Imaging results reviewed over the past 24 hrs:   Recent Results (from the past 24 hour(s))   XR Pelvis w Hip Port Left  1 View    Narrative    EXAM: XR PELVIS AND HIP PORTABLE LEFT 1 VIEW  LOCATION: [facility]  DATE: 5/24/2024    INDICATION: Status post hip surgery.    COMPARISON: Left femur radiographic exam 5/23/2024.      Impression    IMPRESSION: Postoperative change related to interval placement of a left hip hemiarthroplasty for management of left proximal femur fracture. The component projects in the expected position. No acute displaced periprosthetic fracture. Expected recent   postoperative soft tissue and intra-articular gas with left hip soft tissue swelling. Mild degenerative change right hip. Prostate gland brachytherapy seeds. No displaced pelvic fracture. Diffuse bone demineralization.       Recent Labs   Lab 05/25/24  0536 05/25/24  0527 05/24/24  1054 05/23/24  1417 05/23/24  1359   WBC  --  3.2* 4.0  --  3.6*   HGB  --  7.8* 8.8*  --  6.8*   MCV  --  99 98  --  104*   PLT  --  186 217  --  204   INR  --   --   --  1.10  --    NA  --  130*  --   --  136   POTASSIUM  --  4.4  --   --  4.1   CHLORIDE  --  98  --   --  104   CO2  --  23  --   --  22   BUN  --  23.3*  --   --  30.7*   CR  --  0.86  --   --  0.80   ANIONGAP  --  9  --   --  10   SOPHIA  --  8.4*  --   --  8.7*   * 104*  --   --  102*   ALBUMIN  --   --   --   --  3.1*   PROTTOTAL  --   --   --   --  6.1*   BILITOTAL  --   --   --   --  0.2   ALKPHOS  --   --   --   --  58   ALT  --   --   --   --  7   AST  --   --   --   --  21     No results for input(s): \"CHOL\", \"HDL\", \"LDL\", \"TRIG\", \"CHOLHDLRATIO\" in " "the last 64417 hours.  Recent Labs   Lab Test 05/25/24  0536 05/25/24  0527   NA  --  130*   POTASSIUM  --  4.4   CHLORIDE  --  98   CO2  --  23   * 104*   BUN  --  23.3*   CR  --  0.86   GFRESTIMATED  --  86   SOPHIA  --  8.4*     No results for input(s): \"A1C\" in the last 31833 hours.  Recent Labs   Lab Test 05/25/24  0527 05/24/24  1054 05/23/24  1359   HGB 7.8* 8.8* 6.8*     No results for input(s): \"TROPONINI\" in the last 93610 hours.  No results for input(s): \"BNP\", \"NTBNPI\", \"NTBNP\" in the last 34009 hours.  Recent Labs   Lab Test 05/01/24  1238   TSH 27.19*     Recent Labs   Lab Test 05/23/24  1417 05/01/24  1238   INR 1.10 1.16*       40 MINUTES SPENT BY ME on the date of service doing chart review, history, exam, documentation, discussion with nursing staff and specialist, & further activities per the note.  Joyce Rodriguez MD  [facility] Medicine Service  [phone]   "

## 2024-05-25 NOTE — PROGRESS NOTES
Care Management Follow Up    Length of Stay (days): 2    Expected Discharge Date: 05/25/2024     Concerns to be Addressed: discharge planning     Patient plan of care discussed at interdisciplinary rounds: Yes    Anticipated Discharge Disposition:  TCU     Anticipated Discharge Services:  TCU  Anticipated Discharge DME:  as per care team recommendation    Patient/family educated on Medicare website which has current facility and service quality ratings:  yes  Education Provided on the Discharge Plan:  yes  Patient/Family in Agreement with the Plan:  yes    Referrals Placed by CM/SW:  TCU  Private pay costs discussed: Not applicable    Additional Information:  Patient came from [facility] and desires to return there. Therapy assessed patient today and is recommending TCU. Referral made to [facility].    VIVIANE PalumboSW

## 2024-05-26 ENCOUNTER — APPOINTMENT (OUTPATIENT)
Dept: OCCUPATIONAL THERAPY | Facility: HOSPITAL | Age: 84
DRG: 522 | End: 2024-05-26
Payer: COMMERCIAL

## 2024-05-26 ENCOUNTER — APPOINTMENT (OUTPATIENT)
Dept: ULTRASOUND IMAGING | Facility: HOSPITAL | Age: 84
DRG: 522 | End: 2024-05-26
Attending: STUDENT IN AN ORGANIZED HEALTH CARE EDUCATION/TRAINING PROGRAM
Payer: COMMERCIAL

## 2024-05-26 ENCOUNTER — APPOINTMENT (OUTPATIENT)
Dept: PHYSICAL THERAPY | Facility: HOSPITAL | Age: 84
DRG: 522 | End: 2024-05-26
Payer: COMMERCIAL

## 2024-05-26 LAB
BLD PROD TYP BPU: NORMAL
BLOOD COMPONENT TYPE: NORMAL
CODING SYSTEM: NORMAL
CROSSMATCH: NORMAL
GLUCOSE BLDC GLUCOMTR-MCNC: 116 MG/DL (ref 70–99)
GLUCOSE BLDC GLUCOMTR-MCNC: 118 MG/DL (ref 70–99)
GLUCOSE BLDC GLUCOMTR-MCNC: 135 MG/DL (ref 70–99)
HGB BLD-MCNC: 6.7 G/DL (ref 13.3–17.7)
HGB BLD-MCNC: 8.8 G/DL (ref 13.3–17.7)
HOLD SPECIMEN: NORMAL
ISSUE DATE AND TIME: NORMAL
UNIT ABO/RH: NORMAL
UNIT NUMBER: NORMAL
UNIT STATUS: NORMAL
UNIT TYPE ISBT: 9500

## 2024-05-26 PROCEDURE — 36415 COLL VENOUS BLD VENIPUNCTURE: CPT | Performed by: INTERNAL MEDICINE

## 2024-05-26 PROCEDURE — 36415 COLL VENOUS BLD VENIPUNCTURE: CPT | Performed by: STUDENT IN AN ORGANIZED HEALTH CARE EDUCATION/TRAINING PROGRAM

## 2024-05-26 PROCEDURE — 99232 SBSQ HOSP IP/OBS MODERATE 35: CPT | Performed by: INTERNAL MEDICINE

## 2024-05-26 PROCEDURE — 250N000013 HC RX MED GY IP 250 OP 250 PS 637: Performed by: STUDENT IN AN ORGANIZED HEALTH CARE EDUCATION/TRAINING PROGRAM

## 2024-05-26 PROCEDURE — 120N000001 HC R&B MED SURG/OB

## 2024-05-26 PROCEDURE — 97530 THERAPEUTIC ACTIVITIES: CPT | Mod: GP

## 2024-05-26 PROCEDURE — P9040 RBC LEUKOREDUCED IRRADIATED: HCPCS

## 2024-05-26 PROCEDURE — 76770 US EXAM ABDO BACK WALL COMP: CPT

## 2024-05-26 PROCEDURE — 85018 HEMOGLOBIN: CPT | Performed by: STUDENT IN AN ORGANIZED HEALTH CARE EDUCATION/TRAINING PROGRAM

## 2024-05-26 PROCEDURE — 97110 THERAPEUTIC EXERCISES: CPT | Mod: GO

## 2024-05-26 PROCEDURE — 97116 GAIT TRAINING THERAPY: CPT | Mod: GP

## 2024-05-26 PROCEDURE — 97110 THERAPEUTIC EXERCISES: CPT | Mod: GP

## 2024-05-26 PROCEDURE — 85018 HEMOGLOBIN: CPT | Performed by: INTERNAL MEDICINE

## 2024-05-26 RX ADMIN — LEVOTHYROXINE SODIUM 125 MCG: 0.03 TABLET ORAL at 06:34

## 2024-05-26 RX ADMIN — SENNOSIDES AND DOCUSATE SODIUM 1 TABLET: 8.6; 5 TABLET ORAL at 20:59

## 2024-05-26 RX ADMIN — MEGESTROL ACETATE 400 MG: 40 SUSPENSION ORAL at 08:01

## 2024-05-26 RX ADMIN — ACETAMINOPHEN 975 MG: 325 TABLET ORAL at 06:33

## 2024-05-26 RX ADMIN — SODIUM CHLORIDE TAB 1 GM 1 G: 1 TAB at 12:17

## 2024-05-26 RX ADMIN — SODIUM CHLORIDE TAB 1 GM 1 G: 1 TAB at 08:01

## 2024-05-26 RX ADMIN — METHOCARBAMOL 500 MG: 500 TABLET, FILM COATED ORAL at 06:34

## 2024-05-26 RX ADMIN — ACETAMINOPHEN 975 MG: 325 TABLET ORAL at 21:04

## 2024-05-26 RX ADMIN — CYANOCOBALAMIN TAB 1000 MCG 1000 MCG: 1000 TAB at 08:01

## 2024-05-26 RX ADMIN — ESCITALOPRAM OXALATE 10 MG: 10 TABLET ORAL at 08:02

## 2024-05-26 RX ADMIN — ASPIRIN 81 MG: 81 TABLET, COATED ORAL at 08:02

## 2024-05-26 RX ADMIN — METHOCARBAMOL 500 MG: 500 TABLET, FILM COATED ORAL at 20:59

## 2024-05-26 RX ADMIN — SENNOSIDES AND DOCUSATE SODIUM 1 TABLET: 8.6; 5 TABLET ORAL at 08:02

## 2024-05-26 RX ADMIN — ASPIRIN 81 MG: 81 TABLET, COATED ORAL at 20:59

## 2024-05-26 RX ADMIN — SODIUM CHLORIDE TAB 1 GM 1 G: 1 TAB at 16:12

## 2024-05-26 RX ADMIN — OXYCODONE HYDROCHLORIDE 2.5 MG: 5 TABLET ORAL at 14:02

## 2024-05-26 RX ADMIN — POLYETHYLENE GLYCOL 3350 17 G: 17 POWDER, FOR SOLUTION ORAL at 08:01

## 2024-05-26 RX ADMIN — FAMOTIDINE 20 MG: 20 TABLET ORAL at 08:02

## 2024-05-26 ASSESSMENT — ACTIVITIES OF DAILY LIVING (ADL)
ADLS_ACUITY_SCORE: 51
ADLS_ACUITY_SCORE: 47
ADLS_ACUITY_SCORE: 51
ADLS_ACUITY_SCORE: 47
ADLS_ACUITY_SCORE: 51
ADLS_ACUITY_SCORE: 47
ADLS_ACUITY_SCORE: 51
ADLS_ACUITY_SCORE: 47
ADLS_ACUITY_SCORE: 51
ADLS_ACUITY_SCORE: 47
ADLS_ACUITY_SCORE: 51
ADLS_ACUITY_SCORE: 51
ADLS_ACUITY_SCORE: 47

## 2024-05-26 NOTE — PROGRESS NOTES
[facility]    PROGRESS NOTE - Hospitalist Service    Assessment and Plan    Active Problems:    Malignant neoplasm of prostate (H)    Hip pain, left    Closed subcapital fracture of left femur, initial encounter (H)    Fall, initial encounter    Anemia, unspecified type    Gross hematuria    Kilo Montiel is a 83 year old male with h/o metastatic prostate cancer, hypothyroidism, and syncope who was admitted on 5/23/2024 after a mechanical fall with left hip fracture.      Left hip fracture  Mechanical fall,   -CT head negative for acute abnormality  -CT ABD/PEL 5/23: Mildly comminuted impacted subcapital fracture of the left proximal femur  -Ortho consulted s/p surgery left hemiarthroplasty   - post surgery care per ortho and VTE  - PT/OT   - pain control  - needs TCU      Urinary retention with bilateral hydronephrosis and gross hematuria, h/o Prostate cancer   - status post Forte catheter will discharge in place   -CT ABD/PEL 5/23: Moderate to severe distention of the urinary bladder with symmetric hydroureter and hydronephrosis, evidence of bone metastases on the ribs, spine, and pelvis  - having gross hematuria and urology consulted, outpatient follow up   - US Hydro resolved   -Hgb dropped getting a unit, recheck this afternoon   - may need to hold ASA if continues to drop HGB      Acute on chronic anemia  Pancytopenia-chronic (thrombocytopenia is variable)  -Has intermittent hematuria, also chronic disease  -Baseline hemoglobin usually in the 8's  -Transfused 1 unit PRBCs 5/24 adnon 5/26   - trend labs      Chronic compression fractures  -CT lumbar spine 5/23: Chronic T12, L1, L2, L3, L4, and L5 anterior compression fractures, severe spinal canal stenosis at L1-L2 with retropulsion of the L2 vertebral body, moderate to severe spinal stenosis at L4-L5  - Pt/Ot      Clinically Significant Risk Factors              # Hypoalbuminemia: Lowest albumin = 3.1 g/dL at 5/23/2024  1:59 PM, will  "monitor as appropriate            # Cachexia: Estimated body mass index is 17.75 kg/m  as calculated from the following:    Height as of this encounter: 1.676 m (5' 6\").    Weight as of this encounter: 49.9 kg (110 lb)., PRESENT ON ADMISSION       # Financial/Environmental Concerns:             COVID-19 PCR Results          5/1/2024    13:30   COVID-19 PCR Results   SARS CoV2 PCR Negative      COVID-19 Antibody Results, Testing for Immunity           No data to display               Code Status: Full Code  VTE prophylaxis:  Pneumatic Compression Devices and and ASA 81mg BID(might need to old if persistent bleeding and Hgb drop  DIET: Orders Placed This Encounter      Advance Diet as Tolerated: Regular Diet Adult    Drains/Lines: Patient has No line.    Forte Catheter: PRESENT, indication: Acute retention or obstruction    Weight bearing status: WBAt        Subjective:  Hgb dropped this morning and getting 1 unit of blood     PHYSICAL EXAM  Temp:  [98.2  F (36.8  C)-99  F (37.2  C)] 98.2  F (36.8  C)  Pulse:  [69-75] 69  Resp:  [16-18] 18  BP: (100-132)/(57-68) 132/68  SpO2:  [96 %-98 %] 97 %  Wt Readings from Last 1 Encounters:   05/23/24 49.9 kg (110 lb)       Intake/Output Summary (Last 24 hours) at 5/25/2024 0737  Last data filed at 5/24/2024 2221  Gross per 24 hour   Intake 960 ml   Output 1200 ml   Net -240 ml      Body mass index is 17.75 kg/m .    Physical Exam  Vitals and nursing note reviewed.   Constitutional:       General: He is not in acute distress.     Appearance: Normal appearance.   HENT:      Head: Normocephalic and atraumatic.   Cardiovascular:      Rate and Rhythm: Normal rate and regular rhythm.      Pulses: Normal pulses.   Pulmonary:      Effort: Pulmonary effort is normal. No respiratory distress.      Breath sounds: Normal breath sounds. No wheezing or rales.   Abdominal:      General: Bowel sounds are normal. There is no distension.      Palpations: Abdomen is soft.      Tenderness: There " is no abdominal tenderness. There is no guarding.   Genitourinary:     Comments: Forte in place, still bloody urine   Musculoskeletal:      Comments: Left thigh pain but less then previous   Skin:     General: Skin is warm and dry.      Capillary Refill: Capillary refill takes less than 2 seconds.   Neurological:      General: No focal deficit present.      Mental Status: He is alert and oriented to person, place, and time. Mental status is at baseline.       PERTINENT LABS/IMAGING:      Imaging results reviewed over the past 24 hrs:   Recent Results (from the past 24 hour(s))   US Renal Complete Non-Vascular    Narrative    EXAM: US RENAL COMPLETE NON-VASCULAR  LOCATION: [facility]  DATE: 5/26/2024    INDICATION: Eval for resolution of hydronephrosis  COMPARISON: CT 5/23/2024  TECHNIQUE: Routine Bilateral Renal and Bladder Ultrasound.    FINDINGS:    RIGHT KIDNEY: 8.5 x 5.2 x 4.5 cm. No hydronephrosis. Right renal atrophy with a diffuse cortical thinning. No evidence for echogenic intrarenal calculi or exophytic cystic lesion.     LEFT KIDNEY: 8.9 x 4.2 x 4.2 cm. No hydronephrosis. Normal left renal cortical echogenicity and thickness. No evidence for echogenic intrarenal calculi or exophytic cystic lesion.     BLADDER: Forte catheter within the bladder. Marked diffuse bladder wall thickening. Debris within a mildly distended bladder lumen.      Impression    IMPRESSION:  1.  No hydronephrosis.    2.  Right renal atrophy with diffuse cortical thinning.    3.  Marked diffuse bladder wall thickening with debris within the mildly distended bladder lumen. Forte catheter within the bladder.     Recent Labs   Lab 05/26/24  0538 05/26/24  0458 05/25/24  1145 05/25/24  0536 05/25/24  0527 05/24/24  1054 05/23/24  1417 05/23/24  1359   WBC  --   --   --   --  3.2* 4.0  --  3.6*   HGB  --  6.7* 7.5*  --  7.8* 8.8*  --  6.8*   MCV  --   --   --   --  99 98  --  104*   PLT  --   --   --   --  186 217   "--  204   INR  --   --   --   --   --   --  1.10  --    NA  --   --   --   --  130*  --   --  136   POTASSIUM  --   --   --   --  4.4  --   --  4.1   CHLORIDE  --   --   --   --  98  --   --  104   CO2  --   --   --   --  23  --   --  22   BUN  --   --   --   --  23.3*  --   --  30.7*   CR  --   --   --   --  0.86  --   --  0.80   ANIONGAP  --   --   --   --  9  --   --  10   SOPHIA  --   --   --   --  8.4*  --   --  8.7*   *  --   --  102* 104*  --   --  102*   ALBUMIN  --   --   --   --   --   --   --  3.1*   PROTTOTAL  --   --   --   --   --   --   --  6.1*   BILITOTAL  --   --   --   --   --   --   --  0.2   ALKPHOS  --   --   --   --   --   --   --  58   ALT  --   --   --   --   --   --   --  7   AST  --   --   --   --   --   --   --  21     No results for input(s): \"CHOL\", \"HDL\", \"LDL\", \"TRIG\", \"CHOLHDLRATIO\" in the last 01413 hours.  Recent Labs   Lab Test 05/25/24  0536 05/25/24  0527   NA  --  130*   POTASSIUM  --  4.4   CHLORIDE  --  98   CO2  --  23   * 104*   BUN  --  23.3*   CR  --  0.86   GFRESTIMATED  --  86   SOPHIA  --  8.4*     No results for input(s): \"A1C\" in the last 24192 hours.  Recent Labs   Lab Test 05/25/24  0527 05/24/24  1054 05/23/24  1359   HGB 7.8* 8.8* 6.8*     No results for input(s): \"TROPONINI\" in the last 99511 hours.  No results for input(s): \"BNP\", \"NTBNPI\", \"NTBNP\" in the last 95906 hours.  Recent Labs   Lab Test 05/01/24  1238   TSH 27.19*     Recent Labs   Lab Test 05/23/24  1417 05/01/24  1238   INR 1.10 1.16*       40 MINUTES SPENT BY ME on the date of service doing chart review, history, exam, documentation, discussion with nursing staff and specialist, & further activities per the note.  Joyce Rodriguez MD  [facility]  [phone]   "

## 2024-05-26 NOTE — PLAN OF CARE
Problem: Adult Inpatient Plan of Care  Goal: Plan of Care Review  Description: The Plan of Care Review/Shift note should be completed every shift.  The Outcome Evaluation is a brief statement about your assessment that the patient is improving, declining, or no change.  This information will be displayed automatically on your shift  note.  Outcome: Progressing   Goal Outcome Evaluation:       Pt had blood transfusion with recheck at 8.8. Pt has been up walking with walker and up in chair. Pt had renal US which was negative. Forte output remains bloody, irrigated x2. Oxycodone for pain

## 2024-05-26 NOTE — PROGRESS NOTES
"Assessment: 2 Days Post-Op  S/P Procedure(s):  HEMIARTHROPLASTY, HIP, BIPOLAR     Plan:   Continue PT/OT  Weightbearing status: WBAT  Anticoagulation: ASA 81 mg in addition to SCDs, tomas stockings and early ambulation.  Discharge planning: Pending progress through PT/OT and medical clearance      Subjective:  Pain: mild  Nausea, Vomiting:  No  Lightheadedness, Dizziness:  No  Neuro:  Patient denies new onset numbness or paresthesias    Patient resting in bed receiving PRBC  Pain improving but still sore with motion    Objective:  /68 (BP Location: Right arm)   Pulse 69   Temp 98.2  F (36.8  C) (Oral)   Resp 18   Ht 1.676 m (5' 6\")   Wt 49.9 kg (110 lb)   SpO2 97%   BMI 17.75 kg/m    The patient is A&Ox3. Appears comfortable.   Sensation is intact.  Dorsiflexion and plantar flexion is intact.  Dorsalis pedis pulse intact.  Calves are soft and non-tender. Negative Kasia's.  The incision is covered. Dressing C/D/I.    No drain     Pertinent Labs   Lab Results: personally reviewed.   Lab Results   Component Value Date    INR 1.10 05/23/2024    INR 1.16 (H) 05/01/2024     Lab Results   Component Value Date    WBC 3.2 (L) 05/25/2024    HGB 6.7 (LL) 05/26/2024    HCT 23.3 (L) 05/25/2024    MCV 99 05/25/2024     05/25/2024     Lab Results   Component Value Date     (L) 05/25/2024    CO2 23 05/25/2024         Report completed by:  Quin Ventura PA-C, PHOEBE  Date: 5/26/2024  Time: 8:14 AM    "

## 2024-05-26 NOTE — PROGRESS NOTES
"      SUBJECTIVE:  Events:       No acute events overnight    Tolerating diet without nausea    Pain adequately controlled    No fevers, chills, chest pain, shortness of breath      OBJECTIVE:  PHYSICAL EXAM:  /68 (BP Location: Right arm)   Pulse 69   Temp 98.2  F (36.8  C) (Oral)   Resp 18   Ht 1.676 m (5' 6\")   Wt 49.9 kg (110 lb)   SpO2 97%   BMI 17.75 kg/m     General: NAD, alert, cooperative  Head: normocephalic, without abnormality / atraumatic  Abdomen: soft, nondistended  Genitourinary: Forte catheter in place draining red, lighter than yesterday  Skin: No rashes or lesions  Musculoskeletal: moves all four extremities equally; no calf edema or tenderness  Psychological: alert and oriented, answers questions appropriately    LABS:  Lab Results   Component Value Date     05/25/2024     05/23/2024     05/07/2024    CO2 23 05/25/2024    CO2 22 05/23/2024    CO2 24 05/07/2024    BUN 23.3 05/25/2024    BUN 30.7 05/23/2024    BUN 13.8 05/07/2024     Lab Results   Component Value Date    WBC 3.2 05/25/2024    WBC 4.0 05/24/2024    WBC 3.6 05/23/2024    HGB 6.7 05/26/2024    HGB 7.5 05/25/2024    HGB 7.8 05/25/2024    HCT 23.3 05/25/2024    HCT 26.8 05/24/2024    HCT 20.4 05/23/2024    MCV 99 05/25/2024    MCV 98 05/24/2024     05/23/2024     05/25/2024     05/24/2024     05/23/2024         Lab Results: personally reviewed.     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    Renal ultrasound shows resolution of hydronephrosis bilaterally     Recommendations:  -Patient should maintain Forte catheter through discharge  -Urology will follow-up on an outpatient basis for further " discussion of long-term bladder management, patient likely would benefit from suprapubic catheter insertion     NITZA OLSEN MD   [facility]   Phone #[phone]

## 2024-05-26 NOTE — PROGRESS NOTES
CLINICAL NUTRITION SERVICES - ASSESSMENT NOTE     Nutrition Prescription    RECOMMENDATIONS FOR MDs/PROVIDERS TO ORDER:      Malnutrition Status:    Moderate in context of chronic illness    Recommendations already ordered by Registered Dietitian (RD):      Future/Additional Recommendations:  Will monitor po, wt, labs     REASON FOR ASSESSMENT  Kilo Montiel is a/an 83 year old male assessed by the dietitian for eating poorly due to decreased appetite and weight loss.    Per chart, with past medical history significant for metastatic prostate cancer, hypothyroidism, and syncope who was admitted on 5/23/2024 after a mechanical fall with left hip fracture.  He was at home when his cat ran out in front of him causing him to lose his balance and fell to his left side landing on his left hip.  He denies loss of consciousness or hitting his head.  He was recently here admitted from 5/1 to 5/7 after a syncopal event.  After that hospitalization he was sent to TCU and just got home 1 day prior to arrival.  He denies any symptoms associated with his fall including dizziness, lightheadedness, or vision changes.  He denies any GI or respiratory symptoms.  He has chronic difficulty with urination related to his metastatic prostate cancer     NUTRITION HISTORY  Spoke with pt, wife and daughter.  Pt's appetite was down about 4 weeks when he was in the hospital with pneumonia, eating fairly well in TCU.  Was home 17 hours and fell.  His appetite has varied this year with chemotherapy.  His weight varies, as well.  He weighed 125 lb one year ago.  Pt doesn't care for Ensure but, likes Fairlife.    CURRENT NUTRITION ORDERS  Diet: Regular, tray set up  Intake/Tolerance: good appetite, 100% of meals  (good healthy variety and dessert)    LABS  Labs reviewed  FSBG 116  HGB 6.7 L (transfused)  5/25:  Na 130 L  BUN 23.3 H    MEDICATIONS  Medications reviewed  Vit B12  Pepcid  Levothyroxine  Megace  Miralax  Senna-docusate  Sodium  "chloride table 1 gm 3 x per day with meals    ANTHROPOMETRICS  Height: 167.6 cm (5' 6\")  Most Recent Weight: 49.9 kg (110 lb)  12% weight loss in one year - does not meet malnutrition criteria  IBW: 64.5 kg  BMI: Underweight BMI <18.5  17.75  Weight History:   Wt Readings from Last 3 Encounters:   05/23/24 49.9 kg (110 lb)   05/07/24 49.9 kg (110 lb)   10/13/23 57.2 kg (126 lb)   125 lb one year ago per family  Dosing Weight: 49.9 kg    ASSESSED NUTRITION NEEDS  Estimated Energy Needs: 1500 -2000 kcals/day (30 - 35 kcals/kg )  Justification: Underweight  Estimated Protein Needs: 50-60 +grams protein/day (1 - 1.2+ grams of pro/kg)  Justification: Increased needs  Estimated Fluid Needs: 1500 -2000 mL/day (1 mL/kcal)   Justification: Maintenance    PHYSICAL FINDINGS  Per chart,  Upper partial plate - pt wearing  Urine dark red  GI: WDL, no BM documented yet    MALNUTRITION:  % Weight Loss:  Weight loss does not meet criteria for malnutrition   % Intake:  Decreased intake does not meet criteria for malnutrition (recently)  Subcutaneous Fat Loss:  Orbital region mild  depletion, Buccal region moderate depletion  Muscle Loss:  Clavicle bone region severe depletion and Dorsal hand region moderate depletion  Fluid Retention:  None noted    Malnutrition Diagnosis: Moderate malnutrition  In Context of:  Chronic illness or disease    NUTRITION DIAGNOSIS  Malnutrition related to decreased intake on and off this past year with increased energy needs related to cancer and treatment, as well as pneumonia as evidenced by moderate subcutaneous fat loss and moderate to severe muscle loss, and weight loss (pt is underweight, low BMI)    INTERVENTIONS  Implementation  Pt declines supplements at this time - eating well    Goals  Patient to continue to consume % of nutritionally adequate meals three times per day, or the equivalent with supplements/snacks.    Weight gain     Monitoring/Evaluation  Progress toward goals will be " monitored and evaluated per protocol.

## 2024-05-26 NOTE — PLAN OF CARE
Problem: Adult Inpatient Plan of Care  Goal: Plan of Care Review  Description: The Plan of Care Review/Shift note should be completed every shift.  The Outcome Evaluation is a brief statement about your assessment that the patient is improving, declining, or no change.  This information will be displayed automatically on your shift  note.  Outcome: Progressing  Goal: Absence of Hospital-Acquired Illness or Injury  Intervention: Identify and Manage Fall Risk  Recent Flowsheet Documentation  Taken 5/26/2024 0100 by Bryce Art RN  Safety Promotion/Fall Prevention:   safety round/check completed   clutter free environment maintained   activity supervised  Taken 5/25/2024 2030 by Bryce Art RN  Safety Promotion/Fall Prevention:   safety round/check completed   clutter free environment maintained   activity supervised  Intervention: Prevent Skin Injury  Recent Flowsheet Documentation  Taken 5/26/2024 0100 by Bryce Art RN  Body Position: supine, head elevated  Taken 5/25/2024 2030 by Bryce Art RN  Body Position: supine, head elevated  Goal: Optimal Comfort and Wellbeing  Intervention: Monitor Pain and Promote Comfort  Recent Flowsheet Documentation  Taken 5/26/2024 0100 by Bryce Art RN  Pain Management Interventions: medication offered but refused  Taken 5/25/2024 2215 by Bryce Art RN  Pain Management Interventions: medication (see MAR)     Problem: Orthopaedic Fracture  Goal: Absence of Bleeding  Outcome: Progressing     Problem: Orthopaedic Fracture  Goal: Effective Oxygenation and Ventilation  Outcome: Progressing     Problem: Pain Acute  Goal: Optimal Pain Control and Function  Outcome: Progressing     Problem: Anemia  Goal: Anemia Symptom Improvement  Outcome: Progressing     Problem: Comorbidity Management  Goal: Blood Pressure in Desired Range  Outcome: Progressing   Goal Outcome Evaluation:       Pt alert & oriented. Pain  managed by scheduled tylenol, prn robaxin. Forte cath in placer with dark red urine. Dressing, clean dry and intact. Hgb 6.7. Renal U/S done. House officer notified with order of blood transfusion. On room air, Vital Signs stable. Will continue to monitor.

## 2024-05-27 LAB
ALBUMIN SERPL BCG-MCNC: 2.6 G/DL (ref 3.5–5.2)
ANION GAP SERPL CALCULATED.3IONS-SCNC: 7 MMOL/L (ref 7–15)
BUN SERPL-MCNC: 21.1 MG/DL (ref 8–23)
CALCIUM SERPL-MCNC: 8.1 MG/DL (ref 8.8–10.2)
CHLORIDE SERPL-SCNC: 101 MMOL/L (ref 98–107)
CREAT SERPL-MCNC: 0.83 MG/DL (ref 0.67–1.17)
DEPRECATED HCO3 PLAS-SCNC: 24 MMOL/L (ref 22–29)
EGFRCR SERPLBLD CKD-EPI 2021: 87 ML/MIN/1.73M2
ERYTHROCYTE [DISTWIDTH] IN BLOOD BY AUTOMATED COUNT: 22.4 % (ref 10–15)
GLUCOSE SERPL-MCNC: 112 MG/DL (ref 70–99)
HCT VFR BLD AUTO: 21.5 % (ref 40–53)
HGB BLD-MCNC: 7.3 G/DL (ref 13.3–17.7)
MCH RBC QN AUTO: 32.4 PG (ref 26.5–33)
MCHC RBC AUTO-ENTMCNC: 34 G/DL (ref 31.5–36.5)
MCV RBC AUTO: 96 FL (ref 78–100)
PHOSPHATE SERPL-MCNC: 1.6 MG/DL (ref 2.5–4.5)
PHOSPHATE SERPL-MCNC: 1.6 MG/DL (ref 2.5–4.5)
PLATELET # BLD AUTO: 155 10E3/UL (ref 150–450)
POTASSIUM SERPL-SCNC: 4.1 MMOL/L (ref 3.4–5.3)
RBC # BLD AUTO: 2.25 10E6/UL (ref 4.4–5.9)
SODIUM SERPL-SCNC: 132 MMOL/L (ref 135–145)
WBC # BLD AUTO: 3.3 10E3/UL (ref 4–11)

## 2024-05-27 PROCEDURE — 99233 SBSQ HOSP IP/OBS HIGH 50: CPT | Performed by: INTERNAL MEDICINE

## 2024-05-27 PROCEDURE — 36415 COLL VENOUS BLD VENIPUNCTURE: CPT | Performed by: INTERNAL MEDICINE

## 2024-05-27 PROCEDURE — 250N000013 HC RX MED GY IP 250 OP 250 PS 637: Performed by: STUDENT IN AN ORGANIZED HEALTH CARE EDUCATION/TRAINING PROGRAM

## 2024-05-27 PROCEDURE — 250N000013 HC RX MED GY IP 250 OP 250 PS 637: Performed by: INTERNAL MEDICINE

## 2024-05-27 PROCEDURE — 80069 RENAL FUNCTION PANEL: CPT | Performed by: INTERNAL MEDICINE

## 2024-05-27 PROCEDURE — 84100 ASSAY OF PHOSPHORUS: CPT | Performed by: INTERNAL MEDICINE

## 2024-05-27 PROCEDURE — 120N000001 HC R&B MED SURG/OB

## 2024-05-27 PROCEDURE — 85027 COMPLETE CBC AUTOMATED: CPT | Performed by: INTERNAL MEDICINE

## 2024-05-27 RX ADMIN — OXYCODONE HYDROCHLORIDE 5 MG: 5 TABLET ORAL at 20:19

## 2024-05-27 RX ADMIN — SENNOSIDES AND DOCUSATE SODIUM 1 TABLET: 8.6; 5 TABLET ORAL at 19:42

## 2024-05-27 RX ADMIN — ACETAMINOPHEN 975 MG: 325 TABLET ORAL at 13:15

## 2024-05-27 RX ADMIN — SODIUM CHLORIDE TAB 1 GM 1 G: 1 TAB at 11:07

## 2024-05-27 RX ADMIN — SODIUM CHLORIDE TAB 1 GM 1 G: 1 TAB at 17:14

## 2024-05-27 RX ADMIN — METHOCARBAMOL 500 MG: 500 TABLET, FILM COATED ORAL at 06:02

## 2024-05-27 RX ADMIN — ESCITALOPRAM OXALATE 10 MG: 10 TABLET ORAL at 08:35

## 2024-05-27 RX ADMIN — POTASSIUM & SODIUM PHOSPHATES POWDER PACK 280-160-250 MG 2 PACKET: 280-160-250 PACK at 13:15

## 2024-05-27 RX ADMIN — SODIUM CHLORIDE TAB 1 GM 1 G: 1 TAB at 08:34

## 2024-05-27 RX ADMIN — ASPIRIN 81 MG: 81 TABLET, COATED ORAL at 19:42

## 2024-05-27 RX ADMIN — POTASSIUM & SODIUM PHOSPHATES POWDER PACK 280-160-250 MG 2 PACKET: 280-160-250 PACK at 21:31

## 2024-05-27 RX ADMIN — CYANOCOBALAMIN TAB 1000 MCG 1000 MCG: 1000 TAB at 08:34

## 2024-05-27 RX ADMIN — ACETAMINOPHEN 975 MG: 325 TABLET ORAL at 06:01

## 2024-05-27 RX ADMIN — MEGESTROL ACETATE 400 MG: 40 SUSPENSION ORAL at 08:34

## 2024-05-27 RX ADMIN — FAMOTIDINE 20 MG: 20 TABLET ORAL at 08:34

## 2024-05-27 RX ADMIN — SENNOSIDES AND DOCUSATE SODIUM 1 TABLET: 8.6; 5 TABLET ORAL at 08:34

## 2024-05-27 RX ADMIN — LEVOTHYROXINE SODIUM 125 MCG: 0.03 TABLET ORAL at 06:02

## 2024-05-27 RX ADMIN — POTASSIUM & SODIUM PHOSPHATES POWDER PACK 280-160-250 MG 2 PACKET: 280-160-250 PACK at 17:13

## 2024-05-27 RX ADMIN — POTASSIUM & SODIUM PHOSPHATES POWDER PACK 280-160-250 MG 2 PACKET: 280-160-250 PACK at 09:38

## 2024-05-27 RX ADMIN — ASPIRIN 81 MG: 81 TABLET, COATED ORAL at 08:34

## 2024-05-27 ASSESSMENT — ACTIVITIES OF DAILY LIVING (ADL)
ADLS_ACUITY_SCORE: 51
ADLS_ACUITY_SCORE: 50
ADLS_ACUITY_SCORE: 50
ADLS_ACUITY_SCORE: 51
ADLS_ACUITY_SCORE: 50
ADLS_ACUITY_SCORE: 51
ADLS_ACUITY_SCORE: 50
ADLS_ACUITY_SCORE: 51
ADLS_ACUITY_SCORE: 50
ADLS_ACUITY_SCORE: 51

## 2024-05-27 NOTE — PLAN OF CARE
Problem: Adult Inpatient Plan of Care  Goal: Plan of Care Review  Description: The Plan of Care Review/Shift note should be completed every shift.  The Outcome Evaluation is a brief statement about your assessment that the patient is improving, declining, or no change.  This information will be displayed automatically on your shift  note.  Outcome: Progressing  Goal: Absence of Hospital-Acquired Illness or Injury  Intervention: Identify and Manage Fall Risk  Recent Flowsheet Documentation  Taken 5/27/2024 0130 by Bryce Art RN  Safety Promotion/Fall Prevention:   safety round/check completed   clutter free environment maintained   activity supervised  Taken 5/26/2024 2100 by Bryce Art RN  Safety Promotion/Fall Prevention:   safety round/check completed   clutter free environment maintained   activity supervised  Intervention: Prevent Skin Injury  Recent Flowsheet Documentation  Taken 5/27/2024 0130 by Bryce Art RN  Body Position: supine, head elevated  Taken 5/26/2024 2100 by Bryce Art RN  Body Position: supine, head elevated  Goal: Optimal Comfort and Wellbeing  Intervention: Monitor Pain and Promote Comfort  Recent Flowsheet Documentation  Taken 5/27/2024 0100 by Bryce Art RN  Pain Management Interventions: medication offered but refused  Taken 5/26/2024 2104 by Bryce Art RN  Pain Management Interventions: medication (see MAR)     Problem: Orthopaedic Fracture  Goal: Absence of Bleeding  Outcome: Progressing     Problem: Orthopaedic Fracture  Goal: Optimal Pain Control and Function  Outcome: Progressing     Problem: Pain Acute  Goal: Optimal Pain Control and Function  Outcome: Progressing     Problem: Anemia  Goal: Anemia Symptom Improvement  Outcome: Progressing  Intervention: Monitor and Manage Anemia  Recent Flowsheet Documentation  Taken 5/27/2024 0130 by Bryce Art RN  Safety Promotion/Fall Prevention:    safety round/check completed   clutter free environment maintained   activity supervised  Taken 5/26/2024 2100 by Bryce Art RN  Safety Promotion/Fall Prevention:   safety round/check completed   clutter free environment maintained   activity supervised     Problem: Comorbidity Management  Goal: Blood Pressure in Desired Range  Outcome: Progressing  Intervention: Maintain Blood Pressure Management  Recent Flowsheet Documentation  Taken 5/27/2024 0130 by Bryce Art RN  Medication Review/Management: medications reviewed  Taken 5/26/2024 2100 by Bryce Art RN  Medication Review/Management: medications reviewed   Goal Outcome Evaluation:       Pt alert & oriented. Pain managed by scheduled tylenol and prn robaxin. Forte cath in place, still dark urine. Dressing - clean, dry & intact. On room air, Vital Signs stable. Will continue to monitor.

## 2024-05-27 NOTE — PLAN OF CARE
"  Problem: Adult Inpatient Plan of Care  Goal: Plan of Care Review  Description: The Plan of Care Review/Shift note should be completed every shift.  The Outcome Evaluation is a brief statement about your assessment that the patient is improving, declining, or no change.  This information will be displayed automatically on your shift  note.  Outcome: Progressing  Flowsheets (Taken 5/27/2024 1055)  Plan of Care Reviewed With: patient  Goal: Patient-Specific Goal (Individualized)  Description: You can add care plan individualizations to a care plan. Examples of Individualization might be:  \"Parent requests to be called daily at 9am for status\", \"I have a hard time hearing out of my right ear\", or \"Do not touch me to wake me up as it startles  me\".  Outcome: Progressing  Goal: Absence of Hospital-Acquired Illness or Injury  Outcome: Progressing  Intervention: Identify and Manage Fall Risk  Recent Flowsheet Documentation  Taken 5/27/2024 0800 by Amina Be RN  Safety Promotion/Fall Prevention:   safety round/check completed   clutter free environment maintained   activity supervised  Intervention: Prevent Skin Injury  Recent Flowsheet Documentation  Taken 5/27/2024 0800 by Amina Be RN  Body Position: supine, head elevated  Goal: Optimal Comfort and Wellbeing  Outcome: Progressing  Goal: Readiness for Transition of Care  Outcome: Progressing     Problem: Risk for Delirium  Goal: Optimal Coping  Outcome: Progressing  Goal: Improved Behavioral Control  Outcome: Progressing  Intervention: Minimize Safety Risk  Recent Flowsheet Documentation  Taken 5/27/2024 0800 by Amina Be RN  Enhanced Safety Measures: pain management  Goal: Improved Attention and Thought Clarity  Outcome: Progressing  Goal: Improved Sleep  Outcome: Progressing     Problem: Orthopaedic Fracture  Goal: Absence of Bleeding  Outcome: Progressing  Goal: Bowel Elimination  Outcome: Progressing  Goal: Absence of Embolism Signs and " Symptoms  Outcome: Progressing  Goal: Fracture Stability  Outcome: Progressing  Goal: Optimal Functional Ability  Outcome: Progressing  Intervention: Optimize Functional Ability  Recent Flowsheet Documentation  Taken 5/27/2024 0800 by Amina Be RN  Activity Management: activity adjusted per tolerance  Goal: Absence of Infection Signs and Symptoms  Outcome: Progressing  Goal: Effective Tissue Perfusion  Outcome: Progressing  Goal: Optimal Pain Control and Function  Outcome: Progressing  Goal: Effective Oxygenation and Ventilation  Outcome: Progressing  Intervention: Promote Airway Secretion Clearance  Recent Flowsheet Documentation  Taken 5/27/2024 0800 by Amina Be RN  Activity Management: activity adjusted per tolerance  Intervention: Optimize Oxygenation and Ventilation  Recent Flowsheet Documentation  Taken 5/27/2024 0800 by Amina Be RN  Head of Bed (HOB) Positioning: HOB at 20-30 degrees     Problem: Pain Acute  Goal: Optimal Pain Control and Function  Outcome: Progressing  Intervention: Prevent or Manage Pain  Recent Flowsheet Documentation  Taken 5/27/2024 0800 by Amina Be RN  Medication Review/Management: medications reviewed     Problem: Anemia  Goal: Anemia Symptom Improvement  Outcome: Progressing  Intervention: Monitor and Manage Anemia  Recent Flowsheet Documentation  Taken 5/27/2024 0800 by Amina Be RN  Safety Promotion/Fall Prevention:   safety round/check completed   clutter free environment maintained   activity supervised     Problem: Comorbidity Management  Goal: Blood Pressure in Desired Range  Outcome: Progressing  Intervention: Maintain Blood Pressure Management  Recent Flowsheet Documentation  Taken 5/27/2024 0800 by Amina Be RN  Medication Review/Management: medications reviewed   Goal Outcome Evaluation:      Plan of Care Reviewed With: patient           Pt is alert and oriented x4. Pt is med complaint. Pt denies pain. Pt's v/s is stable. No  complain. Continue to monitor pt.

## 2024-05-27 NOTE — CONSULTS
"SPIRITUAL HEALTH SERVICES CONSULT NOTE  [facility]; P4    Saw pt Kilo Montiel per Spiritual Care Consult    Patient/Family Understanding of Illness and Goals of Care - Kilo was sitting up in bed, watching TV when I saw him. He confirms that today is his 84rd birthday. He has cards and stuffed animals brought by loved ones. Kilo shares that he has been in either a hospital or a TCU for almost 4 weeks now. He says \"it's not great being here, but I've gotten used to it.\" He notes that sleep in the hospital has been unpredictable, sleeping well some nights and not so much on other nights. He anticipates discharging on Tuesday or Wednesday and says that it will depend on his strength.     Distress and Loss - Kilo shares that he has been living with cancer for 14 years. He expresses some mild concern about dying. He states that he has discussed the logistics of finances with his family, as well as his wishes for a family farm. He is coming to terms with the fact that his children may not honor his wishes about property after his death. Kilo also notes that his wife has some rotator cuff damage which has made daily activities more difficult.      Strengths, Coping, and Resources - Kilo identifies family and friends for support. He lives with his wife and his 50 year old daughter. He has a son who lives farther away who is a pediatrician. He say that daily walks are good for his wellbeing.     Meaning, Beliefs, and Spirituality - Kilo is Hindu. He is not connected to a sarwat community at this time. He wonders about heaven and welcomed \"any readings that may have brought comfort to others\". I read him some relevant Scripture passages. Kilo invited me to come back and check on him again tomorrow.    Plan of Care: Spiritual care staff will remain available for further follow-up as requested by patient, family or staff.      Time Spent with Patient/Family: 20 minutes    Rin Hector, " Ella  [facility]    Office: [phone] (for non-urgent requests)  Please Vocera or page through [facility] for time-sensitive requests

## 2024-05-27 NOTE — PROGRESS NOTES
"Care Management Follow Up    Length of Stay (days): 4    Expected Discharge Date: 05/25/2024     Concerns to be Addressed: discharge planning     Patient plan of care discussed at interdisciplinary rounds: Yes    Anticipated Discharge Disposition:  TCU      Anticipated Discharge Services:    Anticipated Discharge DME: None     Patient/family educated on Medicare website which has current facility and service quality ratings:  Yes  Education Provided on the Discharge Plan:  Yes  Patient/Family in Agreement with the Plan:  Yes    Referrals Placed by CM/SW:    Private pay costs discussed: Not applicable    Additional Information:  Chart reviewed.    Cm updates:  Per old SWCM note, \"referral was placed to [facility].\"      RNCM looked at destination tab, referral was put in que, but no clinical info was sent. Writer sent info today to [facility].       RNCM spoke to pt and wife, pt does not have a bed hold at [facility], but wishes to return there first.   Wife said to hold off on sending more referrals but if [facility] cannot take pt, she would be fine with facilities closest to [place] without a private room fee.         Social hx:  \"Met with patient and spouse at the bedside for initial assessment. Introduced self and care management role. Patient lives with spouse and adult daughter Casie. Patient was recently discharged to [facility]CU after and admission for generalized weakness. He was home for a day and fell. Surgery scheduled 5/24 afternoon. Patient is open to returning to [facility] for TCU services. \"        CM to follow hospital progression, treatment team recommendations and assist with discharge planning. Transportation TBD.       Estrella Garcia RN      "

## 2024-05-27 NOTE — PROGRESS NOTES
"Orthopedic Surgery Progress Note    Subjective:   Kilo is doing well this AM. Some stiffness in hip but otherwise pain controlled. Hgb 7.3 this am.     Exam:  /57 (BP Location: Right arm)   Pulse 71   Temp 98.4  F (36.9  C) (Oral)   Resp 18   Ht 1.676 m (5' 6\")   Wt 49.9 kg (110 lb)   SpO2 96%   BMI 17.75 kg/m    Gen: Awake, alert, NAD  Resp: breathing equal and non-labored  Extremities:      LLE: Dressing clean and intact. 5/5 EHL/FHL/TA/Gsc. SILT in s/s/dp/sp/t distributions. 2+ DP and PT pulses. Toes WWP, brisk cap refill.      Labs:    Recent Labs   Lab 05/27/24  0604 05/26/24  1310 05/26/24  0458 05/25/24  1145 05/25/24  0527 05/24/24  1054 05/23/24  1359   WBC 3.3*  --   --   --  3.2* 4.0 3.6*   HGB 7.3* 8.8* 6.7* 7.5* 7.8* 8.8* 6.8*     --   --   --  186 217 204     Recent Labs   Lab 05/27/24  0604 05/26/24  2132 05/26/24  1610 05/26/24  0538 05/25/24  0536 05/25/24  0527 05/23/24  1359   *  --   --   --   --  130* 136   POTASSIUM 4.1  --   --   --   --  4.4 4.1   CHLORIDE 101  --   --   --   --  98 104   CO2 24  --   --   --   --  23 22   BUN 21.1  --   --   --   --  23.3* 30.7*   CR 0.83  --   --   --   --  0.86 0.80   * 135* 118* 116*   < > 104* 102*   PHOS 1.6*  --   --   --   --   --   --     < > = values in this interval not displayed.     Recent Labs   Lab 05/23/24  1417   INR 1.10   PTT 30       Assessment:   84 year old male with POD3 sp Left hip hemiarthroplasty. Doing Well.       Plan:  Continue PT/OT  Weightbearing status: WBAT  Anticoagulation: ASA 81 mg in addition to SCDs, tomas stockings and early ambulation.  Discharge planning: Pending progress through PT/OT and medical clearance    Chip Samuels MD  Orthopedic Surgery  [facility]    "

## 2024-05-27 NOTE — PROGRESS NOTES
[facility]    Medicine Progress Note - Hospitalist Service    Date of Admission:  5/23/2024    Assessment & Plan   Kilo Montiel is a 83 year old male with h/o metastatic prostate cancer, hypothyroidism, and syncope who was admitted on 5/23/2024 after a mechanical fall with left hip fracture.      Left hip fracture  Mechanical fall   -CT ABD/PEL 5/23: Mildly comminuted impacted subcapital fracture of the left proximal femur  -s/p left hemiarthroplasty 5/24  - post surgery care per ortho and VTE  - PT/OT   - pain control  - needs TCU      Urinary retention with bilateral hydronephrosis and gross hematuria, h/o Prostate cancer   - status post Forte catheter, will discharge in place   -CT ABD/PEL 5/23: Moderate to severe distention of the urinary bladder with symmetric hydroureter and hydronephrosis, evidence of bone metastases on the ribs, spine, and pelvis  - having gross hematuria and urology consulted, outpatient follow up   -US Hydro resolved   - may need to hold ASA if continues to drop HGB      Acute on chronic anemia  Pancytopenia-chronic (thrombocytopenia is variable)  -Baseline hemoglobin usually in the 8's  -Transfused 1 unit PRBCs 5/24 and on  5/26   -Hemoglobin 7.3 this morning, will monitor until tomorrow in the hospital to ensure stability    Hypophosphatemia  -Replace per protocol     Chronic compression fractures  -CT lumbar spine 5/23: Chronic T12, L1, L2, L3, L4, and L5 anterior compression fractures, severe spinal canal stenosis at L1-L2 with retropulsion of the L2 vertebral body, moderate to severe spinal stenosis at L4-L5  - Pt/Ot           Diet: Advance Diet as Tolerated: Regular Diet Adult    DVT Prophylaxis: Pneumatic Compression Devices and ASA per orthopedic surgery  Forte Catheter: PRESENT, indication: Acute retention or obstruction  Lines: None     Cardiac Monitoring: None  Code Status: Full Code      Clinically Significant Risk Factors              #  "Hypoalbuminemia: Lowest albumin = 2.6 g/dL at 5/27/2024  6:04 AM, will monitor as appropriate            # Cachexia: Estimated body mass index is 17.75 kg/m  as calculated from the following:    Height as of this encounter: 1.676 m (5' 6\").    Weight as of this encounter: 49.9 kg (110 lb)., PRESENT ON ADMISSION  # Moderate Malnutrition: based on nutrition assessment, PRESENT ON ADMISSION   # Financial/Environmental Concerns:           Disposition Plan     Medically Ready for Discharge: Anticipated Tomorrow             Shanthi Ricci MD  Hospitalist Service  [facility]  Securely message with [phone] (more info)  Text page via [employer] Paging/Directory   ______________________________________________________________________    Interval History   No significant pain in the left hip addressed but quite tender to touch and with movements  Also noticed pain in the left heel this morning 5 out of 10 in severity which has been fairly persistent  Continues to have some hematuria, Forte is patent  Good appetite.  Last bowel movement was 3 days ago    Physical Exam   Vital Signs: Temp: 98.8  F (37.1  C) Temp src: Oral BP: 113/57 Pulse: 77   Resp: 16 SpO2: 96 % O2 Device: None (Room air)    Weight: 110 lbs 0 oz    General Appearance: No acute distress  Respiratory: Respirations unlabored, lungs are clear anteriorly  Cardiovascular: Regular rate and rhythm.  Normal S1-S2  GI: Abdomen soft, nontender, bowel sounds present  Extremities: Left hip covered with incision, some surrounding swelling and TTP.   Trace left lower extremity edema, 2+ pedal pulses bilaterally   Other: Awake and alert, nonfocal    Medical Decision Making       50 MINUTES SPENT BY ME on the date of service doing chart review, history, exam, documentation & further activities per the note.  MANAGEMENT DISCUSSED with the following over the past 24 hours: Patient and RN       Data     I have personally reviewed the following data over the " past 24 hrs:    3.3 (L)  \   7.3 (L)   / 155     132 (L) 101 21.1 /  112 (H)   4.1 24 0.83 \     ALT: N/A AST: N/A AP: N/A TBILI: N/A   ALB: 2.6 (L) TOT PROTEIN: N/A LIPASE: N/A       Imaging results reviewed over the past 24 hrs:   No results found for this or any previous visit (from the past 24 hour(s)).

## 2024-05-28 ENCOUNTER — APPOINTMENT (OUTPATIENT)
Dept: PHYSICAL THERAPY | Facility: HOSPITAL | Age: 84
DRG: 522 | End: 2024-05-28
Payer: COMMERCIAL

## 2024-05-28 ENCOUNTER — APPOINTMENT (OUTPATIENT)
Dept: OCCUPATIONAL THERAPY | Facility: HOSPITAL | Age: 84
DRG: 522 | End: 2024-05-28
Payer: COMMERCIAL

## 2024-05-28 LAB
ALBUMIN UR-MCNC: 300 MG/DL
ANION GAP SERPL CALCULATED.3IONS-SCNC: 7 MMOL/L (ref 7–15)
APPEARANCE UR: ABNORMAL
BILIRUB UR QL STRIP: NEGATIVE
BUN SERPL-MCNC: 21.9 MG/DL (ref 8–23)
CALCIUM SERPL-MCNC: 8.5 MG/DL (ref 8.8–10.2)
CHLORIDE SERPL-SCNC: 102 MMOL/L (ref 98–107)
COLOR UR AUTO: ABNORMAL
CREAT SERPL-MCNC: 0.8 MG/DL (ref 0.67–1.17)
DEPRECATED HCO3 PLAS-SCNC: 23 MMOL/L (ref 22–29)
EGFRCR SERPLBLD CKD-EPI 2021: 88 ML/MIN/1.73M2
ERYTHROCYTE [DISTWIDTH] IN BLOOD BY AUTOMATED COUNT: 22.3 % (ref 10–15)
GLUCOSE SERPL-MCNC: 106 MG/DL (ref 70–99)
GLUCOSE UR STRIP-MCNC: NEGATIVE MG/DL
HCT VFR BLD AUTO: 22 % (ref 40–53)
HGB BLD-MCNC: 7.2 G/DL (ref 13.3–17.7)
HGB BLD-MCNC: 8.1 G/DL (ref 13.3–17.7)
HGB UR QL STRIP: ABNORMAL
KETONES UR STRIP-MCNC: NEGATIVE MG/DL
LEUKOCYTE ESTERASE UR QL STRIP: ABNORMAL
MCH RBC QN AUTO: 31.2 PG (ref 26.5–33)
MCHC RBC AUTO-ENTMCNC: 32.7 G/DL (ref 31.5–36.5)
MCV RBC AUTO: 95 FL (ref 78–100)
MUCOUS THREADS #/AREA URNS LPF: PRESENT /LPF
NITRATE UR QL: NEGATIVE
PH UR STRIP: 7.5 [PH] (ref 5–7)
PHOSPHATE SERPL-MCNC: 2.2 MG/DL (ref 2.5–4.5)
PLATELET # BLD AUTO: 191 10E3/UL (ref 150–450)
POTASSIUM SERPL-SCNC: 4.8 MMOL/L (ref 3.4–5.3)
RBC # BLD AUTO: 2.31 10E6/UL (ref 4.4–5.9)
RBC URINE: >182 /HPF
SODIUM SERPL-SCNC: 132 MMOL/L (ref 135–145)
SP GR UR STRIP: 1.02 (ref 1–1.03)
UROBILINOGEN UR STRIP-MCNC: <2 MG/DL
WBC # BLD AUTO: 3.6 10E3/UL (ref 4–11)
WBC URINE: 0 /HPF

## 2024-05-28 PROCEDURE — 250N000013 HC RX MED GY IP 250 OP 250 PS 637: Performed by: INTERNAL MEDICINE

## 2024-05-28 PROCEDURE — 250N000009 HC RX 250

## 2024-05-28 PROCEDURE — 84100 ASSAY OF PHOSPHORUS: CPT | Performed by: INTERNAL MEDICINE

## 2024-05-28 PROCEDURE — 85018 HEMOGLOBIN: CPT | Performed by: INTERNAL MEDICINE

## 2024-05-28 PROCEDURE — 81001 URINALYSIS AUTO W/SCOPE: CPT | Performed by: NURSE PRACTITIONER

## 2024-05-28 PROCEDURE — 80048 BASIC METABOLIC PNL TOTAL CA: CPT | Performed by: INTERNAL MEDICINE

## 2024-05-28 PROCEDURE — 120N000001 HC R&B MED SURG/OB

## 2024-05-28 PROCEDURE — 85027 COMPLETE CBC AUTOMATED: CPT | Performed by: INTERNAL MEDICINE

## 2024-05-28 PROCEDURE — 97110 THERAPEUTIC EXERCISES: CPT | Mod: GP

## 2024-05-28 PROCEDURE — 36415 COLL VENOUS BLD VENIPUNCTURE: CPT | Performed by: INTERNAL MEDICINE

## 2024-05-28 PROCEDURE — 99233 SBSQ HOSP IP/OBS HIGH 50: CPT | Performed by: INTERNAL MEDICINE

## 2024-05-28 PROCEDURE — 97530 THERAPEUTIC ACTIVITIES: CPT | Mod: GO

## 2024-05-28 PROCEDURE — 250N000013 HC RX MED GY IP 250 OP 250 PS 637: Performed by: STUDENT IN AN ORGANIZED HEALTH CARE EDUCATION/TRAINING PROGRAM

## 2024-05-28 PROCEDURE — 97530 THERAPEUTIC ACTIVITIES: CPT | Mod: GP

## 2024-05-28 PROCEDURE — 87086 URINE CULTURE/COLONY COUNT: CPT | Performed by: NURSE PRACTITIONER

## 2024-05-28 RX ORDER — LIDOCAINE HYDROCHLORIDE 20 MG/ML
JELLY TOPICAL ONCE
Status: COMPLETED | OUTPATIENT
Start: 2024-05-28 | End: 2024-05-28

## 2024-05-28 RX ORDER — OXYCODONE HYDROCHLORIDE 5 MG/1
2.5 TABLET ORAL EVERY 4 HOURS PRN
Qty: 10 TABLET | Refills: 0 | Status: SHIPPED | OUTPATIENT
Start: 2024-05-28

## 2024-05-28 RX ORDER — ACETAMINOPHEN 325 MG/1
650 TABLET ORAL EVERY 4 HOURS PRN
Qty: 100 TABLET | Refills: 0 | Status: SHIPPED | OUTPATIENT
Start: 2024-05-28

## 2024-05-28 RX ORDER — ASPIRIN 81 MG/1
81 TABLET ORAL 2 TIMES DAILY
Qty: 60 TABLET | Refills: 0 | Status: SHIPPED | OUTPATIENT
Start: 2024-05-28 | End: 2024-07-12

## 2024-05-28 RX ADMIN — POLYETHYLENE GLYCOL 3350 17 G: 17 POWDER, FOR SOLUTION ORAL at 08:45

## 2024-05-28 RX ADMIN — POTASSIUM & SODIUM PHOSPHATES POWDER PACK 280-160-250 MG 1 PACKET: 280-160-250 PACK at 08:45

## 2024-05-28 RX ADMIN — SENNOSIDES AND DOCUSATE SODIUM 1 TABLET: 8.6; 5 TABLET ORAL at 08:42

## 2024-05-28 RX ADMIN — SODIUM CHLORIDE TAB 1 GM 1 G: 1 TAB at 08:42

## 2024-05-28 RX ADMIN — POTASSIUM & SODIUM PHOSPHATES POWDER PACK 280-160-250 MG 2 PACKET: 280-160-250 PACK at 01:10

## 2024-05-28 RX ADMIN — MEGESTROL ACETATE 400 MG: 40 SUSPENSION ORAL at 08:49

## 2024-05-28 RX ADMIN — SODIUM CHLORIDE TAB 1 GM 1 G: 1 TAB at 13:04

## 2024-05-28 RX ADMIN — LEVOTHYROXINE SODIUM 125 MCG: 0.03 TABLET ORAL at 06:32

## 2024-05-28 RX ADMIN — POTASSIUM & SODIUM PHOSPHATES POWDER PACK 280-160-250 MG 1 PACKET: 280-160-250 PACK at 13:04

## 2024-05-28 RX ADMIN — ACETAMINOPHEN 650 MG: 325 TABLET ORAL at 01:54

## 2024-05-28 RX ADMIN — FAMOTIDINE 20 MG: 20 TABLET ORAL at 08:42

## 2024-05-28 RX ADMIN — SENNOSIDES AND DOCUSATE SODIUM 1 TABLET: 8.6; 5 TABLET ORAL at 19:25

## 2024-05-28 RX ADMIN — ESCITALOPRAM OXALATE 10 MG: 10 TABLET ORAL at 08:42

## 2024-05-28 RX ADMIN — LIDOCAINE HYDROCHLORIDE: 20 JELLY TOPICAL at 02:37

## 2024-05-28 RX ADMIN — OXYCODONE HYDROCHLORIDE 5 MG: 5 TABLET ORAL at 01:54

## 2024-05-28 RX ADMIN — SODIUM CHLORIDE TAB 1 GM 1 G: 1 TAB at 17:15

## 2024-05-28 RX ADMIN — ASPIRIN 81 MG: 81 TABLET, COATED ORAL at 08:42

## 2024-05-28 RX ADMIN — POTASSIUM & SODIUM PHOSPHATES POWDER PACK 280-160-250 MG 2 PACKET: 280-160-250 PACK at 06:32

## 2024-05-28 RX ADMIN — POTASSIUM & SODIUM PHOSPHATES POWDER PACK 280-160-250 MG 1 PACKET: 280-160-250 PACK at 17:15

## 2024-05-28 RX ADMIN — CYANOCOBALAMIN TAB 1000 MCG 1000 MCG: 1000 TAB at 08:42

## 2024-05-28 ASSESSMENT — ACTIVITIES OF DAILY LIVING (ADL)
ADLS_ACUITY_SCORE: 52
ADLS_ACUITY_SCORE: 48
ADLS_ACUITY_SCORE: 52
ADLS_ACUITY_SCORE: 52
ADLS_ACUITY_SCORE: 50
ADLS_ACUITY_SCORE: 52
ADLS_ACUITY_SCORE: 50
ADLS_ACUITY_SCORE: 52
ADLS_ACUITY_SCORE: 50
ADLS_ACUITY_SCORE: 52
ADLS_ACUITY_SCORE: 50
ADLS_ACUITY_SCORE: 50
ADLS_ACUITY_SCORE: 52
ADLS_ACUITY_SCORE: 50
ADLS_ACUITY_SCORE: 48
ADLS_ACUITY_SCORE: 52
ADLS_ACUITY_SCORE: 50
ADLS_ACUITY_SCORE: 48

## 2024-05-28 NOTE — CONSULTS
"SPIRITUAL HEALTH SERVICES CONSULT NOTE  [facility]; P4    Saw pt Kilo Montiel per Spiritual Care Consult follow-up    Patient/Family Understanding of Illness and Goals of Care - Kilo says that he didn't sleep especially well last night and is tired today. He says he continues to experience pain when he walks, but says that it is usually manageable. He anticipates staying one more night \"due to blood in my urine\" and when asked, he says that he is okay with this.     Distress and Loss - Has been away from home for several weeks now.     Strengths, Coping, and Resources - Kilo has an accepting, if not somewhat resigned attitude about his plan of care, saying \"What can I do? I can't change it.\" He says his sarwat is helpful in staying hopeful.     Meaning, Beliefs, and Spirituality - Kilo says \"It's in God's hands... and that brings me comfort.\" He welcomes my offer to notify his [employer] ([facility]) of his admission and he welcomed my offer of prayer.     Plan of Care: Spiritual care staff will remain available for further follow-up as requested by patient, family or staff.      Time Spent with Patient/Family: 15 minutes    Rin Hector M.Div.      Office: [phone] (for non-urgent requests)  Please Vocera or page through [facility] for time-sensitive requests    "

## 2024-05-28 NOTE — PROGRESS NOTES
"Orthopedic Surgery Progress Note    Subjective:   Kilo is doing well today.  Feels pain is well controlled.  He has been getting up to the chair and commode over the weekend.  Denies new fevers/chills.  He has had a BM.  Denies nausea/vomiting.    Exam:  /57 (BP Location: Left arm)   Pulse 73   Temp 97.9  F (36.6  C) (Oral)   Resp 16   Ht 1.676 m (5' 6\")   Wt 49.9 kg (110 lb)   SpO2 94%   BMI 17.75 kg/m    Gen: Awake, alert, NAD  Resp: breathing equal and non-labored  Extremities:      LLE: Dressing clean and intact. 5/5 EHL/FHL/TA/Gsc. SILT in s/s/dp/sp/t distributions. 2+ DP and PT pulses. Toes WWP, brisk cap refill.      Labs:    Recent Labs   Lab 05/28/24  1203 05/28/24  0543 05/27/24  0604 05/26/24  1310 05/25/24  1145 05/25/24  0527 05/24/24  1054   WBC  --  3.6* 3.3*  --   --  3.2* 4.0   HGB 8.1* 7.2* 7.3* 8.8*   < > 7.8* 8.8*   PLT  --  191 155  --   --  186 217    < > = values in this interval not displayed.     Recent Labs   Lab 05/28/24  0543 05/27/24  2052 05/27/24  0604 05/26/24  2132 05/26/24  1610 05/25/24  0536 05/25/24  0527 05/23/24  1359   *  --  132*  --   --   --  130* 136   POTASSIUM 4.8  --  4.1  --   --   --  4.4 4.1   CHLORIDE 102  --  101  --   --   --  98 104   CO2 23 -- 24  --   --   --  23 22   BUN 21.9  --  21.1  --   --   --  23.3* 30.7*   CR 0.80  --  0.83  --   --   --  0.86 0.80   *  --  112* 135* 118*   < > 104* 102*   PHOS 2.2* 1.6* 1.6*  --   --   --   --   --     < > = values in this interval not displayed.     Recent Labs   Lab 05/23/24  1417   INR 1.10   PTT 30       Assessment:   84 year old male with POD4 sp Left hip hemiarthroplasty. Doing Well.       Plan:  Continue PT/OT  Weightbearing status: WBAT  Anticoagulation: ASA 81 mg in addition to SCDs, tomas stockings and early ambulation.  Discussed with Dr. Ricci regarding clots from catheter.  Okay to hold ASA at this time from an ortho standpoint.  For the purposes of evaluating urologic " clots, would not be productive to start on an alternative anticoagulation medicine instead such as subQ heparin.   Hgb 7.2  Discharge planning: Pending progress through PT/OT and medical clearance    SILVIA FERNANDES PA-C  Orthopedic Surgery  [facility]

## 2024-05-28 NOTE — PROGRESS NOTES
"Care Management Follow Up    Length of Stay (days): 5    Expected Discharge Date: 05/28/2024     Concerns to be Addressed: discharge planning     Patient plan of care discussed at interdisciplinary rounds: Yes    Anticipated Discharge Disposition:  TCU      Anticipated Discharge Services:  TCU   Anticipated Discharge DME:  None     Patient/family educated on Medicare website which has current facility and service quality ratings:  Yes  Education Provided on the Discharge Plan:  Yes  Patient/Family in Agreement with the Plan:  Yes    Referrals Placed by CM/SW:  TCU   Private pay costs discussed: Not applicable    Additional Information:  Chart reviewed.    Cm updates:  Hosp says pt is med ready to return to TCU.         RNCM called and spoke to [facility] admissions, they are still reviewing pt and will update writer once reviewed.       Wife ok with [facility] if no avail at [facility], RNCM let admissions know that. Awaiting response.       [facility] TCU can accept pt today. Pt agreeable to facility.     Social hx:  \"Met with patient and spouse at the bedside for initial assessment. Introduced self and care management role. Patient lives with spouse and adult daughter Casie. Patient was recently discharged to [facility] TCU after and admission for generalized weakness. He was home for a day and fell. Surgery scheduled 5/24 afternoon. Patient is open to returning to [facility] for TCU services. \"           CM to follow hospital progression, treatment team recommendations and assist with discharge planning. Transportation TBD.       Estrella Garcia RN    Care Management Discharge Note    Discharge Date: 05/28/2024       Discharge Disposition: Transitional Care    Discharge Services:  TCU     Discharge DME:      Discharge Transportation: health plan transportation    Private pay costs discussed: Not applicable    Does the patient's insurance " plan have a 3 day qualifying hospital stay waiver?  No    PAS Confirmation Code:  NO PAS needed  Patient/family educated on Medicare website which has current facility and service quality ratings:  Yes    Education Provided on the Discharge Plan:  Yes  Persons Notified of Discharge Plans: Yes  Patient/Family in Agreement with the Plan:      Handoff Referral Completed: Yes    Additional Information:    Final discharge plan is for pt to go to [facility] today 5/28.  health w/c transport set up for 3:07pm-3:52pm today 5/28. Pt agreeable to private cost of w/c transport. Bedside, provider, pt, and facility updated.         Provider consulted urology for hematuria, and wants to check hemoglobin at noon, if stable can send pt today. If not writer will re set things up for a discharge tomorrow tentatively. [facility] can accept tomorrow as well.       Discharge canceled today.        Writer tentatively set up a ride for pt tomorrow instead, TCU can accept pt tomorrow.     [facility] tomorrow Weds 5/29.  [facility] w/c transport set up for 3:07pm-3:52pm.       No PAS needed.     Estrella Garcia RN

## 2024-05-28 NOTE — PLAN OF CARE
Problem: Adult Inpatient Plan of Care  Goal: Optimal Comfort and Wellbeing  Outcome: Progressing  Problem: Orthopaedic Fracture  Goal: Optimal Pain Control and Function  Outcome: Progressing  Problem: Anemia  Goal: Anemia Symptom Improvement  Outcome: Progressing     AO, VSS on RA, denies pain this shift- no PRNs given. Up to chair x2 with assist 1GB to pivot. Catheter irrigation orders modified- now Q4H + PRN. Good output, red + clots. 1200 Hgb today was 8.2. 2x mepilex placed on center back to cover abrasions this shift. No acute events reported at this time.    Jorge Luis Resendiz RN

## 2024-05-28 NOTE — PLAN OF CARE
Problem: Orthopaedic Fracture  Goal: Optimal Functional Ability  Outcome: Progressing  Goal: Optimal Pain Control and Function  Outcome: Progressing  Intervention: Manage Acute Orthopaedic-Related Pain  Recent Flowsheet Documentation  Taken 5/28/2024 0154 by Meena Triplett RN  Pain Management Interventions: medication (see MAR)   Goal Outcome Evaluation:    POD 4: Left hemiarthroplasty    Left hip dressing clean, dry and intact. Pain managed with PRN oxycodone & Tylenol. New carlton placed as unable to irrigate and no urinary output. Bladder scan done; 550 ml. Coude catheter 18F placed; patent & draining. Urinary output 650 ml. Up to commode with assist of 1 and walker. Had XXL bowel movement.

## 2024-05-28 NOTE — PROGRESS NOTES
Place of Service:  [facility]     Reason for follow up: Gross hematuria, Urinary retention hx of prostatic fossa stricture s/p carlton placement this admission    SUBJECTIVE:  Events:  Gross hematuria with some clots persisting, on ASA 81 mg daily. Carlton upsized overnight d/t clots. Pt notes some bladder pressure. Hgb 7.2 today, 7.3 on 5/27. Denies bilateral CVA tenderness, fever, chills.     OBJECTIVE:  PHYSICAL EXAM:  Temp: 97.9  F (36.6  C) Temp src: Oral BP: 122/57 Pulse: 73   Resp: 16 SpO2: 94 % O2 Device: None (Room air)    General: NAD, alert, cooperative  Head: normocephalic, without abnormality / atraumatic  Abdomen: soft, non tender, non distended. no suprapubic fullness, mild suprapubic tenderness. No bilateral CVA tenderness.   Genitourinary: Uncircumcised penis without erythema or edema. 18 Fr carlton draining small amount of watermelon colored urine with few small stranding clots noted in the bag. Attempted manual irrigation, unable to aspirate. Deflated balloon and advanced catheter with significant return of translucent watermelon colored urine. Manually irrigated with few tiny clots aspirated and then translucent watermelon colored urine without clots.   Skin: No rashes or lesions  Musculoskeletal: moves all four extremities equally.   Psychological: alert and oriented, answers questions appropriately    LABS:  Creatinine   Date Value Ref Range Status   05/28/2024 0.80 0.67 - 1.17 mg/dL Final     WBC Count   Date Value Ref Range Status   05/28/2024 3.6 (L) 4.0 - 11.0 10e3/uL Final     Hemoglobin   Date Value Ref Range Status   05/28/2024 7.2 (L) 13.3 - 17.7 g/dL Final   ]  Platelet Count   Date Value Ref Range Status   05/28/2024 191 150 - 450 10e3/uL Final       UA: Ordered, pending.   UC: ordered, pending.     Lab Results: personally reviewed.     ASSESSMENT/PLAN:  Kilo Montiel is being seen by [facility] for gross hematuria     - 84 yr old male with metastatic castrate resistant  prostate cancer, urinary retention with prostatic fossa stricture, admitted after mechanical fall with left femur fracture status post orthopedic repair, seen initially by [facility] in consultation for urinary retention with bilateral hydronephrosis status post catheter insertion. Bilateral hydro noted resolved on 5/26/24 renal US.   - Forte upsized overnight from 16 Fr to 18 Fr d/t clots/hematuria.   - Forte noted malpositioned, advanced balloon with significant output.   - Manually irrigate every 4 hours and prn.   - UA/UC ordered, pending.   - Hold ASA if safe to do so.   - Will continue to follow.     This case was discussed with: Dr. Courtney Miranda, APRN, CNP  [facility]   [phone]

## 2024-05-28 NOTE — PROGRESS NOTES
[facility]    Medicine Progress Note - Hospitalist Service    Date of Admission:  5/23/2024    Assessment & Plan   Kilo Montiel is a 83 year old male with h/o metastatic prostate cancer, hypothyroidism, and syncope who was admitted on 5/23/2024 after a mechanical fall with left hip fracture.      Left hip fracture  Mechanical fall   -CT ABD/PEL 5/23: Mildly comminuted impacted subcapital fracture of the left proximal femur  -s/p left hemiarthroplasty 5/24  - post surgery care per ortho   - PT/OT   - pain control  - needs TCU      Urinary retention with bilateral hydronephrosis and gross hematuria, h/o Prostate cancer   - status post Forte catheter and replacement last night with a 18 Haitian catheter, will discharge in place   -CT ABD/PEL 5/23: Moderate to severe distention of the urinary bladder with symmetric hydroureter and hydronephrosis, evidence of bone metastases on the ribs, spine, and pelvis  - continues to have gross hematuria, Forte was upsized overnight due to clots   - discussed with Urology, appreciate seeing patient today   - call placed to Orthopedic surgery regarding holding ASA due to ongoing hematuria with clots   Addendum: Per Orthopedic surgery, OK to hold aspirin for a few days due to hematuria with clots - order placed      Acute on chronic anemia  Pancytopenia-chronic (thrombocytopenia is variable)  -Baseline hemoglobin usually in the 8's  -Transfused 1 unit PRBCs 5/24 and on  5/26   -Hemoglobin 7.2 this morning, will recheck at noon to ensure stability  -Transfuse for hemoglobin 7 or less    Hypophosphatemia  -Replace per protocol     Chronic compression fractures  -CT lumbar spine 5/23: Chronic T12, L1, L2, L3, L4, and L5 anterior compression fractures, severe spinal canal stenosis at L1-L2 with retropulsion of the L2 vertebral body, moderate to severe spinal stenosis at L4-L5  - Pt/Ot     Mass in distal rectum found on recent colonoscopy in [place] (report is not  "available in care everywhere)  -Advised patient to call CRS Dr. Mcdaniels's office to request pathology report to be faxed to his PCPs office    Metastatic prostate cancer   Treated with radiation and initially in 2009, subsequent brachytherapy in 2013  He subsequently developed bony metastasis and had radiation to his lumbar spine in 2015  He has been on multiple additional oral medications    Code status   Previously DNR/DNI, currently full code (probably was changed for the surgery)   Confirmed with the patient, he still wants DNR, order changed         Diet: Advance Diet as Tolerated: Regular Diet Adult  Discharge Instruction - Regular Diet Adult    DVT Prophylaxis: ASA per Orthopedic surgery   Foret Catheter: PRESENT, indication: Acute retention or obstruction;Gross hematuria, Acute retention or obstruction  Lines: None     Cardiac Monitoring: None  Code Status: Full Code      Clinically Significant Risk Factors              # Hypoalbuminemia: Lowest albumin = 2.6 g/dL at 5/27/2024  6:04 AM, will monitor as appropriate            # Cachexia: Estimated body mass index is 17.75 kg/m  as calculated from the following:    Height as of this encounter: 1.676 m (5' 6\").    Weight as of this encounter: 49.9 kg (110 lb).   # Moderate Malnutrition: based on nutrition assessment    # Financial/Environmental Concerns:           Disposition Plan     Medically Ready for Discharge: Ready Now             Shanthi Ricci MD  Hospitalist Service  [facility]  Securely message with [employer] (more info)  Text page via [employer] Paging/Directory   ______________________________________________________________________    Interval History   No significant left hip pain  No left heel pain today  Forte catheter had to be replaced last night due to blood clots last night.  It is patent and draining cherry red color urine  Patient denies feeling dizzy or lightheaded      Physical Exam   Vital Signs: Temp: 97.9  F (36.6 "  C) Temp src: Oral BP: 122/57 Pulse: 73   Resp: 16 SpO2: 94 % O2 Device: None (Room air)    Weight: 110 lbs 0 oz    General Appearance: No acute distress, sitting in bed  Respiratory: Respirations unlabored    Medical Decision Making       50 MINUTES SPENT BY ME on the date of service doing chart review, history, exam, documentation & further activities per the note.  MANAGEMENT DISCUSSED with the following over the past 24 hours: Patient and nursing staff, RN CM       Data     I have personally reviewed the following data over the past 24 hrs:    3.6 (L)  \   7.2 (L)   / 191     132 (L) 102 21.9 /  106 (H)   4.8 23 0.80 \       Imaging results reviewed over the past 24 hrs:   No results found for this or any previous visit (from the past 24 hour(s)).

## 2024-05-29 ENCOUNTER — APPOINTMENT (OUTPATIENT)
Dept: OCCUPATIONAL THERAPY | Facility: HOSPITAL | Age: 84
DRG: 522 | End: 2024-05-29
Payer: COMMERCIAL

## 2024-05-29 ENCOUNTER — APPOINTMENT (OUTPATIENT)
Dept: PHYSICAL THERAPY | Facility: HOSPITAL | Age: 84
DRG: 522 | End: 2024-05-29
Payer: COMMERCIAL

## 2024-05-29 LAB
ABO/RH(D): NORMAL
ANTIBODY SCREEN: NEGATIVE
BLD PROD TYP BPU: NORMAL
BLOOD COMPONENT TYPE: NORMAL
CODING SYSTEM: NORMAL
CROSSMATCH: NORMAL
FOLATE SERPL-MCNC: 6.9 NG/ML (ref 4.6–34.8)
HGB BLD-MCNC: 6.8 G/DL (ref 13.3–17.7)
HOLD SPECIMEN: NORMAL
ISSUE DATE AND TIME: NORMAL
PATH REPORT.COMMENTS IMP SPEC: NORMAL
PATH REPORT.COMMENTS IMP SPEC: NORMAL
PATH REPORT.FINAL DX SPEC: NORMAL
PATH REPORT.GROSS SPEC: NORMAL
PATH REPORT.MICROSCOPIC SPEC OTHER STN: NORMAL
PATH REPORT.RELEVANT HX SPEC: NORMAL
PHOSPHATE SERPL-MCNC: 2.5 MG/DL (ref 2.5–4.5)
PHOTO IMAGE: NORMAL
SPECIMEN EXPIRATION DATE: NORMAL
UNIT ABO/RH: NORMAL
UNIT NUMBER: NORMAL
UNIT STATUS: NORMAL
UNIT TYPE ISBT: 6200

## 2024-05-29 PROCEDURE — 85018 HEMOGLOBIN: CPT | Performed by: INTERNAL MEDICINE

## 2024-05-29 PROCEDURE — 82746 ASSAY OF FOLIC ACID SERUM: CPT | Performed by: INTERNAL MEDICINE

## 2024-05-29 PROCEDURE — 99418 PROLNG IP/OBS E/M EA 15 MIN: CPT | Performed by: INTERNAL MEDICINE

## 2024-05-29 PROCEDURE — 88305 TISSUE EXAM BY PATHOLOGIST: CPT | Mod: 26 | Performed by: PATHOLOGY

## 2024-05-29 PROCEDURE — 250N000013 HC RX MED GY IP 250 OP 250 PS 637: Performed by: INTERNAL MEDICINE

## 2024-05-29 PROCEDURE — 84100 ASSAY OF PHOSPHORUS: CPT | Performed by: INTERNAL MEDICINE

## 2024-05-29 PROCEDURE — 82728 ASSAY OF FERRITIN: CPT | Performed by: INTERNAL MEDICINE

## 2024-05-29 PROCEDURE — 86900 BLOOD TYPING SEROLOGIC ABO: CPT | Performed by: INTERNAL MEDICINE

## 2024-05-29 PROCEDURE — 120N000001 HC R&B MED SURG/OB

## 2024-05-29 PROCEDURE — 250N000013 HC RX MED GY IP 250 OP 250 PS 637: Performed by: STUDENT IN AN ORGANIZED HEALTH CARE EDUCATION/TRAINING PROGRAM

## 2024-05-29 PROCEDURE — 86923 COMPATIBILITY TEST ELECTRIC: CPT | Performed by: INTERNAL MEDICINE

## 2024-05-29 PROCEDURE — 99231 SBSQ HOSP IP/OBS SF/LOW 25: CPT | Performed by: CLINICAL NURSE SPECIALIST

## 2024-05-29 PROCEDURE — 36415 COLL VENOUS BLD VENIPUNCTURE: CPT | Performed by: INTERNAL MEDICINE

## 2024-05-29 PROCEDURE — 97110 THERAPEUTIC EXERCISES: CPT | Mod: GP

## 2024-05-29 PROCEDURE — 97110 THERAPEUTIC EXERCISES: CPT | Mod: GO

## 2024-05-29 PROCEDURE — 97535 SELF CARE MNGMENT TRAINING: CPT | Mod: GO

## 2024-05-29 PROCEDURE — P9016 RBC LEUKOCYTES REDUCED: HCPCS | Performed by: INTERNAL MEDICINE

## 2024-05-29 PROCEDURE — 88311 DECALCIFY TISSUE: CPT | Mod: 26 | Performed by: PATHOLOGY

## 2024-05-29 PROCEDURE — 82607 VITAMIN B-12: CPT | Performed by: INTERNAL MEDICINE

## 2024-05-29 PROCEDURE — 250N000011 HC RX IP 250 OP 636: Performed by: STUDENT IN AN ORGANIZED HEALTH CARE EDUCATION/TRAINING PROGRAM

## 2024-05-29 PROCEDURE — 250N000011 HC RX IP 250 OP 636: Performed by: INTERNAL MEDICINE

## 2024-05-29 PROCEDURE — 99233 SBSQ HOSP IP/OBS HIGH 50: CPT | Performed by: INTERNAL MEDICINE

## 2024-05-29 RX ORDER — CEFEPIME HYDROCHLORIDE 1 G/1
1 INJECTION, POWDER, FOR SOLUTION INTRAMUSCULAR; INTRAVENOUS EVERY 12 HOURS
Status: DISCONTINUED | OUTPATIENT
Start: 2024-05-29 | End: 2024-05-31

## 2024-05-29 RX ADMIN — POTASSIUM & SODIUM PHOSPHATES POWDER PACK 280-160-250 MG 1 PACKET: 280-160-250 PACK at 10:11

## 2024-05-29 RX ADMIN — SODIUM CHLORIDE TAB 1 GM 1 G: 1 TAB at 17:05

## 2024-05-29 RX ADMIN — CYANOCOBALAMIN TAB 1000 MCG 1000 MCG: 1000 TAB at 09:42

## 2024-05-29 RX ADMIN — OXYCODONE HYDROCHLORIDE 5 MG: 5 TABLET ORAL at 00:01

## 2024-05-29 RX ADMIN — MEGESTROL ACETATE 400 MG: 40 SUSPENSION ORAL at 09:49

## 2024-05-29 RX ADMIN — SODIUM CHLORIDE TAB 1 GM 1 G: 1 TAB at 12:22

## 2024-05-29 RX ADMIN — CEFEPIME HYDROCHLORIDE 1 G: 1 INJECTION, POWDER, FOR SOLUTION INTRAMUSCULAR; INTRAVENOUS at 17:03

## 2024-05-29 RX ADMIN — SENNOSIDES AND DOCUSATE SODIUM 1 TABLET: 8.6; 5 TABLET ORAL at 20:20

## 2024-05-29 RX ADMIN — LEVOTHYROXINE SODIUM 125 MCG: 0.03 TABLET ORAL at 06:26

## 2024-05-29 RX ADMIN — ESCITALOPRAM OXALATE 10 MG: 10 TABLET ORAL at 09:41

## 2024-05-29 RX ADMIN — HYDROMORPHONE HYDROCHLORIDE 0.4 MG: 0.2 INJECTION, SOLUTION INTRAMUSCULAR; INTRAVENOUS; SUBCUTANEOUS at 22:58

## 2024-05-29 RX ADMIN — OXYCODONE HYDROCHLORIDE 5 MG: 5 TABLET ORAL at 09:50

## 2024-05-29 RX ADMIN — POTASSIUM & SODIUM PHOSPHATES POWDER PACK 280-160-250 MG 1 PACKET: 280-160-250 PACK at 14:28

## 2024-05-29 RX ADMIN — SODIUM CHLORIDE TAB 1 GM 1 G: 1 TAB at 09:48

## 2024-05-29 RX ADMIN — FAMOTIDINE 20 MG: 20 TABLET ORAL at 09:42

## 2024-05-29 ASSESSMENT — ACTIVITIES OF DAILY LIVING (ADL)
ADLS_ACUITY_SCORE: 52
ADLS_ACUITY_SCORE: 53
ADLS_ACUITY_SCORE: 52
ADLS_ACUITY_SCORE: 53
ADLS_ACUITY_SCORE: 52

## 2024-05-29 NOTE — PROGRESS NOTES
CLINICAL NUTRITION SERVICES    Chart check, pt is eating 100% of meals per nursing documentation.  Ordered weights.

## 2024-05-29 NOTE — PROGRESS NOTES
"Care Management Follow Up    Length of Stay (days): 6    Expected Discharge Date: 05/30/2024     Concerns to be Addressed: monitor Hgb    Patient plan of care discussed at interdisciplinary rounds: Yes    Anticipated Discharge Disposition: Transitional Care     Anticipated Discharge Services:    Anticipated Discharge DME:      Patient/family educated on Medicare website which has current facility and service quality ratings:    Education Provided on the Discharge Plan:    Patient/Family in Agreement with the Plan:      Referrals Placed by CM/SW:    Private pay costs discussed: Not applicable    Additional Information:  Social history per previous CM note:  \"Patient lives with spouse and adult daughter Casie. Patient was recently discharged to [facility] TCU after and admission for generalized weakness. He was home for a day and fell. Surgery scheduled 5/24 afternoon. Patient is open to returning to [facility] for TCU services.\"    Theray recommendation is TCU and pt has been accepted to [facility] [place], pt is accepting of this discharge plan per previos CM note however this AM provider updated CM that pt's Hgb is low and they will keep another day.    Discharge canceled for today, ride canceled, pt and TCU notified of cancel.    No PAS needed per previous CM note      Natalee Spence RN      "

## 2024-05-29 NOTE — PLAN OF CARE
Problem: Adult Inpatient Plan of Care  Goal: Plan of Care Review  Outcome: Progressing     Problem: Adult Inpatient Plan of Care  Goal: Patient-Specific Goal (Individualized)  Outcome: Progressing     Problem: Adult Inpatient Plan of Care  Goal: Absence of Hospital-Acquired Illness or Injury  Outcome: Progressing  Intervention: Identify and Manage Fall Risk  Recent Flowsheet Documentation  Taken 5/28/2024 1622 by Adegun, Oluwadamilola, RN  Safety Promotion/Fall Prevention:   activity supervised   assistive device/personal items within reach  Intervention: Prevent Skin Injury  Recent Flowsheet Documentation  Taken 5/28/2024 1800 by Adegun, Oluwadamilola, RN  Body Position: sitting up in bed     Problem: Adult Inpatient Plan of Care  Goal: Optimal Comfort and Wellbeing  Intervention: Monitor Pain and Promote Comfort  Recent Flowsheet Documentation  Taken 5/28/2024 1622 by Adegun, Oluwadamilola, RN  Pain Management Interventions:   medication offered but refused   emotional support     Problem: Adult Inpatient Plan of Care  Goal: Readiness for Transition of Care  Outcome: Progressing     Problem: Risk for Delirium  Goal: Optimal Coping  Outcome: Progressing     Problem: Risk for Delirium  Goal: Improved Attention and Thought Clarity  Outcome: Progressing     Problem: Orthopaedic Fracture  Goal: Absence of Bleeding  Outcome: Progressing     Problem: Orthopaedic Fracture  Goal: Fracture Stability  Outcome: Progressing     Problem: Orthopaedic Fracture  Goal: Absence of Infection Signs and Symptoms  Outcome: Progressing     Problem: Orthopaedic Fracture  Goal: Effective Tissue Perfusion  Outcome: Progressing     Problem: Pain Acute  Goal: Optimal Pain Control and Function  Outcome: Progressing  Intervention: Develop Pain Management Plan  Recent Flowsheet Documentation  Taken 5/28/2024 1622 by Adegun, Oluwadamilola, RN  Pain Management Interventions:   medication offered but refused   emotional support  Intervention:  Prevent or Manage Pain  Recent Flowsheet Documentation  Taken 5/28/2024 1622 by Adegun, Oluwadamilola, RN  Medication Review/Management: medications reviewed     Problem: Anemia  Goal: Anemia Symptom Improvement  Intervention: Monitor and Manage Anemia  Recent Flowsheet Documentation  Taken 5/28/2024 1622 by Adegun, Oluwadamilola, RN  Safety Promotion/Fall Prevention:   activity supervised   assistive device/personal items within reach     Problem: Comorbidity Management  Goal: Blood Pressure in Desired Range  Outcome: Progressing  Intervention: Maintain Blood Pressure Management  Recent Flowsheet Documentation  Taken 5/28/2024 1622 by Adegun, Oluwadamilola, RN  Medication Review/Management: medications reviewed   Goal Outcome Evaluation: Pt is alert and oriented, able to make needs known. VSS. Dressing on L hip CDI. Forte in place, urine still cherry red, irrigated Q4 per order.

## 2024-05-29 NOTE — PROGRESS NOTES
"Orthopedic Surgery Progress Note    Subjective:   Kilo is doing well today.  He does note some increased pain today.  He has been getting up to the chair and commode over the weekend.  Denies new fevers/chills.  He has had a BM.  Denies nausea/vomiting.    Exam:  /59 (BP Location: Right arm)   Pulse 77   Temp 99.6  F (37.6  C) (Oral)   Resp 16   Ht 1.676 m (5' 6\")   Wt 49.9 kg (110 lb)   SpO2 99%   BMI 17.75 kg/m    Gen: Awake, alert, NAD  Resp: breathing equal and non-labored  Extremities:      LLE: Dressing coming off at distal aspect, some fecal material present so full dressing change done.  Incision well approximated, some dried blood but no drainage present. Minimal surrounding erythema. 5/5 EHL/FHL/TA/Gsc. SILT in s/s/dp/sp/t distributions. 2+ DP and PT pulses. Toes WWP, brisk cap refill.      Labs:    Recent Labs   Lab 05/29/24  0548 05/28/24  1203 05/28/24  0543 05/27/24  0604 05/25/24  1145 05/25/24  0527 05/24/24  1054   WBC  --   --  3.6* 3.3*  --  3.2* 4.0   HGB 6.8* 8.1* 7.2* 7.3*   < > 7.8* 8.8*   PLT  --   --  191 155  --  186 217    < > = values in this interval not displayed.     Recent Labs   Lab 05/29/24  0548 05/28/24  0543 05/27/24  2052 05/27/24  0604 05/26/24  2132 05/26/24  1610 05/25/24  0536 05/25/24  0527 05/23/24  1359   NA  --  132*  --  132*  --   --   --  130* 136   POTASSIUM  --  4.8  --  4.1  --   --   --  4.4 4.1   CHLORIDE  --  102  --  101  --   --   --  98 104   CO2  --  23  --  24  --   --   --  23 22   BUN  --  21.9  --  21.1  --   --   --  23.3* 30.7*   CR  --  0.80  --  0.83  --   --   --  0.86 0.80   GLC  --  106*  --  112* 135* 118*   < > 104* 102*   PHOS 2.5 2.2* 1.6* 1.6*  --   --   --   --   --     < > = values in this interval not displayed.     Recent Labs   Lab 05/23/24  1417   INR 1.10   PTT 30       Assessment:   84 year old male with POD4 sp Left hip hemiarthroplasty. Doing Well.       Plan:  Continue PT/OT  Weightbearing status: " WBAT  Anticoagulation: ASA 81 mg in addition to SCDs, tomas stockings and early ambulation.  Discussed with Dr. Ricci regarding clots from catheter.  Okay to hold ASA at this time from an ortho standpoint.  Restart when able.  Encourage activity, ambulation, calf pumps  Hgb 6.8.  Transfusion ordered by hospitalist.  No signs active bleeding or hip hematoma  Discharge planning: Pending progress through PT/OT and medical clearance    SILVIA FERNANDES PA-C  Orthopedic Surgery  [facility]

## 2024-05-29 NOTE — PLAN OF CARE
Problem: Orthopaedic Fracture  Goal: Bowel Elimination  Outcome: Progressing     Problem: Orthopaedic Fracture  Goal: Optimal Pain Control and Function  Intervention: Manage Acute Orthopaedic-Related Pain  Recent Flowsheet Documentation  Taken 5/29/2024 0500 by Meena Triplett RN  Pain Management Interventions: medication offered but refused     Goal Outcome Evaluation:    Patient had uneventful night; slept well. Up to bedside commode x 1. Had lg bowel movement. Forte patent and draining; irrigated every 4 hours. UO = 200 ml bloody urine with clots, mucous threads. Left hip pain with movement; pain medication offered but declined. Assist of 1 with transfers to bedside commode.

## 2024-05-29 NOTE — PLAN OF CARE
"  Problem: Adult Inpatient Plan of Care  Goal: Plan of Care Review  Description: The Plan of Care Review/Shift note should be completed every shift.  The Outcome Evaluation is a brief statement about your assessment that the patient is improving, declining, or no change.  This information will be displayed automatically on your shift  note.  5/29/2024 1403 by Lesly Gonzalez RN  Outcome: Progressing  5/29/2024 1401 by Lesly Gonzalez RN  Outcome: Progressing  Goal: Patient-Specific Goal (Individualized)  Description: You can add care plan individualizations to a care plan. Examples of Individualization might be:  \"Parent requests to be called daily at 9am for status\", \"I have a hard time hearing out of my right ear\", or \"Do not touch me to wake me up as it startles  me\".  5/29/2024 1403 by Lesly Gonzalez RN  Outcome: Progressing  5/29/2024 1401 by Lesly Gonzalez RN  Outcome: Progressing  Goal: Absence of Hospital-Acquired Illness or Injury  5/29/2024 1403 by Lesly Gonzalez RN  Outcome: Progressing  5/29/2024 1401 by Lesly Gonzalez RN  Outcome: Progressing  Intervention: Prevent Skin Injury  Recent Flowsheet Documentation  Taken 5/29/2024 0930 by Lesly Gonzalez RN  Device Skin Pressure Protection: absorbent pad utilized/changed  Goal: Optimal Comfort and Wellbeing  5/29/2024 1403 by Lesly Gonzalez RN  Outcome: Progressing  5/29/2024 1401 by Lesly Gonzalez RN  Outcome: Progressing  Goal: Readiness for Transition of Care  5/29/2024 1403 by Lesly Gonzalez RN  Outcome: Progressing  5/29/2024 1401 by Lesly Gonzalez RN  Outcome: Progressing     Problem: Anemia  Goal: Anemia Symptom Improvement  5/29/2024 1403 by Lesly Gonzalez RN  Outcome: Progressing  5/29/2024 1401 by Lesly Gonzalez RN  Outcome: Progressing     Problem: Urinary Retention  Goal: Effective Urinary Elimination  Outcome: Progressing   Goal Outcome Evaluation:  Pt continues with bladder " irrigations prn and scheduled. Very little clots noted in tubing, watermelon/cherry colored urine. Urology notified of no urine flow and pressure noted during irrigations. Forte did drain 10minutes after irrigating the second time this shift. Urology notified and came to bedside to assess, irrigate, advance catheter tubing.

## 2024-05-29 NOTE — PROGRESS NOTES
"PALLIATIVE MEDICINE PROGRESS NOTE  [facility]     Patient Name: Kilo Montiel  Date of Admission: 5/23/2024   Today the patient was seen for: goals of care, check in     Recommendations & Counseling     GOALS OF CARE:   Restorative with limits   Per previous documentation, patient and Wife, Lizet, \"have a good understanding of underlying metastatic prostate cancer that he has been treated for over the last 14 years.  Although most recent treatment in [place] was \"unsuccessful\"  they remain hopeful that there is one more treatment offered that may be helpful.  Lizet understands this treatment to come with a fair amount of medical burden which Kilo would still consider.\"  Kilo agrees with rehab strengthening.      ADVANCE CARE PLANNING:  No health care directive on file. Per  informed consent policy, next of kin should be involved if patient becomes unable.  There is no POLST form on file, recommend to complete prior to DC.  Code status: No CPR- Do NOT Intubate     MEDICAL MANAGEMENT:   We are not actively managing symptoms at this time.     PSYCHOSOCIAL/SPIRITUAL SUPPORT:  Spouse, Lizet, and daughter, Casie, live locally.  There is also a son in [place] who is a pediatric emergency physician.   [facility]: Spiritism   Would appreciate Spiritual Health Services, Consult has been placed for support     Palliative Care will continue to follow. Thank you for the consult and allowing us to aid in the care of Kilo Montiel.    DARÍO Hassan, CNS, AOCNS  Securely message with [employer] (more info)  Text page via [facility] Paging/Directory       Assessments           Kilo Montiel is a 83 year old male with a past medical history of metastatic prostate cancer (bone metastases on the ribs, spine, and pelvis ), urinary retention with prostatic fossa stricture, hypothyroidism, and syncope who presented on 5/23 after a sustaining fall, tripping over his cat Witget, and sustaining " left femur fracture. S/p 5/24 left hemiarthroplasty. 5/26 urology consulted for urinary retention and bilateral hydronephrosis s/p catheter placement and gross hematuria now requiring q4h catheter flushes.       Prognosis, Goals, & Planning:   Prognosis, Goals, and/or Advance Care Planning addressed today:  Consintue current cares and treatments. Hopeful for resolve of hematuria and discharge to TCU for strengthening.  Plan to reconnect 5/30 to see if further goals of care needs.         Interval History:     Chart review/discussion with unit or clinical team members:   Working with PT and meeting with Orthopedics.     Per patient or family/caregivers today:  In good spirits. No new concerns.         Review of Systems:     ROS was reviewed.  Pain: left hip pain 7/10  Dyspnea: none  Nausea: none  Weakness/fatigue: moderate  Anxiety: none  Constipation: BM 5/29          Physical Exam:   Temp:  [98  F (36.7  C)-100.3  F (37.9  C)] 99.1  F (37.3  C)  Pulse:  [75-87] 75  Resp:  [16-18] 16  BP: (113-135)/(57-65) 120/62  SpO2:  [97 %-99 %] 97 %  110 lbs 0 oz    General appearance: alert, appears stated age, cooperative, and no distress  Head: Normocephalic, without obvious abnormality, atraumatic  Lungs: non-labored, no accessory muscle use  Neurologic: alert. orineted           Current Problem List:   Active Problems:    Malignant neoplasm of prostate (H)    Hip pain, left    Closed subcapital fracture of left femur, initial encounter (H)    Fall, initial encounter    Anemia, unspecified type    Gross hematuria      Allergies   Allergen Reactions    Bee Pollen Other (See Comments)            Data Reviewed:     Results for orders placed or performed during the hospital encounter of 05/23/24 (from the past 24 hour(s))   Hemoglobin   Result Value Ref Range    Hemoglobin 8.1 (L) 13.3 - 17.7 g/dL   UA with Microscopic reflex to Culture    Specimen: Urine, Catheter   Result Value Ref Range    Color Urine Brown (A) Colorless,  Straw, Light Yellow, Yellow    Appearance Urine Cloudy (A) Clear    Glucose Urine Negative Negative mg/dL    Bilirubin Urine Negative Negative    Ketones Urine Negative Negative mg/dL    Specific Gravity Urine 1.022 1.001 - 1.030    Blood Urine >1.0 mg/dL (A) Negative    pH Urine 7.5 (H) 5.0 - 7.0    Protein Albumin Urine 300 (A) Negative mg/dL    Urobilinogen Urine <2.0 <2.0 mg/dL    Nitrite Urine Negative Negative    Leukocyte Esterase Urine 250 Lei/uL (A) Negative    Mucus Urine Present (A) None Seen /LPF    RBC Urine >182 (H) <=2 /HPF    WBC Urine 0 <=5 /HPF    Narrative    Urine Culture ordered based on laboratory criteria   Phosphorus   Result Value Ref Range    Phosphorus 2.5 2.5 - 4.5 mg/dL   Extra Purple Top EDTA (LAB USE ONLY)   Result Value Ref Range    Hold Specimen [facility]    Hemoglobin   Result Value Ref Range    Hemoglobin 6.8 (LL) 13.3 - 17.7 g/dL   ABO/Rh type and screen    Narrative    The following orders were created for panel order ABO/Rh type and screen.  Procedure                               Abnormality         Status                     ---------                               -----------         ------                     Adult Type and Screen[[id number]]                            Final result                 Please view results for these tests on the individual orders.   Adult Type and Screen   Result Value Ref Range    ABO/RH(D) A POS     Antibody Screen Negative Negative    SPECIMEN EXPIRATION DATE [id number]    Prepare red blood cells (unit)   Result Value Ref Range    Blood Component Type Red Blood Cells     Product Code [id number]     Unit Status Ready for issue     Unit Number [id number]     CROSSMATCH Compatible     CODING SYSTEM [id number]       30 MINUTES SPENT BY ME on the date of service doing chart review, history, exam, documentation & further activities per the note.

## 2024-05-29 NOTE — PROGRESS NOTES
Pt received one unit RBC's, tolerated well, no transfusion reaction. No further stools this shift.   No

## 2024-05-29 NOTE — PROGRESS NOTES
Place of Service:  [facility]     Reason for follow up: Gross hematuria, Urinary retention hx of prostatic fossa stricture s/p carlton placement this admission    SUBJECTIVE:  Events:  Gross hematuria with some clots persisting, urine watermelon. ASA 81 mg last dose 5/28 AM. BRBPR noted today. Receiving PRBC transfusion for Hgb 6.8. Denies bilateral CVA tenderness, fever, chills. Noted some mild SP pressure, relieved with improved drainage of carlton.        OBJECTIVE:  PHYSICAL EXAM:  Temp: 99.2  F (37.3  C) Temp src: Oral BP: 125/60 Pulse: 80   Resp: 16 SpO2: 98 % O2 Device: None (Room air)    General: NAD, alert, cooperative  Head: normocephalic, without abnormality / atraumatic  Abdomen: soft, non tender, non distended. no suprapubic fullness, mild suprapubic tenderness. No bilateral CVA tenderness.   Genitourinary: Uncircumcised penis without erythema or edema. 18 Fr carlton draining translucent watermelon colored urine with few small clots noted in the tubing and bag. Attempted manual irrigation, some resistance with attempt to aspirate. Deflated balloon and advanced catheter with easy aspiration of irrigant, a few small clots and then good return of translucent watermelon colored urine.  Skin: No rashes or lesions  Musculoskeletal: moves all four extremities equally.   Psychological: alert and oriented, answers questions appropriately    LABS:  Creatinine   Date Value Ref Range Status   05/28/2024 0.80 0.67 - 1.17 mg/dL Final     WBC Count   Date Value Ref Range Status   05/28/2024 3.6 (L) 4.0 - 11.0 10e3/uL Final     Hemoglobin   Date Value Ref Range Status   05/29/2024 6.8 (LL) 13.3 - 17.7 g/dL Final     Platelet Count   Date Value Ref Range Status   05/28/2024 191 150 - 450 10e3/uL Final     UA RESULTS:  Recent Labs   Lab Test 05/28/24  1417   COLOR Brown*   APPEARANCE Cloudy*   URINEGLC Negative   URINEBILI Negative   URINEKETONE Negative   SG 1.022   UBLD >1.0 mg/dL*   URINEPH 7.5*   PROTEIN 300*    NITRITE Negative   LEUKEST 250 Lei/uL*   RBCU >182*   WBCU 0     UC 5/28, prelim: >100k Gram neg bacilli  UC 5/1: 10-0k Enterobacter cloacae complex  Susceptibility     Enterobacter cloacae complex     ANAY     Ampicillin  Resistant 1     Ampicillin/ Sulbactam  Resistant 1     Cefazolin  Resistant 2     Cefepime <=1 ug/mL Susceptible     Cefoxitin  Resistant 1     Ceftazidime  Resistant     Ceftriaxone  Resistant     Ciprofloxacin <=0.25 ug/mL Susceptible     Gentamicin <=1 ug/mL Susceptible     Levofloxacin <=0.12 ug/mL Susceptible     Nitrofurantoin 32 ug/mL Susceptible     Piperacillin/Tazobactam  Resistant     Tobramycin <=1 ug/mL Susceptible     Trimethoprim/Sulfamethoxazole <=1/19 ug/mL Susceptible        Lab Results: personally reviewed.     ASSESSMENT/PLAN:  Kilo Montiel is being seen by [facility] for gross hematuria     - 84 yr old male with metastatic castrate resistant prostate cancer, urinary retention with prostatic fossa stricture, admitted after mechanical fall with left femur fracture status post orthopedic repair, seen initially by [facility] in consultation for urinary retention with bilateral hydronephrosis status post catheter insertion. Bilateral hydro noted resolved on 5/26/24 renal US.   - Manually irrigate bladder every 4 hours and prn. If difficulty aspirating, evaluate catheter position as may require repeat balloon deflation and catheter advancement. Notify [facility] if difficulty maintaining catheter patency, aspirating moderate to large clots and/or urine becomes thick cherry in color as may require upsize to 3 way catheter and CBI.   - Prelim UC: >100k gram neg bacilli. Enterobacter cloacae complex growth on 5/1 UC. Hospitalist initiating cefepime.   - Receiving PRBC. Although hematuria may be contributing to anemia, degree of hematuria is inconsistent with 6.8 Hgb today. Hospitalist evaluating for additional etiology, pt reports recent dx intestinal mass on CT (4/2024  colonoscopy in [place]) and has been having intermittent BRBPR.   - Continue to hold ASA if safe to do so.   - Planning to maintain carlton at discharge. Will need outpatient follow up. Consider SP cath in future.   - Will continue to follow.     This case was discussed with: Dr. Courtney Miranda, APRN, CNP  [facility]   [phone]

## 2024-05-29 NOTE — PROGRESS NOTES
Unable to get urine flow return after irrigating/flushing carlton with sterile water. Monitoring, urology notified as had not rounded on pt yet.

## 2024-05-29 NOTE — PROGRESS NOTES
[facility]    Medicine Progress Note - Hospitalist Service    Date of Admission:  5/23/2024    Assessment & Plan   Kilo Montiel is a 83 year old male with h/o metastatic prostate cancer, hypothyroidism, and syncope who was admitted on 5/23/2024 after a mechanical fall with left hip fracture.  Hospital course complicated by gross hematuria with urinary retention, rectal bleeding, UTI, acute blood loss anemia requiring PRBC transfusion     Left hip fracture  Mechanical fall   -CT ABD/PEL 5/23: Mildly comminuted impacted subcapital fracture of the left proximal femur  -s/p left hemiarthroplasty 5/24  - post surgery care per ortho   - PT/OT   - pain control  - needs TCU      Urinary retention with bilateral hydronephrosis and gross hematuria, h/o Prostate cancer   - status post Forte catheter  -CT ABD/PEL 5/23: Moderate to severe distention of the urinary bladder with symmetric hydroureter and hydronephrosis, evidence of bone metastases on the ribs, spine, and pelvis  - continues to have gross hematuria, Forte was upsized overnight due to clots   - Per orthopedic surgery okay to hold aspirin due to gross hematuria     UTI - likely source of fever overnight   Urine cx with >100K of GNB  Start cefepime (history of Enterobacter cloacae in the past); de-escalate as able based on sensitivities    Rectal bleeding - history of hemorrhoids, radiation proctitis, also has rectal ulcer on most recent colonoscopy in [place] 4/10/2024 (biopsy report is in the chart)   -Consult GI     Acute on chronic anemia  Pancytopenia-chronic (thrombocytopenia is variable)  -Baseline hemoglobin usually in the 8's  -S/p 3 PRBCs    Hypophosphatemia  -Replace per protocol     Chronic compression fractures  -CT lumbar spine 5/23: Chronic T12, L1, L2, L3, L4, and L5 anterior compression fractures, severe spinal canal stenosis at L1-L2 with retropulsion of the L2 vertebral body, moderate to severe spinal stenosis at L4-L5  -  "Pt/Ot     Metastatic prostate cancer   Treated with radiation and initially in 2009, subsequent brachytherapy in 2013  He subsequently developed bony metastasis and had radiation to his lumbar spine in 2015  He has been on multiple additional oral medications          Diet: Advance Diet as Tolerated: Regular Diet Adult  Discharge Instruction - Regular Diet Adult    DVT Prophylaxis: Pneumatic Compression Devices  Forte Catheter: PRESENT, indication: Acute retention or obstruction, Acute retention or obstruction  Lines: None     Cardiac Monitoring: None  Code Status: No CPR- Do NOT Intubate      Clinically Significant Risk Factors              # Hypoalbuminemia: Lowest albumin = 2.6 g/dL at 5/27/2024  6:04 AM, will monitor as appropriate            # Cachexia: Estimated body mass index is 17.75 kg/m  as calculated from the following:    Height as of this encounter: 1.676 m (5' 6\").    Weight as of this encounter: 49.9 kg (110 lb).   # Moderate Malnutrition: based on nutrition assessment    # Financial/Environmental Concerns:           Disposition Plan     Medically Ready for Discharge: Anticipated in 2-4 Days             Shanthi Ricci MD  Hospitalist Service  [facility]  Securely message with [employer] (more info)  Text page via [employer] Paging/Directory   ______________________________________________________________________    Interval History   Persistent gross hematuria with some clots.  Had some associated suprapubic pressure earlier which resolved after flushing Forte catheter  Large BM with bright red blood earlier this morning  Hemoglobin 6.8, transfused 1 unit PRBCs  Tmax 100.3 overnight    Physical Exam   Vital Signs: Temp: 99.6  F (37.6  C) Temp src: Oral BP: 114/67 Pulse: 76   Resp: 15 SpO2: 97 % O2 Device: None (Room air)    Weight: 110 lbs 0 oz    General Appearance: No acute distress  Respiratory: Lungs are clear bilaterally  Cardiovascular: Regular rate and rhythm.  Normal " S1-S2  GI: Abdomen soft, nontender  : Forte catheter draining watermelon colored urine with a few small clots  Skin: Pale  Other: Awake alert, grossly nonfocal    Medical Decision Making       65 MINUTES SPENT BY ME on the date of service doing chart review, history, exam, documentation & further activities per the note.  MANAGEMENT DISCUSSED with the following over the past 24 hours: Patient during several visits throughout the day, nursing staff, urology, primary care team at [facility]     I have personally reviewed the following data over the past 24 hrs:    N/A  \   6.8 (LL)   / N/A     N/A N/A N/A /  N/A   N/A N/A N/A \       Imaging results reviewed over the past 24 hrs:   No results found for this or any previous visit (from the past 24 hour(s)).

## 2024-05-30 ENCOUNTER — APPOINTMENT (OUTPATIENT)
Dept: OCCUPATIONAL THERAPY | Facility: HOSPITAL | Age: 84
DRG: 522 | End: 2024-05-30
Payer: COMMERCIAL

## 2024-05-30 LAB
ANION GAP SERPL CALCULATED.3IONS-SCNC: 8 MMOL/L (ref 7–15)
BACTERIA UR CULT: ABNORMAL
BUN SERPL-MCNC: 26.3 MG/DL (ref 8–23)
CALCIUM SERPL-MCNC: 8.5 MG/DL (ref 8.8–10.2)
CHLORIDE SERPL-SCNC: 100 MMOL/L (ref 98–107)
CREAT SERPL-MCNC: 0.86 MG/DL (ref 0.67–1.17)
DEPRECATED HCO3 PLAS-SCNC: 24 MMOL/L (ref 22–29)
EGFRCR SERPLBLD CKD-EPI 2021: 86 ML/MIN/1.73M2
ERYTHROCYTE [DISTWIDTH] IN BLOOD BY AUTOMATED COUNT: 20.3 % (ref 10–15)
FERRITIN SERPL-MCNC: 542 NG/ML (ref 31–409)
GLUCOSE SERPL-MCNC: 99 MG/DL (ref 70–99)
HCT VFR BLD AUTO: 25.7 % (ref 40–53)
HGB BLD-MCNC: 8.6 G/DL (ref 13.3–17.7)
MCH RBC QN AUTO: 32.1 PG (ref 26.5–33)
MCHC RBC AUTO-ENTMCNC: 33.5 G/DL (ref 31.5–36.5)
MCV RBC AUTO: 96 FL (ref 78–100)
PHOSPHATE SERPL-MCNC: 2.8 MG/DL (ref 2.5–4.5)
PLATELET # BLD AUTO: 187 10E3/UL (ref 150–450)
POTASSIUM SERPL-SCNC: 4.5 MMOL/L (ref 3.4–5.3)
RBC # BLD AUTO: 2.68 10E6/UL (ref 4.4–5.9)
SODIUM SERPL-SCNC: 132 MMOL/L (ref 135–145)
VIT B12 SERPL-MCNC: 601 PG/ML (ref 232–1245)
WBC # BLD AUTO: 3.7 10E3/UL (ref 4–11)

## 2024-05-30 PROCEDURE — 250N000011 HC RX IP 250 OP 636: Performed by: STUDENT IN AN ORGANIZED HEALTH CARE EDUCATION/TRAINING PROGRAM

## 2024-05-30 PROCEDURE — 258N000001 HC RX 258: Performed by: NURSE PRACTITIONER

## 2024-05-30 PROCEDURE — 250N000009 HC RX 250: Performed by: NURSE PRACTITIONER

## 2024-05-30 PROCEDURE — 85027 COMPLETE CBC AUTOMATED: CPT | Performed by: INTERNAL MEDICINE

## 2024-05-30 PROCEDURE — 250N000013 HC RX MED GY IP 250 OP 250 PS 637: Performed by: STUDENT IN AN ORGANIZED HEALTH CARE EDUCATION/TRAINING PROGRAM

## 2024-05-30 PROCEDURE — 120N000001 HC R&B MED SURG/OB

## 2024-05-30 PROCEDURE — 99233 SBSQ HOSP IP/OBS HIGH 50: CPT | Performed by: INTERNAL MEDICINE

## 2024-05-30 PROCEDURE — 97535 SELF CARE MNGMENT TRAINING: CPT | Mod: GO

## 2024-05-30 PROCEDURE — 84100 ASSAY OF PHOSPHORUS: CPT | Performed by: INTERNAL MEDICINE

## 2024-05-30 PROCEDURE — 99233 SBSQ HOSP IP/OBS HIGH 50: CPT | Performed by: CLINICAL NURSE SPECIALIST

## 2024-05-30 PROCEDURE — 36415 COLL VENOUS BLD VENIPUNCTURE: CPT | Performed by: INTERNAL MEDICINE

## 2024-05-30 PROCEDURE — 97530 THERAPEUTIC ACTIVITIES: CPT | Mod: GO

## 2024-05-30 PROCEDURE — 250N000013 HC RX MED GY IP 250 OP 250 PS 637: Performed by: INTERNAL MEDICINE

## 2024-05-30 PROCEDURE — 80048 BASIC METABOLIC PNL TOTAL CA: CPT | Performed by: INTERNAL MEDICINE

## 2024-05-30 PROCEDURE — 250N000011 HC RX IP 250 OP 636: Performed by: INTERNAL MEDICINE

## 2024-05-30 RX ORDER — HYDROMORPHONE HYDROCHLORIDE 1 MG/ML
0.4 INJECTION, SOLUTION INTRAMUSCULAR; INTRAVENOUS; SUBCUTANEOUS
Status: DISCONTINUED | OUTPATIENT
Start: 2024-05-30 | End: 2024-06-03 | Stop reason: HOSPADM

## 2024-05-30 RX ORDER — POLYETHYLENE GLYCOL 3350 17 G/17G
17 POWDER, FOR SOLUTION ORAL 2 TIMES DAILY
Status: DISCONTINUED | OUTPATIENT
Start: 2024-05-30 | End: 2024-06-03 | Stop reason: HOSPADM

## 2024-05-30 RX ORDER — GINSENG 100 MG
CAPSULE ORAL 3 TIMES DAILY
Status: DISCONTINUED | OUTPATIENT
Start: 2024-05-30 | End: 2024-06-03 | Stop reason: HOSPADM

## 2024-05-30 RX ADMIN — ESCITALOPRAM OXALATE 10 MG: 10 TABLET ORAL at 08:44

## 2024-05-30 RX ADMIN — SENNOSIDES AND DOCUSATE SODIUM 1 TABLET: 8.6; 5 TABLET ORAL at 08:43

## 2024-05-30 RX ADMIN — CEFEPIME HYDROCHLORIDE 1 G: 1 INJECTION, POWDER, FOR SOLUTION INTRAMUSCULAR; INTRAVENOUS at 08:54

## 2024-05-30 RX ADMIN — SODIUM CHLORIDE TAB 1 GM 1 G: 1 TAB at 18:42

## 2024-05-30 RX ADMIN — SODIUM CHLORIDE TAB 1 GM 1 G: 1 TAB at 12:34

## 2024-05-30 RX ADMIN — OXYCODONE HYDROCHLORIDE 5 MG: 5 TABLET ORAL at 15:41

## 2024-05-30 RX ADMIN — CEFEPIME HYDROCHLORIDE 1 G: 1 INJECTION, POWDER, FOR SOLUTION INTRAMUSCULAR; INTRAVENOUS at 20:14

## 2024-05-30 RX ADMIN — FAMOTIDINE 20 MG: 20 TABLET ORAL at 08:43

## 2024-05-30 RX ADMIN — OXYCODONE HYDROCHLORIDE 2.5 MG: 5 TABLET ORAL at 19:42

## 2024-05-30 RX ADMIN — CYANOCOBALAMIN TAB 1000 MCG 1000 MCG: 1000 TAB at 08:43

## 2024-05-30 RX ADMIN — HYDROMORPHONE HYDROCHLORIDE 0.2 MG: 0.2 INJECTION, SOLUTION INTRAMUSCULAR; INTRAVENOUS; SUBCUTANEOUS at 21:58

## 2024-05-30 RX ADMIN — SENNOSIDES AND DOCUSATE SODIUM 1 TABLET: 8.6; 5 TABLET ORAL at 20:14

## 2024-05-30 RX ADMIN — SODIUM CHLORIDE 3000 ML: 900 IRRIGANT IRRIGATION at 12:36

## 2024-05-30 RX ADMIN — OXYCODONE HYDROCHLORIDE 2.5 MG: 5 TABLET ORAL at 04:33

## 2024-05-30 RX ADMIN — POLYETHYLENE GLYCOL 3350 17 G: 17 POWDER, FOR SOLUTION ORAL at 21:41

## 2024-05-30 RX ADMIN — LEVOTHYROXINE SODIUM 125 MCG: 0.03 TABLET ORAL at 04:34

## 2024-05-30 RX ADMIN — MEGESTROL ACETATE 400 MG: 40 SUSPENSION ORAL at 08:44

## 2024-05-30 RX ADMIN — BACITRACIN: 500 OINTMENT TOPICAL at 12:35

## 2024-05-30 RX ADMIN — POLYETHYLENE GLYCOL 3350 17 G: 17 POWDER, FOR SOLUTION ORAL at 12:34

## 2024-05-30 RX ADMIN — SODIUM CHLORIDE TAB 1 GM 1 G: 1 TAB at 08:44

## 2024-05-30 RX ADMIN — BACITRACIN: 500 OINTMENT TOPICAL at 21:42

## 2024-05-30 ASSESSMENT — ACTIVITIES OF DAILY LIVING (ADL)
ADLS_ACUITY_SCORE: 53
ADLS_ACUITY_SCORE: 52
ADLS_ACUITY_SCORE: 52
ADLS_ACUITY_SCORE: 58
ADLS_ACUITY_SCORE: 58
ADLS_ACUITY_SCORE: 53
ADLS_ACUITY_SCORE: 58
ADLS_ACUITY_SCORE: 58
ADLS_ACUITY_SCORE: 52
ADLS_ACUITY_SCORE: 58
ADLS_ACUITY_SCORE: 52
ADLS_ACUITY_SCORE: 52
ADLS_ACUITY_SCORE: 58
ADLS_ACUITY_SCORE: 52
ADLS_ACUITY_SCORE: 53
ADLS_ACUITY_SCORE: 53
ADLS_ACUITY_SCORE: 52
ADLS_ACUITY_SCORE: 53
ADLS_ACUITY_SCORE: 52
ADLS_ACUITY_SCORE: 52
ADLS_ACUITY_SCORE: 58

## 2024-05-30 NOTE — CONSULTS
"[facility] DIGESTIVE HEALTH CONSULTATION    Kilo Montiel   [address] [address]  84 year old male    Admission Date/Time: 5/23/2024  1:35 PM    Primary Care Provider:  Denzel Jones    Requesting Physician: Shanthi Ricci MD      CHIEF COMPLAINT:   L hip fracture, hematuria    REASON FOR THE CONSULT:  rectal bleeding    HPI:   84 year old male with metastatic prostate cancer with known radiation procopathy, recently evaluated for rectal bleeding in [place] with colonoscopy showing findings most c/w radiation proctopathy and likely stercoral ulcer  (colonoscopy showed anterior rectal mass--biopsies showing mixed inflammation and exudate most c/w ulcer). He has had significant intermittent straining for the past year and notices when \"he gets backed up and strains\" he has bleeding. This has been somewhat worse with the catheter in. Yesterday, he had significant straining and pushing with a large BM that was followed by his first episode of rectal bleeding since being in hospital.   GI is consulted for evaluation of rectal bleeding.      REVIEW OF SYSTEMS:   10 point ROS neg other than the symptoms noted above in the HPI.    MEDICATIONS:  Current Outpatient Medications   Medication Instructions    acetaminophen (TYLENOL) 650 mg, Oral, EVERY 4 HOURS PRN    aspirin 81 mg, Oral, 2 TIMES DAILY    chlorhexidine (PERIDEX) 0.12 % solution 15 mLs, Swish & Spit, 2 TIMES DAILY PRN    cyanocobalamin (VITAMIN B-12) 1,000 mcg, Oral, DAILY    escitalopram (LEXAPRO) 10 mg, Oral, DAILY    famotidine (PEPCID) 20 mg, Oral, DAILY    lactulose (CHRONULAC) 10 GM/15ML solution 15 mLs, Oral, 2 TIMES DAILY PRN    levothyroxine (SYNTHROID/LEVOTHROID) 125 mcg, Oral, EVERY MORNING BEFORE BREAKFAST    LORazepam (ATIVAN) 0.5 MG tablet 1 tablet, Oral, EVERY 8 HOURS PRN    magnesium 250 MG tablet 1 tablet, Oral, DAILY    megestrol (MEGACE) 40 MG/ML suspension 10 mLs, Oral, DAILY    ondansetron (ZOFRAN) 8 mg, Oral, " "EVERY 8 HOURS PRN    oxyCODONE (ROXICODONE) 2.5 mg, Oral, EVERY 4 HOURS PRN    polyethylene glycol (MIRALAX) 17 g, Oral, DAILY    senna-docusate (SENOKOT-S/PERICOLACE) 8.6-50 MG tablet 2 tablets, Oral, 2 TIMES DAILY    sodium chloride 1 g, Oral, 3 TIMES DAILY WITH MEALS    Turmeric 500 MG CAPS 1 capsule, Oral, DAILY       PAST MEDICAL HISTORY:  Past Medical History:   Diagnosis Date    Anxiety     Hypothyroid     Melanoma (H)     excised from abdomen and wrist    Melanoma of wrist (H)     Prostate cancer (H)     Thyroid disease        PAST SURGICAL HISTORY:  Past Surgical History:   Procedure Laterality Date    ANKLE SURGERY      left    COLONOSCOPY      INSERT RADIATION SEEDS PROSTATE  2013    Procedure: INSERT RADIATION SEEDS PROSTATE;  Insert Radiation Seeds Prostate ;  Surgeon: Sahil Franco MD;  Location:  OR    OPEN REDUCTION INTERNAL FIXATION HIP BIPOLAR Left 2024    Procedure: HEMIARTHROPLASTY, LEFT HIP, BIPOLAR;  Surgeon: Everett Sherman MD;  Location: [facility] OR    SURGICAL PATHOLOGY EXAM      melanoma removal       FAMILY HISTORY:  Family History   Problem Relation Age of Onset    Breast Cancer Mother 75       SOCIAL HISTORY:  Social History     Tobacco Use    Smoking status: Former     Current packs/day: 0.00     Average packs/day: 0.5 packs/day for 8.0 years (4.0 ttl pk-yrs)     Types: Cigarettes     Start date: 1974     Quit date: 1982     Years since quittin.0    Smokeless tobacco: Former     Quit date: 1976   Substance Use Topics    Alcohol use: Yes     Comment: 2-3 per week       ALLERGIES/SENSITIVITIES:  Bee pollen      PHYSICAL EXAM:  BP (!) 140/77 (BP Location: Right arm)   Pulse 75   Temp 98.2  F (36.8  C) (Oral)   Resp 16   Ht 1.676 m (5' 6\")   Wt 49.9 kg (110 lb)   SpO2 96%   BMI 17.75 kg/m    Body mass index is 17.75 kg/m .  General: A&O, NAD, non-toxic appearing, getting catheter flushed and is uncomfortable during this. Significant clot and red " discoloration in catheter after flushing  Eyes: No icterus or conjunctivitis  ENT: MMM, OP clear without ulcerations  Neck/Thyroid: Supple, no masses  Pulmonary: CTA B  Cardiovascular: RR, S1, S2  Gastrointestinal: Soft, NTTP, NABS, no r/g, no masses, no HSM  Skin: No jaundice/petechiae/rashes  Lymph: No cervical or supraclavicular lymphadenopathy  Extrem: PPI, no c/c/e      LABORATORY DATA:  CBC:  Recent Labs   Lab Test 05/30/24  0548   WBC 3.7*   RBC 2.68*   HGB 8.6*   HCT 25.7*   MCV 96   MCH 32.1   MCHC 33.5   RDW 20.3*           BMP:  Recent Labs   Lab 05/30/24  0548 05/28/24  0543 05/27/24  0604   * 132* 132*   POTASSIUM 4.5 4.8 4.1   CHLORIDE 100 102 101   CO2 24 23 24   GLC 99 106* 112*   CR 0.86 0.80 0.83   BUN 26.3* 21.9 21.1       INR:  Recent Labs   Lab Test 05/23/24  1417   INR 1.10       Liver and Pancreas panel:  Recent Labs   Lab 05/23/24  1359   AST 21   ALT 7   ALKPHOS 58   BILITOTAL 0.2         IMAGING:    No results found.      ASSESSMENT:   Intermittent rectal bleeding, likely 2/2 radiation proctopathy, +\- stercoral colitis and hemorrhoidal bleeding. Endoscopy with biopsy recently showing findings c/w above.     PLAN:  -Start miralax 1 cap BID. Can go up to two caps BID to keep stools loose without straining (ordered)  -Do not feel rectal bleeding is contributing in a significant manner to his ongoing anemia   -May need repeat outpatient flex sig if bleeding continues to ensure this ulcer is not malignancy but no indication for inpatient scope  -Will sign off thanks for involving us    Approximately 60 minutes of total time was spent providing patient care including patient evaluation, reviewing documentation/test results, and .             Everett Haynes MD  Thank you for the opportunity to participate in the care of this patient.   Please feel free to call me with any questions or concerns.  Phone number [phone].            CC: [facility]  Denzel WOOD

## 2024-05-30 NOTE — PROGRESS NOTES
[facility]    Medicine Progress Note - Hospitalist Service    Date of Admission:  5/23/2024    Assessment & Plan   Kilo Montiel is a 83 year old male with h/o metastatic prostate cancer, hypothyroidism, and syncope who was admitted on 5/23/2024 after a mechanical fall with left hip fracture for which he underwent left hip hemiarthroplasty on 5/24/2024.  Hospital course complicated by gross hematuria with urinary retention, UTI, rectal bleeding, acute blood loss anemia requiring PRBC transfusion     Left hip fracture  S/p mechanical fall   -CT ABD/PEL 5/23: Mildly comminuted impacted subcapital fracture of the left proximal femur  -s/p left hemiarthroplasty 5/24  - post surgery care per ortho   - PT/OT   - pain control  - needs TCU      Urinary retention with bilateral hydronephrosis and gross hematuria, h/o Prostate cancer   - status post Forte catheter  -CT ABD/PEL 5/23: Moderate to severe distention of the urinary bladder with symmetric hydroureter and hydronephrosis, evidence of bone metastases on the ribs, spine, and pelvis  - continues to have gross hematuria with clots and retention, Forte catheter was changed couple of times, consider CBI - defer to urology  - Per orthopedic surgery okay to hold aspirin due to gross hematuria     Morganella UTI   Continue cefepime while inpatient, can switch to oral quinolones at discharge.  Complete 7-day antibiotic course complicated UTI    Rectal bleeding - has history of hemorrhoids, radiation proctitis, also rectal ulcer was noted on most recent colonoscopy in [place] 4/10/2024 (biopsy report is in the chart)   -Consult GI     Acute on chronic anemia  Pancytopenia-chronic (thrombocytopenia is variable)  -Baseline hemoglobin usually in the 8's  -S/p total of 3 units PRBCs    Hypophosphatemia  -Replace per protocol     Chronic compression fractures  -CT lumbar spine 5/23: Chronic T12, L1, L2, L3, L4, and L5 anterior compression fractures, severe  "spinal canal stenosis at L1-L2 with retropulsion of the L2 vertebral body, moderate to severe spinal stenosis at L4-L5  - Pt/Ot     Metastatic prostate cancer   Treated with radiation and initially in 2009, subsequent brachytherapy in 2013  He subsequently developed bony metastasis and had radiation to his lumbar spine in 2015  He has been on multiple additional oral medications          Diet: Advance Diet as Tolerated: Regular Diet Adult  Discharge Instruction - Regular Diet Adult    DVT Prophylaxis: Pneumatic Compression Devices  Forte Catheter: PRESENT, indication: Acute retention or obstruction, Acute retention or obstruction  Lines: None     Cardiac Monitoring: None  Code Status: No CPR- Do NOT Intubate      Clinically Significant Risk Factors              # Hypoalbuminemia: Lowest albumin = 2.6 g/dL at 5/27/2024  6:04 AM, will monitor as appropriate            # Cachexia: Estimated body mass index is 17.75 kg/m  as calculated from the following:    Height as of this encounter: 1.676 m (5' 6\").    Weight as of this encounter: 49.9 kg (110 lb).   # Moderate Malnutrition: based on nutrition assessment    # Financial/Environmental Concerns:           Disposition Plan     Medically Ready for Discharge: Anticipated in 2-4 Days             Shanthi Ricci MD  Hospitalist Service  [facility]  Securely message with [employer] (more info)  Text page via [employer] Paging/Directory   ______________________________________________________________________    Interval History   Forte catheter had to be exchanged last night again, large clot expelled  Has ongoing hematuria, reports some suprapubic discomfort and urge to urinate  No bowel movement since yesterday  No significant left hip pain    Physical Exam   Vital Signs: Temp: 98.2  F (36.8  C) Temp src: Oral BP: (!) 140/77 Pulse: 75   Resp: 16 SpO2: 96 % O2 Device: None (Room air)    Weight: 110 lbs 0 oz    General Appearance: No acute " distress  Respiratory: Respirations unlabored, lungs are clear anteriorly  Cardiovascular: Regular rate and rhythm.  Normal S1-S2  GI: Abdomen soft, voluntary guarding noted, bowel sounds present  : Watermelon colored urine in the bag  Other: Awake and alert, grossly nonfocal    Medical Decision Making       50 MINUTES SPENT BY ME on the date of service doing chart review, history, exam, documentation & further activities per the note.  MANAGEMENT DISCUSSED with the following over the past 24 hours: Patient and nursing staff       Data     I have personally reviewed the following data over the past 24 hrs:    3.7 (L)  \   8.6 (L)   / 187     132 (L) 100 26.3 (H) /  99   4.5 24 0.86 \       Imaging results reviewed over the past 24 hrs:   No results found for this or any previous visit (from the past 24 hour(s)).

## 2024-05-30 NOTE — PROGRESS NOTES
PALLIATIVE MEDICINE PROGRESS NOTE  [facility]     Patient Name: Kilo Montiel  Date of Admission: 5/23/2024   Today the patient was seen for: goals of care discussion     Recommendations & Counseling     GOALS OF CARE:   Restorative with limits  - DNR/DNI  Wishes to continue with current cares and treatments.  Open to more cancer directed treatments when recovered from surgery and stronger.    ADVANCE CARE PLANNING:  No health care directive on file. Per  informed consent policy, next of kin should be involved if patient becomes unable.  Reviewed role of POLST document with patient and wife, Cat.  Completed with patient and his wife today indicating DNR/I and selective treatment.  Original and copies given to patient.  Copy placed in chart and another scanned for placement in EMR.  Code status: No CPR- Do NOT Intubate    MEDICAL MANAGEMENT:   We are not actively managing symptoms at this time.    PSYCHOSOCIAL/SPIRITUAL:  Spouse, Lizet, and daughter, Casie, live locally.  There is also a son in [place] who is a pediatric emergency physician.   [facility]: S[employer]   Would appreciate Spiritual Health Services, Consult has been placed for support     Palliative Care will follow along peripherally. Thank you for the consult and allowing us to aid in the care of Kilo Montiel.    ADRÍO Hassan, CNS, AOCNS  Securely message with [employer] (more info)  Text page via [facility] Paging/Directory       Assessments           Kilo Montiel is a 83 year old male with a past medical history of metastatic prostate cancer (bone metastases on the ribs, spine, and pelvis ), urinary retention with prostatic fossa stricture, hypothyroidism, and syncope who presented on 5/23 after a sustaining fall, tripping over his cat Bodey, and sustaining left femur fracture. S/p 5/24 left hemiarthroplasty. 5/26 urology consulted for urinary retention and bilateral hydronephrosis s/p catheter  placement and gross hematuria now requiring q4h catheter flushes.     Prognosis, Goals, & Planning:   Prognosis, Goals, and/or Advance Care Planning addressed today:  Continue current cares and treatments.  Gross hematuria persisted and CBI initiated.  Eager for ongoing strengthening and returned to previous physical state.           Interval History:     Chart review/discussion with unit or clinical team members:   CBI started for persistent hematuria. Up up OT. Afebrile. VSS. Mild discomfort around catheter insertion. No distress otherwise.         Review of Systems:     ROS was reviewed.  Pain: 3/10 at rest in bed - left hip.relieved with prn IV hydromorphone and po oxycodone  Dyspnea: denies  Nausea: denies  Weakness/fatigue: moderate  Anxiety: denies  Constipation: BM 5/29/2024          Physical Exam:   Temp:  [97.8  F (36.6  C)-99.7  F (37.6  C)] 98.2  F (36.8  C)  Pulse:  [69-80] 75  Resp:  [15-20] 16  BP: (114-155)/(60-77) 140/77  SpO2:  [96 %-99 %] 96 %  110 lbs 0 oz    General appearance: alert, appears stated age, cooperative, and no distress  Head: Normocephalic, without obvious abnormality, atraumatic  Nose: no discharge  Throat: lips, mucosa, and tongue normal; teeth and gums normal  Lungs: non-labored, no accessory muscle use noted  Abdomen: soft, non-tender, non-distended  Neurologic: Alert. Oriented x4. Speech fluent.           Current Problem List:   Principal Problem:    Closed subcapital fracture of left femur, initial encounter (H)  Active Problems:    Malignant neoplasm of prostate (H)    Hip pain, left    Fall, initial encounter    Anemia, unspecified type    Gross hematuria      Allergies   Allergen Reactions    Bee Pollen Other (See Comments)            Data Reviewed:     Results for orders placed or performed during the hospital encounter of 05/23/24 (from the past 24 hour(s))   Folate   Result Value Ref Range    Folic Acid 6.9 4.6 - 34.8 ng/mL   CBC with platelets   Result Value Ref Range     WBC Count 3.7 (L) 4.0 - 11.0 10e3/uL    RBC Count 2.68 (L) 4.40 - 5.90 10e6/uL    Hemoglobin 8.6 (L) 13.3 - 17.7 g/dL    Hematocrit 25.7 (L) 40.0 - 53.0 %    MCV 96 78 - 100 fL    MCH 32.1 26.5 - 33.0 pg    MCHC 33.5 31.5 - 36.5 g/dL    RDW 20.3 (H) 10.0 - 15.0 %    Platelet Count 187 150 - 450 10e3/uL   Phosphorus   Result Value Ref Range    Phosphorus 2.8 2.5 - 4.5 mg/dL   Basic metabolic panel   Result Value Ref Range    Sodium 132 (L) 135 - 145 mmol/L    Potassium 4.5 3.4 - 5.3 mmol/L    Chloride 100 98 - 107 mmol/L    Carbon Dioxide (CO2) 24 22 - 29 mmol/L    Anion Gap 8 7 - 15 mmol/L    Urea Nitrogen 26.3 (H) 8.0 - 23.0 mg/dL    Creatinine 0.86 0.67 - 1.17 mg/dL    GFR Estimate 86 >60 mL/min/1.73m2    Calcium 8.5 (L) 8.8 - 10.2 mg/dL    Glucose 99 70 - 99 mg/dL        55 MINUTES SPENT BY ME on the date of service doing chart review, history, exam, documentation & further activities per the note.

## 2024-05-30 NOTE — PROGRESS NOTES
"Orthopedic Surgery Progress Note    Subjective:   Kilo is doing well today.  Pain is improving today.  No new concerns.    Exam:  BP (!) 140/77 (BP Location: Right arm)   Pulse 75   Temp 98.2  F (36.8  C) (Oral)   Resp 16   Ht 1.676 m (5' 6\")   Wt 49.9 kg (110 lb)   SpO2 96%   BMI 17.75 kg/m    Gen: Awake, alert, NAD  Resp: breathing equal and non-labored  Extremities:      LLE: Dressing C.D.I 5/5 EHL/FHL/TA/Gsc. SILT in s/s/dp/sp/t distributions. 2+ DP and PT pulses. Toes WWP, brisk cap refill.      Labs:    Recent Labs   Lab 05/30/24  0548 05/29/24  0548 05/28/24  1203 05/28/24  0543 05/27/24  0604 05/25/24  1145 05/25/24  0527   WBC 3.7*  --   --  3.6* 3.3*  --  3.2*   HGB 8.6* 6.8* 8.1* 7.2* 7.3*   < > 7.8*     --   --  191 155  --  186    < > = values in this interval not displayed.     Recent Labs   Lab 05/30/24  0548 05/29/24  0548 05/28/24  0543 05/27/24 2052 05/27/24  0604 05/27/24  0604 05/26/24  2132 05/25/24  0536 05/25/24  0527   *  --  132*  --   --  132*  --   --  130*   POTASSIUM 4.5  --  4.8  --   --  4.1  --   --  4.4   CHLORIDE 100  --  102  --   --  101  --   --  98   CO2 24  --  23  --   --  24  --   --  23   BUN 26.3*  --  21.9  --   --  21.1  --   --  23.3*   CR 0.86  --  0.80  --   --  0.83  --   --  0.86   GLC 99  --  106*  --   --  112* 135*   < > 104*   PHOS 2.8 2.5 2.2* 1.6*   < > 1.6*  --   --   --     < > = values in this interval not displayed.     Recent Labs   Lab 05/23/24  1417   INR 1.10   PTT 30       Assessment:   84 year old male with POD6 sp Left hip hemiarthroplasty. Doing Well. Ongoing chronic hemautre, requiring CBI.  Rectal bleed noted, will be managed outpatient      Plan:  Continue PT/OT  Weightbearing status: WBAT  Anticoagulation: SCDs, tomas stockings and early ambulation..  Okay to hold ASA at this time from an ortho standpoint.  Restart when able.  Encourage activity, ambulation, calf pumps  Hgb 8.6.  T  Discharge planning: Pending progress " through PT/OT and medical clearance.  Orthopedically progressing well    SILVIA FERNANDES PA-C  Orthopedic Surgery  [facility]

## 2024-05-30 NOTE — PLAN OF CARE
Problem: Urinary Retention  Goal: Effective Urinary Elimination  Outcome: Progressing   Goal Outcome Evaluation: At 0330 writer flushed Forte with 60mL sterile water per order, but could not aspirate anything back. Balloon deflated and Forte repositioned with no result. Bladder umlo=112wV. Patient was moaning and grimacing in pain. Writer removed Forte and a large clot expelled. New Forte placed.     Problem: Pain Acute  Goal: Optimal Pain Control and Function  Outcome: Progressing  Intervention: Prevent or Manage Pain  Recent Flowsheet Documentation  Taken 5/30/2024 0027 by Ivelisse Layton, RN  Medication Review/Management: medications reviewed  Pain controlled with prn oxycodone.

## 2024-05-30 NOTE — PLAN OF CARE
Problem: Pain Acute  Goal: Optimal Pain Control and Function  5/30/2024 1420 by Lesly Gonzalez RN  Outcome: Progressing  5/30/2024 1420 by Lesly Gonzalez RN  Outcome: Progressing  Intervention: Prevent or Manage Pain  Recent Flowsheet Documentation  Taken 5/30/2024 0840 by Lesly Gonzalez RN  Medication Review/Management: medications reviewed     Problem: Urinary Retention  Goal: Effective Urinary Elimination  5/30/2024 1420 by Lesly Gonzalez RN  Outcome: Progressing  5/30/2024 1420 by Lesly Gonzalez RN  Outcome: Progressing  Intervention: Promote Effective Urine Elimination  Recent Flowsheet Documentation  Taken 5/30/2024 0840 by Lesly Gonzalez RN  Bowel Function Promotion: (per GI bowel program) other (see comments)     Problem: Anemia  Goal: Anemia Symptom Improvement  5/30/2024 1420 by Lesly Gonzalez RN  Outcome: Progressing  5/30/2024 1420 by Lesly Gonzalez RN  Outcome: Progressing   Goal Outcome Evaluation:         Little hip pain today. No requests for pain meds. Ambulated around bed twice after being up in chair this afternoon. CBI inititated late am as bladder clotted off with irrigation this am at 0845. MD notified, urology notified. Uring pale white/light pink at times. No clots noted, pt comfortable.

## 2024-05-30 NOTE — PLAN OF CARE
Problem: Adult Inpatient Plan of Care  Goal: Absence of Hospital-Acquired Illness or Injury  Intervention: Identify and Manage Fall Risk  Recent Flowsheet Documentation  Taken 5/29/2024 1559 by Alba Falk RN  Safety Promotion/Fall Prevention:   activity supervised   assistive device/personal items within reach   nonskid shoes/slippers when out of bed   safety round/check completed  Goal: Optimal Comfort and Wellbeing  Intervention: Monitor Pain and Promote Comfort  Recent Flowsheet Documentation  Taken 5/29/2024 1730 by Alba Falk RN  Pain Management Interventions: repositioned     Problem: Risk for Delirium  Goal: Improved Behavioral Control  Intervention: Minimize Safety Risk  Recent Flowsheet Documentation  Taken 5/29/2024 1559 by Alba Falk RN  Enhanced Safety Measures:   assistive devices when indicated   pain management   review medications for side effects with activity     Problem: Orthopaedic Fracture  Goal: Optimal Functional Ability  Intervention: Optimize Functional Ability  Recent Flowsheet Documentation  Taken 5/29/2024 1730 by Alba Falk RN  Activity Management: back to bed  Taken 5/29/2024 1559 by Alba Falk RN  Activity Management: up in chair  Goal: Optimal Pain Control and Function  Intervention: Manage Acute Orthopaedic-Related Pain  Recent Flowsheet Documentation  Taken 5/29/2024 1730 by Alba Falk RN  Pain Management Interventions: repositioned  Goal: Effective Oxygenation and Ventilation  Intervention: Promote Airway Secretion Clearance  Recent Flowsheet Documentation  Taken 5/29/2024 1730 by Alba Falk RN  Activity Management: back to bed  Taken 5/29/2024 1559 by Alba Falk RN  Activity Management: up in chair     Problem: Pain Acute  Goal: Optimal Pain Control and Function  Intervention: Develop Pain Management Plan  Recent Flowsheet Documentation  Taken 5/29/2024 1730 by Alba Falk RN  Pain Management Interventions:  repositioned  Intervention: Prevent or Manage Pain  Recent Flowsheet Documentation  Taken 5/29/2024 1559 by Alba Falk, RN  Medication Review/Management: medications reviewed     Problem: Anemia  Goal: Anemia Symptom Improvement  Intervention: Monitor and Manage Anemia  Recent Flowsheet Documentation  Taken 5/29/2024 1559 by Alba Falk, RN  Safety Promotion/Fall Prevention:   activity supervised   assistive device/personal items within reach   nonskid shoes/slippers when out of bed   safety round/check completed     Problem: Comorbidity Management  Goal: Blood Pressure in Desired Range  Intervention: Maintain Blood Pressure Management  Recent Flowsheet Documentation  Taken 5/29/2024 1559 by Alba Falk, RN  Medication Review/Management: medications reviewed   Goal Outcome Evaluation:         Aox4, calm and cooperative. Hematuria with clots, irrigation every 4 hours- needing 200-300 ml to irrigate, bypassing, continue to monitor. Severe pain with irrigation, stable and controlled with prn dilaudid. Received PRBC transfusion in the morning, recheck labs tomorrow. Vitals stable. Dressing clean/dry/intact, CMS intact. 1 person standpivot for transfers.

## 2024-05-30 NOTE — PROGRESS NOTES
Place of Service:  [facility]     Reason for follow up: Gross hematuria, Urinary retention hx of prostatic fossa stricture s/p carlton placement this admission    SUBJECTIVE:  Events: RN reports 18 Fr catheter clotted overnight and was replaced with a 16 Fr catheter, now clotted again. Pt c/o some bladder pressure. Denies fever, chills, N/V, bilateral flank pain.     OBJECTIVE:  PHYSICAL EXAM:  Temp: 98.2  F (36.8  C) Temp src: Oral BP: (!) 140/77 Pulse: 75   Resp: 16 SpO2: 96 % O2 Device: None (Room air)    General: NAD, alert, cooperative  Head: normocephalic, without abnormality / atraumatic  Abdomen: soft, mildly tender and firm over SP area,, non distended. No bilateral CVA tenderness.   Genitourinary: Uncircumcised penis without erythema or edema. 16 Fr carlton not draining, 10 ml saline removed from balloon port and catheter easily removed. A 24 Fr 3 way carlton was then placed with return of approx 800 ml burgundy colored urine with a few small clots. Manually irrigated with aspiration of a few small clots, then cherry colored urine. CBI initiated at moderate rate with intermittent pink to clear return.    Skin: No rashes or lesions  Musculoskeletal: moves all four extremities equally.   Psychological: alert and oriented, answers questions appropriately    LABS:  Creatinine   Date Value Ref Range Status   05/30/2024 0.86 0.67 - 1.17 mg/dL Final     WBC Count   Date Value Ref Range Status   05/30/2024 3.7 (L) 4.0 - 11.0 10e3/uL Final     Hemoglobin   Date Value Ref Range Status   05/30/2024 8.6 (L) 13.3 - 17.7 g/dL Final     Platelet Count   Date Value Ref Range Status   05/30/2024 187 150 - 450 10e3/uL Final     UA RESULTS:  Recent Labs   Lab Test 05/28/24  1417   COLOR Brown*   APPEARANCE Cloudy*   URINEGLC Negative   URINEBILI Negative   URINEKETONE Negative   SG 1.022   UBLD >1.0 mg/dL*   URINEPH 7.5*   PROTEIN 300*   NITRITE Negative   LEUKEST 250 Lei/uL*   RBCU >182*   WBCU 0     UC 5/28: >100k  Morganella morganii  Susceptibility     Morganella morganii     ANAY     Ampicillin  Resistant 1     Ampicillin/ Sulbactam >=32 ug/mL Resistant     Cefepime <=1 ug/mL Susceptible     Cefoxitin 16 ug/mL Intermediate     Ceftazidime 4 ug/mL Susceptible     Ceftriaxone <=1 ug/mL Susceptible     Ciprofloxacin <=0.25 ug/mL Susceptible     Gentamicin <=1 ug/mL Susceptible     Levofloxacin <=0.12 ug/mL Susceptible     Nitrofurantoin 128 ug/mL Resistant     Piperacillin/Tazobactam <=4 ug/mL Susceptible     Tobramycin <=1 ug/mL Susceptible     Trimethoprim/Sulfamethoxazole <=1/19 ug/mL Susceptible        Lab Results: personally reviewed.       CATHETER PLACEMENT PROCEDURE NOTE  Procedure:  The patient's urethra was prepped with Betadine solution. A 24 Fr 3 way carlton catheter was liberally lubricated and inserted with  some  difficulty likely d/t known prostatic fossa stricture. The catheter successfully reached the bladder and the catheter balloon was inflated with 20 cc of sterile saline. Approx 800 ml dark burgundy colored urine with small clots returned. The bladder was then manually irrigated with aspiration of a few small clots, then cherry colored urine. CBI initiateda t moderate rate. The patient appeared to tolerate the procedure moderate discomfort c/w bladder spasm, improved within 5 minutes of catheter placement.  The catheter was connected to a 4 liter gravity drainage bag and secured to the Right thigh.    ASSESSMENT/PLAN:  Kilo Montiel is being seen by [facility] for gross hematuria, urinary retention     - 84 yr old male with metastatic castrate resistant prostate cancer, urinary retention with prostatic fossa stricture, admitted after mechanical fall with left femur fracture status post orthopedic repair, seen initially by [facility] in consultation for urinary retention with bilateral hydronephrosis status post catheter insertion. Bilateral hydro noted resolved on 5/26/24 renal US.   - 3 way carlton  placed and CBI initiated for persistent GH with clot obstruction. Continue CBI at moderate rate overnight tonight. Manually irrigate prn.   - UC 5/28: >100k morganella morganii, susceptible to current IV cefepime. Recommend completing 7-10 day antibiotic course.   - Bacitracin ointment ordered to tip of penis for comfort.   - Hgb 8.6 today after PRBC transfusion 5/29. GI consulting for rectal bleeding as well. Monitor.   - Continue to hold ASA if safe to do so.   - Planning to maintain carlton at discharge. Will need close outpatient follow up. Consider SP cath in future.   - Will continue to follow.     This case was discussed with: Dr. Valdez and Dr. Landers.       Ramakrishna Miranda, APRN, CNP  [facility]   [phone]

## 2024-05-30 NOTE — PROGRESS NOTES
"Care Management Follow Up    Length of Stay (days): 7    Expected Discharge Date: 06/02/2024     Concerns to be Addressed: GI consult, manually irrigating carlton, monitor Hgb    Patient plan of care discussed at interdisciplinary rounds: Yes    Anticipated Discharge Disposition: Transitional Care     Anticipated Discharge Services:    Anticipated Discharge DME:      Patient/family educated on Medicare website which has current facility and service quality ratings:    Education Provided on the Discharge Plan:    Patient/Family in Agreement with the Plan:      Referrals Placed by CM/SW:    Private pay costs discussed: Not applicable    Additional Information:  Social history per previous CM note:  \"Patient lives with spouse and adult daughter Casie. Patient was recently discharged to [facility] after and admission for generalized weakness. He was home for a day and fell. Surgery scheduled 5/24 afternoon. Patient is open to returning to [facility] for TCU services.\"     Theray recommendation is TCU and pt has been medically accepted to [facility], pt is accepting of this discharge plan per previos CM note however this AM provider updated CM that the pt is still not medically ready to discharge pt having gross hematuria and has a GI consult pending. Pt will be here likely be here a couple more days. CM has updated the TCU and per TCU just keep them updated on plans and they will try to find a bed when he is medically ready to discharge.    RNCM to follow for medical progression, recommendations, and final discharge plan.      Natalee Spence RN      "

## 2024-05-31 ENCOUNTER — DOCUMENTATION ONLY (OUTPATIENT)
Dept: OTHER | Facility: CLINIC | Age: 84
End: 2024-05-31
Payer: COMMERCIAL

## 2024-05-31 ENCOUNTER — APPOINTMENT (OUTPATIENT)
Dept: PHYSICAL THERAPY | Facility: HOSPITAL | Age: 84
DRG: 522 | End: 2024-05-31
Payer: COMMERCIAL

## 2024-05-31 ENCOUNTER — APPOINTMENT (OUTPATIENT)
Dept: OCCUPATIONAL THERAPY | Facility: HOSPITAL | Age: 84
DRG: 522 | End: 2024-05-31
Payer: COMMERCIAL

## 2024-05-31 LAB
ERYTHROCYTE [DISTWIDTH] IN BLOOD BY AUTOMATED COUNT: 20.1 % (ref 10–15)
HCT VFR BLD AUTO: 28 % (ref 40–53)
HGB BLD-MCNC: 9.5 G/DL (ref 13.3–17.7)
MCH RBC QN AUTO: 32.3 PG (ref 26.5–33)
MCHC RBC AUTO-ENTMCNC: 33.9 G/DL (ref 31.5–36.5)
MCV RBC AUTO: 95 FL (ref 78–100)
PHOSPHATE SERPL-MCNC: 2.9 MG/DL (ref 2.5–4.5)
PLATELET # BLD AUTO: 203 10E3/UL (ref 150–450)
RBC # BLD AUTO: 2.94 10E6/UL (ref 4.4–5.9)
WBC # BLD AUTO: 4 10E3/UL (ref 4–11)

## 2024-05-31 PROCEDURE — 84100 ASSAY OF PHOSPHORUS: CPT | Performed by: INTERNAL MEDICINE

## 2024-05-31 PROCEDURE — 97110 THERAPEUTIC EXERCISES: CPT | Mod: GP | Performed by: PHYSICAL THERAPIST

## 2024-05-31 PROCEDURE — 120N000001 HC R&B MED SURG/OB

## 2024-05-31 PROCEDURE — 250N000013 HC RX MED GY IP 250 OP 250 PS 637: Performed by: INTERNAL MEDICINE

## 2024-05-31 PROCEDURE — 97110 THERAPEUTIC EXERCISES: CPT | Mod: GO

## 2024-05-31 PROCEDURE — 250N000013 HC RX MED GY IP 250 OP 250 PS 637: Performed by: STUDENT IN AN ORGANIZED HEALTH CARE EDUCATION/TRAINING PROGRAM

## 2024-05-31 PROCEDURE — 85027 COMPLETE CBC AUTOMATED: CPT | Performed by: INTERNAL MEDICINE

## 2024-05-31 PROCEDURE — 99233 SBSQ HOSP IP/OBS HIGH 50: CPT | Performed by: INTERNAL MEDICINE

## 2024-05-31 PROCEDURE — 36415 COLL VENOUS BLD VENIPUNCTURE: CPT | Performed by: INTERNAL MEDICINE

## 2024-05-31 PROCEDURE — 97116 GAIT TRAINING THERAPY: CPT | Mod: GP | Performed by: PHYSICAL THERAPIST

## 2024-05-31 PROCEDURE — 250N000011 HC RX IP 250 OP 636: Performed by: INTERNAL MEDICINE

## 2024-05-31 RX ORDER — CEFDINIR 300 MG/1
300 CAPSULE ORAL EVERY 12 HOURS SCHEDULED
Status: DISCONTINUED | OUTPATIENT
Start: 2024-05-31 | End: 2024-06-03 | Stop reason: HOSPADM

## 2024-05-31 RX ORDER — HYDROMORPHONE HYDROCHLORIDE 1 MG/ML
0.2 INJECTION, SOLUTION INTRAMUSCULAR; INTRAVENOUS; SUBCUTANEOUS
Status: DISCONTINUED | OUTPATIENT
Start: 2024-05-31 | End: 2024-06-03 | Stop reason: HOSPADM

## 2024-05-31 RX ADMIN — SODIUM CHLORIDE TAB 1 GM 1 G: 1 TAB at 18:40

## 2024-05-31 RX ADMIN — BACITRACIN: 500 OINTMENT TOPICAL at 20:54

## 2024-05-31 RX ADMIN — BACITRACIN: 500 OINTMENT TOPICAL at 09:17

## 2024-05-31 RX ADMIN — SENNOSIDES AND DOCUSATE SODIUM 1 TABLET: 8.6; 5 TABLET ORAL at 20:51

## 2024-05-31 RX ADMIN — POLYETHYLENE GLYCOL 3350 17 G: 17 POWDER, FOR SOLUTION ORAL at 20:51

## 2024-05-31 RX ADMIN — CYANOCOBALAMIN TAB 1000 MCG 1000 MCG: 1000 TAB at 09:19

## 2024-05-31 RX ADMIN — FAMOTIDINE 20 MG: 20 TABLET ORAL at 09:18

## 2024-05-31 RX ADMIN — ACETAMINOPHEN 650 MG: 325 TABLET ORAL at 21:00

## 2024-05-31 RX ADMIN — ESCITALOPRAM OXALATE 10 MG: 10 TABLET ORAL at 09:18

## 2024-05-31 RX ADMIN — CEFEPIME HYDROCHLORIDE 1 G: 1 INJECTION, POWDER, FOR SOLUTION INTRAMUSCULAR; INTRAVENOUS at 09:17

## 2024-05-31 RX ADMIN — SODIUM CHLORIDE TAB 1 GM 1 G: 1 TAB at 09:18

## 2024-05-31 RX ADMIN — LEVOTHYROXINE SODIUM 125 MCG: 0.03 TABLET ORAL at 06:24

## 2024-05-31 RX ADMIN — MEGESTROL ACETATE 400 MG: 40 SUSPENSION ORAL at 09:18

## 2024-05-31 RX ADMIN — SENNOSIDES AND DOCUSATE SODIUM 1 TABLET: 8.6; 5 TABLET ORAL at 09:18

## 2024-05-31 RX ADMIN — BACITRACIN: 500 OINTMENT TOPICAL at 13:49

## 2024-05-31 RX ADMIN — SODIUM CHLORIDE TAB 1 GM 1 G: 1 TAB at 13:49

## 2024-05-31 RX ADMIN — ASPIRIN 81 MG: 81 TABLET, COATED ORAL at 20:51

## 2024-05-31 RX ADMIN — POLYETHYLENE GLYCOL 3350 17 G: 17 POWDER, FOR SOLUTION ORAL at 09:17

## 2024-05-31 RX ADMIN — ACETAMINOPHEN 650 MG: 325 TABLET ORAL at 09:30

## 2024-05-31 RX ADMIN — CEFDINIR 300 MG: 300 CAPSULE ORAL at 20:50

## 2024-05-31 ASSESSMENT — ACTIVITIES OF DAILY LIVING (ADL)
ADLS_ACUITY_SCORE: 52
ADLS_ACUITY_SCORE: 51
ADLS_ACUITY_SCORE: 52
ADLS_ACUITY_SCORE: 52
ADLS_ACUITY_SCORE: 51
ADLS_ACUITY_SCORE: 52
ADLS_ACUITY_SCORE: 51
ADLS_ACUITY_SCORE: 52
ADLS_ACUITY_SCORE: 51
ADLS_ACUITY_SCORE: 51
ADLS_ACUITY_SCORE: 52
ADLS_ACUITY_SCORE: 51

## 2024-05-31 NOTE — PLAN OF CARE
Goal Outcome Evaluation:    Pt is A&Ox4, SBA with ambulation/transfers using walker. Up in chair for a few hours this am, participating in therapies. Pt reports minimal pain, requested prn tylenol which was effective. CMS in LLE intact, pulse present, dressing is clean, dry and intact. Forte intact and patent, Continues on CBI at a slow rate with clear yellow urine output. Bladder scanned x1 with only 5ml. Pt is eating well, encouraging fluid intake, bm last night per chart. VSS, hgb stable. No s/s of infection, continues on IV abx for UTI     Problem: Adult Inpatient Plan of Care  Goal: Optimal Comfort and Wellbeing  Outcome: Progressing  Intervention: Monitor Pain and Promote Comfort  Recent Flowsheet Documentation  Taken 5/31/2024 0930 by Lida Magaña RN  Pain Management Interventions:   medication (see MAR)   ambulation/increased activity   breathing exercises   cold applied   distraction   emotional support   pillow support provided   repositioned     Problem: Risk for Delirium  Goal: Improved Attention and Thought Clarity  Outcome: Progressing     Problem: Orthopaedic Fracture  Goal: Absence of Bleeding  Outcome: Progressing  Goal: Bowel Elimination  Outcome: Progressing  Intervention: Promote Effective Bowel Elimination  Recent Flowsheet Documentation  Taken 5/31/2024 1630 by Lida Magaña RN  Bowel Elimination Promotion:   adequate fluid intake promoted   ambulation promoted   commode/bedpan at bedside  Taken 5/31/2024 0854 by Lida Magaña RN  Bowel Elimination Promotion:   adequate fluid intake promoted   ambulation promoted   commode/bedpan at bedside  Goal: Absence of Infection Signs and Symptoms  Outcome: Progressing  Goal: Effective Tissue Perfusion  Outcome: Progressing  Goal: Effective Oxygenation and Ventilation  Outcome: Progressing  Intervention: Promote Airway Secretion Clearance  Recent Flowsheet Documentation  Taken 5/31/2024 1630 by Lida Magaña RN  Activity Management: activity  encouraged  Taken 5/31/2024 0854 by Lida Magaña RN  Activity Management: activity encouraged     Problem: Pain Acute  Goal: Optimal Pain Control and Function  Outcome: Progressing  Intervention: Develop Pain Management Plan  Recent Flowsheet Documentation  Taken 5/31/2024 0930 by Lida Magaña RN  Pain Management Interventions:   medication (see MAR)   ambulation/increased activity   breathing exercises   cold applied   distraction   emotional support   pillow support provided   repositioned  Intervention: Prevent or Manage Pain  Recent Flowsheet Documentation  Taken 5/31/2024 1630 by Lida Magaña RN  Bowel Elimination Promotion:   adequate fluid intake promoted   ambulation promoted   commode/bedpan at bedside  Taken 5/31/2024 0854 by Lida Magaña RN  Bowel Elimination Promotion:   adequate fluid intake promoted   ambulation promoted   commode/bedpan at bedside     Problem: Anemia  Goal: Anemia Symptom Improvement  Outcome: Progressing  Intervention: Monitor and Manage Anemia  Recent Flowsheet Documentation  Taken 5/31/2024 1630 by Lida Magaña RN  Safety Promotion/Fall Prevention:   activity supervised   assistive device/personal items within reach   nonskid shoes/slippers when out of bed   patient and family education  Taken 5/31/2024 0854 by Lida Magaña RN  Safety Promotion/Fall Prevention:   activity supervised   assistive device/personal items within reach   nonskid shoes/slippers when out of bed   patient and family education     Problem: Comorbidity Management  Goal: Blood Pressure in Desired Range  Outcome: Progressing     Problem: Urinary Retention  Goal: Effective Urinary Elimination  Outcome: Progressing     Problem: Malnutrition  Goal: Improved Nutritional Intake  Outcome: Progressing     Problem: Adult Inpatient Plan of Care  Goal: Absence of Hospital-Acquired Illness or Injury  Intervention: Identify and Manage Fall Risk  Recent Flowsheet Documentation  Taken 5/31/2024 1630 by  Lida Magaña RN  Safety Promotion/Fall Prevention:   activity supervised   assistive device/personal items within reach   nonskid shoes/slippers when out of bed   patient and family education  Taken 5/31/2024 0854 by Lida Magaña RN  Safety Promotion/Fall Prevention:   activity supervised   assistive device/personal items within reach   nonskid shoes/slippers when out of bed   patient and family education     Problem: Risk for Delirium  Goal: Improved Behavioral Control  Intervention: Minimize Safety Risk  Recent Flowsheet Documentation  Taken 5/31/2024 1630 by Lida Magaña RN  Enhanced Safety Measures:   assistive devices when indicated   pain management   patient/family teach back on injury risk  Taken 5/31/2024 0854 by Lida Magaña RN  Enhanced Safety Measures:   assistive devices when indicated   pain management   patient/family teach back on injury risk     Problem: Orthopaedic Fracture  Goal: Optimal Functional Ability  Intervention: Optimize Functional Ability  Recent Flowsheet Documentation  Taken 5/31/2024 1630 by Lida Magaña RN  Activity Management: activity encouraged  Taken 5/31/2024 0854 by Lida Magaña RN  Activity Management: activity encouraged  Goal: Optimal Pain Control and Function  Intervention: Manage Acute Orthopaedic-Related Pain  Recent Flowsheet Documentation  Taken 5/31/2024 0930 by Lida Magaña RN  Pain Management Interventions:   medication (see MAR)   ambulation/increased activity   breathing exercises   cold applied   distraction   emotional support   pillow support provided   repositioned

## 2024-05-31 NOTE — PROGRESS NOTES
"Orthopedic Surgery Progress Note    Subjective:   Kilo is doing well today.  Pain is improving today.  He does note some concern about upper body deconditioning.  PT provided a packet with exercises    Exam:  BP (!) 158/83 (BP Location: Right arm)   Pulse 69   Temp 98.5  F (36.9  C) (Oral)   Resp 16   Ht 1.676 m (5' 6\")   Wt 51.9 kg (114 lb 6.7 oz)   SpO2 96%   BMI 18.47 kg/m    Gen: Awake, alert, NAD  Resp: breathing equal and non-labored  Extremities:      LLE: Dressing C.D.I 5/5 EHL/FHL/TA/Gsc. SILT in s/s/dp/sp/t distributions. 2+ DP and PT pulses. Toes WWP, brisk cap refill.      Labs:    Recent Labs   Lab 05/31/24  0520 05/30/24  0548 05/29/24  0548 05/28/24  1203 05/28/24  0543 05/27/24  0604   WBC 4.0 3.7*  --   --  3.6* 3.3*   HGB 9.5* 8.6* 6.8* 8.1* 7.2* 7.3*    187  --   --  191 155     Recent Labs   Lab 05/31/24  0520 05/30/24  0548 05/29/24  0548 05/28/24  0543 05/27/24  2052 05/27/24  0604 05/26/24  2132 05/25/24  0536 05/25/24  0527   NA  --  132*  --  132*  --  132*  --   --  130*   POTASSIUM  --  4.5  --  4.8  --  4.1  --   --  4.4   CHLORIDE  --  100  --  102  --  101  --   --  98   CO2  --  24  --  23  --  24  --   --  23   BUN  --  26.3*  --  21.9  --  21.1  --   --  23.3*   CR  --  0.86  --  0.80  --  0.83  --   --  0.86   GLC  --  99  --  106*  --  112* 135*   < > 104*   PHOS 2.9 2.8 2.5 2.2*   < > 1.6*  --   --   --     < > = values in this interval not displayed.     No lab results found in last 7 days.      Assessment:   84 year old male with POD7 sp Left hip hemiarthroplasty. Doing Well. Hematuria resolving.  Rectal bleed noted, will be managed outpatient      Plan:  Continue PT/OT  Weightbearing status: WBAT  Anticoagulation: SCDs, tomas stockings and early ambulation..  Okay to hold ASA at this time from an ortho standpoint.  Restart when able.  Encourage activity, ambulation, calf pumps  Hgb 9.5.   Discharge planning: Pending progress through PT/OT and medical clearance.  " Orthopedically progressing well    SILVIA FERNANDES PA-C  Orthopedic Surgery  [facility]

## 2024-05-31 NOTE — PLAN OF CARE
"  Problem: Adult Inpatient Plan of Care  Description: The Care Plan Review/Shift Note, Individualized Goals, Hospital-Acquired Illness or Injury, Comfort and Wellbeing, and Transition Planning are the \"Overarching Goals\" and should be updated throughout the hospitalization.  Please hover over the (i) for specific information on each goal topic.  Goal: Plan of Care Review  Description: The Plan of Care Review/Shift note should be completed every shift.  The Outcome Evaluation is a brief statement about your assessment that the patient is improving, declining, or no change.  This information will be displayed automatically on your shift  note.  Outcome: Progressing  Flowsheets (Taken 5/31/2024 5192)  Outcome Evaluation: pt is eating well and meeting his nutrition needs. Wt is up 4 lb from admit.  Plan of Care Reviewed With: patient  Overall Patient Progress: improving     Problem: Malnutrition  Goal: Improved Nutritional Intake  Outcome: Progressing   Goal Outcome Evaluation:      Plan of Care Reviewed With: patient    Overall Patient Progress: improvingOverall Patient Progress: improving    Outcome Evaluation: pt is eating well and meeting his nutrition needs. Wt is up 4 lb from admit.      "

## 2024-05-31 NOTE — PROGRESS NOTES
"Care Management Follow Up    Length of Stay (days): 8    Expected Discharge Date: 06/02/2024     Concerns to be Addressed: pt is on CBI     Patient plan of care discussed at interdisciplinary rounds: Yes    Anticipated Discharge Disposition: Transitional Care     Anticipated Discharge Services:    Anticipated Discharge DME:      Patient/family educated on Medicare website which has current facility and service quality ratings:    Education Provided on the Discharge Plan:    Patient/Family in Agreement with the Plan:      Referrals Placed by CM/SW:  TCU  Private pay costs discussed: Not applicable    Additional Information:  Social history per previous CM note:  \"Patient lives with spouse and adult daughter Casie. Patient was recently discharged to [facility]CU after and admission for generalized weakness. He was home for a day and fell. Surgery scheduled 5/24 afternoon. Patient is open to returning to [facility] for TCU services.\"     Theray recommendation is TCU and pt has been medically accepted to [facility], pt is accepting of this discharge plan per previos CM note however provider updated CM that the pt is still not medically ready to discharge due to CBI. Pt will be here likely a couple more days. CM has updated the TCU and per TCU just keep them updated on plans and they will try to find a bed when he is medically ready to discharge.    RNCM to follow for medical progression, recommendations, and final discharge plan.      Natalee Spence RN      "

## 2024-05-31 NOTE — PLAN OF CARE
Problem: Orthopaedic Fracture  Goal: Absence of Bleeding  Outcome: Progressing     Problem: Pain Acute  Goal: Optimal Pain Control and Function  Outcome: Progressing  Intervention: Prevent or Manage Pain  Recent Flowsheet Documentation  Taken 5/31/2024 0000 by Zofia Yanez RN  Sensory Stimulation Regulation:   care clustered   quiet environment promoted  Medication Review/Management: medications reviewed   Goal Outcome Evaluation:  Pt continues on CBI.  Infusing at a fast rate for the 1st part of the shift.  Small clots noted with pink tinged urine at the start of the shift.  Urine has been clear for a while so CBI slowed to a moderate rate.  Continues to be clear.  Pt denies pain.  Cares clustered as much as possible to promote rest.   1 stool noted this shift.  No evident blood.  VSS.  Call light in reach.

## 2024-05-31 NOTE — PROGRESS NOTES
"CLINICAL NUTRITION SERVICES - REASSESSMENT NOTE     Nutrition Prescription    RECOMMENDATIONS FOR MDs/PROVIDERS TO ORDER:  None    Malnutrition Status:    Moderate in context of chronic illness    Recommendations already ordered by Registered Dietitian (RD):  No new    Future/Additional Recommendations:  Add supplement if intake is inadequate         CURRENT NUTRITION ORDERS  Diet: Regular, tray set up    Intake/Tolerance:good, >/= 75% of meals  Ordering 3 meal/day at 5564-5629 kcal,  g protein/day, meeting >100% of estimated nutrition needs    Pt reports meals are going well and his appetite is good. Pt asked questions about foods for constipation    LABS  Labs reviewed  Na 132 (L) 5/30-no change  Phos 2.9 Wnl, improved  Hbg rising    MEDICATIONS  Medications reviewed      ANTHROPOMETRICS  Height: 167.6 cm (5' 6\")  Weight History: Weight up 4 lb from 3 weeks prior  Wt Readings from Last 3 Encounters:   05/31/24 51.9 kg (114 lb 6.7 oz)- bedscale   05/07/24 49.9 kg (110 lb)   10/13/23 57.2 kg (126 lb)   125 lb one year ago per family    Dosing Weight: 49.9 kg    ASSESSED NUTRITION NEEDS  Estimated Energy Needs: 1500 -2000 kcals/day (30 - 35 kcals/kg )  Justification: Underweight  Estimated Protein Needs: 50-60 +grams protein/day (1 - 1.2+ grams of pro/kg)  Justification: Increased needs  Estimated Fluid Needs: 1500 -2000 mL/day (1 mL/kcal)   Justification: Maintenance    GI  1 soft BM yesterday  Goal to keep Bms loose to reduce straining per GI-bowel meds increased     MALNUTRITION:  % Weight Loss:  Weight loss does not meet criteria for malnutrition   % Intake:  None noted  Subcutaneous Fat Loss:  Orbital region mild  depletion, Buccal region moderate depletion  Muscle Loss:  Clavicle bone region severe depletion and Dorsal hand region moderate depletion  Fluid Retention:  None noted    Malnutrition Diagnosis: Moderate malnutrition  In Context of:  Chronic illness or disease    NUTRITION " DIAGNOSIS  Malnutrition related to decreased intake on and off this past year with increased energy needs related to cancer and treatment, as well as pneumonia as evidenced by moderate subcutaneous fat loss and moderate to severe muscle loss, and weight loss (pt is underweight, low BMI)- improving    INTERVENTIONS  Implementation  Educated pt on foods for constipation    Goals  Patient to continue to consume % of nutritionally adequate meals three times per day, or the equivalent with supplements/snacks.- met    Weight gain- progressing     Monitoring/Evaluation  Progress toward goals will be monitored and evaluated per protocol.

## 2024-05-31 NOTE — PROGRESS NOTES
"    Place of Service:  [facility]     Reason for follow up: gross hematuria     SUBJECTIVE:  Events: no acute events overnight    Patient reports feeling well this AM. Urine yellow/clear with CBI on minimal flow. NPO. Tolerating carlton catheter well. Afebrile.     OBJECTIVE:  PHYSICAL EXAM:  BP (!) 158/83 (BP Location: Right arm)   Pulse 69   Temp 98.5  F (36.9  C) (Oral)   Resp 16   Ht 1.676 m (5' 6\")   Wt 51.9 kg (114 lb 6.7 oz)   SpO2 96%   BMI 18.47 kg/m     General: NAD, alert, cooperative  Head: normocephalic, without abnormality / atraumatic  Abdomen: soft, non tender, non distended. No suprapubic fullness, no suprapubic tenderness. No CVA tenderness noted bilaterally  Genitourinary: 3-way carlton catheter in place. Urine yellow without sediment or blood clots.   Psychological: alert and oriented, answers questions appropriately    LABS:  Lab Results   Component Value Date    WBC 4.0 05/31/2024    HGB 9.5 (L) 05/31/2024    HCT 28.0 (L) 05/31/2024     05/31/2024    ALT 7 05/23/2024    AST 21 05/23/2024     (L) 05/30/2024    BUN 26.3 (H) 05/30/2024    CO2 24 05/30/2024    TSH 27.19 (H) 05/01/2024    PSA 0.69 08/19/2014    INR 1.10 05/23/2024        Cultures:     Lab Results: personally reviewed.     ASSESSMENT/PLAN:  Kilo Montiel is being seen by [facility] for gross hematuria (hx of metastatic castrate resistant prostate cancer)     - Hematuria resolved. Wean CBI completely off as tolerated. Manually irrigate as needed for return of gross hematuria/blood clots prior to restarting CBI.   - No need for surgery. Regular diet.   - Hemoglobin trending upward  - Plan to discharge with carlton catheter. Email send to scheduling team to arrange visit.     Ildefonso Parr PA-C   [facility]               "

## 2024-05-31 NOTE — PLAN OF CARE
Patient is A&O to place, situation and self.  He was confused about time and thought he was eating breakfast at dinner. Patient on continuous bladder management and needed to be manually flushed x4. Many large clots removed and patient was in pain. Given prn pain meds with some relief. Dressing on hip is CDI.   Problem: Adult Inpatient Plan of Care  Goal: Optimal Comfort and Wellbeing  Outcome: Progressing  Intervention: Monitor Pain and Promote Comfort  Recent Flowsheet Documentation  Taken 5/30/2024 1942 by Ely Horta RN  Pain Management Interventions: repositioned     Problem: Risk for Delirium  Goal: Optimal Coping  Outcome: Progressing  Intervention: Optimize Psychosocial Adjustment to Delirium  Recent Flowsheet Documentation  Taken 5/30/2024 1900 by Ely Horta RN  Supportive Measures: active listening utilized  Goal: Improved Behavioral Control  Outcome: Progressing  Intervention: Prevent and Manage Agitation  Recent Flowsheet Documentation  Taken 5/30/2024 1900 by Ely Horta RN  Environment Familiarity/Consistency: daily routine followed  Intervention: Minimize Safety Risk  Recent Flowsheet Documentation  Taken 5/30/2024 1900 by Ely Horta RN  Communication Enhancement Strategies: call light answered in person  Enhanced Safety Measures: pain management     Problem: Orthopaedic Fracture  Goal: Absence of Bleeding  Outcome: Progressing  Goal: Absence of Embolism Signs and Symptoms  Outcome: Progressing  Intervention: Prevent or Manage Embolism Risk  Recent Flowsheet Documentation  Taken 5/30/2024 1900 by Ely Horta RN  VTE Prevention/Management: SCDs (sequential compression devices) on  Goal: Effective Tissue Perfusion  Outcome: Progressing  Goal: Optimal Pain Control and Function  Outcome: Progressing  Intervention: Manage Acute Orthopaedic-Related Pain  Recent Flowsheet Documentation  Taken 5/30/2024 1942 by Ely Horta RN  Pain Management Interventions: repositioned  Goal:  Effective Oxygenation and Ventilation  Outcome: Progressing  Intervention: Promote Airway Secretion Clearance  Recent Flowsheet Documentation  Taken 5/30/2024 1900 by Ely Horta RN  Cough And Deep Breathing: done independently per patient  Activity Management: activity adjusted per tolerance  Intervention: Optimize Oxygenation and Ventilation  Recent Flowsheet Documentation  Taken 5/30/2024 2255 by Ely Horta RN  Head of Bed (HOB) Positioning: HOB at 20-30 degrees  Taken 5/30/2024 1900 by Ely Horta RN  Head of Bed (HOB) Positioning: HOB at 20 degrees  Taken 5/30/2024 1655 by Ely Horta RN  Head of Bed (HOB) Positioning: HOB at 20 degrees     Problem: Adult Inpatient Plan of Care  Goal: Absence of Hospital-Acquired Illness or Injury  Intervention: Identify and Manage Fall Risk  Recent Flowsheet Documentation  Taken 5/30/2024 1900 by Ely Horta RN  Safety Promotion/Fall Prevention: activity supervised  Intervention: Prevent Skin Injury  Recent Flowsheet Documentation  Taken 5/30/2024 2255 by Ely Horta RN  Body Position: sitting up in bed  Taken 5/30/2024 1900 by Ely Horta RN  Body Position: turned  Skin Protection: silicone foam dressing in place  Device Skin Pressure Protection: absorbent pad utilized/changed  Taken 5/30/2024 1655 by Ely Horta RN  Body Position: turned  Intervention: Prevent and Manage VTE (Venous Thromboembolism) Risk  Recent Flowsheet Documentation  Taken 5/30/2024 1900 by Ely Horta RN  VTE Prevention/Management: SCDs (sequential compression devices) on  Intervention: Prevent Infection  Recent Flowsheet Documentation  Taken 5/30/2024 1900 by Ely Horta RN  Infection Prevention: hand hygiene promoted     Problem: Risk for Delirium  Goal: Improved Attention and Thought Clarity  Intervention: Maximize Cognitive Function  Recent Flowsheet Documentation  Taken 5/30/2024 1900 by Ely Horta RN  Sensory Stimulation Regulation: care  clustered  Reorientation Measures: clock in view     Problem: Orthopaedic Fracture  Goal: Optimal Functional Ability  Intervention: Optimize Functional Ability  Recent Flowsheet Documentation  Taken 5/30/2024 2255 by Ely Horta RN  Positioning/Transfer Devices:   pillows   in use  Taken 5/30/2024 1900 by Ely Horta RN  Activity Management: activity adjusted per tolerance  Positioning/Transfer Devices:   in use   pillows  Taken 5/30/2024 1655 by Ely Horta RN  Positioning/Transfer Devices:   in use   pillows  Goal: Absence of Infection Signs and Symptoms  Intervention: Prevent or Manage Infection  Recent Flowsheet Documentation  Taken 5/30/2024 1900 by Ely Horta RN  Isolation Precautions: contact precautions maintained     Problem: Pain Acute  Goal: Optimal Pain Control and Function  Intervention: Develop Pain Management Plan  Recent Flowsheet Documentation  Taken 5/30/2024 1942 by Ely Horta RN  Pain Management Interventions: repositioned  Intervention: Prevent or Manage Pain  Recent Flowsheet Documentation  Taken 5/30/2024 1900 by Ely Horta RN  Sensory Stimulation Regulation: care clustered  Medication Review/Management: medications reviewed  Intervention: Optimize Psychosocial Wellbeing  Recent Flowsheet Documentation  Taken 5/30/2024 1900 by Ely Horta RN  Supportive Measures: active listening utilized     Problem: Anemia  Goal: Anemia Symptom Improvement  Intervention: Monitor and Manage Anemia  Recent Flowsheet Documentation  Taken 5/30/2024 1900 by Ely Horta RN  Safety Promotion/Fall Prevention: activity supervised     Problem: Comorbidity Management  Goal: Blood Pressure in Desired Range  Intervention: Maintain Blood Pressure Management  Recent Flowsheet Documentation  Taken 5/30/2024 1900 by Ely Horta, RN  Medication Review/Management: medications reviewed   Goal Outcome Evaluation:

## 2024-05-31 NOTE — PROGRESS NOTES
[facility]    PROGRESS NOTE - Hospitalist Service    ASSESSMENT AND PLAN     Principal Problem:    Closed subcapital fracture of left femur, initial encounter (H)  Active Problems:    Malignant neoplasm of prostate (H)    Hip pain, left    Fall, initial encounter    Anemia, unspecified type    Gross hematuria    Kilo Montiel is a 84 year old male with h/o metastatic prostate cancer, hypothyroidism, and syncope who was admitted on 5/23/2024 after a mechanical fall with left hip fracture for which he underwent left hip hemiarthroplasty on 5/24/2024.  Hospital course complicated by gross hematuria with urinary retention, UTI, rectal bleeding, acute blood loss anemia requiring PRBC transfusion     Left hip fracture  S/p mechanical fall   -CT ABD/PEL 5/23: Mildly comminuted impacted subcapital fracture of the left proximal femur  -s/p left hemiarthroplasty 5/24  - post surgery care per ortho   - PT/OT-recommending TCU placement     Urinary retention with bilateral hydronephrosis and gross hematuria, h/o Prostate cancer   - status post Forte catheter  -CT ABD/PEL 5/23: Moderate to severe distention of the urinary bladder with symmetric hydroureter and hydronephrosis, evidence of bone metastases on the ribs, spine, and pelvis  -Urology initiated CBI.  Urine now clear with plans to wean off CBI  Possible discharge tomorrow   Will resume aspirin 5/31  Discharging with Forte catheter in place.  Will follow-up with urology outpatient.    Morganella UTI   Continue cefepime while inpatient, can switch to oral quinolones at discharge.  Complete 7-day antibiotic course complicated UTI  Switch from cefepime to cefdinir based on susceptibilities     Rectal bleeding -  resolved   Has history of hemorrhoids, radiation proctitis, also rectal ulcer was noted on most recent colonoscopy in [place] 4/10/2024 (biopsy report is in the chart)   -GI saw and signed off      Acute on chronic anemia- stable    Pancytopenia-chronic (thrombocytopenia is variable)  -Baseline hemoglobin usually in the 8's  -S/p total of 3 units PRBCs     Hypophosphatemia  -Replace per protocol     Chronic compression fractures  -CT lumbar spine 5/23: Chronic T12, L1, L2, L3, L4, and L5 anterior compression fractures, severe spinal canal stenosis at L1-L2 with retropulsion of the L2 vertebral body, moderate to severe spinal stenosis at L4-L5     Metastatic prostate cancer   Treated with radiation and initially in 2009, subsequent brachytherapy in 2013  He subsequently developed bony metastasis and had radiation to his lumbar spine in 2015  He has been on multiple additional oral medications     Barriers to discharge: CBI weaning    Anticipated length of stay: Anticipated discharge tomorrow 6/1      Present on Admission:   Hip pain, left   Closed subcapital fracture of left femur, initial encounter (H)   Fall, initial encounter   Anemia, unspecified type   Malignant neoplasm of prostate (H)      Medically Ready for Discharge: Anticipated Tomorrow        Subjective:  Patient resting comfortably in a chair.  Urine is now clear on CBI.  No complaints per patient other than some soreness to his hip.  Working well with PT    PHYSICAL EXAM  Temp:  [98.2  F (36.8  C)-100.2  F (37.9  C)] 98.5  F (36.9  C)  Pulse:  [69-71] 69  Resp:  [15-16] 16  BP: (130-158)/(72-83) 158/83  SpO2:  [95 %-100 %] 96 %  Wt Readings from Last 1 Encounters:   05/31/24 51.9 kg (114 lb 6.7 oz)       Intake/Output Summary (Last 24 hours) at 5/31/2024 1315  Last data filed at 5/31/2024 0500  Gross per 24 hour   Intake 750 ml   Output 300 ml   Net 450 ml      Body mass index is 18.47 kg/m .    GENRL: Alert and answering questions appropriately. Not in acute distress. Sitting up in a chair   CHEST: Breathing easily   EXTRM: trace pedal edema  NEURO: Following commands and moving extremities.    PSYCH: Normal affect and mood.   INTGM: No skin rash    Medical Decision Making        55 MINUTES SPENT BY ME on the date of service doing chart review, history, exam, documentation & further activities per the note.      PERTINENT LABS/IMAGING:  Results for orders placed or performed during the hospital encounter of 05/23/24   CT Head w/o Contrast    Impression    IMPRESSION:    1.  No evidence of acute intracranial hemorrhage or mass effect.  2.  Moderate nonspecific white matter changes.  3.  Moderate brain parenchymal volume loss.   CT Cervical Spine w/o Contrast    Impression    IMPRESSION:  1.  No evidence of acute fracture or subluxation of the cervical spine by CT imaging.  2.  Degenerative cervical spondylosis as described above.   XR Femur Left 2 Views    Impression    IMPRESSION: Both hips negative for fracture or dislocation. There is degenerative change. There is some lucency within the left acetabulum which may be artifactual/projectional. No cortical step-off. Prostate brachytherapy seeds. Fracture of the L5   vertebral body may be chronic. Recommend correlation. The left femur is negative for fracture. Chronic enthesitis superior pole of the left patella. Small left knee joint effusion.   XR Pelvis 1/2 Views    Impression    IMPRESSION: Both hips negative for fracture or dislocation. There is degenerative change. There is some lucency within the left acetabulum which may be artifactual/projectional. No cortical step-off. Prostate brachytherapy seeds. Fracture of the L5   vertebral body may be chronic. Recommend correlation. The left femur is negative for fracture. Chronic enthesitis superior pole of the left patella. Small left knee joint effusion.   XR Knee Left 1/2 Views    Impression    IMPRESSION: Both hips negative for fracture or dislocation. There is degenerative change. There is some lucency within the left acetabulum which may be artifactual/projectional. No cortical step-off. Prostate brachytherapy seeds. Fracture of the L5   vertebral body may be chronic. Recommend  correlation. The left femur is negative for fracture. Chronic enthesitis superior pole of the left patella. Small left knee joint effusion.   CT Abdomen Pelvis w Contrast    Impression    IMPRESSION:     1.  Mildly comminuted impacted subcapital fracture proximal left femur.  2.  Moderate to severe distention of the urinary bladder associated with symmetric hydroureter and hydronephrosis. Urinary retention potentially secondary to flow limitation at the level of the prostate gland.  3.  Multifocal sclerosis of the ribs, spine, and pelvis consistent with patchy bone metastases. Compression deformities of the lumbar spine are unchanged from 5/1/2024.  4.  Focal consolidation in the anterior basal left lower lobe has improved from 5/1/2024 with residual atelectasis and 9 mm nodule in this location. The nodule could be reassessed with chest CT in 3-6 months.   CT Lumbar Spine Reconstructed    Impression    IMPRESSION:  1.  Diffuse osseous metastatic disease.  2.  Chronic T12, L1, L2, L3, L4, and L5 anterior compression fractures are unchanged from comparison exam.  3.  Severe spinal canal stenosis L1-2, secondary to bony retropulsion of the L2 vertebral body. Moderate to severe spinal canal stenosis L4-5.  4.  Partially imaged distended urinary bladder with severe right and mild left hydronephrosis.   XR Pelvis w Hip Port Left  1 View    Impression    IMPRESSION: Postoperative change related to interval placement of a left hip hemiarthroplasty for management of left proximal femur fracture. The component projects in the expected position. No acute displaced periprosthetic fracture. Expected recent   postoperative soft tissue and intra-articular gas with left hip soft tissue swelling. Mild degenerative change right hip. Prostate gland brachytherapy seeds. No displaced pelvic fracture. Diffuse bone demineralization.     US Renal Complete Non-Vascular    Impression    IMPRESSION:  1.  No hydronephrosis.    2.  Right renal  "atrophy with diffuse cortical thinning.    3.  Marked diffuse bladder wall thickening with debris within the mildly distended bladder lumen. Forte catheter within the bladder.     Most Recent 3 CBC's:  Recent Labs   Lab Test 05/31/24  0520 05/30/24  0548 05/29/24  0548 05/28/24  1203 05/28/24  0543   WBC 4.0 3.7*  --   --  3.6*   HGB 9.5* 8.6* 6.8*   < > 7.2*   MCV 95 96  --   --  95    187  --   --  191    < > = values in this interval not displayed.     Most Recent 3 BMP's:  Recent Labs   Lab Test 05/30/24  0548 05/28/24  0543 05/27/24  0604   * 132* 132*   POTASSIUM 4.5 4.8 4.1   CHLORIDE 100 102 101   CO2 24 23 24   BUN 26.3* 21.9 21.1   CR 0.86 0.80 0.83   ANIONGAP 8 7 7   SOPHIA 8.5* 8.5* 8.1*   GLC 99 106* 112*     Most Recent 2 LFT's:  Recent Labs   Lab Test 05/23/24  1359 05/01/24  1238   AST 21 29   ALT 7 <5   ALKPHOS 58 63   BILITOTAL 0.2 0.3       No results for input(s): \"CHOL\", \"HDL\", \"LDL\", \"TRIG\", \"CHOLHDLRATIO\" in the last 95775 hours.  No results for input(s): \"LDL\" in the last 19737 hours.  Recent Labs   Lab Test 05/30/24  0548   *   POTASSIUM 4.5   CHLORIDE 100   CO2 24   GLC 99   BUN 26.3*   CR 0.86   GFRESTIMATED 86   SOPHIA 8.5*     No results for input(s): \"A1C\" in the last 57729 hours.  Recent Labs   Lab Test 05/31/24  0520 05/30/24  0548 05/29/24  0548   HGB 9.5* 8.6* 6.8*     No results for input(s): \"TROPONINI\" in the last 75516 hours.  No results for input(s): \"BNP\", \"NTBNPI\", \"NTBNP\" in the last 79531 hours.  Recent Labs   Lab Test 05/01/24  1238   TSH 27.19*     Recent Labs   Lab Test 05/23/24  1417 05/01/24  1238   INR 1.10 1.16*       Zofia Hinkle, DO  Hospitalist Service  [facility]    "

## 2024-06-01 ENCOUNTER — APPOINTMENT (OUTPATIENT)
Dept: PHYSICAL THERAPY | Facility: HOSPITAL | Age: 84
DRG: 522 | End: 2024-06-01
Payer: COMMERCIAL

## 2024-06-01 LAB
ANION GAP SERPL CALCULATED.3IONS-SCNC: 9 MMOL/L (ref 7–15)
BUN SERPL-MCNC: 23.3 MG/DL (ref 8–23)
CALCIUM SERPL-MCNC: 8.4 MG/DL (ref 8.8–10.2)
CHLORIDE SERPL-SCNC: 100 MMOL/L (ref 98–107)
CREAT SERPL-MCNC: 0.8 MG/DL (ref 0.67–1.17)
DEPRECATED HCO3 PLAS-SCNC: 22 MMOL/L (ref 22–29)
EGFRCR SERPLBLD CKD-EPI 2021: 88 ML/MIN/1.73M2
ERYTHROCYTE [DISTWIDTH] IN BLOOD BY AUTOMATED COUNT: 20.1 % (ref 10–15)
GLUCOSE SERPL-MCNC: 100 MG/DL (ref 70–99)
HCT VFR BLD AUTO: 25.3 % (ref 40–53)
HGB BLD-MCNC: 8.8 G/DL (ref 13.3–17.7)
MCH RBC QN AUTO: 33 PG (ref 26.5–33)
MCHC RBC AUTO-ENTMCNC: 34.8 G/DL (ref 31.5–36.5)
MCV RBC AUTO: 95 FL (ref 78–100)
PHOSPHATE SERPL-MCNC: 2.4 MG/DL (ref 2.5–4.5)
PLATELET # BLD AUTO: 204 10E3/UL (ref 150–450)
POTASSIUM SERPL-SCNC: 4.1 MMOL/L (ref 3.4–5.3)
RBC # BLD AUTO: 2.67 10E6/UL (ref 4.4–5.9)
SODIUM SERPL-SCNC: 131 MMOL/L (ref 135–145)
WBC # BLD AUTO: 4.2 10E3/UL (ref 4–11)

## 2024-06-01 PROCEDURE — 250N000013 HC RX MED GY IP 250 OP 250 PS 637: Performed by: INTERNAL MEDICINE

## 2024-06-01 PROCEDURE — 250N000013 HC RX MED GY IP 250 OP 250 PS 637: Performed by: STUDENT IN AN ORGANIZED HEALTH CARE EDUCATION/TRAINING PROGRAM

## 2024-06-01 PROCEDURE — 36415 COLL VENOUS BLD VENIPUNCTURE: CPT | Performed by: INTERNAL MEDICINE

## 2024-06-01 PROCEDURE — 99233 SBSQ HOSP IP/OBS HIGH 50: CPT | Performed by: INTERNAL MEDICINE

## 2024-06-01 PROCEDURE — 84100 ASSAY OF PHOSPHORUS: CPT | Performed by: INTERNAL MEDICINE

## 2024-06-01 PROCEDURE — 120N000001 HC R&B MED SURG/OB

## 2024-06-01 PROCEDURE — 97110 THERAPEUTIC EXERCISES: CPT | Mod: GP

## 2024-06-01 PROCEDURE — 85027 COMPLETE CBC AUTOMATED: CPT | Performed by: INTERNAL MEDICINE

## 2024-06-01 PROCEDURE — 80048 BASIC METABOLIC PNL TOTAL CA: CPT | Performed by: INTERNAL MEDICINE

## 2024-06-01 PROCEDURE — 97116 GAIT TRAINING THERAPY: CPT | Mod: GP

## 2024-06-01 RX ADMIN — SENNOSIDES AND DOCUSATE SODIUM 1 TABLET: 8.6; 5 TABLET ORAL at 09:08

## 2024-06-01 RX ADMIN — ASPIRIN 81 MG: 81 TABLET, COATED ORAL at 20:09

## 2024-06-01 RX ADMIN — CYANOCOBALAMIN TAB 1000 MCG 1000 MCG: 1000 TAB at 09:08

## 2024-06-01 RX ADMIN — ESCITALOPRAM OXALATE 10 MG: 10 TABLET ORAL at 09:08

## 2024-06-01 RX ADMIN — FAMOTIDINE 20 MG: 20 TABLET ORAL at 09:08

## 2024-06-01 RX ADMIN — ACETAMINOPHEN 650 MG: 325 TABLET ORAL at 22:03

## 2024-06-01 RX ADMIN — SODIUM CHLORIDE TAB 1 GM 1 G: 1 TAB at 09:08

## 2024-06-01 RX ADMIN — POTASSIUM & SODIUM PHOSPHATES POWDER PACK 280-160-250 MG 1 PACKET: 280-160-250 PACK at 17:24

## 2024-06-01 RX ADMIN — LEVOTHYROXINE SODIUM 125 MCG: 0.03 TABLET ORAL at 06:43

## 2024-06-01 RX ADMIN — BACITRACIN: 500 OINTMENT TOPICAL at 20:09

## 2024-06-01 RX ADMIN — BACITRACIN: 500 OINTMENT TOPICAL at 09:09

## 2024-06-01 RX ADMIN — MEGESTROL ACETATE 400 MG: 40 SUSPENSION ORAL at 09:07

## 2024-06-01 RX ADMIN — ACETAMINOPHEN 650 MG: 325 TABLET ORAL at 09:08

## 2024-06-01 RX ADMIN — POLYETHYLENE GLYCOL 3350 17 G: 17 POWDER, FOR SOLUTION ORAL at 09:07

## 2024-06-01 RX ADMIN — ACETAMINOPHEN 650 MG: 325 TABLET ORAL at 16:29

## 2024-06-01 RX ADMIN — POTASSIUM & SODIUM PHOSPHATES POWDER PACK 280-160-250 MG 1 PACKET: 280-160-250 PACK at 09:07

## 2024-06-01 RX ADMIN — SODIUM CHLORIDE TAB 1 GM 1 G: 1 TAB at 17:24

## 2024-06-01 RX ADMIN — ASPIRIN 81 MG: 81 TABLET, COATED ORAL at 09:08

## 2024-06-01 RX ADMIN — POLYETHYLENE GLYCOL 3350 17 G: 17 POWDER, FOR SOLUTION ORAL at 20:08

## 2024-06-01 RX ADMIN — CEFDINIR 300 MG: 300 CAPSULE ORAL at 20:08

## 2024-06-01 RX ADMIN — SODIUM CHLORIDE TAB 1 GM 1 G: 1 TAB at 13:13

## 2024-06-01 RX ADMIN — SENNOSIDES AND DOCUSATE SODIUM 1 TABLET: 8.6; 5 TABLET ORAL at 20:09

## 2024-06-01 RX ADMIN — POTASSIUM & SODIUM PHOSPHATES POWDER PACK 280-160-250 MG 1 PACKET: 280-160-250 PACK at 13:12

## 2024-06-01 RX ADMIN — CEFDINIR 300 MG: 300 CAPSULE ORAL at 09:08

## 2024-06-01 ASSESSMENT — ACTIVITIES OF DAILY LIVING (ADL)
ADLS_ACUITY_SCORE: 51

## 2024-06-01 NOTE — PLAN OF CARE
Problem: Adult Inpatient Plan of Care  Goal: Plan of Care Review  Description: The Plan of Care Review/Shift note should be completed every shift.  The Outcome Evaluation is a brief statement about your assessment that the patient is improving, declining, or no change.  This information will be displayed automatically on your shift  note.  Outcome: Progressing     Problem: Pain Acute  Goal: Optimal Pain Control and Function  Outcome: Progressing  Intervention: Develop Pain Management Plan  Recent Flowsheet Documentation  Taken 5/31/2024 2100 by Saira Lewis, RN  Pain Management Interventions: medication (see MAR)  Intervention: Prevent or Manage Pain  Recent Flowsheet Documentation  Taken 5/31/2024 2105 by Saira Lewis, RN  Bowel Elimination Promotion:   adequate fluid intake promoted   ambulation promoted   commode/bedpan at bedside     Problem: Comorbidity Management  Goal: Blood Pressure in Desired Range  Outcome: Progressing     Problem: Urinary Retention  Goal: Effective Urinary Elimination  Outcome: Progressing   Goal Outcome Evaluation:               Pt alert and oriented ,can make needs known,PRN dilaudid given x 3  With minimal relief

## 2024-06-01 NOTE — PROGRESS NOTES
"    Place of Service:  [facility]     Reason for follow up: gross hematuria     SUBJECTIVE:  Events: no acute events overnight    Patient reports feeling well this AM. Urine yellow/clear with CBI on almost no flow. Tolerating carlton catheter well. Afebrile.     OBJECTIVE:  PHYSICAL EXAM:  BP (!) (P) 143/72 (BP Location: Left arm)   Pulse (P) 74   Temp (P) 99.2  F (37.3  C) (Oral)   Resp (P) 16   Ht 1.676 m (5' 6\")   Wt 51.9 kg (114 lb 6.7 oz)   SpO2 (P) 97%   BMI 18.47 kg/m     General: NAD, alert, cooperative  Head: normocephalic, without abnormality / atraumatic  Abdomen: soft, non tender, non distended. No suprapubic fullness, no suprapubic tenderness. No CVA tenderness noted bilaterally  Genitourinary: 3-way carlton catheter in place. Urine yellow without sediment or blood clots.  Virtually no flow rate at this point.  Psychological: alert and oriented, answers questions appropriately    LABS:  Lab Results   Component Value Date    WBC 4.2 06/01/2024    HGB 8.8 (L) 06/01/2024    HCT 25.3 (L) 06/01/2024     06/01/2024    ALT 7 05/23/2024    AST 21 05/23/2024     (L) 06/01/2024    BUN 23.3 (H) 06/01/2024    CO2 22 06/01/2024    TSH 27.19 (H) 05/01/2024    PSA 0.69 08/19/2014    INR 1.10 05/23/2024     Lab Results: personally reviewed.     ASSESSMENT/PLAN:  Kilo Montiel is being seen by [facility] for gross hematuria (hx of metastatic castrate resistant prostate cancer)     No hematuria in the last 24 hours.  Okay for discharge from the urology perspective.  The irrigation port should be capped with a catheter plug.  He should follow-up with us within 1 week for consideration of a voiding trial or downsizing of the catheter.    Francisco Javier Anderson MD             "

## 2024-06-01 NOTE — PLAN OF CARE
Goal Outcome Evaluation:                  Pt alert and oriented x 4,calm and cooperative with cares,received PRN tylenol with relief,on slow contentious bladder irrigation,urine clear yellow, left hip incision dressing dry,clean and intact

## 2024-06-01 NOTE — PLAN OF CARE
Goal Outcome Evaluation:     Pt is A&Ox4 and can be forgetful. Pt reported pain in L hip, received prn tylenol this am. CMS in LLE intact, pulse present, dressing intact. Pt is participating in therapies, up in chair for about an our this am and back into chair this afternoon, ambulated in hallway x3 today using walker. CBI discontinued around 9am. Urine continues to remain clear/crescencio with 600mls output. No clots assessed. Bladder scanned around 2pm for 3ml. Pt is eating/drinking well, encouraging adequate fluid intake. No bm today, received miralax. On phos protocol, received 2 doses, recheck in am. VSS, hgb stable.   Problem: Adult Inpatient Plan of Care  Goal: Optimal Comfort and Wellbeing  Outcome: Progressing     Problem: Risk for Delirium  Goal: Improved Behavioral Control  Outcome: Progressing  Intervention: Minimize Safety Risk  Recent Flowsheet Documentation  Taken 6/1/2024 0746 by Lida Magaña RN  Enhanced Safety Measures:   assistive devices when indicated   pain management   patient/family teach back on injury risk  Goal: Improved Attention and Thought Clarity  Outcome: Progressing     Problem: Orthopaedic Fracture  Goal: Absence of Bleeding  Outcome: Progressing  Goal: Fracture Stability  Outcome: Progressing  Goal: Optimal Functional Ability  Outcome: Progressing  Intervention: Optimize Functional Ability  Recent Flowsheet Documentation  Taken 6/1/2024 0746 by Lida Magaña RN  Activity Management: activity encouraged  Goal: Absence of Infection Signs and Symptoms  Outcome: Progressing  Goal: Effective Tissue Perfusion  Outcome: Progressing  Goal: Optimal Pain Control and Function  Outcome: Progressing  Goal: Effective Oxygenation and Ventilation  Outcome: Progressing  Intervention: Promote Airway Secretion Clearance  Recent Flowsheet Documentation  Taken 6/1/2024 0746 by Lida Magaña RN  Activity Management: activity encouraged  Intervention: Optimize Oxygenation and Ventilation  Recent  Flowsheet Documentation  Taken 6/1/2024 0746 by Lida Magaña RN  Head of Bed (HOB) Positioning: HOB at 30-45 degrees     Problem: Pain Acute  Goal: Optimal Pain Control and Function  Outcome: Progressing  Intervention: Prevent or Manage Pain  Recent Flowsheet Documentation  Taken 6/1/2024 0746 by Lida Magaña RN  Bowel Elimination Promotion:   adequate fluid intake promoted   ambulation promoted   commode/bedpan at bedside     Problem: Anemia  Goal: Anemia Symptom Improvement  Outcome: Progressing  Intervention: Monitor and Manage Anemia  Recent Flowsheet Documentation  Taken 6/1/2024 0746 by Lida Magaña RN  Safety Promotion/Fall Prevention:   activity supervised   assistive device/personal items within reach   nonskid shoes/slippers when out of bed   patient and family education     Problem: Urinary Retention  Goal: Effective Urinary Elimination  Outcome: Progressing     Problem: Malnutrition  Goal: Improved Nutritional Intake  Outcome: Progressing     Problem: Adult Inpatient Plan of Care  Goal: Absence of Hospital-Acquired Illness or Injury  Intervention: Identify and Manage Fall Risk  Recent Flowsheet Documentation  Taken 6/1/2024 0746 by Lida Magaña RN  Safety Promotion/Fall Prevention:   activity supervised   assistive device/personal items within reach   nonskid shoes/slippers when out of bed   patient and family education     Problem: Orthopaedic Fracture  Goal: Bowel Elimination  Intervention: Promote Effective Bowel Elimination  Recent Flowsheet Documentation  Taken 6/1/2024 0746 by Lida Magaña RN  Bowel Elimination Promotion:   adequate fluid intake promoted   ambulation promoted   commode/bedpan at bedside

## 2024-06-01 NOTE — PROGRESS NOTES
Orthopedic Surgery progress note:  Kilo is doing okay today.  His hospitalizations been prolonged secondary to hematuria and rectal bleeding.  This appears to have been stabilized.  He is hopeful to discharge tomorrow.  He reports his left hip pain has decreased every day and he is overall very pleased with this.  He denies any concerns in this area      Vital stable on room air    Focused examination of the left lower extremity and straits a dressing in place that is clean dry and intact.  Fires gastrosoleus,'s, EHL and tibialis anterior.  Sensation tact light touch in all distributions.  2+ DP pulse.      Labs: Hemoglobin 8.8        84-year-old male that is recovering well from a left hip hemiarthroplasty with a hospitalization has been prolonged secondary to hematuria and rectal bleeding that has been likely secondary to his prior prostate cancer treatment that we will potentially discharge this weekend.      Weightbearing as tolerated left lower extremity  Aspirin 81 mg twice daily resumed  P.o. pain medication  Cefdinir for UTI  Follow-up with me 6/7/2024 for wound check      Everett Sherman MD  [facility]

## 2024-06-01 NOTE — PROGRESS NOTES
[facility]    Medicine Progress Note - Hospitalist Service    Date of Admission:  5/23/2024    Assessment & Plan   Kilo Montiel is a 83 year old male with h/o metastatic prostate cancer, hypothyroidism, and syncope who was admitted on 5/23/2024 after a mechanical fall with left hip fracture for which he underwent left hip hemiarthroplasty on 5/24/2024.  Hospital course complicated by gross hematuria with urinary retention, UTI, rectal bleeding, acute blood loss anemia requiring PRBC transfusion     Left hip fracture s/p left hip hemiarthroplasty POD # 7  S/p mechanical fall   -CT ABD/PEL 5/23: Mildly comminuted impacted subcapital fracture of the left proximal femur  -s/p left hemiarthroplasty 5/24  - post surgery care per ortho   - Aspirin for DVT prevention resumed last night, monitor for recurrent hematuria  - PT/OT   - pain control  - needs TCU      Urinary retention with bilateral hydronephrosis and gross hematuria, h/o Prostate cancer   -CT ABD/PEL 5/23: Moderate to severe distention of the urinary bladder with symmetric hydroureter and hydronephrosis, evidence of bone metastases on the ribs, spine, and pelvis  - Subsequent ultrasound after Forte catheter placement demonstrated resolution of hydronephrosis  - S/p CBI   - Monitor 1 more day in the hospital for recurrent hematuria    Morganella UTI   -Received 2 days of IV cefepime, switched to cefdinir on 5/31, will complete total of 7-day antibiotic course    Rectal bleeding - has history of hemorrhoids, radiation proctitis, also rectal ulcer was noted on most recent colonoscopy in [place] 4/10/2024 (biopsy report is in the chart)   -Seen by GI, did not think that rectal bleeding is significantly contributing to anemia  -Continue stool softeners and laxatives to avoid constipation    Acute on chronic anemia -hemoglobin remains stable  Pancytopenia-chronic (thrombocytopenia is variable)  -Baseline hemoglobin usually in the 8's  -S/p  total of 3 units PRBCs    Hypophosphatemia  -Replace per protocol     Chronic compression fractures  -CT lumbar spine 5/23: Chronic T12, L1, L2, L3, L4, and L5 anterior compression fractures, severe spinal canal stenosis at L1-L2 with retropulsion of the L2 vertebral body, moderate to severe spinal stenosis at L4-L5  - Pt/Ot     Metastatic prostate cancer   Treated with radiation and initially in 2009, subsequent brachytherapy in 2013  He subsequently developed bony metastasis and had radiation to his lumbar spine in 2015  He has been on multiple additional oral medications          Diet: Discharge Instruction - Regular Diet Adult  Regular Diet Adult    DVT Prophylaxis: Aspirin 81 mg twice daily (resumed evening of 5/31)  Forte Catheter: PRESENT, indication: Acute retention or obstruction, Acute retention or obstruction, Gross hematuria, Acute retention or obstruction  Lines: None     Cardiac Monitoring: None  Code Status: No CPR- Do NOT Intubate      Clinically Significant Risk Factors              # Hypoalbuminemia: Lowest albumin = 2.6 g/dL at 5/27/2024  6:04 AM, will monitor as appropriate             # Moderate Malnutrition: based on nutrition assessment    # Financial/Environmental Concerns:           Disposition Plan     Medically Ready for Discharge: Anticipated Tomorrow             Shanthi Ricci MD  Hospitalist Service  [facility]  Securely message with [employer] (more info)  Text page via [employer] Paging/Directory   ______________________________________________________________________    Interval History   Urine has been clear.  No suprapubic discomfort overnight.  Had BM yesterday morning, no bleeding  Left hip is still sore.  Participates in therapy    Physical Exam   Vital Signs: Temp: (P) 99.2  F (37.3  C) Temp src: (P) Oral BP: (!) (P) 143/72 Pulse: (P) 74   Resp: (P) 16 SpO2: (P) 97 % O2 Device: (P) None (Room air)    Weight: 114 lbs 6.7 oz    General Appearance: No acute  distress, sitting in bed  Respiratory: Respirations easy, lungs are clear anteriorly  Cardiovascular: Regular rate and rhythm.  Normal S1-S2  GI: Abdomen soft and nontender  : Forte catheter draining clear urine  Other: Awake and alert, forgetful    Medical Decision Making       50 MINUTES SPENT BY ME on the date of service doing chart review, history, exam, documentation & further activities per the note.  MANAGEMENT DISCUSSED with the following over the past 24 hours: Patient and nursing staff       Data     I have personally reviewed the following data over the past 24 hrs:    4.2  \   8.8 (L)   / 204     131 (L) 100 23.3 (H) /  100 (H)   4.1 22 0.80 \       Imaging results reviewed over the past 24 hrs:   No results found for this or any previous visit (from the past 24 hour(s)).

## 2024-06-02 ENCOUNTER — APPOINTMENT (OUTPATIENT)
Dept: PHYSICAL THERAPY | Facility: HOSPITAL | Age: 84
DRG: 522 | End: 2024-06-02
Payer: COMMERCIAL

## 2024-06-02 LAB
ERYTHROCYTE [DISTWIDTH] IN BLOOD BY AUTOMATED COUNT: 20.2 % (ref 10–15)
HCT VFR BLD AUTO: 24.6 % (ref 40–53)
HGB BLD-MCNC: 8.3 G/DL (ref 13.3–17.7)
MCH RBC QN AUTO: 32 PG (ref 26.5–33)
MCHC RBC AUTO-ENTMCNC: 33.7 G/DL (ref 31.5–36.5)
MCV RBC AUTO: 95 FL (ref 78–100)
PHOSPHATE SERPL-MCNC: 2.3 MG/DL (ref 2.5–4.5)
PLATELET # BLD AUTO: 194 10E3/UL (ref 150–450)
RBC # BLD AUTO: 2.59 10E6/UL (ref 4.4–5.9)
WBC # BLD AUTO: 3.8 10E3/UL (ref 4–11)

## 2024-06-02 PROCEDURE — 84100 ASSAY OF PHOSPHORUS: CPT | Performed by: INTERNAL MEDICINE

## 2024-06-02 PROCEDURE — 99233 SBSQ HOSP IP/OBS HIGH 50: CPT | Performed by: INTERNAL MEDICINE

## 2024-06-02 PROCEDURE — 36415 COLL VENOUS BLD VENIPUNCTURE: CPT | Performed by: INTERNAL MEDICINE

## 2024-06-02 PROCEDURE — 250N000013 HC RX MED GY IP 250 OP 250 PS 637: Performed by: INTERNAL MEDICINE

## 2024-06-02 PROCEDURE — 120N000001 HC R&B MED SURG/OB

## 2024-06-02 PROCEDURE — 85027 COMPLETE CBC AUTOMATED: CPT | Performed by: INTERNAL MEDICINE

## 2024-06-02 PROCEDURE — 97116 GAIT TRAINING THERAPY: CPT | Mod: GP

## 2024-06-02 PROCEDURE — 250N000013 HC RX MED GY IP 250 OP 250 PS 637: Performed by: STUDENT IN AN ORGANIZED HEALTH CARE EDUCATION/TRAINING PROGRAM

## 2024-06-02 PROCEDURE — 97110 THERAPEUTIC EXERCISES: CPT | Mod: GP

## 2024-06-02 RX ORDER — CEFDINIR 300 MG/1
300 CAPSULE ORAL EVERY 12 HOURS
Qty: 4 CAPSULE | Refills: 0 | Status: SHIPPED | OUTPATIENT
Start: 2024-06-02 | End: 2024-06-04

## 2024-06-02 RX ADMIN — POLYETHYLENE GLYCOL 3350 17 G: 17 POWDER, FOR SOLUTION ORAL at 08:33

## 2024-06-02 RX ADMIN — POLYETHYLENE GLYCOL 3350 17 G: 17 POWDER, FOR SOLUTION ORAL at 20:14

## 2024-06-02 RX ADMIN — ASPIRIN 81 MG: 81 TABLET, COATED ORAL at 20:14

## 2024-06-02 RX ADMIN — FAMOTIDINE 20 MG: 20 TABLET ORAL at 08:35

## 2024-06-02 RX ADMIN — BACITRACIN: 500 OINTMENT TOPICAL at 08:33

## 2024-06-02 RX ADMIN — MEGESTROL ACETATE 400 MG: 40 SUSPENSION ORAL at 08:33

## 2024-06-02 RX ADMIN — SENNOSIDES AND DOCUSATE SODIUM 1 TABLET: 8.6; 5 TABLET ORAL at 08:35

## 2024-06-02 RX ADMIN — BACITRACIN: 500 OINTMENT TOPICAL at 20:14

## 2024-06-02 RX ADMIN — CEFDINIR 300 MG: 300 CAPSULE ORAL at 08:35

## 2024-06-02 RX ADMIN — ASPIRIN 81 MG: 81 TABLET, COATED ORAL at 08:35

## 2024-06-02 RX ADMIN — SODIUM CHLORIDE TAB 1 GM 1 G: 1 TAB at 08:35

## 2024-06-02 RX ADMIN — CYANOCOBALAMIN TAB 1000 MCG 1000 MCG: 1000 TAB at 08:35

## 2024-06-02 RX ADMIN — ACETAMINOPHEN 650 MG: 325 TABLET ORAL at 20:21

## 2024-06-02 RX ADMIN — ESCITALOPRAM OXALATE 10 MG: 10 TABLET ORAL at 08:35

## 2024-06-02 RX ADMIN — POTASSIUM & SODIUM PHOSPHATES POWDER PACK 280-160-250 MG 1 PACKET: 280-160-250 PACK at 08:35

## 2024-06-02 RX ADMIN — POTASSIUM & SODIUM PHOSPHATES POWDER PACK 280-160-250 MG 1 PACKET: 280-160-250 PACK at 13:33

## 2024-06-02 RX ADMIN — LEVOTHYROXINE SODIUM 125 MCG: 0.03 TABLET ORAL at 06:17

## 2024-06-02 RX ADMIN — ACETAMINOPHEN 650 MG: 325 TABLET ORAL at 08:35

## 2024-06-02 RX ADMIN — SENNOSIDES AND DOCUSATE SODIUM 1 TABLET: 8.6; 5 TABLET ORAL at 20:14

## 2024-06-02 RX ADMIN — CEFDINIR 300 MG: 300 CAPSULE ORAL at 20:14

## 2024-06-02 RX ADMIN — SODIUM CHLORIDE TAB 1 GM 1 G: 1 TAB at 13:33

## 2024-06-02 RX ADMIN — SODIUM CHLORIDE TAB 1 GM 1 G: 1 TAB at 17:16

## 2024-06-02 RX ADMIN — BACITRACIN: 500 OINTMENT TOPICAL at 13:33

## 2024-06-02 RX ADMIN — POTASSIUM & SODIUM PHOSPHATES POWDER PACK 280-160-250 MG 1 PACKET: 280-160-250 PACK at 17:16

## 2024-06-02 ASSESSMENT — ACTIVITIES OF DAILY LIVING (ADL)
ADLS_ACUITY_SCORE: 51

## 2024-06-02 NOTE — DISCHARGE SUMMARY
ORTHOPEDIC DISCHARGE SUMMARY       Kilo Montiel,  1940, MRN [id number]    Admission Date: 2024  Admission Diagnoses: Hip pain, left [M25.552]  Closed subcapital fracture of left femur, initial encounter (H) [S72.012A]  Fall, initial encounter [W19.XXXA]  Anemia, unspecified type [D64.9]     Discharge Date:  24    Post-operative Day:  9 Days Post-Op    Reason for Admission: The patient was admitted for the following:  Procedure(s):  HEMIARTHROPLASTY, LEFT HIP, BIPOLAR      BRIEF HOSPITAL COURSE   This 84 year old male underwent the aforementioned procedure with Dr. Sherman on 24. There were no intraoperative complications and the patient was transferred to the recovery room and later the orthopedic unit in stable condition. Once the patient reached the orthopedic floor our orthopedic pain protocol was implemented along with the following:    Anticoagulation Medications: ASA  Therapy: PT and OT  Activity: WBAT  Bracing: None    Consultations during Admission: Hospitalist service for medical management     COMPLICATIONS/SIGNIFICANT FINDINGS    Hematureia, rectal bleeding. History of prostate cancer.     DISCHARGE INFORMATION   Condition at discharge: Good  Discharge destination:  TCU  Patient was seen by myself on the date of discharge.    FOLLOW UP CARE   Follow up with orthopedics in 2 weeks or sooner should the need arise. Ortho will continue to manage pain control, post op anticoagulation and incision care.     Follow up with your PCP for management of chronic medical problems and to evaluate for post op medical complications including constipation, nausea/vomiting, DVT/PE, anemia, changes in blood pressure, fevers/chills, urinary retention and atelectasis/pneumonia.     Subjective   84 year-old male who is s/p left hip hemiarthroplasty, with a hospitalization that has been prolonged secondary to hematuria and rectal bleeding. He is doing well this morning. His pain is being well managed  "with PRN tylenol and oxycodone. He has been doing well with PT/OT, and ambulation. Resumed aspirin yesterday. Denies any new concerns.     Physical Exam   The patient is A&Ox3. Appears comfortable.   Sensation is intact.  Dorsiflexion and plantar flexion is intact.  Dorsalis pedis pulse intact.  Calves are soft and non-tender. Negative Kasia's.  The incision is covered. Dressing C/D/I.    /55 (BP Location: Left arm)   Pulse 71   Temp 98.5  F (36.9  C) (Oral)   Resp 19   Ht 1.676 m (5' 6\")   Wt 51.9 kg (114 lb 6.7 oz)   SpO2 95%   BMI 18.47 kg/m      Pertinent Results at Discharge     Hemoglobin   Date/Time Value Ref Range Status   06/02/2024 05:32 AM 8.3 (L) 13.3 - 17.7 g/dL Final   06/01/2024 04:49 AM 8.8 (L) 13.3 - 17.7 g/dL Final   05/31/2024 05:20 AM 9.5 (L) 13.3 - 17.7 g/dL Final     INR   Date/Time Value Ref Range Status   05/23/2024 02:17 PM 1.10 0.85 - 1.15 Final   05/01/2024 12:38 PM 1.16 (H) 0.85 - 1.15 Final     Platelet Count   Date/Time Value Ref Range Status   06/02/2024 05:32  150 - 450 10e3/uL Final   06/01/2024 04:49  150 - 450 10e3/uL Final   05/31/2024 05:20  150 - 450 10e3/uL Final       Problem List   Principal Problem:    Closed subcapital fracture of left femur, initial encounter (H)  Active Problems:    Malignant neoplasm of prostate (H)    Hip pain, left    Fall, initial encounter    Anemia, unspecified type    Gross hematuria        Kim Felton PA-C  Date: 6/2/2024  Time: 7:53 AM   "

## 2024-06-02 NOTE — PLAN OF CARE
Goal Outcome Evaluation:    Pt is A&O but forgetful sometines needing some re-orientation. Assist x1 with walker for ambulation. Up in chair x2 today and ambulated in hallway. Eating well, encouraging fluids. CMS in LLE intact, pulses present, dressing intact. Received prn tylenol this am for pain 3/10 which was effective. Urien output WNL, clear yellow. No s/s of infection, hgb stable, VSS  Problem: Adult Inpatient Plan of Care  Goal: Optimal Comfort and Wellbeing  Outcome: Progressing  Intervention: Monitor Pain and Promote Comfort  Recent Flowsheet Documentation  Taken 6/2/2024 0835 by Lida Magaña RN  Pain Management Interventions:   medication (see MAR)   ambulation/increased activity   breathing exercises   distraction   emotional support   pillow support provided   repositioned  Goal: Readiness for Transition of Care  Outcome: Progressing     Problem: Risk for Delirium  Goal: Improved Behavioral Control  Outcome: Progressing  Intervention: Minimize Safety Risk  Recent Flowsheet Documentation  Taken 6/2/2024 0800 by Lida Magaña RN  Enhanced Safety Measures:   assistive devices when indicated   pain management   patient/family teach back on injury risk  Goal: Improved Attention and Thought Clarity  Outcome: Progressing     Problem: Orthopaedic Fracture  Goal: Absence of Bleeding  Outcome: Progressing  Goal: Bowel Elimination  Outcome: Progressing  Intervention: Promote Effective Bowel Elimination  Recent Flowsheet Documentation  Taken 6/2/2024 0800 by Lida Magaña RN  Bowel Elimination Promotion:   adequate fluid intake promoted   ambulation promoted   commode/bedpan at bedside  Goal: Fracture Stability  Outcome: Progressing  Goal: Effective Tissue Perfusion  Outcome: Progressing  Goal: Optimal Pain Control and Function  Outcome: Progressing  Intervention: Manage Acute Orthopaedic-Related Pain  Recent Flowsheet Documentation  Taken 6/2/2024 0835 by Lida Magaña RN  Pain Management  Interventions:   medication (see MAR)   ambulation/increased activity   breathing exercises   distraction   emotional support   pillow support provided   repositioned     Problem: Pain Acute  Goal: Optimal Pain Control and Function  Outcome: Progressing  Intervention: Develop Pain Management Plan  Recent Flowsheet Documentation  Taken 6/2/2024 0835 by Lida Magaña RN  Pain Management Interventions:   medication (see MAR)   ambulation/increased activity   breathing exercises   distraction   emotional support   pillow support provided   repositioned  Intervention: Prevent or Manage Pain  Recent Flowsheet Documentation  Taken 6/2/2024 0800 by Lida Magaña RN  Bowel Elimination Promotion:   adequate fluid intake promoted   ambulation promoted   commode/bedpan at bedside     Problem: Adult Inpatient Plan of Care  Goal: Absence of Hospital-Acquired Illness or Injury  Intervention: Identify and Manage Fall Risk  Recent Flowsheet Documentation  Taken 6/2/2024 0800 by Lida Magaña RN  Safety Promotion/Fall Prevention:   activity supervised   assistive device/personal items within reach   nonskid shoes/slippers when out of bed   patient and family education     Problem: Orthopaedic Fracture  Goal: Optimal Functional Ability  Intervention: Optimize Functional Ability  Recent Flowsheet Documentation  Taken 6/2/2024 0800 by Lida Magaña RN  Activity Management: activity encouraged  Goal: Effective Oxygenation and Ventilation  Intervention: Promote Airway Secretion Clearance  Recent Flowsheet Documentation  Taken 6/2/2024 0800 by Lida Magaña RN  Activity Management: activity encouraged  Intervention: Optimize Oxygenation and Ventilation  Recent Flowsheet Documentation  Taken 6/2/2024 0800 by Lida Magaña RN  Head of Bed (HOB) Positioning: HOB at 30-45 degrees     Problem: Anemia  Goal: Anemia Symptom Improvement  Intervention: Monitor and Manage Anemia  Recent Flowsheet Documentation  Taken 6/2/2024 0800  by Lida Magaña RN  Safety Promotion/Fall Prevention:   activity supervised   assistive device/personal items within reach   nonskid shoes/slippers when out of bed   patient and family education

## 2024-06-02 NOTE — PLAN OF CARE
Problem: Pain Acute  Goal: Optimal Pain Control and Function  Outcome: Progressing     Problem: Urinary Retention  Goal: Effective Urinary Elimination  Outcome: Progressing   Goal Outcome Evaluation:         No significant issues overnight. Awake most of night. Denied pain. Pain medication & ice pack offered but declined. Turned and repositioned. Forte catheter patent; draining clear, yellow urine. Urinary output 675 ml. Fluids encouraged.

## 2024-06-02 NOTE — PROGRESS NOTES
"    Place of Service:  [facility]     Reason for follow up: gross hematuria     SUBJECTIVE:  Events: no acute events overnight    He reports that he feels fine.  He is tolerating the catheter fine as well.    OBJECTIVE:  PHYSICAL EXAM:  BP (!) 148/73 (BP Location: Left arm)   Pulse 77   Temp 98.1  F (36.7  C) (Oral)   Resp 16   Ht 1.676 m (5' 6\")   Wt 51.9 kg (114 lb 6.7 oz)   SpO2 96%   BMI 18.47 kg/m     General: NAD, alert, cooperative  Head: normocephalic, without abnormality / atraumatic  Abdomen: soft, non tender, non distended. No suprapubic fullness, no suprapubic tenderness. No CVA tenderness noted bilaterally  Genitourinary: The continuous bladder irrigation is no longer attached and the urine remains clear yellow.  Psychological: alert and oriented, answers questions appropriately    LABS:  Lab Results   Component Value Date    WBC 3.8 (L) 06/02/2024    HGB 8.3 (L) 06/02/2024    HCT 24.6 (L) 06/02/2024     06/02/2024    ALT 7 05/23/2024    AST 21 05/23/2024     (L) 06/01/2024    BUN 23.3 (H) 06/01/2024    CO2 22 06/01/2024    TSH 27.19 (H) 05/01/2024    PSA 0.69 08/19/2014    INR 1.10 05/23/2024     Lab Results: personally reviewed.     ASSESSMENT/PLAN:  Kilo Montiel is being seen by [facility] for gross hematuria (hx of metastatic castrate resistant prostate cancer)     No hematuria in the last 48 hours.  Okay for discharge from the urology perspective.  The irrigation port is capped with a catheter plug.  He should follow-up with us within 1 week for consideration of a voiding trial or downsizing of the catheter.    Francisco Javier Anderson MD             "

## 2024-06-02 NOTE — PROGRESS NOTES
[facility]    Medicine Progress Note - Hospitalist Service    Date of Admission:  5/23/2024    Assessment & Plan   Kilo Montiel is a 83 year old male with h/o metastatic prostate cancer, hypothyroidism, and syncope who was admitted on 5/23/2024 after a mechanical fall with left hip fracture for which he underwent left hip hemiarthroplasty on 5/24/2024.  Hospital course complicated by gross hematuria with urinary retention, UTI, rectal bleeding, acute blood loss anemia requiring PRBC transfusion     Left hip fracture s/p left hip hemiarthroplasty POD # 8  S/p mechanical fall   -CT ABD/PEL 5/23: Mildly comminuted impacted subcapital fracture of the left proximal femur  -s/p left hemiarthroplasty 5/24  - post surgery care per ortho   - Aspirin for DVT prevention was on hold due to gross hematuria, resumed 5/31, monitor for recurrent hematuria  - Continue PT/OT      Gross hematuria with urinary retention and bilateral hydronephrosis, h/o Prostate cancer   -CT ABD/PEL 5/23: Moderate to severe distention of the urinary bladder with symmetric hydroureter and hydronephrosis, evidence of bone metastases on the ribs, spine, and pelvis  - Subsequent ultrasound after Forte catheter placement demonstrated resolution of hydronephrosis  - S/p CBI, no recurrent hematuria > 48 hours  - Follow-up with urology in 1 week for consideration of voiding trial or downsizing of the catheter     Morganella Morgagni UTI   -Received 2 days of IV cefepime, switched to cefdinir on 5/31, will complete total of 7-day antibiotic course    Rectal bleeding - has history of hemorrhoids, radiation proctitis, also rectal ulcer was noted on most recent colonoscopy in [place] 4/10/2024 (biopsy report is in the chart)   -Seen by GI, did not think that rectal bleeding is significantly contributing to anemia  -Continue stool softeners and laxatives to avoid constipation    Acute on chronic anemia -hemoglobin remains  stable  Pancytopenia-chronic (thrombocytopenia is variable)  -Baseline hemoglobin usually in the 8's  -S/p total of 3 units PRBCs    Hypophosphatemia  -Replace per protocol     Chronic compression fractures  -CT lumbar spine 5/23: Chronic T12, L1, L2, L3, L4, and L5 anterior compression fractures, severe spinal canal stenosis at L1-L2 with retropulsion of the L2 vertebral body, moderate to severe spinal stenosis at L4-L5  - Pt/Ot     Metastatic prostate cancer   Treated with radiation and initially in 2009, subsequent brachytherapy in 2013  He subsequently developed bony metastasis and had radiation to his lumbar spine in 2015  He has been on multiple additional oral medications          Diet: Discharge Instruction - Regular Diet Adult  Regular Diet Adult    DVT Prophylaxis: Aspirin 81 mg twice daily  Forte Catheter: PRESENT, indication: Acute retention or obstruction, Acute retention or obstruction, Acute retention or obstruction, Acute retention or obstruction  Lines: None     Cardiac Monitoring: None  Code Status: No CPR- Do NOT Intubate      Clinically Significant Risk Factors              # Hypoalbuminemia: Lowest albumin = 2.6 g/dL at 5/27/2024  6:04 AM, will monitor as appropriate             # Moderate Malnutrition: based on nutrition assessment    # Financial/Environmental Concerns:           Disposition Plan     Medically Ready for Discharge: Ready Now             Shanthi Ricci MD  Hospitalist Service  [facility]  Securely message with [employer] (more info)  Text page via [employer] Paging/Directory   ______________________________________________________________________    Interval History   Had low-grade fever 99.8 associated with some anxiety at around 10 PM yesterday.  Received Tylenol.  No recurrence  Reports his pain is under control.  No hematuria for over 48 hours.  Had small BM yesterday, no bleeding    Physical Exam   Vital Signs: Temp: 98.1  F (36.7  C) Temp src: Oral BP:  (!) 148/73 Pulse: 77   Resp: 16 SpO2: 96 % O2 Device: None (Room air)    Weight: 114 lbs 6.7 oz    General Appearance: No acute distress, sitting in a chair  Respiratory: Easy respirations, lungs are clear bilaterally  Cardiovascular: Regular rate and rhythm.  Normal S1-S2, no murmur.  No significant peripheral edema  GI: Abdomen soft, nontender, bowel sounds present  : Forte catheter with crescencio-colored urine in the bag  Other: Awake and alert, forgetful    Medical Decision Making       50 MINUTES SPENT BY ME on the date of service doing chart review, history, exam, documentation & further activities per the note.  MANAGEMENT DISCUSSED with the following over the past 24 hours: Patient, nursing staff, , updated daughter Casie over the phone       Data     I have personally reviewed the following data over the past 24 hrs:    3.8 (L)  \   8.3 (L)   / 194     N/A N/A N/A /  N/A   N/A N/A N/A \       Imaging results reviewed over the past 24 hrs:   No results found for this or any previous visit (from the past 24 hour(s)).

## 2024-06-02 NOTE — PROGRESS NOTES
Care Management Follow Up    Length of Stay (days): 10    Expected Discharge Date: 06/02/2024    Anticipated Discharge Plan:  Transitional Care    Transportation: Anticipate medical transport    PT Recommendations: Transitional Care Facility  OT Recommendations:  Transitional Care Facility     Barriers to Discharge: placement    Prior Living Situation: town home with spouse, child(gunner), adult    Advanced Directive on File: no concerns identified     Patient/Spokesperson Updated: No    Additional Information:  Discharge orders placed, therapy rec is TCU. Pt accepted at [facility]. They can accept when bed becomes available. CM sent Amcom to surgeon asking to remove discharge orders as pt does not have TCU bed acceptance today.     10:52 AM  CM called and left  for [facility] to inquire when they may have a bed available for pt.     JOSE ALBERTO UrrutiaW

## 2024-06-02 NOTE — PLAN OF CARE
Goal Outcome Evaluation:     Pt is A&Ox4 and can be forgetful. Up in chair for some time this afternoon/early evening. CMS in LLE intact, pulse present, dressing intact. Urine continues to remain clear/crescencio with 250mls output. No clots assessed. Bladder scanned around 1015pm for 5ml. Pt had decrease in appetite this shift, continued to encourage fluids, pt only had about 300-350mls of fluids.  small bm this evening, received miralax. Hgb stable. Around 10pm pt called for nurse, he appeared anxious and flushed. Pt stated he was feeling anxious. Reported he needed to use restroom. Re-assured pt, bladder scanned for 5ml, 250ml output of urine. Checked temp, pt had fever of 99.8. after talking with pt he became less anxious. Received prn tylenol for fever. VSS otherwise.   Problem: Adult Inpatient Plan of Care  Goal: Optimal Comfort and Wellbeing  6/1/2024 2218 by Lida Magaña RN  Outcome: Progressing  6/1/2024 1430 by Lida Magaña RN  Outcome: Progressing     Problem: Risk for Delirium  Goal: Improved Behavioral Control  Outcome: Progressing  Intervention: Minimize Safety Risk  Recent Flowsheet Documentation  Taken 6/1/2024 1626 by Lida Magaña RN  Enhanced Safety Measures:   assistive devices when indicated   pain management   patient/family teach back on injury risk  Goal: Improved Attention and Thought Clarity  6/1/2024 2218 by Lida Magaña RN  Outcome: Progressing  6/1/2024 1430 by Lida Magaña RN  Outcome: Progressing     Problem: Orthopaedic Fracture  Goal: Absence of Bleeding  6/1/2024 2218 by Lida Magaña RN  Outcome: Progressing  6/1/2024 1430 by Lida Magaña RN  Outcome: Progressing  Goal: Absence of Embolism Signs and Symptoms  Outcome: Progressing  Goal: Fracture Stability  6/1/2024 2218 by Lida Magaña RN  Outcome: Progressing  6/1/2024 1430 by Lida Magaña RN  Outcome: Progressing  Goal: Optimal Functional Ability  6/1/2024 2218 by Lida Magaña RN  Outcome:  Progressing  6/1/2024 1430 by Lida Magaña RN  Outcome: Progressing  Intervention: Optimize Functional Ability  Recent Flowsheet Documentation  Taken 6/1/2024 1626 by Lida Magaña RN  Activity Management: activity encouraged  Goal: Absence of Infection Signs and Symptoms  6/1/2024 2218 by Lida Magaña RN  Outcome: Progressing  6/1/2024 1430 by Lida Magaña RN  Outcome: Progressing  Goal: Effective Tissue Perfusion  Outcome: Progressing  Goal: Optimal Pain Control and Function  6/1/2024 2218 by Lida Magaña RN  Outcome: Progressing  6/1/2024 1430 by Lida Magaña RN  Outcome: Progressing  Goal: Effective Oxygenation and Ventilation  6/1/2024 2218 by Lida Magaña RN  Outcome: Progressing  6/1/2024 1430 by Lida Magaña RN  Outcome: Progressing  Intervention: Promote Airway Secretion Clearance  Recent Flowsheet Documentation  Taken 6/1/2024 1626 by Lida Magaña RN  Activity Management: activity encouraged  Intervention: Optimize Oxygenation and Ventilation  Recent Flowsheet Documentation  Taken 6/1/2024 1626 by Lida Magaña RN  Head of Bed (HOB) Positioning: HOB at 30-45 degrees     Problem: Pain Acute  Goal: Optimal Pain Control and Function  6/1/2024 2218 by Lida Magaña RN  Outcome: Progressing  6/1/2024 1430 by Lida Magaña RN  Outcome: Progressing  Intervention: Prevent or Manage Pain  Recent Flowsheet Documentation  Taken 6/1/2024 1626 by Lida Magaña RN  Bowel Elimination Promotion:   adequate fluid intake promoted   ambulation promoted   commode/bedpan at bedside     Problem: Anemia  Goal: Anemia Symptom Improvement  6/1/2024 2218 by Lida Magaña RN  Outcome: Progressing  6/1/2024 1430 by Lida Magaña RN  Outcome: Progressing  Intervention: Monitor and Manage Anemia  Recent Flowsheet Documentation  Taken 6/1/2024 1626 by Lida Magaña RN  Safety Promotion/Fall Prevention:   activity supervised   assistive device/personal items within reach   nonskid  shoes/slippers when out of bed   patient and family education     Problem: Urinary Retention  Goal: Effective Urinary Elimination  6/1/2024 2218 by Lida Magaña RN  Outcome: Progressing  6/1/2024 1430 by Lida Magaña RN  Outcome: Progressing     Problem: Malnutrition  Goal: Improved Nutritional Intake  6/1/2024 2218 by Lida Magaña RN  Outcome: Progressing  6/1/2024 1430 by Lida Magaña RN  Outcome: Progressing     Problem: Adult Inpatient Plan of Care  Goal: Absence of Hospital-Acquired Illness or Injury  Intervention: Identify and Manage Fall Risk  Recent Flowsheet Documentation  Taken 6/1/2024 1626 by Lida Magaña RN  Safety Promotion/Fall Prevention:   activity supervised   assistive device/personal items within reach   nonskid shoes/slippers when out of bed   patient and family education     Problem: Orthopaedic Fracture  Goal: Bowel Elimination  Intervention: Promote Effective Bowel Elimination  Recent Flowsheet Documentation  Taken 6/1/2024 1626 by Lida Magaña RN  Bowel Elimination Promotion:   adequate fluid intake promoted   ambulation promoted   commode/bedpan at bedside

## 2024-06-03 ENCOUNTER — APPOINTMENT (OUTPATIENT)
Dept: OCCUPATIONAL THERAPY | Facility: HOSPITAL | Age: 84
DRG: 522 | End: 2024-06-03
Payer: COMMERCIAL

## 2024-06-03 VITALS
WEIGHT: 114.42 LBS | RESPIRATION RATE: 18 BRPM | BODY MASS INDEX: 18.39 KG/M2 | DIASTOLIC BLOOD PRESSURE: 76 MMHG | SYSTOLIC BLOOD PRESSURE: 164 MMHG | HEIGHT: 66 IN | TEMPERATURE: 98.5 F | OXYGEN SATURATION: 96 % | HEART RATE: 71 BPM

## 2024-06-03 LAB
ANION GAP SERPL CALCULATED.3IONS-SCNC: 12 MMOL/L (ref 7–15)
BUN SERPL-MCNC: 24.8 MG/DL (ref 8–23)
CALCIUM SERPL-MCNC: 8.6 MG/DL (ref 8.8–10.2)
CHLORIDE SERPL-SCNC: 100 MMOL/L (ref 98–107)
CREAT SERPL-MCNC: 0.86 MG/DL (ref 0.67–1.17)
DEPRECATED HCO3 PLAS-SCNC: 21 MMOL/L (ref 22–29)
EGFRCR SERPLBLD CKD-EPI 2021: 86 ML/MIN/1.73M2
ERYTHROCYTE [DISTWIDTH] IN BLOOD BY AUTOMATED COUNT: 20.3 % (ref 10–15)
GLUCOSE SERPL-MCNC: 104 MG/DL (ref 70–99)
HCT VFR BLD AUTO: 25.6 % (ref 40–53)
HGB BLD-MCNC: 8.4 G/DL (ref 13.3–17.7)
MCH RBC QN AUTO: 31.7 PG (ref 26.5–33)
MCHC RBC AUTO-ENTMCNC: 32.8 G/DL (ref 31.5–36.5)
MCV RBC AUTO: 97 FL (ref 78–100)
PHOSPHATE SERPL-MCNC: 2.7 MG/DL (ref 2.5–4.5)
PLATELET # BLD AUTO: 216 10E3/UL (ref 150–450)
POTASSIUM SERPL-SCNC: 4.7 MMOL/L (ref 3.4–5.3)
RBC # BLD AUTO: 2.65 10E6/UL (ref 4.4–5.9)
SODIUM SERPL-SCNC: 133 MMOL/L (ref 135–145)
WBC # BLD AUTO: 4.3 10E3/UL (ref 4–11)

## 2024-06-03 PROCEDURE — 250N000013 HC RX MED GY IP 250 OP 250 PS 637: Performed by: INTERNAL MEDICINE

## 2024-06-03 PROCEDURE — 250N000013 HC RX MED GY IP 250 OP 250 PS 637: Performed by: STUDENT IN AN ORGANIZED HEALTH CARE EDUCATION/TRAINING PROGRAM

## 2024-06-03 PROCEDURE — 85027 COMPLETE CBC AUTOMATED: CPT | Performed by: INTERNAL MEDICINE

## 2024-06-03 PROCEDURE — 80048 BASIC METABOLIC PNL TOTAL CA: CPT | Performed by: INTERNAL MEDICINE

## 2024-06-03 PROCEDURE — 84100 ASSAY OF PHOSPHORUS: CPT | Performed by: INTERNAL MEDICINE

## 2024-06-03 PROCEDURE — 97535 SELF CARE MNGMENT TRAINING: CPT | Mod: GO

## 2024-06-03 PROCEDURE — 99239 HOSP IP/OBS DSCHRG MGMT >30: CPT | Performed by: INTERNAL MEDICINE

## 2024-06-03 PROCEDURE — 36415 COLL VENOUS BLD VENIPUNCTURE: CPT | Performed by: INTERNAL MEDICINE

## 2024-06-03 RX ADMIN — POLYETHYLENE GLYCOL 3350 17 G: 17 POWDER, FOR SOLUTION ORAL at 08:08

## 2024-06-03 RX ADMIN — BACITRACIN: 500 OINTMENT TOPICAL at 08:10

## 2024-06-03 RX ADMIN — MEGESTROL ACETATE 400 MG: 40 SUSPENSION ORAL at 08:08

## 2024-06-03 RX ADMIN — CEFDINIR 300 MG: 300 CAPSULE ORAL at 08:09

## 2024-06-03 RX ADMIN — SODIUM CHLORIDE TAB 1 GM 1 G: 1 TAB at 08:09

## 2024-06-03 RX ADMIN — CYANOCOBALAMIN TAB 1000 MCG 1000 MCG: 1000 TAB at 08:09

## 2024-06-03 RX ADMIN — ESCITALOPRAM OXALATE 10 MG: 10 TABLET ORAL at 08:09

## 2024-06-03 RX ADMIN — SENNOSIDES AND DOCUSATE SODIUM 1 TABLET: 8.6; 5 TABLET ORAL at 08:09

## 2024-06-03 RX ADMIN — FAMOTIDINE 20 MG: 20 TABLET ORAL at 08:09

## 2024-06-03 RX ADMIN — ASPIRIN 81 MG: 81 TABLET, COATED ORAL at 08:09

## 2024-06-03 RX ADMIN — LEVOTHYROXINE SODIUM 125 MCG: 0.03 TABLET ORAL at 08:09

## 2024-06-03 ASSESSMENT — ACTIVITIES OF DAILY LIVING (ADL)
ADLS_ACUITY_SCORE: 51

## 2024-06-03 NOTE — DISCHARGE SUMMARY
[facility]    Discharge Summary  Hospitalist    Date of Admission:  5/23/2024  Date of Discharge:  6/3/2024  Discharging Provider: Dagmar Brown MD  Date of Service (when I saw the patient): 06/03/24    Discharge Diagnoses   Left hip fracture s/p left hip hemiarthroplasty   Fall  Gross hematuria with urinary retention and bilateral hydronephrosis, h/o Prostate cancer  Mohinder Nagel UTI   Acute on chronic anemia  Chronic compression fractures  Metastatic prostate cancer     History of Present Illness   Kilo Montiel is an 84 year old male who presented with fall    Hospital Course   Kilo Montiel is a 83 year old male with h/o metastatic prostate cancer, hypothyroidism, and syncope who was admitted on 5/23/2024 after a mechanical fall with left hip fracture for which he underwent left hip hemiarthroplasty on 5/24/2024.  Hospital course complicated by gross hematuria with urinary retention, UTI, rectal bleeding, acute blood loss anemia requiring PRBC transfusion     Left hip fracture s/p left hip hemiarthroplasty   S/p mechanical fall   -CT ABD/PEL 5/23: Mildly comminuted impacted subcapital fracture of the left proximal femur  -s/p left hemiarthroplasty 5/24  - post surgery care per ortho   - Aspirin for DVT prevention was on hold due to gross hematuria, resumed 5/31, monitor for recurrent hematuria; no more bloody urine  - Continue PT/OT. TCU placement.     Gross hematuria with urinary retention and bilateral hydronephrosis, h/o Prostate cancer   -CT ABD/PEL 5/23: Moderate to severe distention of the urinary bladder with symmetric hydroureter and hydronephrosis, evidence of bone metastases on the ribs, spine, and pelvis  - Subsequent ultrasound after Forte catheter placement demonstrated resolution of hydronephrosis  - S/p CBI, no recurrent hematuria > 48 hours  - Follow-up with urology in 1 week for consideration of voiding trial or downsizing of the catheter      Mohinder  Morgagni UTI   -Received 2 days of IV cefepime, switched to cefdinir on 5/31, will complete total of 7-day antibiotic course     Rectal bleeding - has history of hemorrhoids, radiation proctitis, also rectal ulcer was noted on most recent colonoscopy in [place] 4/10/2024 (biopsy report is in the chart)   -Seen by GI, did not think that rectal bleeding is significantly contributing to anemia  -Continue stool softeners and laxatives to avoid constipation     Acute on chronic anemia -hemoglobin remains stable  Pancytopenia-chronic (thrombocytopenia is variable)  -Baseline hemoglobin usually in the 8's  -S/p total of 3 units PRBCs     Hypophosphatemia  -Replace per protocol     Chronic compression fractures  -CT lumbar spine 5/23: Chronic T12, L1, L2, L3, L4, and L5 anterior compression fractures, severe spinal canal stenosis at L1-L2 with retropulsion of the L2 vertebral body, moderate to severe spinal stenosis at L4-L5  - Pt/Ot      Metastatic prostate cancer   Treated with radiation and initially in 2009, subsequent brachytherapy in 2013  He subsequently developed bony metastasis and had radiation to his lumbar spine in 2015  He has been on multiple additional oral medications       Dagmar Brown MD    Significant Results and Procedures   See below    Pending Results     Unresulted Labs Ordered in the Past 30 Days of this Admission       No orders found from 4/23/2024 to 5/24/2024.            Code Status   DNR / DNI       Primary Care Physician   Denzel Jones    General.   not in acute distress.  HEENT.  Pupils equal round react to light, anicteric, EOM intact.  Neck supple no JVD.  CVS regular rhythm no murmur gallops.  Lungs.  Clear to auscultation bilateral no wheezing or rales.  Abdomen.  Soft nontender bowel sounds present.  Extremities.  S/p hip sx.  Neurological.  General weakness  Skin no rash. No pallor.       Discharge Disposition   Discharged to TCU  Condition at discharge: Fair    Consultations  "This Hospital Stay   PALLIATIVE CARE ADULT IP CONSULT  SPIRITUAL HEALTH SERVICES IP CONSULT  CARE MANAGEMENT / SOCIAL WORK IP CONSULT  PHYSICAL THERAPY ADULT IP CONSULT  OCCUPATIONAL THERAPY ADULT IP CONSULT  UROLOGY IP CONSULT  SPIRITUAL HEALTH SERVICES IP CONSULT  SPIRITUAL HEALTH SERVICES IP CONSULT  GASTROENTEROLOGY IP CONSULT    Time Spent on this Encounter   IDagmar MD, personally saw the patient today and spent greater than 30 minutes discharging this patient.    Discharge Orders      Reason for your hospital stay    S/p hip repair     When to call - Contact Surgeon Team    You may experience symptoms that require follow-up before your scheduled appointment. Refer to the \"Stop[employer] Tool\" for instructions on when to contact your Surgeon Team if you are concerned about pain control, blood clots, constipation, or if you are unable to urinate.     When to call - Reach out to Urgent Care    If you are not able to reach your Surgeon Team and you need immediate care, go to the Orthopedic Walk-in Clinic or Urgent Care at your Surgeon's office.  Do NOT go to the Emergency Room unless you have shortness of breath, chest pain, or other signs of a medical emergency.     When to call - Reasons to Call 911    Call 911 immediately if you experience sudden-onset chest pain, arm weakness/numbness, slurred speech, or shortness of breath     Discharge Instruction - Breathing exercises    Perform breathing exercises using your Incentive Spirometer 10 times per hour while awake for 2 weeks.     Symptoms - Fever Management    A low grade fever can be expected after surgery.  Use acetaminophen (TYLENOL) as needed for fever management.  Contact your Surgeon Team if you have a fever greater than 101.5 F, chills, and/or night sweats.     Symptoms - Constipation management    Constipation (hard, dry bowel movements) is expected after surgery due to the combination of being less active, the anesthetic, and the opioid pain " medication.  You can do the following to help reduce constipation:  ~  FLUIDS:  Drink clear liquids (water or Gatorade), or juice (apple/prune).  ~  DIET:  Eat a fiber rich diet.    ~  ACTIVITY:  Get up and move around several times a day.  Increase your activity as you are able.  MEDICATIONS:  Reduce the risk of constipation by starting medications before you are constipated.  You can take Miralax   (1 packet as directed) and/or a stool softener (Senokot 1-2 tablets 1-2 times a day).  If you already have constipation and these medications are not working, you can get magnesium citrate and use as directed.  If you continue to have constipation you can try an over the counter suppository or enema.  Call your Surgeon Team if it has been greater than 3 days since your last bowel movement.     Symptoms - Reduced Urine Output    Changes in the amount of fluids you drank before and after surgery may result in problems urinating.  It is important to stay well-hydrated after surgery and drink plenty of water. If it has been greater than 8 hours since you have urinated despite drinking plenty of water, call your Surgeon Team.     Activity - Exercises to prevent blood clots    Unless otherwise directed by your Surgeon team, perform the following exercises at least three times per day for the first four weeks after surgery to prevent blood clots in your legs: 1) Point and flex your feet (Ankle Pumps), 2) Move your ankle around in big circles, 3) Wiggle your toes, 4) Walk, even for short distances, several times a day, will help decrease the risk of blood clots.     Comfort and Pain Management - Pain after Surgery    Pain after surgery is normal and expected.  You will have some amount of pain for several weeks after surgery.  Your pain will improve with time.  There are several things you can do to help reduce your pain including: rest, ice, elevation, and using pain medications as needed. Contact your Surgeon Team if you have  "pain that persists or worsens after surgery despite rest, ice, elevation, and taking your medication(s) as prescribed. Contact your Surgeon Team if you have new numbness, tingling, or weakness in your operative extremity.     Comfort and Pain Management - Swelling after Surgery    Swelling and/or bruising of the surgical extremity is common and may persist for several months after surgery. In addition to frequent icing and elevation, gentle compressive support with an ACE wrap or tubigrip may help with swelling. Apply compression regularly, removing at least twice daily to perform skin checks. Contact your Surgeon Team if your swelling increases and is NOT associated with an increase in your activity level, or if your swelling increases and is associated with redness and pain.     Comfort and Pain Management - LOWER Extremity Elevation    Swelling is expected for several months after surgery. This type of swelling is usually associated with gravity and activity, and can be improved with elevation.   The best way to do this is to get your \"toes above your nose\" by laying down and placing several pillows lengthwise under your calf and heel. When elevating your leg keep your knee completely straight. Perform this elevation as often as possible especially for the first two weeks after surgery.     Comfort and Pain Management - Cold therapy    Ice can be used to control swelling and discomfort after surgery. Place a thin towel over your operative site and apply the ice pack overtop. Leave ice pack in place for 20 minutes, then remove for 20 minutes. Repeat this 20 minutes on/20 minutes off routine as often as tolerated.     Medication Instructions - Acetaminophen (TYLENOL) Instructions    You were discharged with acetaminophen (TYLENOL) for pain management after surgery. Acetaminophen most effectively manages pain symptoms when it is taken on a schedule without missing doses (every four, six, or eight hours). Your " Provider will prescribe a safe daily dose between 3000 - 4000 mg.  Do NOT exceed this daily dose. Most patients use acetaminophen for pain control for the first four weeks after surgery.  You can wean from this medication as your pain decreases.     Medication Instructions - Opioid Instructions (1 - 2 tablets Q 4-6 hours, MAX 6 tablets)    You were discharged with an opioid medication (hydromorphone, oxycodone, hydrocodone, or tramadol). This medication should only be taken for breakthrough pain that is not controlled with acetaminophen (TYLENOL). If you rate your pain less than 3 you do not need this medication.  Pain rating 0-3:  You do not need this medication.  Pain rating 4-6:  Take 1 tablet every 4-6 hours as needed  Pain rating 7-10:  Take 2 tablets every 4-6 hours as needed.  Do not exceed 6 tablets per day     Medication Instructions - Opioids - Tapering Instructions    In the first three days following surgery, your symptoms may warrant use of the narcotic pain medication every four to six hours as prescribed. This is normal. As your pain symptoms improve, focus your efforts on decreasing (tapering) use of narcotic medications. The most successful tapering strategy is to first, decrease the number of tablets you take every 4-6 hours to the minimum prescribed. Then, increase the amount of time between doses.  For example:  First, taper to   or 1 tablet every 4-6 hours.  Then, taper to   or 1 tablet every 6-8 hours.  Then, taper to   or 1 tablet every 8-10 hours.  Then, taper to   or 1 tablet every 10-12 hours.  Then, taper to   or 1 tablet at bedtime.  The bedtime dose can help with comfort during sleep and is typically the last dose to be discontinued after surgery.     Follow Up Care    Please follow-up with Dr. Sherman's team in 2 weeks at [facility]. Call our scheduling line at [phone] to make an appointment, if you do not already have one scheduled.     Activity    Activity as tolerated      Return to Driving    Return to driving - Driving is NOT permitted until directed by your provider. Under no circumstance are you permitted to drive while using narcotic pain medications.     Weight bearing as tolerated    Weight bearing as tolerated on your operative extremity.     Dressing / Wound Care - Wound    You have a clean dressing on your surgical wound. Dressing change instructions as follows: dressing will be removed at your follow-up appointment. Contact your Surgeon Team if you have increased redness, warmth around the surgical wound, and/or drainage from the surgical wound.     Dressing / Wound care - Shower with wound/dressing covered    You must COVER your dressing or incision with saran wrap (or any other non-permeable covering) to allow the incision to remain dry while showering.  You may shower 2 days after surgery as long as the surgical wound stays dry. Continue to cover your dressing or incision for showering until your first office visit.  You are strictly prohibited from soaking or submerging the surgical wound underwater.     Dressing / Wound Care - NO Tub Bathing    Tub bathing, swimming, or any other activities that will cause your incision to be submerged in water should be avoided until allowed by your Surgeon.     Medication instructions -  Anticoagulation - aspirin    Take the aspirin as prescribed for a total of four weeks after surgery.  This is given to help minimize your risk of blood clot.     No flexion past 90 degrees    No bending at waist past 90 degrees.     No internal rotation    No pivoting on your operative leg.     No adduction past midline    No crossing your operative leg.     Crutches DME    DME Documentation: Describe the reason for need to support medical necessity: Impaired gait status post hip surgery. I, the undersigned, certify that the above prescribed supplies are medically necessary for this patient and is both reasonable and necessary in reference to  accepted standards of medical practice in the treatment of this patient's condition and is not prescribed as a convenience.     Cane DME    DME Documentation: Describe the reason for need to support medical necessity: Impaired gait status post hip surgery. I, the undersigned, certify that the above prescribed supplies are medically necessary for this patient and is both reasonable and necessary in reference to accepted standards of medical practice in the treatment of this patient's condition and is not prescribed as a convenience.     Walker DME    DME Documentation: Describe the reason for need to support medical necessity: Impaired gait status post hip surgery. I, the undersigned, certify that the above prescribed supplies are medically necessary for this patient and is both reasonable and necessary in reference to accepted standards of medical practice in the treatment of this patient's condition and is not prescribed as a convenience.     Discharge Instruction - Regular Diet Adult    Return to your pre-surgery diet unless instructed otherwise     Discharge Medications   Current Discharge Medication List        START taking these medications    Details   acetaminophen (TYLENOL) 325 MG tablet Take 2 tablets (650 mg) by mouth every 4 hours as needed for other (For optimal non-opioid multimodal pain management to improve pain control.)  Qty: 100 tablet, Refills: 0    Associated Diagnoses: Closed subcapital fracture of left femur, initial encounter (H)      aspirin 81 MG EC tablet Take 1 tablet (81 mg) by mouth 2 times daily  Qty: 60 tablet, Refills: 0    Associated Diagnoses: Closed subcapital fracture of left femur, initial encounter (H)      cefdinir (OMNICEF) 300 MG capsule Take 1 capsule (300 mg) by mouth every 12 hours for 4 doses  Qty: 4 capsule, Refills: 0    Associated Diagnoses: Urinary tract infection with hematuria, site unspecified      oxyCODONE (ROXICODONE) 5 MG tablet Take 0.5 tablets (2.5 mg) by  mouth every 4 hours as needed for severe pain  Qty: 10 tablet, Refills: 0    Associated Diagnoses: Closed subcapital fracture of left femur, initial encounter (H)           CONTINUE these medications which have NOT CHANGED    Details   chlorhexidine (PERIDEX) 0.12 % solution Swish and spit 15 mLs in mouth 2 times daily as needed (PRN)      cyanocobalamin (VITAMIN B-12) 1000 MCG tablet Take 1,000 mcg by mouth daily      escitalopram (LEXAPRO) 10 MG tablet Take 10 mg by mouth daily      famotidine (PEPCID) 20 MG tablet Take 20 mg by mouth daily      lactulose (CHRONULAC) 10 GM/15ML solution Take 15 mLs by mouth 2 times daily as needed      levothyroxine (SYNTHROID/LEVOTHROID) 125 MCG tablet Take 1 tablet (125 mcg) by mouth every morning (before breakfast)    Associated Diagnoses: Thyroid disease      LORazepam (ATIVAN) 0.5 MG tablet Take 1 tablet by mouth every 8 hours as needed      magnesium 250 MG tablet Take 1 tablet by mouth daily      megestrol (MEGACE) 40 MG/ML suspension Take 10 mLs by mouth daily      ondansetron (ZOFRAN) 8 MG tablet Take 8 mg by mouth every 8 hours as needed      polyethylene glycol (MIRALAX) 17 GM/Dose powder Take 17 g by mouth daily    Associated Diagnoses: Hyponatremia      senna-docusate (SENOKOT-S/PERICOLACE) 8.6-50 MG tablet Take 2 tablets by mouth 2 times daily    Associated Diagnoses: Constipation, unspecified constipation type      sodium chloride 1 GM tablet Take 1 tablet (1 g) by mouth 3 times daily (with meals)    Associated Diagnoses: Hyponatremia      Turmeric 500 MG CAPS Take 1 capsule by mouth daily           STOP taking these medications       aspirin 81 MG tablet Comments:   Reason for Stopping:             Allergies   Allergies   Allergen Reactions    Bee Pollen Other (See Comments)     Data   Most Recent 3 CBC's:  Recent Labs   Lab Test 06/03/24  0517 06/02/24  0532 06/01/24  0449   WBC 4.3 3.8* 4.2   HGB 8.4* 8.3* 8.8*   MCV 97 95 95    194 204      Most Recent 3  BMP's:  Recent Labs   Lab Test 06/03/24  0517 06/01/24  0449 05/30/24  0548   * 131* 132*   POTASSIUM 4.7 4.1 4.5   CHLORIDE 100 100 100   CO2 21* 22 24   BUN 24.8* 23.3* 26.3*   CR 0.86 0.80 0.86   ANIONGAP 12 9 8   SOPHIA 8.6* 8.4* 8.5*   * 100* 99     Most Recent 2 LFT's:  Recent Labs   Lab Test 05/23/24  1359 05/01/24  1238   AST 21 29   ALT 7 <5   ALKPHOS 58 63   BILITOTAL 0.2 0.3     Most Recent INR's and Anticoagulation Dosing History:  Anticoagulation Dose History          Latest Ref Rng & Units 5/1/2024 5/23/2024   Recent Dosing and Labs   INR 0.85 - 1.15 1.16  1.10      Most Recent 3 Troponin's:No lab results found.  Most Recent Cholesterol Panel:No lab results found.  Most Recent 6 Bacteria Isolates From Any Culture (See EPIC Reports for Culture Details):No lab results found.  Most Recent TSH, T4 and A1c Labs:  Recent Labs   Lab Test 05/01/24  1238   TSH 27.19*   T4 0.59*     Results for orders placed or performed during the hospital encounter of 05/23/24   CT Head w/o Contrast    Narrative    EXAM: CT HEAD W/O CONTRAST  LOCATION: [facility]  DATE: 5/23/2024    INDICATION: fall, elderly  COMPARISON: None.  TECHNIQUE: Routine CT Head without IV contrast. Multiplanar reformats. Dose reduction techniques were used.    FINDINGS:  INTRACRANIAL CONTENTS: No evidence of acute intracranial hemorrhage or mass effect. Few scattered foci of decreased attenuation within the cerebral hemispheric white matter which are nonspecific, though most commonly ascribed to chronic small vessel   ischemic disease. The ventricles and sulci are prominent consistent with moderate brain parenchymal volume loss. Gray-white matter differentiation is maintained. The basilar cisterns are patent.    VISUALIZED ORBITS/SINUSES/MASTOIDS: The globes are unremarkable. The partially imaged paranasal sinuses and mastoid air cells are unremarkable.     BONES/SOFT TISSUES: The visualized skull base and calvarium  are unremarkable.      Impression    IMPRESSION:    1.  No evidence of acute intracranial hemorrhage or mass effect.  2.  Moderate nonspecific white matter changes.  3.  Moderate brain parenchymal volume loss.   CT Cervical Spine w/o Contrast    Narrative    EXAM: CT CERVICAL SPINE W/O CONTRAST  LOCATION: [facility]  DATE: 5/23/2024    INDICATION: fall, elderly  COMPARISON: None.  TECHNIQUE: Routine CT Cervical Spine without IV contrast. Multiplanar reformats. Dose reduction techniques were used.    FINDINGS:  No evidence of acute fracture or subluxation of the cervical spine by CT imaging. Anterolisthesis of C7 on T1 measuring 2 mm. The vertebral bodies of the cervical spine otherwise have normal stature and alignment. Multilevel degenerative disc disease of   the cervical spine with disc height loss, most pronounced at C4/C5-T1/T2. No extraspinal abnormality.     Craniovertebral junction and C1-C2: The odontoid process is well approximated with the anterior body of C1 and well aligned between the lateral masses of C1.    C2-C3: No posterior disc bulge or spinal canal narrowing. No neural foraminal narrowing.     C3-C4: No posterior disc bulge or spinal canal narrowing. Uncovertebral joint disease and facet arthropathy with severe bilateral neural foraminal narrowing.     C4-C5: No posterior disc bulge or spinal canal narrowing. Uncovertebral joint disease and facet arthropathy with severe bilateral neural foraminal narrowing.     C5-C6: No posterior disc bulge or spinal canal narrowing. Uncovertebral joint disease and facet arthropathy with severe bilateral neural foraminal narrowing.     C6-C7: No posterior disc bulge or spinal canal narrowing. No neural foraminal narrowing.     C7-T1: No posterior disc bulge or spinal canal narrowing. No neural foraminal narrowing.       Impression    IMPRESSION:  1.  No evidence of acute fracture or subluxation of the cervical spine by CT imaging.  2.   Degenerative cervical spondylosis as described above.   XR Femur Left 2 Views    Narrative    EXAM: XR PELVIS 1/2 VIEWS, XR FEMUR LEFT 2 VIEWS, XR KNEE LEFT 1/2 VIEWS  LOCATION: [facility]  DATE: 5/23/2024    INDICATION: Left hip pain after fall directly onto left hip this AM.  COMPARISON: None.      Impression    IMPRESSION: Both hips negative for fracture or dislocation. There is degenerative change. There is some lucency within the left acetabulum which may be artifactual/projectional. No cortical step-off. Prostate brachytherapy seeds. Fracture of the L5   vertebral body may be chronic. Recommend correlation. The left femur is negative for fracture. Chronic enthesitis superior pole of the left patella. Small left knee joint effusion.   XR Pelvis 1/2 Views    Narrative    EXAM: XR PELVIS 1/2 VIEWS, XR FEMUR LEFT 2 VIEWS, XR KNEE LEFT 1/2 VIEWS  LOCATION: [facility]  DATE: 5/23/2024    INDICATION: Left hip pain after fall directly onto left hip this AM.  COMPARISON: None.      Impression    IMPRESSION: Both hips negative for fracture or dislocation. There is degenerative change. There is some lucency within the left acetabulum which may be artifactual/projectional. No cortical step-off. Prostate brachytherapy seeds. Fracture of the L5   vertebral body may be chronic. Recommend correlation. The left femur is negative for fracture. Chronic enthesitis superior pole of the left patella. Small left knee joint effusion.   XR Knee Left 1/2 Views    Narrative    EXAM: XR PELVIS 1/2 VIEWS, XR FEMUR LEFT 2 VIEWS, XR KNEE LEFT 1/2 VIEWS  LOCATION: [facility]  DATE: 5/23/2024    INDICATION: Left hip pain after fall directly onto left hip this AM.  COMPARISON: None.      Impression    IMPRESSION: Both hips negative for fracture or dislocation. There is degenerative change. There is some lucency within the left acetabulum which may be  artifactual/projectional. No cortical step-off. Prostate brachytherapy seeds. Fracture of the L5   vertebral body may be chronic. Recommend correlation. The left femur is negative for fracture. Chronic enthesitis superior pole of the left patella. Small left knee joint effusion.   CT Abdomen Pelvis w Contrast    Narrative    EXAM: CT ABDOMEN PELVIS W CONTRAST  LOCATION: [facility]  DATE: 5/23/2024    INDICATION: left hip pain and possible fracture, hemoglobin drop to 6  COMPARISON: CT of the abdomen and pelvis 5/1/2024  TECHNIQUE: CT scan of the abdomen and pelvis was performed following injection of IV contrast. Multiplanar reformats were obtained. Dose reduction techniques were used.  CONTRAST: isovue 370  60ml    FINDINGS:   LOWER CHEST: In the territory of left lower lobe airspace consolidation present 5/1/2023 there is now a 9 mm nodule (series 3, image 11) with an adjacent band of atelectasis. Additional bands of atelectasis are present in both lower lobes along the   posterior costal pleura. Endoluminal secretions are present with multiple subsegmental airways in the right middle lobe and anterior basal segment right lower lobe. Few benign calcified granuloma are present in the right lower lobe. Small hiatal hernia.    HEPATOBILIARY: Few scattered benign calcifications in the liver. No focal liver lesions. Gallbladder is nondistended without wall thickening. No calcified gallstones. No bile duct enlargement.    PANCREAS: Normal.    SPLEEN: Normal in size containing multiple calcified granulomata.    ADRENAL GLANDS: Normal.    KIDNEYS/BLADDER: Moderate to severe distention of the urinary bladder. The bladder has a smooth wall of uniform thickness. Symmetric hydroureteronephrosis without urothelial thickening or radiopaque calculi.    BOWEL: There is a large amount of stool throughout the colon. No bowel wall thickening. No inflammatory stranding in the mesentery or peritoneal  thickening.    LYMPH NODES: Normal.    VASCULATURE: There are patchy atheromatous calcifications throughout the abdominal aorta and iliac arteries. Minimal focal ectasia of the right lateral wall of the infrarenal abdominal aorta but no abdominal aortic aneurysm.    PELVIC ORGANS: There are multiple metallic seeds within the substance of the prostate gland. Small phleboliths are present in the low pelvis.    MUSCULOSKELETAL: Globally decreased bone marrow attenuation consistent with demineralization. Heterogeneous sclerosis of the medullary spaces of the low thoracic and lumbar spine, iliac bones as well as several lower ribs consistent with multifocal bone   metastases. Moderate compression deformities of L2, L3, and L4 with mild compression deformities of L1 and L4. There is an acute mildly comminuted and impacted subcapital fracture of the proximal left femur.      Impression    IMPRESSION:     1.  Mildly comminuted impacted subcapital fracture proximal left femur.  2.  Moderate to severe distention of the urinary bladder associated with symmetric hydroureter and hydronephrosis. Urinary retention potentially secondary to flow limitation at the level of the prostate gland.  3.  Multifocal sclerosis of the ribs, spine, and pelvis consistent with patchy bone metastases. Compression deformities of the lumbar spine are unchanged from 5/1/2024.  4.  Focal consolidation in the anterior basal left lower lobe has improved from 5/1/2024 with residual atelectasis and 9 mm nodule in this location. The nodule could be reassessed with chest CT in 3-6 months.   CT Lumbar Spine Reconstructed    Narrative    EXAM: CT LUMBAR SPINE RECONSTRUCTED  LOCATION: [facility]  DATE: 5/23/2024    INDICATION: Possible L5 fracture  COMPARISON: CT abdomen and pelvis May 1, 2024.  TECHNIQUE: Routine noncontrast CT of the lumbar spine. Dose reduction techniques were used.     FINDINGS:  VERTEBRA: Slight lumbar kyphosis.  Grade 1 anterolisthesis L4 on L5 and L5 on S1. There are numerous sclerotic lesions throughout the visualized spine, ribs, and pelvis compatible with osseous metastatic disease. Anterior compression fractures of the T12,   L1, L2, L3, L4 and L5 vertebra are unchanged from May 1. Unchanged bony retropulsion L2 measuring approximately 5 mm No new or worsening anterior compression fracture     CANAL/FORAMINA: Severe spinal canal stenosis L1-2, in part due to bony retropulsion. Moderate to severe spinal canal stenosis L4-5.    PARASPINAL: Severe right-sided hydronephrosis. Mild left hydronephrosis. Partially imaged distended urinary bladder.      Impression    IMPRESSION:  1.  Diffuse osseous metastatic disease.  2.  Chronic T12, L1, L2, L3, L4, and L5 anterior compression fractures are unchanged from comparison exam.  3.  Severe spinal canal stenosis L1-2, secondary to bony retropulsion of the L2 vertebral body. Moderate to severe spinal canal stenosis L4-5.  4.  Partially imaged distended urinary bladder with severe right and mild left hydronephrosis.   XR Pelvis w Hip Port Left  1 View    Narrative    EXAM: XR PELVIS AND HIP PORTABLE LEFT 1 VIEW  LOCATION: [facility]  DATE: 5/24/2024    INDICATION: Status post hip surgery.    COMPARISON: Left femur radiographic exam 5/23/2024.      Impression    IMPRESSION: Postoperative change related to interval placement of a left hip hemiarthroplasty for management of left proximal femur fracture. The component projects in the expected position. No acute displaced periprosthetic fracture. Expected recent   postoperative soft tissue and intra-articular gas with left hip soft tissue swelling. Mild degenerative change right hip. Prostate gland brachytherapy seeds. No displaced pelvic fracture. Diffuse bone demineralization.     US Renal Complete Non-Vascular    Narrative    EXAM: US RENAL COMPLETE NON-VASCULAR  [facility]  DATE: 5/26/2024    INDICATION: Eval for resolution of hydronephrosis  COMPARISON: CT 5/23/2024  TECHNIQUE: Routine Bilateral Renal and Bladder Ultrasound.    FINDINGS:    RIGHT KIDNEY: 8.5 x 5.2 x 4.5 cm. No hydronephrosis. Right renal atrophy with a diffuse cortical thinning. No evidence for echogenic intrarenal calculi or exophytic cystic lesion.     LEFT KIDNEY: 8.9 x 4.2 x 4.2 cm. No hydronephrosis. Normal left renal cortical echogenicity and thickness. No evidence for echogenic intrarenal calculi or exophytic cystic lesion.     BLADDER: Forte catheter within the bladder. Marked diffuse bladder wall thickening. Debris within a mildly distended bladder lumen.      Impression    IMPRESSION:  1.  No hydronephrosis.    2.  Right renal atrophy with diffuse cortical thinning.    3.  Marked diffuse bladder wall thickening with debris within the mildly distended bladder lumen. Forte catheter within the bladder.

## 2024-06-03 NOTE — PLAN OF CARE
Problem: Adult Inpatient Plan of Care  Goal: Plan of Care Review  Description: The Plan of Care Review/Shift note should be completed every shift.  The Outcome Evaluation is a brief statement about your assessment that the patient is improving, declining, or no change.  This information will be displayed automatically on your shift  note.  Outcome: Progressing  Goal: Absence of Hospital-Acquired Illness or Injury  Intervention: Prevent Skin Injury  Recent Flowsheet Documentation  Taken 6/2/2024 2055 by Dorina Bullard, RN  Body Position: heels elevated  Taken 6/2/2024 1627 by Dorina Bullard RN  Body Position: sitting up in bed  Intervention: Prevent Infection  Recent Flowsheet Documentation  Taken 6/2/2024 1557 by Dorina Bullard RN  Infection Prevention:   hand hygiene promoted   rest/sleep promoted   single patient room provided  Goal: Optimal Comfort and Wellbeing  Intervention: Monitor Pain and Promote Comfort  Recent Flowsheet Documentation  Taken 6/2/2024 1545 by Dorina Bullard, RN  Pain Management Interventions:   ambulation/increased activity   care clustered   cold applied   repositioned   rest     Problem: Orthopaedic Fracture  Goal: Absence of Bleeding  Outcome: Progressing  Goal: Optimal Functional Ability  Intervention: Optimize Functional Ability  Recent Flowsheet Documentation  Taken 6/2/2024 2055 by Dorina Bullard RN  Positioning/Transfer Devices:   pillows   in use  Taken 6/2/2024 1627 by Dorina Bullard RN  Positioning/Transfer Devices:   pillows   in use  Taken 6/2/2024 1545 by Dorina Bullard, RN  Range of Motion: active ROM (range of motion) encouraged  Goal: Optimal Pain Control and Function  Intervention: Manage Acute Orthopaedic-Related Pain  Recent Flowsheet Documentation  Taken 6/2/2024 1545 by Dorina Bullard, RN  Pain Management Interventions:   ambulation/increased activity   care clustered   cold applied   repositioned   rest  Goal: Effective Oxygenation  and Ventilation  Intervention: Optimize Oxygenation and Ventilation  Recent Flowsheet Documentation  Taken 6/2/2024 2055 by Dorina Bullard, RN  Head of Bed ([facility]) Positioning: HOB at 20-30 degrees  Taken 6/2/2024 1627 by Dorina Bullard, RN  Head of Bed ([facility]) Positioning: HOB at 30-45 degrees     Problem: Pain Acute  Goal: Optimal Pain Control and Function  Outcome: Progressing  Intervention: Develop Pain Management Plan  Recent Flowsheet Documentation  Taken 6/2/2024 1545 by Dorina Bullard, RN  Pain Management Interventions:   ambulation/increased activity   care clustered   cold applied   repositioned   rest     Problem: Malnutrition  Goal: Improved Nutritional Intake  Outcome: Progressing     Problem: Urinary Retention  Goal: Effective Urinary Elimination  Outcome: Progressing     Goal Outcome Evaluation:  Patient is alert and oriented and able to make needs known. Patient ambulated in hallway x 2. Patient complained of rectal pain, PRN tylenol given, effective per patient. Forte catheter patent, continuing to encourage fluids.

## 2024-06-03 NOTE — PLAN OF CARE
Occupational Therapy Discharge Summary    Reason for therapy discharge:    Discharged to transitional care facility.    Progress towards therapy goal(s). See goals on Care Plan in [facility] electronic health record for goal details.  Goals partially met.  Barriers to achieving goals:   discharge from facility.    Therapy recommendation(s):    Continued therapy is recommended.  Rationale/Recommendations:  Recommend continued OT to address functional mobility for ADLs/IADLs.

## 2024-06-03 NOTE — PROGRESS NOTES
Care Management Discharge Note    Discharge Date: 06/03/2024       Discharge Disposition: Transitional Care    Discharge Services: None    Discharge DME: None    Discharge Transportation: agency    Private pay costs discussed: transportation costs    Does the patient's insurance plan have a 3 day qualifying hospital stay waiver?  No    PAS Confirmation Code: [id number]  Patient/family educated on Medicare website which has current facility and service quality ratings: yes    Education Provided on the Discharge Plan: Yes  Persons Notified of Discharge Plans: patient  Patient/Family in Agreement with the Plan: yes    Handoff Referral Completed: Yes    Additional Information:    Plan is for Pt to discharge back to [facility] ([facility] when medically ready.     8:33 AM  SW called [facility] liaison Mandy to check if she can accept Pt back today. Mandy reported they most likely will have a bed available and asked for CM to coordinate a time for transport.     9:51 AM  SW met and spoke with Pt about transportation at discharge. Pt requested for hospital transport and is understanding of the potential costs. [facility] wheelchair transport pick-up window: [phone]. Care Team, facility, and Pt updated.    10:33 AM  PAS completed. [id number].    No further CM needs requested or anticipated. CM will sign off. Please contact CM if any additional needs arise prior to discharge.      BRENDEN Corral

## 2024-06-03 NOTE — PLAN OF CARE
Problem: Adult Inpatient Plan of Care  Goal: Plan of Care Review  Description: The Plan of Care Review/Shift note should be completed every shift.  The Outcome Evaluation is a brief statement about your assessment that the patient is improving, declining, or no change.  This information will be displayed automatically on your shift  note.  6/3/2024 0616 by Dorina Bullard, RN  Outcome: Progressing  6/2/2024 2200 by Dorina Bullard RN  Outcome: Progressing  Goal: Absence of Hospital-Acquired Illness or Injury  Intervention: Prevent Skin Injury  Recent Flowsheet Documentation  Taken 6/3/2024 0255 by Dorina Bullard RN  Body Position: (repositions independently) --  Taken 6/2/2024 2055 by Dorina Bullard, RN  Body Position: heels elevated  Taken 6/2/2024 1627 by Dorina Bullard, RN  Body Position: sitting up in bed  Intervention: Prevent Infection  Recent Flowsheet Documentation  Taken 6/3/2024 0100 by Dorina Bullard, RN  Infection Prevention:   hand hygiene promoted   rest/sleep promoted   single patient room provided  Goal: Optimal Comfort and Wellbeing  Intervention: Monitor Pain and Promote Comfort  Recent Flowsheet Documentation  Taken 6/3/2024 0100 by Dorina Bullard, RN  Pain Management Interventions:   ambulation/increased activity   care clustered   cold applied   repositioned   rest     Goal Outcome Evaluation:  Patient is alert and oriented and able to make needs known. Patient has carlton, patent with 600 mL of clear yellow output. Encouraged to drink fluid.

## 2024-06-03 NOTE — PLAN OF CARE
Physical Therapy Discharge Summary    Reason for therapy discharge:    Discharged to transitional care facility.    Progress towards therapy goal(s). See goals on Care Plan in [facility] electronic health record for goal details.  Goals partially met.  Barriers to achieving goals:   discharge from facility.    Therapy recommendation(s):    Continued therapy is recommended.  Rationale/Recommendations:  recommend continued PT at TCU.    Mariposa Ramos, PT  6/3/2024

## 2024-06-04 ENCOUNTER — PATIENT OUTREACH (OUTPATIENT)
Dept: CARE COORDINATION | Facility: CLINIC | Age: 84
End: 2024-06-04
Payer: COMMERCIAL

## 2024-06-04 NOTE — PROGRESS NOTES
[facility]    Background: Transitional Care Management program identified per system criteria and reviewed by [facility] team for possible outreach.    Assessment: Upon chart review, [facility] Team member will not proceed with patient outreach related to this episode of Transitional Care Management program due to reason below:    Non-[facility] TCU: CCRC team member noted patient discharged to TCU/ARU/LTACH. Patient is not established with a [facility] currently supported by Primary Care-Care Coordination therefore handoff to Primary Care-Care Coordination is not appropriate at this time.    Plan: Transitional Care Management episode addressed appropriately per reason noted above.      CHUYITA Mario  Connected Care Resource [place], [facility]    *Connected Care Resource Team does NOT follow patient ongoing. Referrals are identified based on internal discharge reports and the outreach is to ensure patient has an understanding of their discharge instructions.

## 2024-06-21 ENCOUNTER — HOSPITAL ENCOUNTER (EMERGENCY)
Facility: HOSPITAL | Age: 84
Discharge: NURSING, REHAB OR LONG-TERM CARE | End: 2024-06-22
Attending: EMERGENCY MEDICINE | Admitting: EMERGENCY MEDICINE
Payer: COMMERCIAL

## 2024-06-21 DIAGNOSIS — D64.9 ANEMIA, UNSPECIFIED TYPE: ICD-10-CM

## 2024-06-21 LAB
ABO/RH(D): NORMAL
ANION GAP SERPL CALCULATED.3IONS-SCNC: 11 MMOL/L (ref 7–15)
ANTIBODY SCREEN: NEGATIVE
BASOPHILS # BLD AUTO: 0 10E3/UL (ref 0–0.2)
BASOPHILS NFR BLD AUTO: 0 %
BLD PROD TYP BPU: NORMAL
BLOOD COMPONENT TYPE: NORMAL
BUN SERPL-MCNC: 25.9 MG/DL (ref 8–23)
CALCIUM SERPL-MCNC: 8.2 MG/DL (ref 8.8–10.2)
CHLORIDE SERPL-SCNC: 102 MMOL/L (ref 98–107)
CODING SYSTEM: NORMAL
CREAT SERPL-MCNC: 0.83 MG/DL (ref 0.67–1.17)
CROSSMATCH: NORMAL
DEPRECATED HCO3 PLAS-SCNC: 21 MMOL/L (ref 22–29)
EGFRCR SERPLBLD CKD-EPI 2021: 87 ML/MIN/1.73M2
EOSINOPHIL # BLD AUTO: 0 10E3/UL (ref 0–0.7)
EOSINOPHIL NFR BLD AUTO: 0 %
ERYTHROCYTE [DISTWIDTH] IN BLOOD BY AUTOMATED COUNT: 20.9 % (ref 10–15)
GLUCOSE SERPL-MCNC: 105 MG/DL (ref 70–99)
HCT VFR BLD AUTO: 20.5 % (ref 40–53)
HGB BLD-MCNC: 6.8 G/DL (ref 13.3–17.7)
IMM GRANULOCYTES # BLD: 0.1 10E3/UL
IMM GRANULOCYTES NFR BLD: 2 %
ISSUE DATE AND TIME: NORMAL
LYMPHOCYTES # BLD AUTO: 0.3 10E3/UL (ref 0.8–5.3)
LYMPHOCYTES NFR BLD AUTO: 7 %
MCH RBC QN AUTO: 32.4 PG (ref 26.5–33)
MCHC RBC AUTO-ENTMCNC: 33.2 G/DL (ref 31.5–36.5)
MCV RBC AUTO: 98 FL (ref 78–100)
MONOCYTES # BLD AUTO: 0.5 10E3/UL (ref 0–1.3)
MONOCYTES NFR BLD AUTO: 9 %
NEUTROPHILS # BLD AUTO: 4 10E3/UL (ref 1.6–8.3)
NEUTROPHILS NFR BLD AUTO: 82 %
NRBC # BLD AUTO: 0 10E3/UL
NRBC BLD AUTO-RTO: 0 /100
PLATELET # BLD AUTO: 200 10E3/UL (ref 150–450)
POTASSIUM SERPL-SCNC: 3.6 MMOL/L (ref 3.4–5.3)
RBC # BLD AUTO: 2.1 10E6/UL (ref 4.4–5.9)
SODIUM SERPL-SCNC: 134 MMOL/L (ref 135–145)
SPECIMEN EXPIRATION DATE: NORMAL
UNIT ABO/RH: NORMAL
UNIT NUMBER: NORMAL
UNIT STATUS: NORMAL
UNIT TYPE ISBT: 6200
WBC # BLD AUTO: 4.9 10E3/UL (ref 4–11)

## 2024-06-21 PROCEDURE — P9016 RBC LEUKOCYTES REDUCED: HCPCS | Performed by: PHYSICIAN ASSISTANT

## 2024-06-21 PROCEDURE — 86923 COMPATIBILITY TEST ELECTRIC: CPT | Performed by: PHYSICIAN ASSISTANT

## 2024-06-21 PROCEDURE — 85025 COMPLETE CBC W/AUTO DIFF WBC: CPT | Performed by: PHYSICIAN ASSISTANT

## 2024-06-21 PROCEDURE — 86900 BLOOD TYPING SEROLOGIC ABO: CPT | Performed by: PHYSICIAN ASSISTANT

## 2024-06-21 PROCEDURE — 36430 TRANSFUSION BLD/BLD COMPNT: CPT

## 2024-06-21 PROCEDURE — 36415 COLL VENOUS BLD VENIPUNCTURE: CPT | Performed by: PHYSICIAN ASSISTANT

## 2024-06-21 PROCEDURE — 80048 BASIC METABOLIC PNL TOTAL CA: CPT | Performed by: PHYSICIAN ASSISTANT

## 2024-06-21 PROCEDURE — 99285 EMERGENCY DEPT VISIT HI MDM: CPT | Mod: 25

## 2024-06-21 ASSESSMENT — ACTIVITIES OF DAILY LIVING (ADL)
ADLS_ACUITY_SCORE: 38

## 2024-06-21 ASSESSMENT — COLUMBIA-SUICIDE SEVERITY RATING SCALE - C-SSRS
2. HAVE YOU ACTUALLY HAD ANY THOUGHTS OF KILLING YOURSELF IN THE PAST MONTH?: NO
1. IN THE PAST MONTH, HAVE YOU WISHED YOU WERE DEAD OR WISHED YOU COULD GO TO SLEEP AND NOT WAKE UP?: NO
6. HAVE YOU EVER DONE ANYTHING, STARTED TO DO ANYTHING, OR PREPARED TO DO ANYTHING TO END YOUR LIFE?: NO

## 2024-06-22 VITALS
DIASTOLIC BLOOD PRESSURE: 76 MMHG | SYSTOLIC BLOOD PRESSURE: 151 MMHG | HEART RATE: 68 BPM | RESPIRATION RATE: 19 BRPM | TEMPERATURE: 98.7 F | OXYGEN SATURATION: 96 %

## 2024-06-22 ASSESSMENT — ACTIVITIES OF DAILY LIVING (ADL)
ADLS_ACUITY_SCORE: 38

## 2024-06-22 NOTE — ED PROVIDER NOTES
EMERGENCY DEPARTMENT ENCOUNTER   NAME: Kilo Montiel ; AGE: 84 year old male ; YOB: 1940 ; MRN: [id number] ; PCP: Denzel Jones     Evaluation Date & Time: 6/21/2024  8:05 PM    ED Provider: Joan Field PA-C    CHIEF COMPLAINT     Abnormal Labs      FINAL ASSESSMENT       ICD-10-CM    1. Anemia, unspecified type  D64.9           ED COURSE, MEDICAL DECISION MAKING, PLAN     ED course     8:05 PM I evaluated the patient. Performed physical exam. Plan for labs and transfusion. Staffed with Dr. Ortez.   8:54 PM Lab called reporting critical hgb of 6.8.   9:30 PM Filled out consent form and patient signed. Waiting for blood from blood bank. Plan to transfuse and discharge back to SNF.   11:07 PM: Checked with nurse, about 1.45 hrs left of transfusion.   ______________________________________________________________________    Kilo Montiel is a 84 year old male with pertinent medical history of anemia, hx prostate cancer, hx melanoma, hx CVA, CKD, hypothyroidism, recent subcapital fracture of left femur s/p hemiarthroplasty presenting for low hemoglobin noted by his SNF today. Reported at 6.8.     Exam reveals a pale elderly male in no acute distress. Vitally normal.     Laboratory workup reveals a hemoglobin of 6.8. Labs otherwise reassuring.     Interventions here included: 1 unit of PRBC transfused.   On recheck he continues to have no complaints.   Vitals have been stable and reassuring.     History and exam consistent with acute on chronic anemia. Source suspected to be from hematuria per urology. Was having gross hematuria since hospital discharge with down trending hgbs.   Low suspicion for GI bleed. No black stools. No rectal bleeding.    Since patient is really not symptomatic with the low hemoglobin, I think it is reasonable to have him discharge back to the SNF after transfusion. Patient and family agree with this.     Recommended close follow up for hgb recheck in a couple of  days.     Indications for re-evaluation in the ER discussed.     Patient understanding and agreeable with the plan and will discharge back to SNF in good condition.     ______________________________________________________________________    *All pertinent lab & imaging studies independently reviewed. (See chart for details)   *Discussed the results of all the tests and plan with patient and family/guardians.   *The patient and/or family/guardian acknowledged understanding and was agreeable with the care plan.    Critical Care   Performed by: Joan Field PA-C  Authorized by: Joan Field PA-C    Total critical care time: 35 minutes  Critical care time was exclusive of separately billable procedures and treating other patients.  Critical care was necessary to treat or prevent imminent or life-threatening deterioration of the following conditions: Anemia  Critical care was time spent personally by me on the following activities: development of treatment plan with patient or surrogate, discussions with consultants, examination of patient, evaluation of patient's response to treatment, obtaining history from patient or surrogate, ordering and performing treatments and interventions, ordering and review of laboratory studies, ordering and review of radiographic studies and re-evaluation of patient's condition, this excludes any separately billable procedures.    HISTORY OF PRESENT ILLNESS   Patient information was obtained from: Patient and EMS   Use of Intrepreter: N/A     Kilo Montiel is a 84 year old male with a pertinent history anemia, hx prostate cancer, hx melanoma, hx CVA, CKD, hypothyroidism, recent subcapital fracture of left femur s/p hemiarthroplasty who presents to the ED by ambulance for evaluation of abnormal labs.    Per EMS, the patient is coming from [facility] in [place], [place]. The reason for his arrival is that his hemoglobin was abnormal and was measured at 6.8. He was stable the whole was to the  emergency department.    Per patient, eight or nine days ago he fell over a cat and broke his left femur, and he said he was given half of a new hip. He has been going to physical therapy to help his leg heal. He also mentioned having bright red blood in his stool around ten days ago, but that has resolved.     He has had no recent shortness of breath, chest pain, dizziness, or lightheadedness. He said he doesn't think he is on any blood thinners, but he did mention he knows he used to take baby Asprin but he wasn't sure if he still takes them.    Chart review:  Per Chart Review, the patient was seen from 05/23/2024-06/03/2024 at [facility] for evaluation of a left hip fracture. Upon admission he underwent left hip hemiarthroplasty. The left hip hemiarthroplasty was a success. His hospital course was complicated by gross hematuria with urinary retention, UTI, rectal bleeding, and acute blood loss anemia requiring PRBS transfusion. He was taken off Asprin for part of his hospital stay due to the gross hematuria but on 05/31/2024 he went back on it and was monitored. He was discharged on 06/03/2024 to TCU and was supposed to continue physical therapy for his injury.  6/12/24: [facility] for gross hematuria noted in carlton catheter. Hgb at time of d/c was 8.3. Repeat hgb was 9.4 at TCU on 6/6. Will recheck Hgb today. -CT abd pelvis w/ on 5/1/24 distended bladder -Cysto with Dr. Mendez on 5/5/24 with prostatic strictures -Pt was recently treated for UTI. finished abx course. Will check UA today to r/o infection -hematuria could also be secondary to catheter -Will call pt with hgb results. If hgb above 7 and pt is asymptomatic will continue to monitor. If below 7 or pt symptomatic, recommend going to ED. Advised pt to push fluids. Pt verbalizes understanding and agrees with plan -once hgb is stable, will call pt and discuss hematuria work up     MEDICAL HISTORY     Past Medical History:    Diagnosis Date    Anxiety     Hypothyroid     Melanoma (H)     Melanoma of wrist (H)     Prostate cancer (H)     Thyroid disease        Past Surgical History:   Procedure Laterality Date    ANKLE SURGERY      left    COLONOSCOPY      INSERT RADIATION SEEDS PROSTATE  2013    Procedure: INSERT RADIATION SEEDS PROSTATE;  Insert Radiation Seeds Prostate ;  Surgeon: Sahil Franco MD;  Location: [facility] OR    OPEN REDUCTION INTERNAL FIXATION HIP BIPOLAR Left 2024    Procedure: HEMIARTHROPLASTY, LEFT HIP, BIPOLAR;  Surgeon: Everett Sherman MD;  [facility]    SURGICAL PATHOLOGY EXAM      melanoma removal       Family History   Problem Relation Age of Onset    Breast Cancer Mother 75       Social History     Tobacco Use    Smoking status: Former     Current packs/day: 0.00     Average packs/day: 0.5 packs/day for 8.0 years (4.0 ttl pk-yrs)     Types: Cigarettes     Start date: 1974     Quit date: 1982     Years since quittin.0    Smokeless tobacco: Former     Quit date: 1976   Substance Use Topics    Alcohol use: Yes     Comment: 2-3 per week       acetaminophen (TYLENOL) 325 MG tablet  aspirin 81 MG EC tablet  chlorhexidine (PERIDEX) 0.12 % solution  cyanocobalamin (VITAMIN B-12) 1000 MCG tablet  escitalopram (LEXAPRO) 10 MG tablet  famotidine (PEPCID) 20 MG tablet  lactulose (CHRONULAC) 10 GM/15ML solution  levothyroxine (SYNTHROID/LEVOTHROID) 125 MCG tablet  LORazepam (ATIVAN) 0.5 MG tablet  magnesium 250 MG tablet  megestrol (MEGACE) 40 MG/ML suspension  ondansetron (ZOFRAN) 8 MG tablet  oxyCODONE (ROXICODONE) 5 MG tablet  polyethylene glycol (MIRALAX) 17 GM/Dose powder  senna-docusate (SENOKOT-S/PERICOLACE) 8.6-50 MG tablet  sodium chloride 1 GM tablet  Turmeric 500 MG CAPS          PHYSICAL EXAM     First Vitals:  Patient Vitals for the past 24 hrs:   BP Temp Temp src Pulse Resp SpO2   24 0030 (!) 157/81 -- -- 66 -- 99 %   24 0015 (!) 150/75 -- -- 66 -- 99 %    06/22/24 0000 (!) 143/78 98.7  F (37.1  C) -- 68 -- 99 %   06/21/24 2345 (!) 146/76 -- -- 68 19 99 %   06/21/24 2330 (!) 152/76 -- -- 70 -- 99 %   06/21/24 2315 (!) 140/74 -- -- 72 -- 99 %   06/21/24 2300 (!) 145/73 98.8  F (37.1  C) -- 72 -- 98 %   06/21/24 2245 130/69 -- -- 74 -- 99 %   06/21/24 2234 132/73 98.7  F (37.1  C) Oral 74 19 99 %   06/21/24 2215 136/71 -- -- 72 -- 99 %   06/21/24 2200 (!) 140/75 -- -- 72 -- 98 %   06/21/24 2145 134/68 -- -- 72 -- 99 %   06/21/24 2130 139/70 -- -- 73 -- 99 %   06/21/24 2045 120/62 -- -- 75 -- 98 %   06/21/24 2030 -- -- -- 76 -- 98 %   06/21/24 2015 123/67 -- -- 76 -- 99 %   06/21/24 2013 123/67 -- -- -- -- --   06/21/24 2008 -- 99.1  F (37.3  C) Oral 81 19 100 %         PHYSICAL EXAM:   Constitutional: No acute distress.  Neuro: Awake and alert. No focal deficits.  Psych: Calm and cooperative.  Eyes: PERRL. EOMI. Conjunctivae clear.   Mouth: Pink and moist.   Cardio: Regular rate. Adequate perfusion to extremities. Regular rhythm. No murmurs.  Pulmonary: Oxygenating well on RA. No labored breathing. CTA b/l.  Abdomen: Soft and non-distended. No palpable pain.  : Forte catheter in place. No anuj hematuria.    Upper extremities: Moves freely.   Lower extremities: Moves freely. No edema.   Skin: Pale skin otherwise unremarkable.       RESULTS     LAB:  All pertinent labs reviewed and interpreted  Labs Ordered and Resulted from Time of ED Arrival to Time of ED Departure   BASIC METABOLIC PANEL - Abnormal       Result Value    Sodium 134 (*)     Potassium 3.6      Chloride 102      Carbon Dioxide (CO2) 21 (*)     Anion Gap 11      Urea Nitrogen 25.9 (*)     Creatinine 0.83      GFR Estimate 87      Calcium 8.2 (*)     Glucose 105 (*)    CBC WITH PLATELETS AND DIFFERENTIAL - Abnormal    WBC Count 4.9      RBC Count 2.10 (*)     Hemoglobin 6.8 (*)     Hematocrit 20.5 (*)     MCV 98      MCH 32.4      MCHC 33.2      RDW 20.9 (*)     Platelet Count 200      % Neutrophils 82       % Lymphocytes 7      % Monocytes 9      % Eosinophils 0      % Basophils 0      % Immature Granulocytes 2      NRBCs per 100 WBC 0      Absolute Neutrophils 4.0      Absolute Lymphocytes 0.3 (*)     Absolute Monocytes 0.5      Absolute Eosinophils 0.0      Absolute Basophils 0.0      Absolute Immature Granulocytes 0.1      Absolute NRBCs 0.0     TYPE AND SCREEN, ADULT    ABO/RH(D) A POS      Antibody Screen Negative      SPECIMEN EXPIRATION DATE [id number]     PREPARE RED BLOOD CELLS (UNIT)    Blood Component Type Red Blood Cells      Product Code [id number]      Unit Status Transfused      Unit Number [id number]      CROSSMATCH Compatible      CODING SYSTEM [id number]      ISSUE DATE AND TIME [id number]      UNIT ABO/RH A+      UNIT TYPE ISBT 6200     PREPARE RED BLOOD CELLS (UNIT)   TRANSFUSE RED BLOOD CELLS (UNIT)   ABO/RH TYPE AND SCREEN       RADIOLOGY:  No orders to display       ECG:    NA      PROCEDURES     None           MEDICAL DECISION MAKING:  Obtained supplemental history:Supplemental history obtained?: Documented in chart and EMS  Reviewed external records: External records reviewed?: Documented in chart  Care impacted by chronic illness:Cancer/Chemotherapy  Care significantly affected by social determinants of health:N/A  Did you consider but not order tests?: Work up considered but not performed and documented in chart, if applicable  Did you interpret images independently?: Independent interpretation of ECG and images noted in documentation, when applicable.  Consultation discussion with other provider:Did you involve another provider (consultant, , pharmacy, etc.)?: No  Discharge. No recommendations on prescription strength medication(s). See documentation for any additional details.      FINAL IMPRESSION:    ICD-10-CM    1. Anemia, unspecified type  D64.9             MEDICATIONS GIVEN IN THE EMERGENCY DEPARTMENT:  Medications - No data to display      NEW PRESCRIPTIONS STARTED AT  TODAY'S ED VISIT:  New Prescriptions    No medications on file            I, Shaw Freeman, am serving as a scribe to document services personally performed by Joan Field PA-C, based on my observation and the provider's statements to me. I, Joan Field PA-C attest that Shaw Freeman is acting in a scribe capacity, has observed my performance of the services and has documented them in accordance with my direction.     Some or all of this documentation has been completed using dictation software and mild grammatical errors may be present. Please contact me with any concerns regarding this.       Joan Field PA-C  Emergency Medicine   [facility] EMERGENCY DEPARTMENT      Joan Field PA-C  06/22/24 0032

## 2024-06-22 NOTE — ED NOTES
Bed: JNED-24  Expected date: 6/21/24  Expected time: 7:47 PM  Means of arrival: Ambulance  Comments:  [facility]- 84yom hgb 6.8, vss, coming from facility

## 2024-06-22 NOTE — DISCHARGE INSTRUCTIONS
You were given 1 unit of packed red blood cells today for a low hemoglobin level at 6.8.     Please have this lab rechecked in the next couple of days to ensure it is coming up.     If you develop any lightheadedness/dizziness, shortness of breath, fainting or near fainting, or any other abnormal symptom, you should return to the ER immediately.

## 2024-06-22 NOTE — ED PROVIDER NOTES
Emergency Department Midlevel Supervisory Note     I had a face to face encounter with this patient seen by the Advanced Practice Provider (BRICE). I personally made/approved the management plan and take responsibility for the patient management. I personally saw patient and performed a substantive portion of the visit including all aspects of the medical decision making.     ED Course:  8:24 PM Joan Field PA-C staffed patient with me. I agree with their assessment and plan of management, and I will see the patient.  9:30 PM I met with the patient to introduce myself, gather additional history, perform my initial exam, and discuss the plan.     Brief HPI:     Kilo Montiel is a 84 year old male who presents for evaluation of  abnormal labs.     Per EMS, the patient is coming from [facility] in [place], [place]. The reason for his arrival is that his hemoglobin was abnormal and was measured at 6.8. He was stable the whole was to the emergency department.     Per patient, eight or nine days ago he fell over a cat and broke his left femur, and he said he was given half of a new hip. He has been going to physical therapy to help his leg heal. He also mentioned having bright red blood in his stool around ten days ago.     He has had no recent shortness of breath, chest pain, dizziness, or lightheadedness. He said he doesn't think he is on any blood thinners, but he did mention he knows he used to take baby Asprin but he wasn't sure if he still takes them.    I, Candelaria Monzon, am serving as a scribe to document services personally performed by Osmani Ortez MD, based on my observations and the provider's statements to me.   I, Osmani Ortez MD attest that Candelaria Monzon was acting in a scribe capacity, has observed my performance of the services and has documented them in accordance with my direction.    Brief Physical Exam: /62   Pulse 75   Temp 99.1  F (37.3  C) (Oral)   Resp 19   SpO2 98%   Constitutional:   Alert, in no acute distress  EYES: Conjunctivae clear  HENT:  Atraumatic  Respiratory:  Respirations even, unlabored, in no acute respiratory distress  Cardiovascular:  Regular rate and rhythm, good peripheral perfusion  GI: Soft, non-distended, non-tender  : Forte catheter in place draining clear urine  Musculoskeletal:  Moves all 4 extremities equally, grossly symmetrical strength  Integument: Warm & dry. No appreciable rash, erythema.  Neurologic:  Alert & oriented, speech clear and fluent, no focal deficits noted  Psych: Normal mood and affect       MDM:  Patient is an 84-year-old male who presents to the emergency department with concerns about declining hemoglobin levels at his TCU.  He is recovering from a hip fracture and also has been dealing with some gross hematuria for which he is following with [facility].  From an anemia standpoint he does not clearly have any associated symptoms..  His hemoglobin today has declined to 6.8 so he was consented for a blood transfusion.  I think this is multifactorial in the setting of recent surgery and ongoing hematuria.  No evidence of more significant GI blood loss, the trend has been very gradual over the last couple of weeks.  Discussed options with patient and family, they would strongly prefer to avoid admitting him to the hospital which I think is reasonable given his stability.  Will plan for 1 unit PRBC transfusion with discharge and outpatient follow-up.  Agree with remainder of ED course and plan as documented by BRICE.      1. Anemia, unspecified type          Labs and Imaging:  Results for orders placed or performed during the hospital encounter of 06/21/24   Basic metabolic panel   Result Value Ref Range    Sodium 134 (L) 135 - 145 mmol/L    Potassium 3.6 3.4 - 5.3 mmol/L    Chloride 102 98 - 107 mmol/L    Carbon Dioxide (CO2) 21 (L) 22 - 29 mmol/L    Anion Gap 11 7 - 15 mmol/L    Urea Nitrogen 25.9 (H) 8.0 - 23.0 mg/dL    Creatinine 0.83 0.67 -  1.17 mg/dL    GFR Estimate 87 >60 mL/min/1.73m2    Calcium 8.2 (L) 8.8 - 10.2 mg/dL    Glucose 105 (H) 70 - 99 mg/dL   CBC with platelets and differential   Result Value Ref Range    WBC Count 4.9 4.0 - 11.0 10e3/uL    RBC Count 2.10 (L) 4.40 - 5.90 10e6/uL    Hemoglobin 6.8 (LL) 13.3 - 17.7 g/dL    Hematocrit 20.5 (L) 40.0 - 53.0 %    MCV 98 78 - 100 fL    MCH 32.4 26.5 - 33.0 pg    MCHC 33.2 31.5 - 36.5 g/dL    RDW 20.9 (H) 10.0 - 15.0 %    Platelet Count 200 150 - 450 10e3/uL    % Neutrophils 82 %    % Lymphocytes 7 %    % Monocytes 9 %    % Eosinophils 0 %    % Basophils 0 %    % Immature Granulocytes 2 %    NRBCs per 100 WBC 0 <1 /100    Absolute Neutrophils 4.0 1.6 - 8.3 10e3/uL    Absolute Lymphocytes 0.3 (L) 0.8 - 5.3 10e3/uL    Absolute Monocytes 0.5 0.0 - 1.3 10e3/uL    Absolute Eosinophils 0.0 0.0 - 0.7 10e3/uL    Absolute Basophils 0.0 0.0 - 0.2 10e3/uL    Absolute Immature Granulocytes 0.1 <=0.4 10e3/uL    Absolute NRBCs 0.0 10e3/uL   Adult Type and Screen   Result Value Ref Range    ABO/RH(D) A POS     Antibody Screen Negative Negative    SPECIMEN EXPIRATION DATE [id number]    Prepare red blood cells (unit)   Result Value Ref Range    Blood Component Type Red Blood Cells     Product Code [id number]     Unit Status Ready for issue     Unit Number [id number]     CROSSMATCH Compatible     CODING SYSTEM [id number]        Procedures:  I was present for the key portions of procedures documented in BRICE/midlevel note, see midlevel note for further details.    Osmani Ortez MD  [facility] EMERGENCY DEPARTMENT  [address] [place] [id number] [phone]       Osmani Ortez MD  06/21/24 0622

## 2024-07-06 ENCOUNTER — MYC MEDICAL ADVICE (OUTPATIENT)
Dept: INTERVENTIONAL RADIOLOGY/VASCULAR | Facility: CLINIC | Age: 84
End: 2024-07-06
Payer: COMMERCIAL

## 2024-07-10 NOTE — PROGRESS NOTES
"  Interventional Radiology - Pre-Procedure Note:  Outpatient - [facility]  07/12/2024     Procedure Requested: Suprapubic catheter placement  Requested by: Aniyah SANDHU    History and Physical Reviewed: H&P documented within 30 days (by Rashida SANDHU on 06/21/2024). I have personally reviewed the patient's medical history and have updated the medical record as necessary.    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 He is followed by [facility], last visit 06/12/2024 for hematuria and urinary retention. Urinary system currently managed with carlton per urethra. Has had recent issues with hematuria, thought to be d/t carlton catheter irritation. Per patient and family, it \"comes and goes\", resolves with irrigation. Hgb today stable at 8.7. Infectious workup negative. Requesting SP catheter placement.     IMAGING:  Narrative & Impression   EXAM: US RENAL COMPLETE NON-VASCULAR  LOCATION: [facility]  DATE: 5/26/2024     INDICATION: Eval for resolution of hydronephrosis  COMPARISON: CT 5/23/2024  TECHNIQUE: Routine Bilateral Renal and Bladder Ultrasound.     FINDINGS:     RIGHT KIDNEY: 8.5 x 5.2 x 4.5 cm. No hydronephrosis. Right renal atrophy with a diffuse cortical thinning. No evidence for echogenic intrarenal calculi or exophytic cystic lesion.      LEFT KIDNEY: 8.9 x 4.2 x 4.2 cm. No hydronephrosis. Normal left renal cortical echogenicity and thickness. No evidence for echogenic intrarenal calculi or exophytic cystic lesion.      BLADDER: Carlton catheter within the bladder. Marked diffuse bladder wall thickening. Debris within a mildly distended bladder lumen.                                                                      IMPRESSION:  1.  No hydronephrosis.     2.  Right renal " atrophy with diffuse cortical thinning.     3.  Marked diffuse bladder wall thickening with debris within the mildly distended bladder lumen. Forte catheter within the bladder.       NPO: Midnight  ANTICOAGULANTS: None  ANTIBIOTICS: Rocephin 1 g     ALLERGIES  Allergies   Allergen Reactions    Bee Pollen Other (See Comments)         LABS:   INR   Date Value Ref Range Status   07/12/2024 1.15 0.85 - 1.15 Final      Hemoglobin   Date Value Ref Range Status   07/12/2024 8.7 (L) 13.3 - 17.7 g/dL Final     Platelet Count   Date Value Ref Range Status   07/12/2024 264 150 - 450 10e3/uL Final     Creatinine   Date Value Ref Range Status   06/21/2024 0.83 0.67 - 1.17 mg/dL Final     Potassium   Date Value Ref Range Status   06/21/2024 3.6 3.4 - 5.3 mmol/L Final         EXAM:  BP (!) 154/76 (BP Location: Left arm)   Pulse 72   Temp 98.5  F (36.9  C) (Oral)   Resp 16   SpO2 100%   General: Stable. In no acute distress.    Neuro: Alert and oriented x 3. No focal deficits.  Psych: Appropriate mood and affect. Linear/coherent thought process.   Resp: Normal respirations. Lungs clear to auscultation bilaterally.  Cardio: S1S2, regular rate and rhythm, without murmur, clicks or rubs.   Skin: Warm and dry. Without excoriations, ecchymosis, erythema, lesions or open sores. Forte catheter in place per urethra. Clamped.      Pre-Sedation Assessment:  Mallampati Airway Classification:  II - Faucial pillars and soft palate may be seen, but uvula is masked by the base of the tongue  Previous reaction to anesthesia/sedation:  No  Sedation plan based on assessment: Moderate (conscious) sedation  ASA Classification: Class 3 - SEVERE SYSTEMIC DISEASE, DEFINITE FUNCTIONAL LIMITATIONS.   Code Status: FULL CODE      ASSESSMENT/PLAN:   Suprapubic catheter placement with sedation.    Procedural education reviewed with patient/family in detail including, but not limited to risks, benefits and alternatives with understanding verbalized by  patient/family.    Total time spent on the date of the encounter: 20 minutes.      Note comprised by:  Dixie Valentin NP  Interventional Radiology  [phone]     Exam and procedure discussion completed by:  DARÍO BHATT CNP  Interventional Radiology

## 2024-07-12 ENCOUNTER — HOSPITAL ENCOUNTER (OUTPATIENT)
Dept: INTERVENTIONAL RADIOLOGY/VASCULAR | Facility: HOSPITAL | Age: 84
Discharge: HOME | End: 2024-07-12
Attending: STUDENT IN AN ORGANIZED HEALTH CARE EDUCATION/TRAINING PROGRAM | Admitting: RADIOLOGY
Payer: COMMERCIAL

## 2024-07-12 VITALS
DIASTOLIC BLOOD PRESSURE: 77 MMHG | OXYGEN SATURATION: 100 % | RESPIRATION RATE: 12 BRPM | HEART RATE: 73 BPM | TEMPERATURE: 98.4 F | SYSTOLIC BLOOD PRESSURE: 140 MMHG

## 2024-07-12 DIAGNOSIS — R33.9 URINARY RETENTION: Primary | ICD-10-CM

## 2024-07-12 DIAGNOSIS — R33.9 RETENTION OF URINE, UNSPECIFIED: ICD-10-CM

## 2024-07-12 LAB
ERYTHROCYTE [DISTWIDTH] IN BLOOD BY AUTOMATED COUNT: 20.8 % (ref 10–15)
HCT VFR BLD AUTO: 26.6 % (ref 40–53)
HGB BLD-MCNC: 8.7 G/DL (ref 13.3–17.7)
HOLD SPECIMEN: NORMAL
INR PPP: 1.15 (ref 0.85–1.15)
MCH RBC QN AUTO: 32.3 PG (ref 26.5–33)
MCHC RBC AUTO-ENTMCNC: 32.7 G/DL (ref 31.5–36.5)
MCV RBC AUTO: 99 FL (ref 78–100)
PLATELET # BLD AUTO: 264 10E3/UL (ref 150–450)
RBC # BLD AUTO: 2.69 10E6/UL (ref 4.4–5.9)
WBC # BLD AUTO: 5 10E3/UL (ref 4–11)

## 2024-07-12 PROCEDURE — 85610 PROTHROMBIN TIME: CPT | Performed by: NURSE PRACTITIONER

## 2024-07-12 PROCEDURE — 272N000492 HC NEEDLE CR1

## 2024-07-12 PROCEDURE — C1729 CATH, DRAINAGE: HCPCS

## 2024-07-12 PROCEDURE — 36415 COLL VENOUS BLD VENIPUNCTURE: CPT | Performed by: NURSE PRACTITIONER

## 2024-07-12 PROCEDURE — 255N000002 HC RX 255 OP 636: Performed by: RADIOLOGY

## 2024-07-12 PROCEDURE — 250N000009 HC RX 250: Performed by: NURSE PRACTITIONER

## 2024-07-12 PROCEDURE — 250N000013 HC RX MED GY IP 250 OP 250 PS 637

## 2024-07-12 PROCEDURE — 250N000011 HC RX IP 250 OP 636

## 2024-07-12 PROCEDURE — 250N000011 HC RX IP 250 OP 636: Performed by: NURSE PRACTITIONER

## 2024-07-12 PROCEDURE — 99152 MOD SED SAME PHYS/QHP 5/>YRS: CPT

## 2024-07-12 PROCEDURE — C1769 GUIDE WIRE: HCPCS

## 2024-07-12 PROCEDURE — 85014 HEMATOCRIT: CPT | Performed by: NURSE PRACTITIONER

## 2024-07-12 RX ORDER — ACETAMINOPHEN 325 MG/1
650 TABLET ORAL EVERY 4 HOURS PRN
Status: DISCONTINUED | OUTPATIENT
Start: 2024-07-12 | End: 2024-07-13 | Stop reason: HOSPADM

## 2024-07-12 RX ORDER — NALOXONE HYDROCHLORIDE 0.4 MG/ML
0.4 INJECTION, SOLUTION INTRAMUSCULAR; INTRAVENOUS; SUBCUTANEOUS
Status: DISCONTINUED | OUTPATIENT
Start: 2024-07-12 | End: 2024-07-13 | Stop reason: HOSPADM

## 2024-07-12 RX ORDER — LIDOCAINE 40 MG/G
CREAM TOPICAL
Status: DISCONTINUED | OUTPATIENT
Start: 2024-07-12 | End: 2024-07-13 | Stop reason: HOSPADM

## 2024-07-12 RX ORDER — ACETAMINOPHEN 650 MG/20.3ML
650 LIQUID ORAL EVERY 4 HOURS PRN
Status: DISCONTINUED | OUTPATIENT
Start: 2024-07-12 | End: 2024-07-12

## 2024-07-12 RX ORDER — CEFTRIAXONE 1 G/1
1 INJECTION, POWDER, FOR SOLUTION INTRAMUSCULAR; INTRAVENOUS
Status: COMPLETED | OUTPATIENT
Start: 2024-07-12 | End: 2024-07-12

## 2024-07-12 RX ORDER — NALOXONE HYDROCHLORIDE 0.4 MG/ML
0.2 INJECTION, SOLUTION INTRAMUSCULAR; INTRAVENOUS; SUBCUTANEOUS
Status: DISCONTINUED | OUTPATIENT
Start: 2024-07-12 | End: 2024-07-13 | Stop reason: HOSPADM

## 2024-07-12 RX ORDER — FENTANYL CITRATE 50 UG/ML
25-50 INJECTION, SOLUTION INTRAMUSCULAR; INTRAVENOUS EVERY 5 MIN PRN
Status: DISCONTINUED | OUTPATIENT
Start: 2024-07-12 | End: 2024-07-13 | Stop reason: HOSPADM

## 2024-07-12 RX ORDER — SODIUM CHLORIDE 9 MG/ML
INJECTION, SOLUTION INTRAVENOUS CONTINUOUS
Status: DISCONTINUED | OUTPATIENT
Start: 2024-07-12 | End: 2024-07-13 | Stop reason: HOSPADM

## 2024-07-12 RX ORDER — FLUMAZENIL 0.1 MG/ML
0.2 INJECTION, SOLUTION INTRAVENOUS
Status: DISCONTINUED | OUTPATIENT
Start: 2024-07-12 | End: 2024-07-13 | Stop reason: HOSPADM

## 2024-07-12 RX ORDER — ONDANSETRON 2 MG/ML
4 INJECTION INTRAMUSCULAR; INTRAVENOUS
Status: DISCONTINUED | OUTPATIENT
Start: 2024-07-12 | End: 2024-07-13 | Stop reason: HOSPADM

## 2024-07-12 RX ADMIN — FENTANYL CITRATE 25 MCG: 50 INJECTION, SOLUTION INTRAMUSCULAR; INTRAVENOUS at 09:01

## 2024-07-12 RX ADMIN — IOHEXOL 5 ML: 350 INJECTION, SOLUTION INTRAVENOUS at 09:10

## 2024-07-12 RX ADMIN — ACETAMINOPHEN 650 MG: 325 TABLET ORAL at 11:13

## 2024-07-12 RX ADMIN — MIDAZOLAM HYDROCHLORIDE 1 MG: 1 INJECTION, SOLUTION INTRAMUSCULAR; INTRAVENOUS at 08:48

## 2024-07-12 RX ADMIN — MIDAZOLAM HYDROCHLORIDE 0.5 MG: 1 INJECTION, SOLUTION INTRAMUSCULAR; INTRAVENOUS at 08:56

## 2024-07-12 RX ADMIN — CEFTRIAXONE SODIUM 1 G: 1 INJECTION, POWDER, FOR SOLUTION INTRAMUSCULAR; INTRAVENOUS at 08:43

## 2024-07-12 RX ADMIN — FENTANYL CITRATE 50 MCG: 50 INJECTION, SOLUTION INTRAMUSCULAR; INTRAVENOUS at 08:51

## 2024-07-12 RX ADMIN — FENTANYL CITRATE 25 MCG: 50 INJECTION, SOLUTION INTRAMUSCULAR; INTRAVENOUS at 08:58

## 2024-07-12 RX ADMIN — LIDOCAINE HYDROCHLORIDE 1 ML: 10 INJECTION, SOLUTION INFILTRATION; PERINEURAL at 08:55

## 2024-07-12 NOTE — PROCEDURES
Interventional Radiology Post-Procedure Note   ?   Brief Procedure Note:   Patient name: Kilo Montiel  Pt MRN:[id number]   Date of procedure: 7/12/2024     Procedure(s): Image guided suprapubic catheter placement  Sedation method: Moderate sedation was employed. The patient was monitored by an interventional radiology nurse at all times throughout the procedure under my direct guidance.  Pre Procedure Diagnosis: Chronic carlton catheter, urinary retention  Post Procedure Diagnosis: same  Indications: Need for long term urinary drainage   ?   Attending: Chip Jules M.D.  Specimen(s) removed: None   Additional studies ordered: None  Drains: Suprapubic 16 Fr [place]-tip Carlton Catheter  Estimated Blood Loss: Minimal  Complications: None  Vascular closure method: N/A      Findings/Notes/Comments: Uncomplicated image-guided placement of suprapubic catheter. Keep urethral Carlton catheter clamped during post-procedure observation. Once urine is seen draining from new suprapubic catheter, may remove prior Carlton catheter. Patient to return to IR in 4 weeks for initial exchange. Afterwards, future exchanges can be performed in clinic.   ?   Please see dictation in PACS or under the Imaging tab in [facility] for detailed procedure note.     Chip Jules M.D.   Vascular and Interventional Radiology   Pager: [phone]   After Hours / Scheduling: [phone]     7/12/2024  9:16 AM

## 2024-07-12 NOTE — DISCHARGE INSTRUCTIONS
Suprapubic Catheter Discharge Instructions:  You had a suprapubic catheter (SP) tube placed. An SP tube is a type of urinary catheter (thin, flexible tube) that is placed through a small incision in your lower abdomen into your bladder to drain urine from your bladder into a bag outside of your body. Please follow the below instructions:    Care instructions:  - If you received sedation for your procedure, do not drive or operate heavy machinery for the rest of the day (24 hours).   - Avoid stagnant water such as tub baths, Jacuzzis and pools.  - You may shower beginning the day after your suprapubic catheter was placed. Clean around  the SP tube exit site with soap and water and pat dry under disk.    - Change gauze dressing around suprapubic catheter site daily or as needed to keep the site clean and dry.    - Positioning of the drainage bag and tubing:  *Allow gravity drainage: Do not loop or kink the tubing so urine can flow.  *Keep the drainage bag below the level of your waist.      Follow up:  - Recommend routine exchanges every 4-6 weeks or as recommended by your urologist (either with urology, care facility staff or with [facility] at the hospital).    - If exchange at the hospital is desired please contact [facility] Outpatient Scheduling at [phone] to see about a possible appointment.      Call [facility] RN Line at [phone] if you experience the following: [Monday to Friday (7:30AM-4:00PM). Any voicemails left will be returned within 24 hours.]    - Suprapubic tube outputs suddenly stop or significantly decrease.  - Your suprapubic tube appears to be falling out, has fallen out, becomes clogged or breaks.   - Significant leaking around the suprapubic tube exit site.    Please seek medical evaluation for:    - Fever (greater than 101 F (38.3C)) and/or chills.  - Purulent (yellow/green/foul smelling) drainage from the suprapubic tube exit site.   - Significant bleeding at the suprapubic  tube exit site.   - Significant or worsening pain at the suprapubic tube exit site or abdomen.

## 2024-07-17 ENCOUNTER — TELEPHONE (OUTPATIENT)
Dept: INTERVENTIONAL RADIOLOGY/VASCULAR | Facility: HOSPITAL | Age: 84
End: 2024-07-17
Payer: COMMERCIAL

## 2024-07-17 NOTE — TELEPHONE ENCOUNTER
"Call received from Casie, patient's daughter with concerns regarding bleeding and clots from newly placed SP catheter.    Patient with history of Prostate CA, carlton catheter with persistent blood in urine.      Suprapubic catheter placed on Friday July 12 by IR and patient discharged to home with leg bag.      Casie shared the follow chain of events post procedure.    7/12 Friday-7/13 Saturday:  Urine draining via leg bag is pink tinged initially, becoming clear on Saturday.    7/14 Sunday: Urine becoming bloody again, clots noted.    7/16 Tuesday:  Leg bag stops draining completely.  Patient notes yellow urine draining(leaking) from penis.  Patient has no control over this urine output.      Yesterday (Tuesday) , Casie changed leg bag over to to larger carlton collection bag and noted immediate improvement in flow of urine.  She characterizes urine as \"cabernet\" colored with some clots, smaller than a dime noted.      Urine output is improved with large bag attached, but remains bloody.    Patient continues to leak urine from penis, this urine output is now bloody colored.    Patient denies any other symptoms.  Casie states patient does not look pale, skin is not cool to touch or clammy.    I have advised Casie that I will alert IR team of SP catheter concerns with flow, clots blood, but I have also recommended that she alert the referring urology team of status as well as oncology team (primary) of the blood loss given patient's recent history of anemia.    1300-  IR recommending SP Catheter Check/Exchange.  Preferably tomorrow.  Scheduling made aware and I have left a VMM with scheduling contact with patient daughter.    Alexandria GUTIÉRREZ  Interventional Radiology RN   [phone]            "

## 2024-07-18 ENCOUNTER — HOSPITAL ENCOUNTER (OUTPATIENT)
Dept: INTERVENTIONAL RADIOLOGY/VASCULAR | Facility: HOSPITAL | Age: 84
Discharge: HOME | End: 2024-07-18
Admitting: RADIOLOGY
Payer: COMMERCIAL

## 2024-07-18 VITALS
HEIGHT: 66 IN | SYSTOLIC BLOOD PRESSURE: 153 MMHG | HEART RATE: 70 BPM | RESPIRATION RATE: 16 BRPM | BODY MASS INDEX: 18.32 KG/M2 | DIASTOLIC BLOOD PRESSURE: 80 MMHG | WEIGHT: 114 LBS | TEMPERATURE: 98.4 F | OXYGEN SATURATION: 99 %

## 2024-07-18 DIAGNOSIS — R33.9 URINARY RETENTION: ICD-10-CM

## 2024-07-18 PROCEDURE — 99152 MOD SED SAME PHYS/QHP 5/>YRS: CPT

## 2024-07-18 PROCEDURE — 75984 XRAY CONTROL CATHETER CHANGE: CPT

## 2024-07-18 PROCEDURE — C1729 CATH, DRAINAGE: HCPCS

## 2024-07-18 PROCEDURE — C1769 GUIDE WIRE: HCPCS

## 2024-07-18 PROCEDURE — 272N000211 HC BALLOON (NON-PTA) CR8

## 2024-07-18 PROCEDURE — 258N000003 HC RX IP 258 OP 636: Performed by: NURSE PRACTITIONER

## 2024-07-18 PROCEDURE — 250N000011 HC RX IP 250 OP 636: Performed by: NURSE PRACTITIONER

## 2024-07-18 RX ORDER — NALOXONE HYDROCHLORIDE 0.4 MG/ML
0.4 INJECTION, SOLUTION INTRAMUSCULAR; INTRAVENOUS; SUBCUTANEOUS
Status: DISCONTINUED | OUTPATIENT
Start: 2024-07-18 | End: 2024-07-19 | Stop reason: HOSPADM

## 2024-07-18 RX ORDER — ONDANSETRON 2 MG/ML
4 INJECTION INTRAMUSCULAR; INTRAVENOUS
Status: DISCONTINUED | OUTPATIENT
Start: 2024-07-18 | End: 2024-07-19 | Stop reason: HOSPADM

## 2024-07-18 RX ORDER — NALOXONE HYDROCHLORIDE 0.4 MG/ML
0.2 INJECTION, SOLUTION INTRAMUSCULAR; INTRAVENOUS; SUBCUTANEOUS
Status: DISCONTINUED | OUTPATIENT
Start: 2024-07-18 | End: 2024-07-19 | Stop reason: HOSPADM

## 2024-07-18 RX ORDER — CEFTRIAXONE 1 G/1
1 INJECTION, POWDER, FOR SOLUTION INTRAMUSCULAR; INTRAVENOUS
Status: COMPLETED | OUTPATIENT
Start: 2024-07-18 | End: 2024-07-18

## 2024-07-18 RX ORDER — LIDOCAINE 40 MG/G
CREAM TOPICAL
Status: DISCONTINUED | OUTPATIENT
Start: 2024-07-18 | End: 2024-07-19 | Stop reason: HOSPADM

## 2024-07-18 RX ORDER — FENTANYL CITRATE 50 UG/ML
25-50 INJECTION, SOLUTION INTRAMUSCULAR; INTRAVENOUS EVERY 5 MIN PRN
Status: DISCONTINUED | OUTPATIENT
Start: 2024-07-18 | End: 2024-07-19 | Stop reason: HOSPADM

## 2024-07-18 RX ORDER — SODIUM CHLORIDE 9 MG/ML
INJECTION, SOLUTION INTRAVENOUS CONTINUOUS
Status: DISCONTINUED | OUTPATIENT
Start: 2024-07-18 | End: 2024-07-19 | Stop reason: HOSPADM

## 2024-07-18 RX ADMIN — CEFTRIAXONE SODIUM 1 G: 1 INJECTION, POWDER, FOR SOLUTION INTRAMUSCULAR; INTRAVENOUS at 09:48

## 2024-07-18 RX ADMIN — FENTANYL CITRATE 50 MCG: 50 INJECTION, SOLUTION INTRAMUSCULAR; INTRAVENOUS at 10:20

## 2024-07-18 RX ADMIN — SODIUM CHLORIDE: 900 INJECTION INTRAVENOUS at 09:47

## 2024-07-18 RX ADMIN — FENTANYL CITRATE 50 MCG: 50 INJECTION, SOLUTION INTRAMUSCULAR; INTRAVENOUS at 10:14

## 2024-07-18 NOTE — DISCHARGE INSTRUCTIONS
Suprapubic Catheter Discharge Instructions:  You had a suprapubic catheter (SP) tube exchanged 07/18/2024. An SP tube is a type of urinary catheter (thin, flexible tube) that is placed through a small incision in your lower abdomen into your bladder to drain urine from your bladder into a bag outside of your body. Please follow the below instructions:    Care instructions:  - If you received sedation for your procedure, do not drive or operate heavy machinery for the rest of the day (24 hours).   - Avoid stagnant water such as tub baths, Jacuzzis and pools.  - You may shower beginning the day after your suprapubic catheter was placed. Clean around  the SP tube exit site with soap and water and pat dry under disk.    - Change gauze dressing around suprapubic catheter site daily or as needed to keep the site clean and dry.    - Positioning of the drainage bag and tubing:  *Allow gravity drainage: Do not loop or kink the tubing so urine can flow.  *Keep the drainage bag below the level of your waist.      Follow up:  - Recommend routine exchanges every 4-6 weeks or as recommended by your urologist (either with urology, care facility staff or with [facility] at the hospital).    - If exchange at the hospital is desired please contact [facility] Outpatient Scheduling at [phone] to see about a possible appointment.      Call [facility] RN Line at [phone] if you experience the following: [Monday to Friday (7:30AM-4:00PM). Any voicemails left will be returned within 24 hours.]    - Suprapubic tube outputs suddenly stop or significantly decrease.  - Your suprapubic tube appears to be falling out, has fallen out, becomes clogged or breaks.   - Significant leaking around the suprapubic tube exit site.    Please seek medical evaluation for:    - Fever (greater than 101 F (38.3C)) and/or chills.  - Purulent (yellow/green/foul smelling) drainage from the suprapubic tube exit site.   - Significant bleeding at  the suprapubic tube exit site.   - Significant or worsening pain at the suprapubic tube exit site or abdomen.

## 2024-07-18 NOTE — PRE-PROCEDURE
GENERAL PRE-PROCEDURE:   Procedure:  Suprapubic catheter exchange  Date/Time:  7/18/2024 9:50 AM    Written consent obtained?: Yes    Risks and benefits: Risks, benefits and alternatives were discussed    Consent given by:  Patient  Patient states understanding of procedure being performed: Yes    Patient's understanding of procedure matches consent: Yes    Procedure consent matches procedure scheduled: Yes    Expected level of sedation:  Moderate  Appropriately NPO:  Yes  ASA Class:  3  Mallampati  :  Grade 2- soft palate, base of uvula, tonsillar pillars, and portion of posterior pharyngeal wall visible  Lungs:  Lungs clear with good breath sounds bilaterally  Heart:  Normal heart sounds and rate  History & Physical reviewed:  History and physical reviewed and no updates needed  Statement of review:  I have reviewed the lab findings, diagnostic data, medications, and the plan for sedation

## 2024-08-18 ENCOUNTER — HOSPITAL ENCOUNTER (EMERGENCY)
Facility: HOSPITAL | Age: 84
Discharge: HOME | End: 2024-08-18
Attending: EMERGENCY MEDICINE | Admitting: EMERGENCY MEDICINE
Payer: COMMERCIAL

## 2024-08-18 ENCOUNTER — APPOINTMENT (OUTPATIENT)
Dept: INTERVENTIONAL RADIOLOGY/VASCULAR | Facility: HOSPITAL | Age: 84
End: 2024-08-18
Attending: RADIOLOGY
Payer: COMMERCIAL

## 2024-08-18 ENCOUNTER — APPOINTMENT (OUTPATIENT)
Dept: CT IMAGING | Facility: HOSPITAL | Age: 84
End: 2024-08-18
Attending: EMERGENCY MEDICINE
Payer: COMMERCIAL

## 2024-08-18 VITALS
DIASTOLIC BLOOD PRESSURE: 79 MMHG | BODY MASS INDEX: 18.32 KG/M2 | TEMPERATURE: 98 F | OXYGEN SATURATION: 100 % | HEART RATE: 70 BPM | RESPIRATION RATE: 12 BRPM | SYSTOLIC BLOOD PRESSURE: 150 MMHG | WEIGHT: 114 LBS | HEIGHT: 66 IN

## 2024-08-18 DIAGNOSIS — D64.9 CHRONIC ANEMIA: ICD-10-CM

## 2024-08-18 DIAGNOSIS — Z85.46 HISTORY OF PROSTATE CANCER: ICD-10-CM

## 2024-08-18 DIAGNOSIS — Z93.59 SUPRAPUBIC CATHETER (H): ICD-10-CM

## 2024-08-18 DIAGNOSIS — R31.0 GROSS HEMATURIA: ICD-10-CM

## 2024-08-18 DIAGNOSIS — C79.51 METASTASIS TO BONE (H): ICD-10-CM

## 2024-08-18 DIAGNOSIS — T83.510A URINARY TRACT INFECTION ASSOCIATED WITH CYSTOSTOMY CATHETER, INITIAL ENCOUNTER (H): ICD-10-CM

## 2024-08-18 DIAGNOSIS — N39.0 URINARY TRACT INFECTION ASSOCIATED WITH CYSTOSTOMY CATHETER, INITIAL ENCOUNTER (H): ICD-10-CM

## 2024-08-18 LAB
ALBUMIN UR-MCNC: 300 MG/DL
ANION GAP SERPL CALCULATED.3IONS-SCNC: 12 MMOL/L (ref 7–15)
APPEARANCE UR: ABNORMAL
BACTERIA #/AREA URNS HPF: ABNORMAL /HPF
BASOPHILS # BLD AUTO: 0 10E3/UL (ref 0–0.2)
BASOPHILS NFR BLD AUTO: 0 %
BILIRUB UR QL STRIP: NEGATIVE
BUN SERPL-MCNC: 24.6 MG/DL (ref 8–23)
CALCIUM SERPL-MCNC: 8.9 MG/DL (ref 8.8–10.4)
CHLORIDE SERPL-SCNC: 98 MMOL/L (ref 98–107)
COLOR UR AUTO: ABNORMAL
CREAT SERPL-MCNC: 1.02 MG/DL (ref 0.67–1.17)
CRP SERPL-MCNC: 72.9 MG/L
EGFRCR SERPLBLD CKD-EPI 2021: 73 ML/MIN/1.73M2
EOSINOPHIL # BLD AUTO: 0 10E3/UL (ref 0–0.7)
EOSINOPHIL NFR BLD AUTO: 0 %
ERYTHROCYTE [DISTWIDTH] IN BLOOD BY AUTOMATED COUNT: 17.7 % (ref 10–15)
GLUCOSE SERPL-MCNC: 103 MG/DL (ref 70–99)
GLUCOSE UR STRIP-MCNC: NEGATIVE MG/DL
HCO3 SERPL-SCNC: 25 MMOL/L (ref 22–29)
HCT VFR BLD AUTO: 25.5 % (ref 40–53)
HGB BLD-MCNC: 8.3 G/DL (ref 13.3–17.7)
HGB UR QL STRIP: ABNORMAL
IMM GRANULOCYTES # BLD: 0 10E3/UL
IMM GRANULOCYTES NFR BLD: 1 %
KETONES UR STRIP-MCNC: NEGATIVE MG/DL
LEUKOCYTE ESTERASE UR QL STRIP: ABNORMAL
LYMPHOCYTES # BLD AUTO: 0.4 10E3/UL (ref 0.8–5.3)
LYMPHOCYTES NFR BLD AUTO: 6 %
MCH RBC QN AUTO: 32.8 PG (ref 26.5–33)
MCHC RBC AUTO-ENTMCNC: 32.5 G/DL (ref 31.5–36.5)
MCV RBC AUTO: 101 FL (ref 78–100)
MONOCYTES # BLD AUTO: 0.6 10E3/UL (ref 0–1.3)
MONOCYTES NFR BLD AUTO: 10 %
NEUTROPHILS # BLD AUTO: 4.5 10E3/UL (ref 1.6–8.3)
NEUTROPHILS NFR BLD AUTO: 82 %
NITRATE UR QL: POSITIVE
NRBC # BLD AUTO: 0 10E3/UL
NRBC BLD AUTO-RTO: 0 /100
PH UR STRIP: 6.5 [PH] (ref 5–7)
PLATELET # BLD AUTO: 232 10E3/UL (ref 150–450)
POTASSIUM SERPL-SCNC: 4.2 MMOL/L (ref 3.4–5.3)
RBC # BLD AUTO: 2.53 10E6/UL (ref 4.4–5.9)
RBC URINE: >182 /HPF
SODIUM SERPL-SCNC: 135 MMOL/L (ref 135–145)
SP GR UR STRIP: 1.02 (ref 1–1.03)
UROBILINOGEN UR STRIP-MCNC: <2 MG/DL
WBC # BLD AUTO: 5.5 10E3/UL (ref 4–11)
WBC URINE: >182 /HPF

## 2024-08-18 PROCEDURE — 250N000011 HC RX IP 250 OP 636: Performed by: EMERGENCY MEDICINE

## 2024-08-18 PROCEDURE — 255N000002 HC RX 255 OP 636: Performed by: FAMILY MEDICINE

## 2024-08-18 PROCEDURE — 96361 HYDRATE IV INFUSION ADD-ON: CPT

## 2024-08-18 PROCEDURE — 96375 TX/PRO/DX INJ NEW DRUG ADDON: CPT

## 2024-08-18 PROCEDURE — 258N000003 HC RX IP 258 OP 636: Performed by: EMERGENCY MEDICINE

## 2024-08-18 PROCEDURE — 74177 CT ABD & PELVIS W/CONTRAST: CPT

## 2024-08-18 PROCEDURE — 51705 CHANGE OF BLADDER TUBE: CPT

## 2024-08-18 PROCEDURE — 81001 URINALYSIS AUTO W/SCOPE: CPT | Performed by: STUDENT IN AN ORGANIZED HEALTH CARE EDUCATION/TRAINING PROGRAM

## 2024-08-18 PROCEDURE — C1729 CATH, DRAINAGE: HCPCS

## 2024-08-18 PROCEDURE — 87086 URINE CULTURE/COLONY COUNT: CPT | Performed by: STUDENT IN AN ORGANIZED HEALTH CARE EDUCATION/TRAINING PROGRAM

## 2024-08-18 PROCEDURE — 96365 THER/PROPH/DIAG IV INF INIT: CPT | Mod: 59

## 2024-08-18 PROCEDURE — 80048 BASIC METABOLIC PNL TOTAL CA: CPT | Performed by: STUDENT IN AN ORGANIZED HEALTH CARE EDUCATION/TRAINING PROGRAM

## 2024-08-18 PROCEDURE — 99285 EMERGENCY DEPT VISIT HI MDM: CPT | Mod: 25

## 2024-08-18 PROCEDURE — 85025 COMPLETE CBC W/AUTO DIFF WBC: CPT | Performed by: STUDENT IN AN ORGANIZED HEALTH CARE EDUCATION/TRAINING PROGRAM

## 2024-08-18 PROCEDURE — 999N000104 CT LUMBAR SPINE RECONSTRUCTED

## 2024-08-18 PROCEDURE — 36415 COLL VENOUS BLD VENIPUNCTURE: CPT | Performed by: STUDENT IN AN ORGANIZED HEALTH CARE EDUCATION/TRAINING PROGRAM

## 2024-08-18 PROCEDURE — 86140 C-REACTIVE PROTEIN: CPT | Performed by: EMERGENCY MEDICINE

## 2024-08-18 RX ORDER — LEVOFLOXACIN 500 MG/1
500 TABLET, FILM COATED ORAL DAILY
Qty: 7 TABLET | Refills: 0 | Status: SHIPPED | OUTPATIENT
Start: 2024-08-18 | End: 2024-08-18

## 2024-08-18 RX ORDER — LEVOFLOXACIN 500 MG/1
500 TABLET, FILM COATED ORAL DAILY
Qty: 7 TABLET | Refills: 0 | Status: SHIPPED | OUTPATIENT
Start: 2024-08-18 | End: 2024-09-16

## 2024-08-18 RX ORDER — METHOCARBAMOL 500 MG/1
500 TABLET, FILM COATED ORAL 3 TIMES DAILY
Qty: 10 TABLET | Refills: 0 | Status: SHIPPED | OUTPATIENT
Start: 2024-08-18 | End: 2024-09-16

## 2024-08-18 RX ORDER — MORPHINE SULFATE 4 MG/ML
4 INJECTION, SOLUTION INTRAMUSCULAR; INTRAVENOUS ONCE
Status: COMPLETED | OUTPATIENT
Start: 2024-08-18 | End: 2024-08-18

## 2024-08-18 RX ORDER — OXYCODONE HYDROCHLORIDE 5 MG/1
5 TABLET ORAL EVERY 6 HOURS PRN
Qty: 6 TABLET | Refills: 0 | Status: SHIPPED | OUTPATIENT
Start: 2024-08-18 | End: 2024-08-21

## 2024-08-18 RX ORDER — LEVOFLOXACIN 5 MG/ML
500 INJECTION, SOLUTION INTRAVENOUS ONCE
Status: COMPLETED | OUTPATIENT
Start: 2024-08-18 | End: 2024-08-18

## 2024-08-18 RX ORDER — IOPAMIDOL 755 MG/ML
70 INJECTION, SOLUTION INTRAVASCULAR ONCE
Status: COMPLETED | OUTPATIENT
Start: 2024-08-18 | End: 2024-08-18

## 2024-08-18 RX ADMIN — MORPHINE SULFATE 4 MG: 4 INJECTION, SOLUTION INTRAMUSCULAR; INTRAVENOUS at 14:55

## 2024-08-18 RX ADMIN — IOHEXOL 15 ML: 350 INJECTION, SOLUTION INTRAVENOUS at 14:34

## 2024-08-18 RX ADMIN — SODIUM CHLORIDE 250 ML: 9 INJECTION, SOLUTION INTRAVENOUS at 12:28

## 2024-08-18 RX ADMIN — IOPAMIDOL 70 ML: 755 INJECTION, SOLUTION INTRAVENOUS at 13:04

## 2024-08-18 RX ADMIN — LEVOFLOXACIN 500 MG: 500 INJECTION, SOLUTION INTRAVENOUS at 13:26

## 2024-08-18 ASSESSMENT — COLUMBIA-SUICIDE SEVERITY RATING SCALE - C-SSRS
2. HAVE YOU ACTUALLY HAD ANY THOUGHTS OF KILLING YOURSELF IN THE PAST MONTH?: NO
6. HAVE YOU EVER DONE ANYTHING, STARTED TO DO ANYTHING, OR PREPARED TO DO ANYTHING TO END YOUR LIFE?: NO
1. IN THE PAST MONTH, HAVE YOU WISHED YOU WERE DEAD OR WISHED YOU COULD GO TO SLEEP AND NOT WAKE UP?: NO

## 2024-08-18 ASSESSMENT — ACTIVITIES OF DAILY LIVING (ADL)
ADLS_ACUITY_SCORE: 38
ADLS_ACUITY_SCORE: 36
ADLS_ACUITY_SCORE: 38
ADLS_ACUITY_SCORE: 38

## 2024-08-18 NOTE — ED PROVIDER NOTES
"ED SIGNOUT  Date/Time:8/18/2024 2:19 PM    ED Course/MDM:    2:19 PM Signout accepted from Dr. Gilda Waldron.  Prior records were reviewed.  Labs, x-ray, CT, EKG from this visit are reviewed.    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  Urine today does look infected and patient is given Levaquin and is stable for discharge.    I discussed with patient the utility, limitations and findings of the exam/interventions/studies done during this visit as well as the list of differential diagnosis and symptoms to monitor/return to ER for.  Additional verbal discharge instructions were provided.     DIAGNOSIS  1. Gross hematuria    2. Suprapubic catheter (H)    3. Urinary tract infection associated with cystostomy catheter, initial encounter  (H24)    4. History of prostate cancer    5. Metastasis to bone (H)    6. Chronic anemia        New Prescriptions    LEVOFLOXACIN (LEVAQUIN) 500 MG TABLET    Take 1 tablet (500 mg) by mouth daily for 7 days    OXYCODONE (ROXICODONE) 5 MG TABLET    Take 1 tablet (5 mg) by mouth every 6 hours as needed for severe pain       HPI    Briefly, this is a 85y/o with prostate cancer and probable radiation cystitis, here with hematuria    Physical Exam:  /65   Pulse 76   Temp 98  F (36.7  C) (Tympanic)   Resp 12   Ht 1.676 m (5' 6\")   Wt 51.7 kg (114 lb)   SpO2 96%   BMI 18.40 kg/m    Physical Exam     Results for orders placed or performed during the hospital encounter of 08/18/24   CT Abdomen Pelvis w Contrast    Impression    IMPRESSION:   1.  Essentially nondiagnostic evaluation of the bladder due to significant streak artifact. Suprapubic catheter is in the region of the urinary bladder. Consider further characterization with bladder ultrasound.  2.  No collecting system dilatation.  3.  Trace right pleural effusion. Medial right lower lobe patchy opacity could reflect atelectasis or infectious/inflammatory " process.  4.  Similar skeletal metastases.       CT Lumbar Spine Reconstructed    Impression    IMPRESSION: Five lumbar-type vertebrae. Compression fracture deformities of all five lumbar vertebral bodies again noted without change in morphology from the comparison study. Sclerosis within the L2, L3, L4 and L5 vertebral bodies again noted worrisome   for bony metastatic disease. Grade 1 degenerative anterolisthesis of L4 upon L5 again noted. Alignment otherwise normal. Mild bony spinal canal narrowing at the upper L2 level due to retropulsion of bony fragments into the spinal canal again noted. No   other significant spinal canal narrowing of the lumbar spine. No definite new fractures.       IR Suprapubic Catheter Change    Impression    IMPRESSION:    Successful suprapubic catheter exchange under fluoroscopic guidance as detailed above.     UA with Microscopic reflex to Culture    Specimen: Urine, Forte Catheter   Result Value Ref Range    Color Urine Brown (A) Colorless, Straw, Light Yellow, Yellow    Appearance Urine Cloudy (A) Clear    Glucose Urine Negative Negative mg/dL    Bilirubin Urine Negative Negative    Ketones Urine Negative Negative mg/dL    Specific Gravity Urine 1.023 1.001 - 1.030    Blood Urine >1.0 mg/dL (A) Negative    pH Urine 6.5 5.0 - 7.0    Protein Albumin Urine 300 (A) Negative mg/dL    Urobilinogen Urine <2.0 <2.0 mg/dL    Nitrite Urine Positive (A) Negative    Leukocyte Esterase Urine 500 Lei/uL (A) Negative    Bacteria Urine Moderate (A) None Seen /HPF    RBC Urine >182 (H) <=2 /HPF    WBC Urine >182 (H) <=5 /HPF   Basic metabolic panel   Result Value Ref Range    Sodium 135 135 - 145 mmol/L    Potassium 4.2 3.4 - 5.3 mmol/L    Chloride 98 98 - 107 mmol/L    Carbon Dioxide (CO2) 25 22 - 29 mmol/L    Anion Gap 12 7 - 15 mmol/L    Urea Nitrogen 24.6 (H) 8.0 - 23.0 mg/dL    Creatinine 1.02 0.67 - 1.17 mg/dL    GFR Estimate 73 >60 mL/min/1.73m2    Calcium 8.9 8.8 - 10.4 mg/dL    Glucose 103  (H) 70 - 99 mg/dL   CBC with platelets and differential   Result Value Ref Range    WBC Count 5.5 4.0 - 11.0 10e3/uL    RBC Count 2.53 (L) 4.40 - 5.90 10e6/uL    Hemoglobin 8.3 (L) 13.3 - 17.7 g/dL    Hematocrit 25.5 (L) 40.0 - 53.0 %     (H) 78 - 100 fL    MCH 32.8 26.5 - 33.0 pg    MCHC 32.5 31.5 - 36.5 g/dL    RDW 17.7 (H) 10.0 - 15.0 %    Platelet Count 232 150 - 450 10e3/uL    % Neutrophils 82 %    % Lymphocytes 6 %    % Monocytes 10 %    % Eosinophils 0 %    % Basophils 0 %    % Immature Granulocytes 1 %    NRBCs per 100 WBC 0 <1 /100    Absolute Neutrophils 4.5 1.6 - 8.3 10e3/uL    Absolute Lymphocytes 0.4 (L) 0.8 - 5.3 10e3/uL    Absolute Monocytes 0.6 0.0 - 1.3 10e3/uL    Absolute Eosinophils 0.0 0.0 - 0.7 10e3/uL    Absolute Basophils 0.0 0.0 - 0.2 10e3/uL    Absolute Immature Granulocytes 0.0 <=0.4 10e3/uL    Absolute NRBCs 0.0 10e3/uL   Result Value Ref Range    CRP Inflammation 72.90 (H) <5.00 mg/L       CT Lumbar Spine Reconstructed    Result Date: 8/18/2024  EXAM: CT LUMBAR SPINE RECONSTRUCTED LOCATION: [facility] DATE: 08/18/2024 INDICATION: Pain. Had radiation.  Elevate for any acute compression fracture. COMPARISON: CT lumbar spine 05/23/2024. TECHNIQUE: Routine CT Lumbar Spine without IV contrast. Multiplanar reformats. Dose reduction techniques were used.     IMPRESSION: Five lumbar-type vertebrae. Compression fracture deformities of all five lumbar vertebral bodies again noted without change in morphology from the comparison study. Sclerosis within the L2, L3, L4 and L5 vertebral bodies again noted worrisome  for bony metastatic disease. Grade 1 degenerative anterolisthesis of L4 upon L5 again noted. Alignment otherwise normal. Mild bony spinal canal narrowing at the upper L2 level due to retropulsion of bony fragments into the spinal canal again noted. No other significant spinal canal narrowing of the lumbar spine. No definite new fractures.     CT Abdomen  Pelvis w Contrast    Result Date: 8/18/2024  EXAM: CT ABDOMEN AND PELVIS WITH CONTRAST LOCATION: [facility] DATE: 08/18/2024 INDICATION: Hematuria suprapubic catheter in place. Known history of malignancy radiation, no for any renal pathology. On radiation. Evaluate for any masses or any source of bleeding. COMPARISON: Renal ultrasound 05/26/2024. CT abdomen and pelvis 05/23/2024. TECHNIQUE: CT scan of the abdomen and pelvis was performed following injection of IV contrast. Multiplanar reformats were obtained. Dose reduction techniques were used. CONTRAST: Isovue 370, 70 mL. FINDINGS: LOWER CHEST: Trace right pleural effusion. Medial right lower lobe patchy opacity.  HEPATOBILIARY: Unremarkable. PANCREAS: Unremarkable. SPLEEN: Splenic granulomas. ADRENAL GLANDS: Normal. KIDNEYS/BLADDER: No collecting system dilatation. Similar right lower pole subcentimeter hypodensity. Suprapubic catheter in the region of the urinary bladder. The evaluation is essentially nondiagnostic due to streak artifact. There is likely small amount of gas within the urinary bladder. BOWEL: Normal. LYMPH NODES: Normal. VASCULATURE: Aortic calcification. PELVIC ORGANS: Prostate gland brachytherapy seeds. Evaluation of the pelvis is limited due to streak artifact. MUSCULOSKELETAL: Left hip arthroplasty. Similar multilevel lumbar compression deformities. Overall similar skeletal metastases.     IMPRESSION: 1.  Essentially nondiagnostic evaluation of the bladder due to significant streak artifact. Suprapubic catheter is in the region of the urinary bladder. Consider further characterization with bladder ultrasound. 2.  No collecting system dilatation. 3.  Trace right pleural effusion. Medial right lower lobe patchy opacity could reflect atelectasis or infectious/inflammatory process. 4.  Similar skeletal metastases.       Linden Adair M.D.  [facility] Emergency Department       Linden Adair,  MD  08/18/24 [id number]

## 2024-08-18 NOTE — IR NOTE
Patient Name: Kilo Montiel  Medical Record Number: [id number]  Today's Date: 8/18/2024    Procedure: SP chat exchange  Proceduralist: George      Sedation medications administered: 0 mg midazolam and 0 mcg fentanyl   Sedation time: 0 minutes      Other Notes: Pt returned to ED2 via cart.  NAD. Bedside hand off report and joint assessment with Jose LARSON RN

## 2024-08-18 NOTE — ED TRIAGE NOTES
Pt has prostate cancer and is presently not on chemo.  Pt has had a sp tube in for the last 5 weeks. Pt has had hematuria,but for the last 10 days pt has had consistently had very dark red urine with some clots.  His sp tube today is showing very dark urine.  Pt also has had varying degrees of low back pain and right flank pain. Daughter Casie is with pt and she says he has had some odor to his urine.  He is also having discharge  in his briefs that they are not sure where its coming from--BM or odorous urine. That has occurred for the last 7 to 10 days.

## 2024-08-18 NOTE — PROCEDURES
Interventional Radiology Post-Procedure Note   ?   Brief Procedure Note:   Patient name: Kilo Montiel  Pt MRN:[id number]   Date of procedure: 8/18/2024     Procedure(s): Suprapubic catheter exchange  Sedation method: Moderate sedation was not employed. Local lidocaine only.  Pre Procedure Diagnosis: Prostate cancer  Post Procedure Diagnosis: Same  Indications: UTI, hematuria   ?   Specimen(s) removed: None   Additional studies ordered: None  Drains: 16fr [place] tip suprapubic catheter  Estimated Blood Loss: Minimal  Complications: None  Vascular closure method: N/A    Findings/Notes/Comments: Successful suprapubic catheter exchange.   ?   Please see dictation in PACS or under the Imaging tab in [facility] for detailed procedure note.     Nadeem Vazquez M.D.   Vascular and Interventional Radiology   Pager: [phone]   After Hours / Scheduling: [phone]     8/18/2024  2:32 PM

## 2024-08-18 NOTE — DISCHARGE INSTRUCTIONS
Read And follow the discharge instructions.    Call the urologist tomorrow to make a follow-up appointment to schedule the scope.    We will call you we need to change your antibiotics.      Your primary care doctor so they are aware that you have a urine culture pending.  Sometimes antibiotics can be changed to something that has less side effects    We spoke about the side effects of the antibiotic which includes tendon rupture he can decrease the absorption of your magnesium among others.  Therefore make sure you follow-up primary care doctor to see what bacteria if any you grew in your urine.    Your CT shows multiple metastasis to your lumbar spine.  No change from previous.  Make sure you follow-up with your oncologist    Return for any concerns.

## 2024-08-18 NOTE — ED PROVIDER NOTES
EMERGENCY DEPARTMENT ENCOUNTER      NAME: Kilo Montiel  AGE: 84 year old male  YOB: 1940  MRN: [id number]  EVALUATION DATE & TIME: 8/18/2024 11:36 AM    PCP: Denzel Jones    ED PROVIDER: Gilda Waldron M.D.      CHIEF COMPLAINT     Chief Complaint   Patient presents with    Hematuria         FINAL IMPRESSION:     1. Gross hematuria    2. Suprapubic catheter (H)    3. Urinary tract infection associated with cystostomy catheter, initial encounter  (H24)    4. History of prostate cancer    5. Metastasis to bone (H)    6. Chronic anemia          MEDICAL DECISION MAKING:       ED Course as of 08/21/24 0606   Sun Aug 18, 2024   1317 Vital Signs: reviewed  Imaging: I independently interpreted ct abdomen .Formal read by radiologist.  Home meds:  reviewed  ED meds: Levaquin  Fluids: NS  Labs:  K 4.2  Cr 1.02  Wbc 5.5  Hgb 8.3  platelets 232    In addition to the work out documented, I considered the following work up inserting Forte catheter.  No evidence of obstruction from the suprapubic catheter    84-year-old male with known history of prostate malignancy status post chemotherapy and radiation has a suprapubic catheter and is here because he been having bloody urine for 10 days and also malodorous drainage from his penis.    On examination chronically ill-appearing nontoxic.  Suprapubic catheter with gross hematuria no gross clots.  He is circumcised.  No gross purulence from the urethra.    Denies any pain and IV was established.  Hemoglobin was 8.3.  This is around his baseline.  Normal platelets.  Therefore no need for any immediate transfusion.  Creatinine is 1.2 which is around his baseline.  Slightly elevated BUN patient given 250 cc of normal saline.  Urine with positive nitrites leukocyte esterase and moderate bacteria this was from the back specimen.    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  Spoke with the pharmacy and the recommendation is for Levaquin.  Patient and daughter updated on possible side effects of Levaquin.    CT unfortunately otherwise nondiagnostic because of heart fractures unchanged from previous.    Spoke with IR who agree currently cannot change his suprapubic catheter.  At the time of the dictation waiting urology for follow-up.  Anticipate discharge with Levaquin 500 milligrams once a day for 7 days and strict return precautions given.    2:46 PM patient back from IR  Tolerated procedure well  I spoke with daughter over the phone gave discharge instructions          Medical Decision Making    History:  Supplemental history from: Family Member/Significant Other  External Record(s) reviewed: Documented in chart (see below)   [facility] 8/15/24 - contacted clinic regarding hematuria    Work Up:  Chart documentation includes differential considered and any EKGs or imaging independently interpreted by provider, where specified.  In additional to work up documented, I considered the following work up: Documented in chart, if applicable.    External consultation:  Discussion of management with another provider: Documented in chart, if applicable    Complicating factors:  Care impacted by chronic illness: Cancer/Chemotherapy and Mental Health  Care affected by social determinants of health: N/A    Disposition considerations: Admission considered. Patient was signed out to the oncoming physician, disposition pending.      External Consultation  Pharmacy  Urology,   Hematology,      ED COURSE     11:57 PM I met with the patient, obtained history, performed an initial exam, and discussed options and plan for diagnostics and treatment here in the ED.  12:53 PM Reviewed urine culture result. Consulted pharmacy.   1:34 PM Talked to patient and daughter about Levaquin.  1:56 PM Spoke to IR, Dr. Vazquez.  2:21 PM signed out to Dr. Adair pending  IR.      At the conclusion of the encounter I discussed the results of all of the tests and the disposition. The questions were answered. The patient and daughter acknowledged understanding and was agreeable with the care plan.         MEDICATIONS GIVEN IN THE EMERGENCY:     Medications   sodium chloride 0.9% BOLUS 250 mL (0 mLs Intravenous Stopped 8/18/24 1300)   iopamidol (ISOVUE-370) solution 70 mL (70 mLs Intravenous $Given 8/18/24 1304)   levofloxacin (LEVAQUIN) infusion 500 mg (0 mg Intravenous Stopped 8/18/24 1426)   iohexol (OMNIPAQUE) 350 MG/ML injectable solution 100 mL (15 mLs Intravenous $Given 8/18/24 1434)   morphine (PF) injection 4 mg (4 mg Intravenous $Given 8/18/24 1455)       NEW PRESCRIPTIONS STARTED AT TODAY'S ER VISIT     Discharge Medication List as of 8/18/2024  3:10 PM        START taking these medications    Details   methocarbamol (ROBAXIN) 500 MG tablet Take 1 tablet (500 mg) by mouth 3 times daily, Disp-10 tablet, R-0, Local Print      !! oxyCODONE (ROXICODONE) 5 MG tablet Take 1 tablet (5 mg) by mouth every 6 hours as needed for severe pain, Disp-6 tablet, R-0, Local Print      levofloxacin (LEVAQUIN) 500 MG tablet Take 1 tablet (500 mg) by mouth daily for 7 days, Disp-7 tablet, R-0, Local Print       !! - Potential duplicate medications found. Please discuss with provider.             =================================================================    HPI     Patient information was obtained from: patient and daughter    Use of : N/A         Kilo Montiel is a 84 year old male who presents by walk in for evaluation of hematuria.     Patient had a suprapubic Forte catheter placed on 7/12 (5 weeks ago) for incontinence due to prostate cancer metastasized to bone. He had pain with initial insertion, so they replaced the catheter on 7/17 (5 days later). It has not been exchanged since. Since then, he has been having intermittent hematuria. He has been evaluated for this and  it was thought it was due to radiation treatments. However, he is on a break and last radiation was months ago. For the past 10 days, he has had persistently red blood in the catheter bag, which is longer than usual, with some clotting. Has also noticed malodorous leakage from his penis and is concerned for infection. Denies any concerns about the ability of his catheter to drain. He follows with Dr. Bernal. Has had a blood transfusion in the past. The patient is not anticoagulated. Denies fever, chills, dizziness, chest pain, shortness of breath, bloody stool, leg swelling, rash, or any other complaints at this time.       REVIEW OF SYSTEMS   ROS negative other than the symptoms noted above in the HPI.    PAST MEDICAL HISTORY:     Past Medical History:   Diagnosis Date    Anxiety     Hypothyroid     Melanoma (H)     excised from abdomen and wrist    Melanoma of wrist (H)     Prostate cancer (H)     Thyroid disease        PAST SURGICAL HISTORY:     Past Surgical History:   Procedure Laterality Date    ANKLE SURGERY      left    COLONOSCOPY      HIP SURGERY Left     INSERT RADIATION SEEDS PROSTATE  11/08/2013    Procedure: INSERT RADIATION SEEDS PROSTATE;  Insert Radiation Seeds Prostate ;  Surgeon: Sahil Franco MD;  Location: [facility] OR    IR SUPRAPUBIC CATHETER CHANGE  7/18/2024    IR SUPRAPUBIC CATHETER CHANGE  8/18/2024    IR SUPRAPUBIC CATHETER PLACMENT  7/12/2024    OPEN REDUCTION INTERNAL FIXATION HIP BIPOLAR Left 05/24/2024    Procedure: HEMIARTHROPLASTY, LEFT HIP, BIPOLAR;  Surgeon: Everett Sherman MD;  Location: [facility] OR    SURGICAL PATHOLOGY EXAM      melanoma removal         CURRENT MEDICATIONS:   levofloxacin (LEVAQUIN) 500 MG tablet  methocarbamol (ROBAXIN) 500 MG tablet  oxyCODONE (ROXICODONE) 5 MG tablet  acetaminophen (TYLENOL) 325 MG tablet  chlorhexidine (PERIDEX) 0.12 % solution  cyanocobalamin (VITAMIN B-12) 1000 MCG tablet  escitalopram (LEXAPRO) 10 MG tablet  famotidine (PEPCID) 20 MG  "tablet  lactulose (CHRONULAC) 10 GM/15ML solution  levothyroxine (SYNTHROID/LEVOTHROID) 125 MCG tablet  LORazepam (ATIVAN) 0.5 MG tablet  magnesium 250 MG tablet  megestrol (MEGACE) 40 MG/ML suspension  ondansetron (ZOFRAN) 8 MG tablet  oxyCODONE (ROXICODONE) 5 MG tablet  polyethylene glycol (MIRALAX) 17 GM/Dose powder  senna-docusate (SENOKOT-S/PERICOLACE) 8.6-50 MG tablet  sodium chloride 1 GM tablet  Turmeric 500 MG CAPS         ALLERGIES:   No Known Allergies    FAMILY HISTORY:     Family History   Problem Relation Age of Onset    Breast Cancer Mother 75       SOCIAL HISTORY:     Social History     Socioeconomic History    Marital status:    Tobacco Use    Smoking status: Former     Current packs/day: 0.00     Average packs/day: 0.5 packs/day for 8.0 years (4.0 ttl pk-yrs)     Types: Cigarettes     Start date: 1974     Quit date: 1982     Years since quittin.2    Smokeless tobacco: Former     Quit date: 1976   Substance and Sexual Activity    Alcohol use: Yes     Comment: 2-3 per week       VITALS:   BP (!) 150/79   Pulse 70   Temp 98  F (36.7  C) (Tympanic)   Resp 12   Ht 1.676 m (5' 6\")   Wt 51.7 kg (114 lb)   SpO2 100%   BMI 18.40 kg/m      PHYSICAL EXAM     Physical Exam  Vitals and nursing note reviewed. Exam conducted with a chaperone present.   Constitutional:       General: He is not in acute distress.     Appearance: He is normal weight. He is not ill-appearing, toxic-appearing or diaphoretic.   Skin:     Capillary Refill: Capillary refill takes less than 2 seconds.   Neurological:      Mental Status: He is alert and oriented to person, place, and time.         Physical Exam   Constitutional: Elderly, chronically-ill appearing. Nontoxic.     Head: Atraumatic.     Nose: Nose normal.     Mouth/Throat: Oropharynx is clear and moist.     Eyes: EOM are normal. Pupils are equal, round, and reactive to light.     Ears: Bilateral pearly white tympanic membranes.    Neck: Normal " range of motion. Neck supple.     Cardiovascular: Normal rate, regular rhythm and normal heart sounds.  2+ femoral pulses/radial/DP pulses B    Pulmonary/Chest: Normal effort  and breath sounds normal.     Abdominal: soft nontender.    Musculoskeletal: Normal range of motion.     Neurological: Moves upper and lower extremities equally.    Lymphatics: no edema, no calves pain, no palpable cords.    : Suprapubic catheter draining bloody urine.  Circumcised.  No purulence from the urethra.  No cellulitis in the perineal area.    Skin: Skin is warm and dry.     Psychiatric: Normal mood and affect. Behavior is normal.       LAB:     All pertinent labs reviewed and interpreted.  Labs Ordered and Resulted from Time of ED Arrival to Time of ED Departure   ROUTINE UA WITH MICROSCOPIC REFLEX TO CULTURE - Abnormal       Result Value    Color Urine Brown (*)     Appearance Urine Cloudy (*)     Glucose Urine Negative      Bilirubin Urine Negative      Ketones Urine Negative      Specific Gravity Urine 1.023      Blood Urine >1.0 mg/dL (*)     pH Urine 6.5      Protein Albumin Urine 300 (*)     Urobilinogen Urine <2.0      Nitrite Urine Positive (*)     Leukocyte Esterase Urine 500 Lei/uL (*)     Bacteria Urine Moderate (*)     RBC Urine >182 (*)     WBC Urine >182 (*)    BASIC METABOLIC PANEL - Abnormal    Sodium 135      Potassium 4.2      Chloride 98      Carbon Dioxide (CO2) 25      Anion Gap 12      Urea Nitrogen 24.6 (*)     Creatinine 1.02      GFR Estimate 73      Calcium 8.9      Glucose 103 (*)    CBC WITH PLATELETS AND DIFFERENTIAL - Abnormal    WBC Count 5.5      RBC Count 2.53 (*)     Hemoglobin 8.3 (*)     Hematocrit 25.5 (*)      (*)     MCH 32.8      MCHC 32.5      RDW 17.7 (*)     Platelet Count 232      % Neutrophils 82      % Lymphocytes 6      % Monocytes 10      % Eosinophils 0      % Basophils 0      % Immature Granulocytes 1      NRBCs per 100 WBC 0      Absolute Neutrophils 4.5      Absolute  Lymphocytes 0.4 (*)     Absolute Monocytes 0.6      Absolute Eosinophils 0.0      Absolute Basophils 0.0      Absolute Immature Granulocytes 0.0      Absolute NRBCs 0.0     CRP INFLAMMATION - Abnormal    CRP Inflammation 72.90 (*)         RADIOLOGY:     Reviewed all pertinent imaging. Please see official radiology report.  IR Suprapubic Catheter Change   Final Result   IMPRESSION:     Successful suprapubic catheter exchange under fluoroscopic guidance as detailed above.         CT Lumbar Spine Reconstructed   Final Result   IMPRESSION: Five lumbar-type vertebrae. Compression fracture deformities of all five lumbar vertebral bodies again noted without change in morphology from the comparison study. Sclerosis within the L2, L3, L4 and L5 vertebral bodies again noted worrisome    for bony metastatic disease. Grade 1 degenerative anterolisthesis of L4 upon L5 again noted. Alignment otherwise normal. Mild bony spinal canal narrowing at the upper L2 level due to retropulsion of bony fragments into the spinal canal again noted. No    other significant spinal canal narrowing of the lumbar spine. No definite new fractures.            CT Abdomen Pelvis w Contrast   Final Result   IMPRESSION:    1.  Essentially nondiagnostic evaluation of the bladder due to significant streak artifact. Suprapubic catheter is in the region of the urinary bladder. Consider further characterization with bladder ultrasound.   2.  No collecting system dilatation.   3.  Trace right pleural effusion. Medial right lower lobe patchy opacity could reflect atelectasis or infectious/inflammatory process.   4.  Similar skeletal metastases.                 EKG:       I have independently reviewed and interpreted the EKG(s) documented above.      PROCEDURES:     Procedures      I, Milagros Lopez, am serving as a scribe to document services personally performed by Dr. Waldron based on my observation and the provider's statements to me. I, Gilda Waldron MD  attest that Milagros Lopez is acting in a scribe capacity, has observed my performance of the services and has documented them in accordance with my direction.    Gilda Waldron M.D.  Emergency Medicine  [facility] EMERGENCY DEPARTMENT  [address] [place] [id number]  [phone]  Dept: [phone]       Gilda Waldron MD  08/18/24 1442       Gilda Waldron MD  08/21/24 0606

## 2024-08-19 ENCOUNTER — TELEPHONE (OUTPATIENT)
Dept: INTERVENTIONAL RADIOLOGY/VASCULAR | Facility: HOSPITAL | Age: 84
End: 2024-08-19
Payer: COMMERCIAL

## 2024-08-19 LAB — BACTERIA UR CULT: NORMAL

## 2024-08-19 NOTE — TELEPHONE ENCOUNTER
Called by [facility] looking for care orders (flushing orders, tubing change) for SP cath placed by IR.  She states patient has not had UR via catheter since last evening.    I have now called patient daughter, she has updated me of ED visit yesterday.  She states patient did not have UO via catheter overnight, got up to void twice instead.  This am, initially no urine flowing, but as morning has gone one, he is not producing urine via bag.  Urine is strawberry in color.      All questions answered, not further concerns regarding tube functionality at this time.    I have called back [facility] RN and advised her to contact referring urologist regarding her requests.  I have given her provider name who made referral with [facility].  All questions answered.    Alexandria GUTIÉRREZ  Interventional Radiology RN   [phone]

## 2024-09-07 ENCOUNTER — HOSPITAL ENCOUNTER (EMERGENCY)
Facility: HOSPITAL | Age: 84
Discharge: HOME | End: 2024-09-07
Attending: EMERGENCY MEDICINE | Admitting: EMERGENCY MEDICINE
Payer: COMMERCIAL

## 2024-09-07 VITALS
SYSTOLIC BLOOD PRESSURE: 163 MMHG | BODY MASS INDEX: 17.75 KG/M2 | DIASTOLIC BLOOD PRESSURE: 80 MMHG | TEMPERATURE: 98.6 F | OXYGEN SATURATION: 100 % | WEIGHT: 110 LBS | RESPIRATION RATE: 18 BRPM | HEART RATE: 66 BPM

## 2024-09-07 DIAGNOSIS — E87.6 HYPOKALEMIA: ICD-10-CM

## 2024-09-07 DIAGNOSIS — R31.0 GROSS HEMATURIA: ICD-10-CM

## 2024-09-07 LAB
ABO/RH(D): NORMAL
ALBUMIN UR-MCNC: 70 MG/DL
ANION GAP SERPL CALCULATED.3IONS-SCNC: 14 MMOL/L (ref 7–15)
ANTIBODY SCREEN: NEGATIVE
APPEARANCE UR: ABNORMAL
APTT PPP: 35 SECONDS (ref 22–38)
BASOPHILS # BLD AUTO: 0 10E3/UL (ref 0–0.2)
BASOPHILS NFR BLD AUTO: 0 %
BILIRUB UR QL STRIP: NEGATIVE
BUN SERPL-MCNC: 21.1 MG/DL (ref 8–23)
CALCIUM SERPL-MCNC: 8.9 MG/DL (ref 8.8–10.4)
CHLORIDE SERPL-SCNC: 101 MMOL/L (ref 98–107)
COLOR UR AUTO: ABNORMAL
CREAT SERPL-MCNC: 1.15 MG/DL (ref 0.67–1.17)
EGFRCR SERPLBLD CKD-EPI 2021: 63 ML/MIN/1.73M2
EOSINOPHIL # BLD AUTO: 0 10E3/UL (ref 0–0.7)
EOSINOPHIL NFR BLD AUTO: 0 %
ERYTHROCYTE [DISTWIDTH] IN BLOOD BY AUTOMATED COUNT: 16.1 % (ref 10–15)
GLUCOSE SERPL-MCNC: 101 MG/DL (ref 70–99)
GLUCOSE UR STRIP-MCNC: NEGATIVE MG/DL
HCO3 SERPL-SCNC: 20 MMOL/L (ref 22–29)
HCT VFR BLD AUTO: 25.5 % (ref 40–53)
HGB BLD-MCNC: 8.5 G/DL (ref 13.3–17.7)
HGB UR QL STRIP: ABNORMAL
HOLD SPECIMEN: NORMAL
IMM GRANULOCYTES # BLD: 0.1 10E3/UL
IMM GRANULOCYTES NFR BLD: 1 %
INR PPP: 1.29 (ref 0.85–1.15)
KETONES UR STRIP-MCNC: NEGATIVE MG/DL
LEUKOCYTE ESTERASE UR QL STRIP: ABNORMAL
LYMPHOCYTES # BLD AUTO: 0.3 10E3/UL (ref 0.8–5.3)
LYMPHOCYTES NFR BLD AUTO: 5 %
MCH RBC QN AUTO: 32.7 PG (ref 26.5–33)
MCHC RBC AUTO-ENTMCNC: 33.3 G/DL (ref 31.5–36.5)
MCV RBC AUTO: 98 FL (ref 78–100)
MONOCYTES # BLD AUTO: 0.5 10E3/UL (ref 0–1.3)
MONOCYTES NFR BLD AUTO: 8 %
NEUTROPHILS # BLD AUTO: 5.4 10E3/UL (ref 1.6–8.3)
NEUTROPHILS NFR BLD AUTO: 86 %
NITRATE UR QL: NEGATIVE
NRBC # BLD AUTO: 0 10E3/UL
NRBC BLD AUTO-RTO: 0 /100
PH UR STRIP: 6 [PH] (ref 5–7)
PLATELET # BLD AUTO: 251 10E3/UL (ref 150–450)
POTASSIUM SERPL-SCNC: 3 MMOL/L (ref 3.4–5.3)
RBC # BLD AUTO: 2.6 10E6/UL (ref 4.4–5.9)
RBC URINE: >182 /HPF
SODIUM SERPL-SCNC: 135 MMOL/L (ref 135–145)
SP GR UR STRIP: 1.01 (ref 1–1.03)
SPECIMEN EXPIRATION DATE: NORMAL
UROBILINOGEN UR STRIP-MCNC: <2 MG/DL
WBC # BLD AUTO: 6.3 10E3/UL (ref 4–11)
WBC URINE: >182 /HPF

## 2024-09-07 PROCEDURE — 85610 PROTHROMBIN TIME: CPT | Performed by: EMERGENCY MEDICINE

## 2024-09-07 PROCEDURE — 86900 BLOOD TYPING SEROLOGIC ABO: CPT | Performed by: EMERGENCY MEDICINE

## 2024-09-07 PROCEDURE — 36415 COLL VENOUS BLD VENIPUNCTURE: CPT | Performed by: EMERGENCY MEDICINE

## 2024-09-07 PROCEDURE — 250N000013 HC RX MED GY IP 250 OP 250 PS 637: Performed by: EMERGENCY MEDICINE

## 2024-09-07 PROCEDURE — 87086 URINE CULTURE/COLONY COUNT: CPT | Performed by: EMERGENCY MEDICINE

## 2024-09-07 PROCEDURE — 81001 URINALYSIS AUTO W/SCOPE: CPT | Performed by: EMERGENCY MEDICINE

## 2024-09-07 PROCEDURE — 80048 BASIC METABOLIC PNL TOTAL CA: CPT | Performed by: EMERGENCY MEDICINE

## 2024-09-07 PROCEDURE — 85004 AUTOMATED DIFF WBC COUNT: CPT | Performed by: EMERGENCY MEDICINE

## 2024-09-07 PROCEDURE — 85730 THROMBOPLASTIN TIME PARTIAL: CPT | Performed by: EMERGENCY MEDICINE

## 2024-09-07 PROCEDURE — 99283 EMERGENCY DEPT VISIT LOW MDM: CPT

## 2024-09-07 RX ORDER — POTASSIUM CHLORIDE 1500 MG/1
40 TABLET, EXTENDED RELEASE ORAL ONCE
Status: COMPLETED | OUTPATIENT
Start: 2024-09-07 | End: 2024-09-07

## 2024-09-07 RX ADMIN — POTASSIUM CHLORIDE 40 MEQ: 1500 TABLET, EXTENDED RELEASE ORAL at 12:43

## 2024-09-07 ASSESSMENT — COLUMBIA-SUICIDE SEVERITY RATING SCALE - C-SSRS
6. HAVE YOU EVER DONE ANYTHING, STARTED TO DO ANYTHING, OR PREPARED TO DO ANYTHING TO END YOUR LIFE?: NO
2. HAVE YOU ACTUALLY HAD ANY THOUGHTS OF KILLING YOURSELF IN THE PAST MONTH?: NO
1. IN THE PAST MONTH, HAVE YOU WISHED YOU WERE DEAD OR WISHED YOU COULD GO TO SLEEP AND NOT WAKE UP?: NO

## 2024-09-07 ASSESSMENT — ACTIVITIES OF DAILY LIVING (ADL)
ADLS_ACUITY_SCORE: 38
ADLS_ACUITY_SCORE: 38

## 2024-09-07 NOTE — ED PROVIDER NOTES
EMERGENCY DEPARTMENT ENCOUNTER      NAME: Kilo Montiel  AGE: 84 year old male  YOB: 1940  MRN: [id number]  EVALUATION DATE & TIME: No admission date for patient encounter.    PCP: Denzel Jones    ED PROVIDER: Bailey Mckenna MD    Chief Complaint   Patient presents with    Hematuria         FINAL IMPRESSION:  1. Gross hematuria    2. Hypokalemia          ED COURSE & MEDICAL DECISION MAKING:    Pertinent Labs & Imaging studies reviewed. (See chart for details)  84 year old male with history of prostate cancer status post radiation therapy most recently April indwelling suprapubic catheter who presents to the Emergency Department for evaluation of ongoing issues with gross hematuria.  This is really been happening for the last 4 months but pretty much daily consistently over the course of the last month.  Has chronic anemia seen at urgent care yesterday and therefore referred here.  None of his symptoms are really new worse or different today but simply ongoing patient and his family are appropriately frustrated.  Does not sound like he has had any recent cystoscopy.  He follows with Dr. [facility] and they have been trying to get in unsuccessfully.  Concerned that he has infection, mass, radiation cystitis, or new/worsening symptomatic anemia.    Patient placed on monitor, IV established and blood obtained.  CBC, BMP, coags, type and screen, urinalysis notable for hemoglobin 8.5 which is baseline for patient.  Potassium slightly low at 3.0.  Replaced orally.  Urine shows red cells and white cells but nitrite negative.  Will send for culture.  Case discussed with urology.  Patient had recent CT imaging that was nondiagnostic due to artifact within the pelvis.  I do think he would benefit from cystoscopy.  Seems how he has been draining appropriately, and is not clotting off his suprapubic there is no emergent indications for admission, CBI.  Urology will help arrange outpatient  follow-up for cystoscopy this week.      ED Course as of 09/07/24 1343   Sat Sep 07, 2024   1158 Hemoglobin(!): 8.5  Chronic   1210 Potassium(!): 3.0   1242 I met with the patient for the initial interview and physical examination. Discussed plan for treatment and workup in the ED.     1319 Spoke w [facility] urology   1338 I rechecked with and updated the patient on results and the future plan of care while in the emergency department.       Medical Decision Making    History:  Supplemental history from: Documented in chart and Family Member/Significant Other  External Record(s) reviewed: Documented in chart and Outpatient Record: 8/18/2024, [facility] Emergency Department; 9/6/2024,  [facility]    Work Up:  Chart documentation includes differential considered and any EKGs or imaging independently interpreted by provider, see MDM  In additional to work up documented, I considered the following work up: see MDM    External consultation:  Discussion of management with another provider: Urology    Complicating factors:  Care impacted by chronic illness: Cancer/Chemotherapy and Chronic Kidney Disease  Care affected by social determinants of health: Other: Weekend, access to primary care    Disposition considerations: Discharge. No recommendations on prescription strength medication(s). See documentation for any additional details.      At the conclusion of the encounter I discussed the results of all of the tests and the disposition. The questions were answered. The patient or family acknowledged understanding and was agreeable with the care plan.      MEDICATIONS GIVEN IN THE EMERGENCY:  Medications   potassium chloride eduarda ER (KLOR-CON M20) CR tablet 40 mEq (40 mEq Oral $Given 9/7/24 1243)       NEW PRESCRIPTIONS STARTED AT TODAY'S ER VISIT  New Prescriptions    No medications on file           =================================================================    HPI    Patient information was obtained from: Patient and     Use of Intrepreter: N/A        Kilo Montiel is a 84 year old male with pertinent medical history of prostate cancer metastatic to bone, anemia, gross hematuria, CKD, hypothyroidism who presents to the emergency department by car for evaluation of hematuria.     Per chart review: Patient was seen 8/18/2024 at [facility] Emergency Department for evaluation of hematuria. Hemoglobin was 8.3; this was around baseline. Creatinine was 1.2; this was around baseline. Slightly elevated BN; patient given 250 ml of normal saline. Urine with positive nitrites leukocyte esterase and moderate bacteria. CT otherwise nondiagnostic because of heart fractures unchanged from previous. Patient had suprapubic catheter changed. Patient discharged with Levaquin 500 mg once per day for 7 days. He was subsequently seen yesterday (9/6/2024) at  [facility] [place] for evaluation of hematuria with abnormal urine odor. Patient was discharged with nitrofurantoin and plan to follow-up in the emergency department to determine the source of bleeding and consideration of blood transfusion.     Per patient: Patient presents to the emergency department today for evaluation of hematuria after having recently been here for the same issue. Since that most recent visit, he has continued to have bloody urine draining from his catheter. Patient follows with Dr. Schmidt, Urology.     Per patient's family member: Patient has been struggling with hematuria off and on over the past four months, the past month of which has been increasingly severe. She mentions that the patient has not had to be irrigated recently. He last underwent radiation for prostate cancer in April of this year (2024).       PAST MEDICAL HISTORY:  Past Medical History:   Diagnosis Date    Anxiety     Hypothyroid      Melanoma (H)     excised from abdomen and wrist    Melanoma of wrist (H)     Prostate cancer (H)     Thyroid disease        PAST SURGICAL HISTORY:  Past Surgical History:   Procedure Laterality Date    ANKLE SURGERY      left    COLONOSCOPY      HIP SURGERY Left     INSERT RADIATION SEEDS PROSTATE  11/08/2013    Procedure: INSERT RADIATION SEEDS PROSTATE;  Insert Radiation Seeds Prostate ;  Surgeon: Sahil Franco MD;  Location: [facility] OR    IR SUPRAPUBIC CATHETER CHANGE  7/18/2024    IR SUPRAPUBIC CATHETER CHANGE  8/18/2024    IR SUPRAPUBIC CATHETER PLACMENT  7/12/2024    OPEN REDUCTION INTERNAL FIXATION HIP BIPOLAR Left 05/24/2024    Procedure: HEMIARTHROPLASTY, LEFT HIP, BIPOLAR;  Surgeon: Everett Sherman MD;  [place] [facility]    SURGICAL PATHOLOGY EXAM      melanoma removal       CURRENT MEDICATIONS:    Prior to Admission Medications   Prescriptions Last Dose Informant Patient Reported? Taking?   LORazepam (ATIVAN) 0.5 MG tablet   Yes No   Sig: Take 1 tablet by mouth every 8 hours as needed   Turmeric 500 MG CAPS   Yes No   Sig: Take 1 capsule by mouth daily   acetaminophen (TYLENOL) 325 MG tablet   No No   Sig: Take 2 tablets (650 mg) by mouth every 4 hours as needed for other (For optimal non-opioid multimodal pain management to improve pain control.)   chlorhexidine (PERIDEX) 0.12 % solution   Yes No   Sig: Swish and spit 15 mLs in mouth 2 times daily as needed (PRN)   cyanocobalamin (VITAMIN B-12) 1000 MCG tablet   Yes No   Sig: Take 1,000 mcg by mouth daily   escitalopram (LEXAPRO) 10 MG tablet   Yes No   Sig: Take 10 mg by mouth daily   famotidine (PEPCID) 20 MG tablet   Yes No   Sig: Take 20 mg by mouth daily   lactulose (CHRONULAC) 10 GM/15ML solution   Yes No   Sig: Take 15 mLs by mouth 2 times daily as needed   levofloxacin (LEVAQUIN) 500 MG tablet   No No   Sig: Take 1 tablet (500 mg) by mouth daily   levothyroxine (SYNTHROID/LEVOTHROID) 125 MCG tablet   No No   Sig: Take 1 tablet (125 mcg) by  mouth every morning (before breakfast)   magnesium 250 MG tablet   Yes No   Sig: Take 1 tablet by mouth daily   megestrol (MEGACE) 40 MG/ML suspension   Yes No   Sig: Take 10 mLs by mouth daily   methocarbamol (ROBAXIN) 500 MG tablet   No No   Sig: Take 1 tablet (500 mg) by mouth 3 times daily   ondansetron (ZOFRAN) 8 MG tablet   Yes No   Sig: Take 8 mg by mouth every 8 hours as needed   oxyCODONE (ROXICODONE) 5 MG tablet   No No   Sig: Take 0.5 tablets (2.5 mg) by mouth every 4 hours as needed for severe pain   polyethylene glycol (MIRALAX) 17 GM/Dose powder   No No   Sig: Take 17 g by mouth daily   senna-docusate (SENOKOT-S/PERICOLACE) 8.6-50 MG tablet   No No   Sig: Take 2 tablets by mouth 2 times daily   sodium chloride 1 GM tablet   No No   Sig: Take 1 tablet (1 g) by mouth 3 times daily (with meals)      Facility-Administered Medications: None       ALLERGIES:  No Known Allergies    FAMILY HISTORY:  Family History   Problem Relation Age of Onset    Breast Cancer Mother 75       SOCIAL HISTORY:  Social History     Tobacco Use    Smoking status: Former     Current packs/day: 0.00     Average packs/day: 0.5 packs/day for 8.0 years (4.0 ttl pk-yrs)     Types: Cigarettes     Start date: 1974     Quit date: 1982     Years since quittin.2    Smokeless tobacco: Former     Quit date: 1976   Substance Use Topics    Alcohol use: Yes     Comment: 2-3 per week        VITALS:  Patient Vitals for the past 24 hrs:   BP Temp Temp src Pulse Resp SpO2 Weight   24 1310 -- -- -- 63 -- 100 % --   24 1300 (!) 169/76 -- -- 63 -- 100 % --   24 1250 (!) 158/68 -- -- 65 -- 97 % --   24 1240 (!) 159/70 -- -- 66 -- 99 % --   24 1122 123/60 98.6  F (37  C) Oral 74 18 99 % 49.9 kg (110 lb)       PHYSICAL EXAM    General Appearance: Well-appearing, well-nourished, no acute distress.  Head:  Normocephalic  Cardio:  Regular rate and rhythm  Pulm:  No respiratory distress  Back:   No CVA  tenderness, normal ROM  Abdomen:  Soft, non-tender, non distended,no rebound or guarding.  Neuro:  Alert and oriented ×3  : Indwelling suprapubic catheter in place draining brownish/cranberry-colored urine.      RADIOLOGY/LABS:  Reviewed all pertinent imaging. Please see official radiology report. All pertinent labs reviewed and interpreted.    Results for orders placed or performed during the hospital encounter of 09/07/24   Result Value Ref Range    INR 1.29 (H) 0.85 - 1.15   Result Value Ref Range    aPTT 35 22 - 38 Seconds   Basic metabolic panel   Result Value Ref Range    Sodium 135 135 - 145 mmol/L    Potassium 3.0 (L) 3.4 - 5.3 mmol/L    Chloride 101 98 - 107 mmol/L    Carbon Dioxide (CO2) 20 (L) 22 - 29 mmol/L    Anion Gap 14 7 - 15 mmol/L    Urea Nitrogen 21.1 8.0 - 23.0 mg/dL    Creatinine 1.15 0.67 - 1.17 mg/dL    GFR Estimate 63 >60 mL/min/1.73m2    Calcium 8.9 8.8 - 10.4 mg/dL    Glucose 101 (H) 70 - 99 mg/dL   UA with Microscopic reflex to Culture    Specimen: Urine, Forte Catheter   Result Value Ref Range    Color Urine Brown (A) Colorless, Straw, Light Yellow, Yellow    Appearance Urine Cloudy (A) Clear    Glucose Urine Negative Negative mg/dL    Bilirubin Urine Negative Negative    Ketones Urine Negative Negative mg/dL    Specific Gravity Urine 1.011 1.001 - 1.030    Blood Urine >1.0 mg/dL (A) Negative    pH Urine 6.0 5.0 - 7.0    Protein Albumin Urine 70 (A) Negative mg/dL    Urobilinogen Urine <2.0 <2.0 mg/dL    Nitrite Urine Negative Negative    Leukocyte Esterase Urine 500 Lei/uL (A) Negative    RBC Urine >182 (H) <=2 /HPF    WBC Urine >182 (H) <=5 /HPF   CBC with platelets and differential   Result Value Ref Range    WBC Count 6.3 4.0 - 11.0 10e3/uL    RBC Count 2.60 (L) 4.40 - 5.90 10e6/uL    Hemoglobin 8.5 (L) 13.3 - 17.7 g/dL    Hematocrit 25.5 (L) 40.0 - 53.0 %    MCV 98 78 - 100 fL    MCH 32.7 26.5 - 33.0 pg    MCHC 33.3 31.5 - 36.5 g/dL    RDW 16.1 (H) 10.0 - 15.0 %    Platelet Count  251 150 - 450 10e3/uL    % Neutrophils 86 %    % Lymphocytes 5 %    % Monocytes 8 %    % Eosinophils 0 %    % Basophils 0 %    % Immature Granulocytes 1 %    NRBCs per 100 WBC 0 <1 /100    Absolute Neutrophils 5.4 1.6 - 8.3 10e3/uL    Absolute Lymphocytes 0.3 (L) 0.8 - 5.3 10e3/uL    Absolute Monocytes 0.5 0.0 - 1.3 10e3/uL    Absolute Eosinophils 0.0 0.0 - 0.7 10e3/uL    Absolute Basophils 0.0 0.0 - 0.2 10e3/uL    Absolute Immature Granulocytes 0.1 <=0.4 10e3/uL    Absolute NRBCs 0.0 10e3/uL   Extra Green Top (Lithium Heparin) Tube   Result Value Ref Range    Hold Specimen [facility]    Adult Type and Screen   Result Value Ref Range    ABO/RH(D) A POS     Antibody Screen Negative Negative    SPECIMEN EXPIRATION DATE [id number]        The creation of this record is based on the scribe s observations of the work being performed by Bailey Mckenna MD and the provider s statements to them. It was created on her behalf by David Patel, a trained medical scribe. This document has been checked and approved by the attending provider.    Bailey Mckenna MD  Emergency Medicine  [facility] EMERGENCY DEPARTMENT  [facility] [facility] [address] [phone]  Dept: [phone] v     Bailey Mckenna MD  09/07/24 6273

## 2024-09-07 NOTE — DISCHARGE INSTRUCTIONS
[facility] should be calling you to schedule follow-up appointment with them in clinic this week for cystoscopy.  If you do not hear from them by noon Mo  nday call to schedule appointment.

## 2024-09-07 NOTE — ED NOTES
Discharge paperwork and follow up care discussed with pt. Pt verbalized understanding and has no questions at this time. IV access removed.  Pt is ambulatory with walker when leaving ED

## 2024-09-07 NOTE — ED TRIAGE NOTES
The pt arrives with hematuria from his suprapubic catheter. He had the catheter placed a month and a half ago and has had blood in his urine on and off since. Was see at  yesterday for increased blood in urine and was told that his Hgb was low. Complains of lower back pain but is unsure if this is related.      Triage Assessment (Adult)       Row Name 09/07/24 1124          Triage Assessment    Airway WDL WDL        Cognitive/Neuro/Behavioral WDL    Cognitive/Neuro/Behavioral WDL WDL

## 2024-09-08 LAB — BACTERIA UR CULT: NORMAL

## 2024-09-16 ENCOUNTER — HOSPITAL ENCOUNTER (INPATIENT)
Facility: HOSPITAL | Age: 84
LOS: 4 days | Discharge: HOSPICE | DRG: 919 | End: 2024-09-20
Attending: EMERGENCY MEDICINE | Admitting: INTERNAL MEDICINE
Payer: COMMERCIAL

## 2024-09-16 ENCOUNTER — APPOINTMENT (OUTPATIENT)
Dept: CT IMAGING | Facility: HOSPITAL | Age: 84
DRG: 919 | End: 2024-09-16
Attending: EMERGENCY MEDICINE
Payer: COMMERCIAL

## 2024-09-16 DIAGNOSIS — D64.9 NORMOCYTIC ANEMIA: ICD-10-CM

## 2024-09-16 DIAGNOSIS — E87.6 HYPOKALEMIA: ICD-10-CM

## 2024-09-16 DIAGNOSIS — E61.1 IRON DEFICIENCY: ICD-10-CM

## 2024-09-16 DIAGNOSIS — Z85.46 HISTORY OF PROSTATE CANCER: ICD-10-CM

## 2024-09-16 DIAGNOSIS — K65.1 INTRA-ABDOMINAL ABSCESS (H): ICD-10-CM

## 2024-09-16 LAB
ABO/RH(D): NORMAL
ALBUMIN SERPL BCG-MCNC: 3.6 G/DL (ref 3.5–5.2)
ALP SERPL-CCNC: 113 U/L (ref 40–150)
ALT SERPL W P-5'-P-CCNC: 11 U/L (ref 0–70)
ANION GAP SERPL CALCULATED.3IONS-SCNC: 14 MMOL/L (ref 7–15)
ANTIBODY SCREEN: NEGATIVE
AST SERPL W P-5'-P-CCNC: 39 U/L (ref 0–45)
BASOPHILS # BLD AUTO: 0 10E3/UL (ref 0–0.2)
BASOPHILS NFR BLD AUTO: 0 %
BILIRUB SERPL-MCNC: 0.3 MG/DL
BLD PROD TYP BPU: NORMAL
BLD PROD TYP BPU: NORMAL
BLOOD COMPONENT TYPE: NORMAL
BLOOD COMPONENT TYPE: NORMAL
BUN SERPL-MCNC: 20.8 MG/DL (ref 8–23)
CALCIUM SERPL-MCNC: 8.3 MG/DL (ref 8.8–10.4)
CHLORIDE SERPL-SCNC: 100 MMOL/L (ref 98–107)
CODING SYSTEM: NORMAL
CODING SYSTEM: NORMAL
CREAT SERPL-MCNC: 1.4 MG/DL (ref 0.67–1.17)
CROSSMATCH: NORMAL
CROSSMATCH: NORMAL
EGFRCR SERPLBLD CKD-EPI 2021: 50 ML/MIN/1.73M2
EOSINOPHIL # BLD AUTO: 0 10E3/UL (ref 0–0.7)
EOSINOPHIL NFR BLD AUTO: 0 %
ERYTHROCYTE [DISTWIDTH] IN BLOOD BY AUTOMATED COUNT: 16.7 % (ref 10–15)
GLUCOSE SERPL-MCNC: 102 MG/DL (ref 70–99)
HCO3 SERPL-SCNC: 19 MMOL/L (ref 22–29)
HCT VFR BLD AUTO: 16.2 % (ref 40–53)
HGB BLD-MCNC: 5.5 G/DL (ref 13.3–17.7)
IMM GRANULOCYTES # BLD: 0 10E3/UL
IMM GRANULOCYTES NFR BLD: 1 %
INR PPP: 1.2 (ref 0.85–1.15)
IRON BINDING CAPACITY (ROCHE): 179 UG/DL (ref 240–430)
IRON SATN MFR SERPL: 23 % (ref 15–46)
IRON SERPL-MCNC: 41 UG/DL (ref 61–157)
ISSUE DATE AND TIME: NORMAL
ISSUE DATE AND TIME: NORMAL
LYMPHOCYTES # BLD AUTO: 0.4 10E3/UL (ref 0.8–5.3)
LYMPHOCYTES NFR BLD AUTO: 6 %
MCH RBC QN AUTO: 33.5 PG (ref 26.5–33)
MCHC RBC AUTO-ENTMCNC: 34 G/DL (ref 31.5–36.5)
MCV RBC AUTO: 99 FL (ref 78–100)
MONOCYTES # BLD AUTO: 0.5 10E3/UL (ref 0–1.3)
MONOCYTES NFR BLD AUTO: 9 %
NEUTROPHILS # BLD AUTO: 4.9 10E3/UL (ref 1.6–8.3)
NEUTROPHILS NFR BLD AUTO: 84 %
NRBC # BLD AUTO: 0 10E3/UL
NRBC BLD AUTO-RTO: 0 /100
PLATELET # BLD AUTO: 228 10E3/UL (ref 150–450)
POTASSIUM SERPL-SCNC: 2.6 MMOL/L (ref 3.4–5.3)
PROT SERPL-MCNC: 6.4 G/DL (ref 6.4–8.3)
RBC # BLD AUTO: 1.64 10E6/UL (ref 4.4–5.9)
RETICS # AUTO: 0.06 10E6/UL (ref 0.03–0.1)
RETICS/RBC NFR AUTO: 3.5 % (ref 0.5–2)
SODIUM SERPL-SCNC: 133 MMOL/L (ref 135–145)
SPECIMEN EXPIRATION DATE: NORMAL
UNIT ABO/RH: NORMAL
UNIT ABO/RH: NORMAL
UNIT NUMBER: NORMAL
UNIT NUMBER: NORMAL
UNIT STATUS: NORMAL
UNIT STATUS: NORMAL
UNIT TYPE ISBT: 6200
UNIT TYPE ISBT: 6200
WBC # BLD AUTO: 5.8 10E3/UL (ref 4–11)

## 2024-09-16 PROCEDURE — 250N000011 HC RX IP 250 OP 636: Performed by: EMERGENCY MEDICINE

## 2024-09-16 PROCEDURE — 96374 THER/PROPH/DIAG INJ IV PUSH: CPT

## 2024-09-16 PROCEDURE — 258N000003 HC RX IP 258 OP 636: Performed by: EMERGENCY MEDICINE

## 2024-09-16 PROCEDURE — 85610 PROTHROMBIN TIME: CPT | Performed by: EMERGENCY MEDICINE

## 2024-09-16 PROCEDURE — 85045 AUTOMATED RETICULOCYTE COUNT: CPT | Performed by: EMERGENCY MEDICINE

## 2024-09-16 PROCEDURE — 85025 COMPLETE CBC W/AUTO DIFF WBC: CPT | Performed by: EMERGENCY MEDICINE

## 2024-09-16 PROCEDURE — 36430 TRANSFUSION BLD/BLD COMPNT: CPT

## 2024-09-16 PROCEDURE — 86900 BLOOD TYPING SEROLOGIC ABO: CPT | Performed by: EMERGENCY MEDICINE

## 2024-09-16 PROCEDURE — 99223 1ST HOSP IP/OBS HIGH 75: CPT | Performed by: INTERNAL MEDICINE

## 2024-09-16 PROCEDURE — 250N000013 HC RX MED GY IP 250 OP 250 PS 637: Performed by: EMERGENCY MEDICINE

## 2024-09-16 PROCEDURE — 250N000013 HC RX MED GY IP 250 OP 250 PS 637: Performed by: INTERNAL MEDICINE

## 2024-09-16 PROCEDURE — P9016 RBC LEUKOCYTES REDUCED: HCPCS | Performed by: EMERGENCY MEDICINE

## 2024-09-16 PROCEDURE — 74178 CT ABD&PLV WO CNTR FLWD CNTR: CPT

## 2024-09-16 PROCEDURE — 99285 EMERGENCY DEPT VISIT HI MDM: CPT | Mod: 25

## 2024-09-16 PROCEDURE — 36415 COLL VENOUS BLD VENIPUNCTURE: CPT | Performed by: EMERGENCY MEDICINE

## 2024-09-16 PROCEDURE — 96365 THER/PROPH/DIAG IV INF INIT: CPT

## 2024-09-16 PROCEDURE — 87040 BLOOD CULTURE FOR BACTERIA: CPT | Performed by: EMERGENCY MEDICINE

## 2024-09-16 PROCEDURE — 83550 IRON BINDING TEST: CPT | Performed by: EMERGENCY MEDICINE

## 2024-09-16 PROCEDURE — 82728 ASSAY OF FERRITIN: CPT | Performed by: INTERNAL MEDICINE

## 2024-09-16 PROCEDURE — 120N000001 HC R&B MED SURG/OB

## 2024-09-16 PROCEDURE — 86923 COMPATIBILITY TEST ELECTRIC: CPT | Performed by: EMERGENCY MEDICINE

## 2024-09-16 PROCEDURE — 80053 COMPREHEN METABOLIC PANEL: CPT | Performed by: EMERGENCY MEDICINE

## 2024-09-16 PROCEDURE — 82728 ASSAY OF FERRITIN: CPT | Performed by: EMERGENCY MEDICINE

## 2024-09-16 RX ORDER — AMOXICILLIN 250 MG
1 CAPSULE ORAL 2 TIMES DAILY PRN
Status: DISCONTINUED | OUTPATIENT
Start: 2024-09-16 | End: 2024-09-16

## 2024-09-16 RX ORDER — CEFAZOLIN SODIUM 1 G/50ML
1250 SOLUTION INTRAVENOUS ONCE
Status: COMPLETED | OUTPATIENT
Start: 2024-09-16 | End: 2024-09-16

## 2024-09-16 RX ORDER — FAMOTIDINE 20 MG/1
20 TABLET, FILM COATED ORAL DAILY
Status: DISCONTINUED | OUTPATIENT
Start: 2024-09-17 | End: 2024-09-16

## 2024-09-16 RX ORDER — CALCIUM CARBONATE 500 MG/1
1000 TABLET, CHEWABLE ORAL 4 TIMES DAILY PRN
Status: DISCONTINUED | OUTPATIENT
Start: 2024-09-16 | End: 2024-09-20 | Stop reason: HOSPADM

## 2024-09-16 RX ORDER — POTASSIUM CHLORIDE 1.5 G/1.58G
40 POWDER, FOR SOLUTION ORAL ONCE
Status: COMPLETED | OUTPATIENT
Start: 2024-09-16 | End: 2024-09-16

## 2024-09-16 RX ORDER — PIPERACILLIN SODIUM, TAZOBACTAM SODIUM 3; .375 G/15ML; G/15ML
3.38 INJECTION, POWDER, LYOPHILIZED, FOR SOLUTION INTRAVENOUS EVERY 8 HOURS
Status: DISCONTINUED | OUTPATIENT
Start: 2024-09-17 | End: 2024-09-20

## 2024-09-16 RX ORDER — ONDANSETRON 4 MG/1
4 TABLET, ORALLY DISINTEGRATING ORAL EVERY 6 HOURS PRN
Status: DISCONTINUED | OUTPATIENT
Start: 2024-09-16 | End: 2024-09-20 | Stop reason: HOSPADM

## 2024-09-16 RX ORDER — NALOXONE HYDROCHLORIDE 0.4 MG/ML
0.4 INJECTION, SOLUTION INTRAMUSCULAR; INTRAVENOUS; SUBCUTANEOUS
Status: DISCONTINUED | OUTPATIENT
Start: 2024-09-16 | End: 2024-09-20 | Stop reason: HOSPADM

## 2024-09-16 RX ORDER — AMOXICILLIN 250 MG
2 CAPSULE ORAL 2 TIMES DAILY PRN
Status: DISCONTINUED | OUTPATIENT
Start: 2024-09-16 | End: 2024-09-16

## 2024-09-16 RX ORDER — ONDANSETRON 2 MG/ML
4 INJECTION INTRAMUSCULAR; INTRAVENOUS EVERY 6 HOURS PRN
Status: DISCONTINUED | OUTPATIENT
Start: 2024-09-16 | End: 2024-09-20 | Stop reason: HOSPADM

## 2024-09-16 RX ORDER — NALOXONE HYDROCHLORIDE 0.4 MG/ML
0.2 INJECTION, SOLUTION INTRAMUSCULAR; INTRAVENOUS; SUBCUTANEOUS
Status: DISCONTINUED | OUTPATIENT
Start: 2024-09-16 | End: 2024-09-20 | Stop reason: HOSPADM

## 2024-09-16 RX ORDER — HYDROMORPHONE HCL IN WATER/PF 6 MG/30 ML
0.2 PATIENT CONTROLLED ANALGESIA SYRINGE INTRAVENOUS
Status: DISCONTINUED | OUTPATIENT
Start: 2024-09-16 | End: 2024-09-20 | Stop reason: HOSPADM

## 2024-09-16 RX ORDER — ACETAMINOPHEN 325 MG/1
650 TABLET ORAL EVERY 4 HOURS PRN
Status: DISCONTINUED | OUTPATIENT
Start: 2024-09-16 | End: 2024-09-20 | Stop reason: HOSPADM

## 2024-09-16 RX ORDER — LIDOCAINE 40 MG/G
CREAM TOPICAL
Status: DISCONTINUED | OUTPATIENT
Start: 2024-09-16 | End: 2024-09-20 | Stop reason: HOSPADM

## 2024-09-16 RX ORDER — LORAZEPAM 0.5 MG/1
0.5 TABLET ORAL EVERY 8 HOURS PRN
Status: DISCONTINUED | OUTPATIENT
Start: 2024-09-16 | End: 2024-09-20 | Stop reason: HOSPADM

## 2024-09-16 RX ORDER — MEGESTROL ACETATE 40 MG/ML
400 SUSPENSION ORAL AT BEDTIME
Status: DISCONTINUED | OUTPATIENT
Start: 2024-09-16 | End: 2024-09-20 | Stop reason: HOSPADM

## 2024-09-16 RX ORDER — IOPAMIDOL 755 MG/ML
75 INJECTION, SOLUTION INTRAVASCULAR ONCE
Status: COMPLETED | OUTPATIENT
Start: 2024-09-16 | End: 2024-09-16

## 2024-09-16 RX ORDER — CEFAZOLIN SODIUM 1 G/50ML
750 SOLUTION INTRAVENOUS EVERY 24 HOURS
Status: DISCONTINUED | OUTPATIENT
Start: 2024-09-17 | End: 2024-09-18

## 2024-09-16 RX ORDER — LEVOTHYROXINE SODIUM 100 UG/1
100 TABLET ORAL DAILY
COMMUNITY

## 2024-09-16 RX ORDER — MULTIVITAMIN WITH IRON
250 TABLET ORAL DAILY
Status: DISCONTINUED | OUTPATIENT
Start: 2024-09-17 | End: 2024-09-16

## 2024-09-16 RX ORDER — POTASSIUM CHLORIDE 7.45 MG/ML
10 INJECTION INTRAVENOUS ONCE
Status: COMPLETED | OUTPATIENT
Start: 2024-09-16 | End: 2024-09-16

## 2024-09-16 RX ORDER — AMOXICILLIN 250 MG
2 CAPSULE ORAL DAILY
Status: DISCONTINUED | OUTPATIENT
Start: 2024-09-17 | End: 2024-09-20 | Stop reason: HOSPADM

## 2024-09-16 RX ORDER — HYDROMORPHONE HCL IN WATER/PF 6 MG/30 ML
0.4 PATIENT CONTROLLED ANALGESIA SYRINGE INTRAVENOUS
Status: DISCONTINUED | OUTPATIENT
Start: 2024-09-16 | End: 2024-09-20 | Stop reason: HOSPADM

## 2024-09-16 RX ORDER — LACTULOSE 10 G/15ML
15 SOLUTION ORAL 2 TIMES DAILY PRN
Status: DISCONTINUED | OUTPATIENT
Start: 2024-09-16 | End: 2024-09-20 | Stop reason: HOSPADM

## 2024-09-16 RX ORDER — NITROFURANTOIN 25; 75 MG/1; MG/1
100 CAPSULE ORAL 2 TIMES DAILY
Status: ON HOLD | COMMUNITY
End: 2024-09-20

## 2024-09-16 RX ORDER — CHLORHEXIDINE GLUCONATE ORAL RINSE 1.2 MG/ML
15 SOLUTION DENTAL 2 TIMES DAILY PRN
Status: DISCONTINUED | OUTPATIENT
Start: 2024-09-16 | End: 2024-09-20 | Stop reason: HOSPADM

## 2024-09-16 RX ORDER — ESCITALOPRAM OXALATE 10 MG/1
20 TABLET ORAL DAILY
Status: DISCONTINUED | OUTPATIENT
Start: 2024-09-17 | End: 2024-09-20 | Stop reason: HOSPADM

## 2024-09-16 RX ORDER — AMOXICILLIN 250 MG
2 CAPSULE ORAL DAILY
COMMUNITY

## 2024-09-16 RX ORDER — PIPERACILLIN SODIUM, TAZOBACTAM SODIUM 3; .375 G/15ML; G/15ML
3.38 INJECTION, POWDER, LYOPHILIZED, FOR SOLUTION INTRAVENOUS ONCE
Status: COMPLETED | OUTPATIENT
Start: 2024-09-16 | End: 2024-09-16

## 2024-09-16 RX ORDER — LANOLIN ALCOHOL/MO/W.PET/CERES
1000 CREAM (GRAM) TOPICAL DAILY
Status: DISCONTINUED | OUTPATIENT
Start: 2024-09-17 | End: 2024-09-20 | Stop reason: HOSPADM

## 2024-09-16 RX ORDER — LEVOTHYROXINE SODIUM 100 UG/1
100 TABLET ORAL
Status: DISCONTINUED | OUTPATIENT
Start: 2024-09-17 | End: 2024-09-20 | Stop reason: HOSPADM

## 2024-09-16 RX ORDER — ESCITALOPRAM OXALATE 20 MG/1
20 TABLET ORAL DAILY
COMMUNITY
Start: 2024-09-01

## 2024-09-16 RX ADMIN — PIPERACILLIN AND TAZOBACTAM 3.38 G: 3; .375 INJECTION, POWDER, FOR SOLUTION INTRAVENOUS at 20:52

## 2024-09-16 RX ADMIN — IRON SUCROSE 200 MG: 20 INJECTION, SOLUTION INTRAVENOUS at 20:28

## 2024-09-16 RX ADMIN — SODIUM CHLORIDE 1250 MG: 9 INJECTION, SOLUTION INTRAVENOUS at 21:30

## 2024-09-16 RX ADMIN — IOPAMIDOL 75 ML: 755 INJECTION, SOLUTION INTRAVENOUS at 19:05

## 2024-09-16 RX ADMIN — POTASSIUM CHLORIDE 40 MEQ: 1.5 POWDER, FOR SOLUTION ORAL at 19:10

## 2024-09-16 RX ADMIN — MEGESTROL ACETATE 400 MG: 40 SUSPENSION ORAL at 22:51

## 2024-09-16 RX ADMIN — POTASSIUM CHLORIDE 10 MEQ: 7.46 INJECTION, SOLUTION INTRAVENOUS at 19:11

## 2024-09-16 ASSESSMENT — ACTIVITIES OF DAILY LIVING (ADL)
ADLS_ACUITY_SCORE: 38

## 2024-09-16 NOTE — MEDICATION SCRIBE - ADMISSION MEDICATION HISTORY
Medication Scribe Admission Medication History    Admission medication history is complete. The information provided in this note is only as accurate as the sources available at the time of the update.    Information Source(s): Patient and CareEverywhere/SureScripts via in-person    Pertinent Information:     Changes made to PTA medication list:  Added: None  Deleted: None  Changed: None    Allergies reviewed with patient and updates made in EHR: yes    Medication History Completed By: ZULEMA MARINELLI 9/16/2024 6:19 PM    PTA Med List   Medication Sig Last Dose    acetaminophen (TYLENOL) 325 MG tablet Take 2 tablets (650 mg) by mouth every 4 hours as needed for other (For optimal non-opioid multimodal pain management to improve pain control.) 9/16/2024 at am    chlorhexidine (PERIDEX) 0.12 % solution Swish and spit 15 mLs in mouth 2 times daily as needed (PRN) Unknown at prn    cyanocobalamin (VITAMIN B-12) 1000 MCG tablet Take 1,000 mcg by mouth daily 9/16/2024 at am    escitalopram (LEXAPRO) 20 MG tablet Take 20 mg by mouth daily. 9/15/2024 at pm    famotidine (PEPCID) 20 MG tablet Take 20 mg by mouth daily 9/16/2024 at am    lactulose (CHRONULAC) 10 GM/15ML solution Take 15 mLs by mouth 2 times daily as needed Unknown at prn    levothyroxine (SYNTHROID/LEVOTHROID) 100 MCG tablet Take 100 mcg by mouth daily. 9/16/2024 at am    LORazepam (ATIVAN) 0.5 MG tablet Take 1 tablet by mouth every 8 hours as needed Unknown at prn    magnesium 250 MG tablet Take 1 tablet by mouth daily 9/15/2024 at pm    megestrol (MEGACE) 40 MG/ML suspension Take 10 mLs by mouth daily 9/15/2024 at pm    nitroFURantoin macrocrystal-monohydrate (MACROBID) 100 MG capsule Take 100 mg by mouth 2 times daily. 9/16/2024 at am    ondansetron (ZOFRAN) 8 MG tablet Take 8 mg by mouth every 8 hours as needed Past Week at prn    oxyCODONE (ROXICODONE) 5 MG tablet Take 0.5 tablets (2.5 mg) by mouth every 4 hours as needed for severe pain Unknown at prn  has supply    senna-docusate (SENOKOT-S/PERICOLACE) 8.6-50 MG tablet Take 2 tablets by mouth daily. 9/16/2024 at am    sodium chloride 1 GM tablet Take 1 tablet (1 g) by mouth 3 times daily (with meals) 9/16/2024 at am    Turmeric 500 MG CAPS Take 1 capsule by mouth daily 9/16/2024 at am

## 2024-09-16 NOTE — ED TRIAGE NOTES
Patient brought in by his wife and daughter--has a history of prostate cancer with radiation.  Has a suprapubic cath which was exchanged at the office today--he gets bloody urine on and off. They checked his hgb at the office and is was in the 6s.  Advised to come here for transfusion.  Patient has been feeling weak and tired at home.

## 2024-09-16 NOTE — ED PROVIDER NOTES
EMERGENCY DEPARTMENT ENCOUNTER      NAME: Kilo Montiel  AGE: 84 year old male  YOB: 1940  MRN: [id number]  EVALUATION DATE & TIME: 9/16/2024  4:48 PM    PCP: Denzel Jones    ED PROVIDER: Erick Resendiz M.D.      Chief Complaint   Patient presents with    Anemia         IMPRESSION  1. Normocytic anemia    2. Hypokalemia    3. Iron deficiency    4. History of prostate cancer    5. Intra-abdominal abscess (H)        PLAN  - admit to hospitalist for further care with urology & IR consulting; med/surg tele admit    ED COURSE & MEDICAL DECISION MAKING    ED Course as of 09/16/24 2125   Mon Sep 16, 2024   1757 84yoM with history of prostate cancer, s/p suprapubic catheter x1 month, no blood thinner use presenting from urology clinic for evaluation of anemia. Reports bloody urine in his suprapubic catheter the past 1.5 weeks; no bleeding around catheter. No pain. No black or bloody stools, or bleeding from anywhere else.    Normal vitals on presentation. Calm & well-appearing on exam with unremarkable suprapubic catheter site with no bleeding, dark red urine in catheter & drainage bag, clear lungs, normal work of breathing, clear mentation with normal neuro exam, no peripheral edema.    Labs notable for hemoglobin 5.5 (was 8.5 nine days ago). MCV within normal limits; iron low at 41 but doubt primary  of his 3 gram drop in 1.5 weeks. Suspect bleeding from catheter given dark red blood for 1.5 weeks. Does not describe melena; GI bleed less likely. Consents to pRBC transfusion; 2 units ordered here now.    Labs also notable for K 2.6; no telemetry changes of this though. Will replaced PO & IV while keeping on telemetry.    Given ongoing bleeding from suprapubic catheter; will obtain CT and plan to admit. Patient comfortable with this plan; no further questions at this time.   2122 CT with no acute arterial bleed, but does have 4cm intraabdominal abscess to pelvic region---contains  brachytherapy beads. Given vanc/Zosyn for this---not septic. Discussed with urology who recommends IR for drainage. Discussed with IR who recommends NPO at midnight; no emergent intervention tonight. Consulted hospitalist for admission; they agreed. Patient understood and agreed with the plan; no further questions at the time of admission.       --------------------------------------------------------------------------------   --------------------------------------------------------------------------------     5:35 PM I met with the patient for the initial interview and physical examination. Discussed plan for treatment and workup in the ED.  8:32 PM I spoke with Dr. Guy Leal from [facility].  8:40 PM I spoke with the accepting hospitalist.         This patient involved a high degree of complexity in medical decision making, as significant risks were present and assessed. Recent encounters & results in medical record reviewed by me.    All workup (i.e. any EKG/labs/imaging as per charting below) reviewed and independently interpreted by me. See respective sections for details.        See additional MDM below if interested.      MEDICATIONS GIVEN IN THE EMERGENCY DEPARTMENT  Medications   sodium chloride 0.9% BOLUS 1-250 mL (has no administration in time range)   piperacillin-tazobactam (ZOSYN) 3.375 g vial to attach to  mL bag (3.375 g Intravenous $New Bag 9/16/24 2052)   vancomycin (VANCOCIN) 1,250 mg in sodium chloride 0.9 % 250 mL intermittent infusion (has no administration in time range)   iron sucrose (VENOFER) 200 mg in sodium chloride 0.9 % 120 mL intermittent infusion (0 mg Intravenous Stopped 9/16/24 2052)   potassium chloride 10 mEq in 100 mL sterile water infusion (0 mEq Intravenous Stopped 9/16/24 2024)   potassium chloride (KLOR-CON) Packet 40 mEq (40 mEq Oral $Given 9/16/24 1910)   iopamidol (ISOVUE-370) solution 75 mL (75 mLs Intravenous $Given 9/16/24 1905)  "              =================================================================      HPI  Use of : N/A         Kilo Montiel is a 84 year old male with a pertinent history of prostate cancer, thyroid disease, and suprapubic catheter in place who presents to this ED via walk-in for evaluation of anemia.    The patient was brought in by wife and daughter. He has a history of prostate cancer with radiation and has a suprapubic catheter that was placed 4-5 weeks ago. He has had hematuria for the last week with on/off hematuria before that. The catheter was exchanged in clinic today and after seeing the hematuria his hemoglobin was checked and it was in the 6s. He also endorses feeling \"tired but good\". He has no pain. No blood in stool. He has had transfusions in the past. He denies any other complaints at this time.      --------------- MEDICAL HISTORY ---------------  PAST MEDICAL HISTORY:  Reviewed independently by me.  Past Medical History:   Diagnosis Date    Anxiety     Hypothyroid     Melanoma (H)     excised from abdomen and wrist    Melanoma of wrist (H)     Prostate cancer (H)     Thyroid disease      Patient Active Problem List   Diagnosis    Malignant neoplasm of prostate (H)    Thyroid disease    Prostate cancer (H)    Melanoma (H)    Hypokalemia    Episode of loss of consciousness    MSSA bacteremia    Hip pain, left    Closed subcapital fracture of left femur, initial encounter (H)    Fall, initial encounter    Anemia, unspecified type    Gross hematuria    Iron deficiency    Normocytic anemia    Intra-abdominal abscess (H)    History of prostate cancer       PAST SURGICAL HISTORY:  Reviewed independently by me.  Past Surgical History:   Procedure Laterality Date    ANKLE SURGERY      left    COLONOSCOPY      HIP SURGERY Left     INSERT RADIATION SEEDS PROSTATE  11/08/2013    Procedure: INSERT RADIATION SEEDS PROSTATE;  Insert Radiation Seeds Prostate ;  Surgeon: Sahil Franco MD;  " Location: [facility] OR    IR SUPRAPUBIC CATHETER CHANGE  7/18/2024    IR SUPRAPUBIC CATHETER CHANGE  8/18/2024    IR SUPRAPUBIC CATHETER PLACMENT  7/12/2024    OPEN REDUCTION INTERNAL FIXATION HIP BIPOLAR Left 05/24/2024    Procedure: HEMIARTHROPLASTY, LEFT HIP, BIPOLAR;  Surgeon: Everett Sherman MD;  Location: [facility]    SURGICAL PATHOLOGY EXAM      melanoma removal       CURRENT MEDICATIONS:    Reviewed independently by me.    Current Facility-Administered Medications:     piperacillin-tazobactam (ZOSYN) 3.375 g vial to attach to  mL bag, 3.375 g, Intravenous, Once, Erick Resendiz MD, 3.375 g at 09/16/24 2052    sodium chloride 0.9% BOLUS 1-250 mL, 1-250 mL, Intravenous, Q1H PRN, Erick Resendiz MD    vancomycin (VANCOCIN) 1,250 mg in sodium chloride 0.9 % 250 mL intermittent infusion, 1,250 mg, Intravenous, Once, Erick Resendiz MD    Current Outpatient Medications:     acetaminophen (TYLENOL) 325 MG tablet, Take 2 tablets (650 mg) by mouth every 4 hours as needed for other (For optimal non-opioid multimodal pain management to improve pain control.), Disp: 100 tablet, Rfl: 0    chlorhexidine (PERIDEX) 0.12 % solution, Swish and spit 15 mLs in mouth 2 times daily as needed (PRN), Disp: , Rfl:     cyanocobalamin (VITAMIN B-12) 1000 MCG tablet, Take 1,000 mcg by mouth daily, Disp: , Rfl:     escitalopram (LEXAPRO) 20 MG tablet, Take 20 mg by mouth daily., Disp: , Rfl:     famotidine (PEPCID) 20 MG tablet, Take 20 mg by mouth daily, Disp: , Rfl:     lactulose (CHRONULAC) 10 GM/15ML solution, Take 15 mLs by mouth 2 times daily as needed, Disp: , Rfl:     levothyroxine (SYNTHROID/LEVOTHROID) 100 MCG tablet, Take 100 mcg by mouth daily., Disp: , Rfl:     LORazepam (ATIVAN) 0.5 MG tablet, Take 1 tablet by mouth every 8 hours as needed, Disp: , Rfl:     magnesium 250 MG tablet, Take 1 tablet by mouth daily, Disp: , Rfl:     megestrol (MEGACE) 40 MG/ML suspension, Take 10 mLs by mouth daily, Disp: , Rfl:      nitroFURantoin macrocrystal-monohydrate (MACROBID) 100 MG capsule, Take 100 mg by mouth 2 times daily., Disp: , Rfl:     ondansetron (ZOFRAN) 8 MG tablet, Take 8 mg by mouth every 8 hours as needed, Disp: , Rfl:     oxyCODONE (ROXICODONE) 5 MG tablet, Take 0.5 tablets (2.5 mg) by mouth every 4 hours as needed for severe pain, Disp: 10 tablet, Rfl: 0    senna-docusate (SENOKOT-S/PERICOLACE) 8.6-50 MG tablet, Take 2 tablets by mouth daily., Disp: , Rfl:     sodium chloride 1 GM tablet, Take 1 tablet (1 g) by mouth 3 times daily (with meals), Disp: , Rfl:     Turmeric 500 MG CAPS, Take 1 capsule by mouth daily, Disp: , Rfl:     ALLERGIES:  Reviewed independently by me.  No Known Allergies    FAMILY HISTORY:  Reviewed independently by me.  Family History   Problem Relation Age of Onset    Breast Cancer Mother 75       SOCIAL HISTORY:   Reviewed independently by me.  Social History     Socioeconomic History    Marital status:    Tobacco Use    Smoking status: Former     Current packs/day: 0.00     Average packs/day: 0.5 packs/day for 8.0 years (4.0 ttl pk-yrs)     Types: Cigarettes     Start date: 1974     Quit date: 1982     Years since quittin.3    Smokeless tobacco: Former     Quit date: 1976   Substance and Sexual Activity    Alcohol use: Yes     Comment: 2-3 per week       --------------- PHYSICAL EXAM ---------------  Nursing notes and vitals independently reviewed by me.  VITALS:  Vitals:    245 24 2045 24 2100 24   BP: (!) 176/79 (!) 157/83 (!) 155/84 (!) 153/81   Pulse: 63 62 62 62   Resp: 15 13 10 22   Temp:       TempSrc:       SpO2: 100% 100% 100% 100%   Weight:       Height:           PHYSICAL EXAM:    General:  alert, interactive, no distress  Eyes:  conjunctivae clear, conjugate gaze  HENT:  atraumatic, nose with no rhinorrhea, oropharynx clear  Neck:  no meningismus  Cardiovascular:  HR 60s during exam, regular rhythm, no murmurs, brisk cap  refill  Chest:  no chest wall tenderness  Pulmonary:  no stridor, normal phonation, normal work of breathing, clear lungs bilaterally  Abdomen:  soft, nondistended, nontender, suprapubic catheter site unremarkable.   :  no CVA tenderness, dark red urine in suprapubic catheter and drain bag  Back:  no midline spinal tenderness  Musculoskeletal:  no pretibial edema, no calf tenderness. Gross ROM intact to joints of extremities with no obvious deformities.  Skin:  warm, dry, no rash  Neuro:  awake, alert, answers questions appropriately, follows commands, moves all limbs  Psych:  calm, normal affect      --------------- RESULTS ---------------  LAB:  Reviewed and independently interpreted by me.  Results for orders placed or performed during the hospital encounter of 09/16/24   CT Urogram wo & w Contrast    Impression    IMPRESSION:    1.  Two new migrated brachytherapy seeds in the region of the bulbar urethra, which could be a cause of hematuria.     2.  Gas and fluid containing 4.0 cm right pelvic abscess, which also contains multiple brachytherapy seeds.    3.  Appropriately positioned suprapubic catheter. Mild bladder wall thickening could reflect cystitis. No large intraluminal bladder clots, although evaluation of the bladder is limited by artifact from left hip arthroplasty hardware.    4.  Two nonobstructing left renal calculi measuring up to 2 mm. No right urinary calculi.    5.  No solid renal mass or evidence of upper tract urothelial malignancy.    6.  Compression fracture of the T12 vertebral body has worsened since 8/18/2024. Multiple lumbar spinal compression fractures have not significantly changed.    7.  Sclerotic skeletal metastases have not significantly changed.    Result Value Ref Range    INR 1.20 (H) 0.85 - 1.15   Comprehensive metabolic panel   Result Value Ref Range    Sodium 133 (L) 135 - 145 mmol/L    Potassium 2.6 (LL) 3.4 - 5.3 mmol/L    Carbon Dioxide (CO2) 19 (L) 22 - 29 mmol/L     Anion Gap 14 7 - 15 mmol/L    Urea Nitrogen 20.8 8.0 - 23.0 mg/dL    Creatinine 1.40 (H) 0.67 - 1.17 mg/dL    GFR Estimate 50 (L) >60 mL/min/1.73m2    Calcium 8.3 (L) 8.8 - 10.4 mg/dL    Chloride 100 98 - 107 mmol/L    Glucose 102 (H) 70 - 99 mg/dL    Alkaline Phosphatase 113 40 - 150 U/L    AST 39 0 - 45 U/L    ALT 11 0 - 70 U/L    Protein Total 6.4 6.4 - 8.3 g/dL    Albumin 3.6 3.5 - 5.2 g/dL    Bilirubin Total 0.3 <=1.2 mg/dL   Reticulocyte count   Result Value Ref Range    % Reticulocyte 3.5 (H) 0.5 - 2.0 %    Absolute Reticulocyte 0.058 0.025 - 0.095 10e6/uL   Iron & Iron Binding Capacity   Result Value Ref Range    Iron 41 (L) 61 - 157 ug/dL    Iron Binding Capacity 179 (L) 240 - 430 ug/dL    Iron Sat Index 23 15 - 46 %   CBC with platelets and differential   Result Value Ref Range    WBC Count 5.8 4.0 - 11.0 10e3/uL    RBC Count 1.64 (L) 4.40 - 5.90 10e6/uL    Hemoglobin 5.5 (LL) 13.3 - 17.7 g/dL    Hematocrit 16.2 (L) 40.0 - 53.0 %    MCV 99 78 - 100 fL    MCH 33.5 (H) 26.5 - 33.0 pg    MCHC 34.0 31.5 - 36.5 g/dL    RDW 16.7 (H) 10.0 - 15.0 %    Platelet Count 228 150 - 450 10e3/uL    % Neutrophils 84 %    % Lymphocytes 6 %    % Monocytes 9 %    % Eosinophils 0 %    % Basophils 0 %    % Immature Granulocytes 1 %    NRBCs per 100 WBC 0 <1 /100    Absolute Neutrophils 4.9 1.6 - 8.3 10e3/uL    Absolute Lymphocytes 0.4 (L) 0.8 - 5.3 10e3/uL    Absolute Monocytes 0.5 0.0 - 1.3 10e3/uL    Absolute Eosinophils 0.0 0.0 - 0.7 10e3/uL    Absolute Basophils 0.0 0.0 - 0.2 10e3/uL    Absolute Immature Granulocytes 0.0 <=0.4 10e3/uL    Absolute NRBCs 0.0 10e3/uL   Adult Type and Screen   Result Value Ref Range    ABO/RH(D) A POS     Antibody Screen Negative Negative    SPECIMEN EXPIRATION DATE [id number]    Prepare red blood cells (unit)   Result Value Ref Range    Blood Component Type Red Blood Cells     Product Code P0006F72     Unit Status Transfused     Unit Number [id number]     CROSSMATCH Compatible      CODING SYSTEM [id number]     ISSUE DATE AND TIME [id number]     UNIT ABO/RH A+     UNIT TYPE ISBT 6200    Prepare red blood cells (unit)   Result Value Ref Range    Blood Component Type Red Blood Cells     Product Code O4220I97     Unit Status Transfused     Unit Number [id number]     CROSSMATCH Compatible     CODING SYSTEM [id number]     ISSUE DATE AND TIME [id number]     UNIT ABO/RH A+     UNIT TYPE ISBT 6200        RADIOLOGY:  Reviewed and independently interpreted by me. Please see official radiology report.  Recent Results (from the past 24 hour(s))   CT Urogram wo & w Contrast    Narrative    EXAM: CT UROGRAM WO and W CONTRAST  LOCATION: [facility]  DATE: 9/16/2024    INDICATION: Status post suprapubic catheter. Ongoing bloody urine. Anemia.   COMPARISON: CT abdomen pelvis 8/18/2024.   TECHNIQUE: CT scan of the abdomen and pelvis using urogram technique with pre contrast, post contrast, and delayed images. Multiplanar reformats were obtained. Dose reduction techniques were used.   CONTRAST: 75ml isovue 370    FINDINGS:     LOWER CHEST: Unchanged small consolidative opacity within the medial right lower lobe, peripheral right lower lobe bronchiectasis, and scattered areas of scarring at both lung bases. No pleural effusions.     HEPATOBILIARY: Gallbladder is contracted. No biliary ductal dilation. Scattered punctate hepatic granulomas. No suspicious liver lesions.     PANCREAS: Normal.    SPLEEN: Innumerable calcified granulomas.     ADRENAL GLANDS: Normal.    RIGHT KIDNEY/URETER: No renal or ureteral calculi. Small hypodense cortical cyst at the lower pole the right kidney is unchanged and does not require follow-up. No solid renal mass. No hydronephrosis or evidence of upper tract urothelial malignancy.     LEFT KIDNEY/URETER: Two nonobstructing left renal calculi measuring up to 2 mm. No ureteral calculi. Tiny subcentimeter cystic lesion is too small to characterize but is  likely a cyst and does not require follow-up. No solid renal mass. No hydronephrosis   or evidence of upper tract urothelial malignancy.     BLADDER: Bladder is obscured by streak artifact from left hip arthroplasty hardware. A suprapubic catheter is present within the bladder, with expected intraluminal gas. Bladder wall appears mildly thickened. No definite intraluminal bladder clot is   visualized.     BOWEL: No bowel obstruction or inflammation.     LYMPH NODES: No enlarged lymph nodes.     VASCULATURE: Patent portal, splenic, and superior mesenteric veins. Moderate aortobiiliac atherosclerosis. No abdominal aortic aneurysm.     PELVIC ORGANS: Numerous brachytherapy seeds within the prostate. Fluid and gas containing right pelvic abscess measuring 4.0 x 2.5 x 2.8 cm (6/#152, 7/#42), which is partially obscured by artifact. Multiple brachytherapy seeds are present within the   abscess. There are also 2 new brachytherapy seeds in the region of the bulbar urethra (8/#57, 6/#166). A single brachytherapy seed in the region of the left penile bulb (6/#164) is unchanged.     MUSCULOSKELETAL: Left hip arthroplasty hardware. Numerous sclerotic metastases throughout the visualized spine, lower ribs, and pelvis have not significantly changed. Compression fracture of the T12 vertebral body has worsened. Additional compression   fractures of the L1-L5 vertebral bodies have not significantly changed.       Impression    IMPRESSION:    1.  Two new migrated brachytherapy seeds in the region of the bulbar urethra, which could be a cause of hematuria.     2.  Gas and fluid containing 4.0 cm right pelvic abscess, which also contains multiple brachytherapy seeds.    3.  Appropriately positioned suprapubic catheter. Mild bladder wall thickening could reflect cystitis. No large intraluminal bladder clots, although evaluation of the bladder is limited by artifact from left hip arthroplasty hardware.    4.  Two nonobstructing left  renal calculi measuring up to 2 mm. No right urinary calculi.    5.  No solid renal mass or evidence of upper tract urothelial malignancy.    6.  Compression fracture of the T12 vertebral body has worsened since 8/18/2024. Multiple lumbar spinal compression fractures have not significantly changed.    7.  Sclerotic skeletal metastases have not significantly changed.          PROCEDURES:   Procedures   --------------------------------------------------------------------------------   Cardiac telemetry monitoring ordered by me secondary to the patient's history of hypokalemia and to monitor the patient for dysrhythmia. Reviewed & independently interpreted by me. Revealed normal sinus rhythm.  --------------------------------------------------------------------------------         Critical Care     Performed by:   Erick Resendiz MD   Authorized by:   Erick Resendiz MD  Total critical care time: 180 minutes (Critical care time was exclusive of separately billable procedures and treating other patients.)    Critical care was necessary to treat or prevent imminent or life-threatening deterioration of the following conditions: Anemia requiring pRBC transfusion, intra-abdominal abscess requiring antibiotics, admission    Critical care was time spent personally by me on the following activities:  - obtaining history from patient or surrogate  - examination of patient  - development of treatment plan with patient or surrogate  - ordering and performing treatments and interventions  - ordering and review of laboratory studies  - ordering and review of radiographic studies  - re-evaluation of patient's condition  - monitoring for potential decompensation  - discussion with  consultants  ---------------------------------------------------------------------------------------------------------------------  ---------------------------------------------------------------------------------------------------------------------          --------------- ADDITIONAL MDM ---------------  MIPS:  Not Applicable    History:  - I considered systemic symptoms of the presenting illness.  - Supplemental history from:       -- patient  - External Record(s) reviewed:       -- Inpatient/outpatient record, prior labs, prior imaging       -- see above ED course & MDM for further details    Workup:  - Chart documentation above includes differential considered and any EKGs or imaging independently interpreted by provider.  - In additional to work up documented, I considered the following work up:       -- see above ED course & MDM for further details    External Consultation:  - Discussion of management with another provider:       -- see above charting for additional    Complicating Factors:  - Care impacted by chronic illness:       -- see above MDM, past medical history, & problem list  - Care affected by social determinants of health:       -- see above social history       -- Access to Affordable Healthcare       -- Access to Medical Care    Disposition Considerations:  - Admit           I, Mike Lyn, am serving as a scribe to document services personally performed by Dr. Erick Resendiz based on my observation and the provider's statements to me. I, Erick Resendiz MD attest that Mike Lyn is acting in a scribe capacity, has observed my performance of the services and has documented them in accordance with my direction.      Erick Resendiz MD  09/16/24  Emergency Medicine  [facility] EMERGENCY DEPARTMENT  [address] [place] [id number]  [phone]  Dept: [phone]     Erick Resendiz MD  09/16/24 1254

## 2024-09-17 PROBLEM — G47.00 INSOMNIA: Status: ACTIVE | Noted: 2024-09-17

## 2024-09-17 LAB
ALBUMIN SERPL BCG-MCNC: 2.8 G/DL (ref 3.5–5.2)
ALBUMIN UR-MCNC: 300 MG/DL
ALP SERPL-CCNC: 103 U/L (ref 40–150)
ALT SERPL W P-5'-P-CCNC: 8 U/L (ref 0–70)
ANION GAP SERPL CALCULATED.3IONS-SCNC: 12 MMOL/L (ref 7–15)
APPEARANCE UR: ABNORMAL
AST SERPL W P-5'-P-CCNC: 32 U/L (ref 0–45)
BACTERIA #/AREA URNS HPF: ABNORMAL /HPF
BASOPHILS # BLD AUTO: 0 10E3/UL (ref 0–0.2)
BASOPHILS NFR BLD AUTO: 1 %
BILIRUB SERPL-MCNC: 0.4 MG/DL
BILIRUB UR QL STRIP: NEGATIVE
BUN SERPL-MCNC: 18 MG/DL (ref 8–23)
CALCIUM SERPL-MCNC: 7.7 MG/DL (ref 8.8–10.4)
CHLORIDE SERPL-SCNC: 108 MMOL/L (ref 98–107)
COLOR UR AUTO: YELLOW
CREAT SERPL-MCNC: 1.24 MG/DL (ref 0.67–1.17)
EGFRCR SERPLBLD CKD-EPI 2021: 58 ML/MIN/1.73M2
EOSINOPHIL # BLD AUTO: 0 10E3/UL (ref 0–0.7)
EOSINOPHIL NFR BLD AUTO: 1 %
ERYTHROCYTE [DISTWIDTH] IN BLOOD BY AUTOMATED COUNT: 18.4 % (ref 10–15)
FERRITIN SERPL-MCNC: 317 NG/ML (ref 31–409)
FERRITIN SERPL-MCNC: 331 NG/ML (ref 31–409)
GLUCOSE SERPL-MCNC: 84 MG/DL (ref 70–99)
GLUCOSE UR STRIP-MCNC: NEGATIVE MG/DL
HCO3 SERPL-SCNC: 16 MMOL/L (ref 22–29)
HCT VFR BLD AUTO: 24.8 % (ref 40–53)
HGB BLD-MCNC: 8.1 G/DL (ref 13.3–17.7)
HGB BLD-MCNC: 8.2 G/DL (ref 13.3–17.7)
HGB BLD-MCNC: 8.7 G/DL (ref 13.3–17.7)
HGB UR QL STRIP: ABNORMAL
IMM GRANULOCYTES # BLD: 0.1 10E3/UL
IMM GRANULOCYTES NFR BLD: 1 %
KETONES UR STRIP-MCNC: NEGATIVE MG/DL
LEUKOCYTE ESTERASE UR QL STRIP: ABNORMAL
LYMPHOCYTES # BLD AUTO: 0.3 10E3/UL (ref 0.8–5.3)
LYMPHOCYTES NFR BLD AUTO: 6 %
MAGNESIUM SERPL-MCNC: 1.6 MG/DL (ref 1.7–2.3)
MCH RBC QN AUTO: 31.5 PG (ref 26.5–33)
MCHC RBC AUTO-ENTMCNC: 33.1 G/DL (ref 31.5–36.5)
MCV RBC AUTO: 95 FL (ref 78–100)
MONOCYTES # BLD AUTO: 0.4 10E3/UL (ref 0–1.3)
MONOCYTES NFR BLD AUTO: 9 %
NEUTROPHILS # BLD AUTO: 3.7 10E3/UL (ref 1.6–8.3)
NEUTROPHILS NFR BLD AUTO: 83 %
NITRATE UR QL: NEGATIVE
NRBC # BLD AUTO: 0 10E3/UL
NRBC BLD AUTO-RTO: 1 /100
PH UR STRIP: 6.5 [PH] (ref 5–7)
PLATELET # BLD AUTO: 140 10E3/UL (ref 150–450)
POTASSIUM SERPL-SCNC: 2.8 MMOL/L (ref 3.4–5.3)
POTASSIUM SERPL-SCNC: 3.2 MMOL/L (ref 3.4–5.3)
POTASSIUM SERPL-SCNC: 3.4 MMOL/L (ref 3.4–5.3)
PROT SERPL-MCNC: 5.5 G/DL (ref 6.4–8.3)
RBC # BLD AUTO: 2.6 10E6/UL (ref 4.4–5.9)
RBC URINE: >182 /HPF
SODIUM SERPL-SCNC: 136 MMOL/L (ref 135–145)
SP GR UR STRIP: 1.01 (ref 1–1.03)
UROBILINOGEN UR STRIP-MCNC: <2 MG/DL
WBC # BLD AUTO: 4.4 10E3/UL (ref 4–11)
WBC CLUMPS #/AREA URNS HPF: PRESENT /HPF
WBC URINE: >182 /HPF

## 2024-09-17 PROCEDURE — 250N000013 HC RX MED GY IP 250 OP 250 PS 637: Performed by: INTERNAL MEDICINE

## 2024-09-17 PROCEDURE — 250N000011 HC RX IP 250 OP 636: Performed by: INTERNAL MEDICINE

## 2024-09-17 PROCEDURE — 99233 SBSQ HOSP IP/OBS HIGH 50: CPT | Performed by: INTERNAL MEDICINE

## 2024-09-17 PROCEDURE — 36415 COLL VENOUS BLD VENIPUNCTURE: CPT | Performed by: INTERNAL MEDICINE

## 2024-09-17 PROCEDURE — 99223 1ST HOSP IP/OBS HIGH 75: CPT | Performed by: FAMILY MEDICINE

## 2024-09-17 PROCEDURE — 120N000001 HC R&B MED SURG/OB

## 2024-09-17 PROCEDURE — 84450 TRANSFERASE (AST) (SGOT): CPT | Performed by: INTERNAL MEDICINE

## 2024-09-17 PROCEDURE — 83735 ASSAY OF MAGNESIUM: CPT | Performed by: INTERNAL MEDICINE

## 2024-09-17 PROCEDURE — 85025 COMPLETE CBC W/AUTO DIFF WBC: CPT | Performed by: INTERNAL MEDICINE

## 2024-09-17 PROCEDURE — 84132 ASSAY OF SERUM POTASSIUM: CPT | Performed by: INTERNAL MEDICINE

## 2024-09-17 PROCEDURE — 258N000003 HC RX IP 258 OP 636: Performed by: INTERNAL MEDICINE

## 2024-09-17 PROCEDURE — 82040 ASSAY OF SERUM ALBUMIN: CPT | Performed by: INTERNAL MEDICINE

## 2024-09-17 PROCEDURE — 87086 URINE CULTURE/COLONY COUNT: CPT | Performed by: INTERNAL MEDICINE

## 2024-09-17 PROCEDURE — 99222 1ST HOSP IP/OBS MODERATE 55: CPT | Performed by: INTERNAL MEDICINE

## 2024-09-17 PROCEDURE — 85018 HEMOGLOBIN: CPT | Performed by: INTERNAL MEDICINE

## 2024-09-17 PROCEDURE — 81001 URINALYSIS AUTO W/SCOPE: CPT | Performed by: INTERNAL MEDICINE

## 2024-09-17 RX ORDER — SODIUM CHLORIDE AND POTASSIUM CHLORIDE 150; 900 MG/100ML; MG/100ML
INJECTION, SOLUTION INTRAVENOUS CONTINUOUS
Status: DISCONTINUED | OUTPATIENT
Start: 2024-09-17 | End: 2024-09-19

## 2024-09-17 RX ORDER — POTASSIUM CHLORIDE 1500 MG/1
40 TABLET, EXTENDED RELEASE ORAL ONCE
Status: COMPLETED | OUTPATIENT
Start: 2024-09-17 | End: 2024-09-17

## 2024-09-17 RX ORDER — FERROUS SULFATE 325(65) MG
325 TABLET ORAL 2 TIMES DAILY
Status: DISCONTINUED | OUTPATIENT
Start: 2024-09-17 | End: 2024-09-20 | Stop reason: HOSPADM

## 2024-09-17 RX ORDER — POTASSIUM CHLORIDE 1500 MG/1
20 TABLET, EXTENDED RELEASE ORAL ONCE
Status: COMPLETED | OUTPATIENT
Start: 2024-09-17 | End: 2024-09-17

## 2024-09-17 RX ORDER — SODIUM CHLORIDE 1 G/1
1 TABLET ORAL
Status: DISCONTINUED | OUTPATIENT
Start: 2024-09-17 | End: 2024-09-20 | Stop reason: HOSPADM

## 2024-09-17 RX ADMIN — POTASSIUM CHLORIDE 20 MEQ: 1500 TABLET, EXTENDED RELEASE ORAL at 16:30

## 2024-09-17 RX ADMIN — POTASSIUM CHLORIDE 20 MEQ: 1500 TABLET, EXTENDED RELEASE ORAL at 22:30

## 2024-09-17 RX ADMIN — CYANOCOBALAMIN TAB 1000 MCG 1000 MCG: 1000 TAB at 08:18

## 2024-09-17 RX ADMIN — PIPERACILLIN AND TAZOBACTAM 3.38 G: 3; .375 INJECTION, POWDER, FOR SOLUTION INTRAVENOUS at 02:12

## 2024-09-17 RX ADMIN — SODIUM CHLORIDE TAB 1 GM 1 G: 1 TAB at 08:17

## 2024-09-17 RX ADMIN — FAMOTIDINE 20 MG: 10 INJECTION, SOLUTION INTRAVENOUS at 22:30

## 2024-09-17 RX ADMIN — Medication 325 MG: at 21:35

## 2024-09-17 RX ADMIN — ESCITALOPRAM OXALATE 20 MG: 20 TABLET ORAL at 08:18

## 2024-09-17 RX ADMIN — MAGNESIUM OXIDE TAB 400 MG (241.3 MG ELEMENTAL MG) 200 MG: 400 (241.3 MG) TAB at 08:18

## 2024-09-17 RX ADMIN — POTASSIUM CHLORIDE AND SODIUM CHLORIDE: 900; 150 INJECTION, SOLUTION INTRAVENOUS at 13:32

## 2024-09-17 RX ADMIN — PIPERACILLIN AND TAZOBACTAM 3.38 G: 3; .375 INJECTION, POWDER, FOR SOLUTION INTRAVENOUS at 09:51

## 2024-09-17 RX ADMIN — SODIUM CHLORIDE 750 MG: 9 INJECTION, SOLUTION INTRAVENOUS at 16:31

## 2024-09-17 RX ADMIN — POTASSIUM CHLORIDE 40 MEQ: 1500 TABLET, EXTENDED RELEASE ORAL at 09:51

## 2024-09-17 RX ADMIN — SENNOSIDES AND DOCUSATE SODIUM 1 TABLET: 8.6; 5 TABLET ORAL at 09:13

## 2024-09-17 RX ADMIN — Medication 5 MG: at 03:18

## 2024-09-17 RX ADMIN — SODIUM CHLORIDE TAB 1 GM 1 G: 1 TAB at 19:02

## 2024-09-17 RX ADMIN — LEVOTHYROXINE SODIUM 100 MCG: 100 TABLET ORAL at 09:13

## 2024-09-17 RX ADMIN — Medication 325 MG: at 09:14

## 2024-09-17 RX ADMIN — PIPERACILLIN AND TAZOBACTAM 3.38 G: 3; .375 INJECTION, POWDER, FOR SOLUTION INTRAVENOUS at 19:01

## 2024-09-17 RX ADMIN — SODIUM CHLORIDE TAB 1 GM 1 G: 1 TAB at 11:28

## 2024-09-17 ASSESSMENT — ACTIVITIES OF DAILY LIVING (ADL)
ADLS_ACUITY_SCORE: 44
ADLS_ACUITY_SCORE: 44
ADLS_ACUITY_SCORE: 40
ADLS_ACUITY_SCORE: 44
ADLS_ACUITY_SCORE: 44
ADLS_ACUITY_SCORE: 40
ADLS_ACUITY_SCORE: 44
ADLS_ACUITY_SCORE: 44
ADLS_ACUITY_SCORE: 40
ADLS_ACUITY_SCORE: 44
ADLS_ACUITY_SCORE: 44
ADLS_ACUITY_SCORE: 40
ADLS_ACUITY_SCORE: 44
ADLS_ACUITY_SCORE: 40
ADLS_ACUITY_SCORE: 40
ADLS_ACUITY_SCORE: 44
ADLS_ACUITY_SCORE: 40
ADLS_ACUITY_SCORE: 40
ADLS_ACUITY_SCORE: 44
ADLS_ACUITY_SCORE: 40
ADLS_ACUITY_SCORE: 40
DEPENDENT_IADLS:: TRANSPORTATION

## 2024-09-17 NOTE — CONSULTS
[facility] consult    SUBJECTIVE:  Nice man post prostatectomy with indwelling carlton for the past month in with hematuria, profound anemia.  As part of work up imaging done with:    1.  Two new migrated brachytherapy seeds in the region of the bulbar urethra, which could be a cause of hematuria.      2.  Gas and fluid containing 4.0 cm right pelvic abscess, which also contains multiple brachytherapy seeds.     3.  Appropriately positioned suprapubic catheter. Mild bladder wall thickening could reflect cystitis. No large intraluminal bladder clots, although evaluation of the bladder is limited by artifact from left hip arthroplasty hardware.     4.  Two nonobstructing left renal calculi measuring up to 2 mm. No right urinary calculi.     5.  No solid renal mass or evidence of upper tract urothelial malignancy.     6.  Compression fracture of the T12 vertebral body has worsened since 8/18/2024. Multiple lumbar spinal compression fractures have not significantly changed.     7.  Sclerotic skeletal metastases have not significantly changed.     He is placed on abx, and IR is not able to safely drain or aspirate the collection as in their note.          Past Medical History       Past Medical History:   Diagnosis Date    Anxiety      Hypothyroid      Melanoma (H)       excised from abdomen and wrist    Melanoma of wrist (H)      Prostate cancer (H)      Thyroid disease                 Past Surgical History        Past Surgical History:   Procedure Laterality Date    ANKLE SURGERY         left    COLONOSCOPY        HIP SURGERY Left      INSERT RADIATION SEEDS PROSTATE   11/08/2013     Procedure: INSERT RADIATION SEEDS PROSTATE;  Insert Radiation Seeds Prostate ;  Surgeon: Sahil Franco MD;  Location: [facility] OR    IR SUPRAPUBIC CATHETER CHANGE   7/18/2024    IR SUPRAPUBIC CATHETER CHANGE   8/18/2024    IR SUPRAPUBIC CATHETER PLACMENT   7/12/2024    OPEN REDUCTION INTERNAL FIXATION HIP BIPOLAR Left  05/24/2024     Procedure: HEMIARTHROPLASTY, LEFT HIP, BIPOLAR;  Surgeon: Everett Sherman MD;  Location: [facility] OR    SURGICAL PATHOLOGY EXAM         melanoma removal               Prior to Admission Medications  Prior to Admission Medications   Prescriptions Last Dose Informant Patient Reported? Taking?   LORazepam (ATIVAN) 0.5 MG tablet Unknown at prn   Yes Yes   Sig: Take 1 tablet by mouth every 8 hours as needed   Turmeric 500 MG CAPS 9/16/2024 at am   Yes Yes   Sig: Take 1 capsule by mouth daily   acetaminophen (TYLENOL) 325 MG tablet 9/16/2024 at am   No Yes   Sig: Take 2 tablets (650 mg) by mouth every 4 hours as needed for other (For optimal non-opioid multimodal pain management to improve pain control.)   chlorhexidine (PERIDEX) 0.12 % solution Unknown at prn   Yes Yes   Sig: Swish and spit 15 mLs in mouth 2 times daily as needed (PRN)   cyanocobalamin (VITAMIN B-12) 1000 MCG tablet 9/16/2024 at am   Yes Yes   Sig: Take 1,000 mcg by mouth daily   escitalopram (LEXAPRO) 20 MG tablet 9/15/2024 at pm   Yes Yes   Sig: Take 20 mg by mouth daily.   famotidine (PEPCID) 20 MG tablet 9/16/2024 at am   Yes Yes   Sig: Take 20 mg by mouth daily   lactulose (CHRONULAC) 10 GM/15ML solution Unknown at prn   Yes Yes   Sig: Take 15 mLs by mouth 2 times daily as needed   levothyroxine (SYNTHROID/LEVOTHROID) 100 MCG tablet 9/16/2024 at am   Yes Yes   Sig: Take 100 mcg by mouth daily.   magnesium 250 MG tablet 9/15/2024 at pm   Yes Yes   Sig: Take 1 tablet by mouth daily   megestrol (MEGACE) 40 MG/ML suspension 9/15/2024 at pm   Yes Yes   Sig: Take 10 mLs by mouth daily   nitroFURantoin macrocrystal-monohydrate (MACROBID) 100 MG capsule 9/16/2024 at am   Yes Yes   Sig: Take 100 mg by mouth 2 times daily.   ondansetron (ZOFRAN) 8 MG tablet Past Week at prn   Yes Yes   Sig: Take 8 mg by mouth every 8 hours as needed   oxyCODONE (ROXICODONE) 5 MG tablet Unknown at prn has supply   No Yes   Sig: Take 0.5 tablets (2.5 mg) by  "mouth every 4 hours as needed for severe pain   senna-docusate (SENOKOT-S/PERICOLACE) 8.6-50 MG tablet 2024 at am   Yes Yes   Sig: Take 2 tablets by mouth daily.   sodium chloride 1 GM tablet 2024 at am   No Yes   Sig: Take 1 tablet (1 g) by mouth 3 times daily (with meals)      Facility-Administered Medications: None            Review of Systems  The 10 point Review of Systems is negative other than noted in the HPI or here.            Social History  I have reviewed this patient's social history and updated it with pertinent information if needed.  Social History            Tobacco Use    Smoking status: Former       Current packs/day: 0.00       Average packs/day: 0.5 packs/day for 8.0 years (4.0 ttl pk-yrs)       Types: Cigarettes       Start date: 1974       Quit date: 1982       Years since quittin.3    Smokeless tobacco: Former       Quit date: 1976   Substance Use Topics    Alcohol use: Yes       Comment: 2-3 per week                  Family History  I have reviewed this patient's family history and updated it with pertinent information if needed.        Family History   Problem Relation Age of Onset    Breast Cancer Mother 75                  Allergies      REVIEW OF SYSTEMS:  Negative unless as listed above.  Social history, Family history, Medications: reviewed.    OBJECTIVE:  /70   Pulse 55   Temp 97.9  F (36.6  C) (Oral)   Resp 13   Ht 1.676 m (5' 6\")   Wt 49.9 kg (110 lb)   SpO2 99%   BMI 17.75 kg/m                PHYSICAL EXAM:  Alert, awake  Vitals tabulated above, reviewed  Neck supple without lymphadenopathy  Sclera normal color, not injected  CARDIOVASCULAR regular rate and rhythm, no murmur  Lungs CLEAR TO AUSCULTATION   Abdomen soft, NT/ND, absent HEPATOSPLENOMEGALY  Skin normal  Joints normal  Neurologic exam non focal  Gross hematuria in carlton bag    Antibiotics:  V/Z  Pertinent labs:    Recent Labs   Lab Test 24  0757 24  0102 24  1648 " "09/07/24  1136   WBC 4.4  --  5.8 6.3   HGB 8.2* 8.1* 5.5* 8.5*   HCT 24.8*  --  16.2* 25.5*   MCV 95  --  99 98   *  --  228 251       Lab Results   Component Value Date    RBC 2.60 09/17/2024     Lab Results   Component Value Date    HGB 8.2 09/17/2024     Lab Results   Component Value Date    HCT 24.8 09/17/2024     No components found for: \"MCT\"  Lab Results   Component Value Date    MCV 95 09/17/2024     Lab Results   Component Value Date    MCH 31.5 09/17/2024     Lab Results   Component Value Date    MCHC 33.1 09/17/2024     Lab Results   Component Value Date    RDW 18.4 09/17/2024     Lab Results   Component Value Date     09/17/2024       Last Comprehensive Metabolic Panel:  Sodium   Date Value Ref Range Status   09/17/2024 136 135 - 145 mmol/L Final     Potassium   Date Value Ref Range Status   09/17/2024 2.8 (L) 3.4 - 5.3 mmol/L Final     Chloride   Date Value Ref Range Status   09/17/2024 108 (H) 98 - 107 mmol/L Final     Carbon Dioxide (CO2)   Date Value Ref Range Status   09/17/2024 16 (L) 22 - 29 mmol/L Final     Anion Gap   Date Value Ref Range Status   09/17/2024 12 7 - 15 mmol/L Final     Glucose   Date Value Ref Range Status   09/17/2024 84 70 - 99 mg/dL Final     GLUCOSE BY METER POCT   Date Value Ref Range Status   05/26/2024 135 (H) 70 - 99 mg/dL Final     Urea Nitrogen   Date Value Ref Range Status   09/17/2024 18.0 8.0 - 23.0 mg/dL Final     Creatinine   Date Value Ref Range Status   09/17/2024 1.24 (H) 0.67 - 1.17 mg/dL Final     GFR Estimate   Date Value Ref Range Status   09/17/2024 58 (L) >60 mL/min/1.73m2 Final     Comment:     eGFR calculated using 2021 CKD-EPI equation.     GFR, ESTIMATED POCT   Date Value Ref Range Status   09/30/2021 52 (L) >60 mL/min/1.73m2 Final     Calcium   Date Value Ref Range Status   09/17/2024 7.7 (L) 8.8 - 10.4 mg/dL Final     Comment:     Reference intervals for this test were updated on 7/16/2024 to reflect our healthy population more " "accurately. There may be differences in the flagging of prior results with similar values performed with this method. Those prior results can be interpreted in the context of the updated reference intervals.       Liver Function Studies -   Recent Labs   Lab Test 09/17/24  0757   PROTTOTAL 5.5*   ALBUMIN 2.8*   BILITOTAL 0.4   ALKPHOS 103   AST 32   ALT 8       No results found for: \"SED\"    No results found for: \"CRP\"            MICROBIOLOGY DATA:        IMAGING/RADIOLOGY:          ASSESSMENT:  4cm pelvic abscess vs maybe hematoma  ,   Prostate cancer history: Diagnosed in 2009 and treated with radiation. S/p brachytherapy 2013 for a biochemical recurrence and PET uptake in the prostate. Found to have bone mets in his lumbar spine and was treated with radiation in 2015. Further bone mets found in 2016 and he was started on androgen deprivation therapy. Had Provenge in 2016. Has been on multiple oral medications for his prostate cancer including abiraterone, enzalutamide, and olaparib. Had docetaxal in 2022. Started Pembro in 9/2023 for multiple bone mets seen on PSMA PET CT. PSA 4/5/24 (from [place]) = 218.7 ng/mL. PSA 3/1/24 ([place]) = 93.9 ng/mL.       RECOMMENDATION:  Dual IV abx  Probably have to commit to these for a couple weeks and see if responding to treatment.    ANNIE Peterson MD  Office [phone] option 2 to desk staff  "

## 2024-09-17 NOTE — PHARMACY-VANCOMYCIN DOSING SERVICE
Pharmacy Vancomycin Initial Note  Date of Service 2024  Patient's  1940  84 year old, male    Indication: Sepsis    Current estimated CrCl = Estimated Creatinine Clearance: 28.2 mL/min (A) (based on SCr of 1.4 mg/dL (H)).    Creatinine for last 3 days  2024:  4:48 PM Creatinine 1.40 mg/dL    Recent Vancomycin Level(s) for last 3 days  No results found for requested labs within last 3 days.      Vancomycin IV Administrations (past 72 hours)        No vancomycin orders with administrations in past 72 hours.                    Nephrotoxins and other renal medications (From now, onward)      Start     Dose/Rate Route Frequency Ordered Stop    24  piperacillin-tazobactam (ZOSYN) 3.375 g vial to attach to  mL bag         3.375 g  over 30 Minutes Intravenous ONCE 24              Contrast Orders - past 72 hours (72h ago, onward)      Start     Dose/Rate Route Frequency Stop    24  iopamidol (ISOVUE-370) solution 75 mL         75 mL Intravenous ONCE 24 190            Plan:  Vancomycin 1250 mg IV once  Please re-consult pharmacist if vancomycin is to be continued inpatient    Glenny Garcia, PharmD, BCPS 24 8:10 PM

## 2024-09-17 NOTE — CONSULTS
Imaging reviewed. This fluid collection is most likely not safely accessible at this time. Consider follow-up imaging to determine if this is more accessible in the future.

## 2024-09-17 NOTE — PLAN OF CARE
Goal Outcome Evaluation:      Plan of Care Reviewed With: patient    Overall Patient Progress: improvingOverall Patient Progress: improving    Outcome Evaluation: Pt alert/oriented, denies pain, urine cloudy, small amout red blood noted from penis, Up with assist of one and walker, tolerating reg diet.  Assist with cares as needed, lets staff know of needs

## 2024-09-17 NOTE — CONSULTS
INTERVENTIONAL RADIOLOGY - [place] INTERVENTIONAL CONSULT MODEL NOTE    Procedure Requested: Right pelvic drainage  Requesting Provider: Dr. Resendiz (ER)    Brief History/Plan of Care:  Patient with history of prostate cancer with radiation seeds.    Patient also has suprapubic catheter which was exchanged in the office earlier today.  Came to ER with bloody urine, found to be anemic.    As part of that work up a CT was performed demonstrating a small gas and fluid containing abscess in the low right pelvis/perineum.  This is in the vicinity of the radiation seeds.    If drainage is to be performed this would be done by the daytime radiologist tomorrow.  This is an unusual situation in that the patient is non-toxic and the fluid collection is associated with prostate cancer and radiation seeds.  Would recommend a physician to physician discussion with urology in the morning.    Options would include repeat imaging, aspiration or drain placement.  I am concerned that placing a drain into this may create a situation where it may be challenging to remove or possibly create a fistula to the skin.    Please keep NPO after midnight in case a procedure is performed on Tuesday 9/17/2024.    Guy Leal MD  Vascular and Interventional Radiology

## 2024-09-17 NOTE — CONSULTS
Care Management Initial Consult    General Information  Assessment completed with: Patient, patient  Type of CM/SW Visit: Initial Assessment    Primary Care Provider verified and updated as needed: Yes   Readmission within the last 30 days:        Reason for Consult: discharge planning  Advance Care Planning: Advance Care Planning Reviewed: present on chart          Communication Assessment  Patient's communication style: spoken language (English or Bilingual)             Cognitive  Cognitive/Neuro/Behavioral: WDL                      Living Environment:   People in home: child(gunner), adult, spouse     Current living Arrangements: town home      Able to return to prior arrangements: yes       Family/Social Support:  Care provided by: self  Provides care for: no one  Marital Status:   Support system: Wife, Children  Lizet       Description of Support System: Supportive, Involved         Current Resources:   Patient receiving home care services: No        Community Resources: None  Equipment currently used at home: cane, straight, walker, rolling  Supplies currently used at home: None    Employment/Financial:  Employment Status:          Financial Concerns:             Does the patient's insurance plan have a 3 day qualifying hospital stay waiver?  Yes     Which insurance plan 3 day waiver is available? Alternative insurance waiver    Will the waiver be used for post-acute placement? Undetermined at this time    Lifestyle & Psychosocial Needs:  Social Determinants of Health     Food Insecurity: Not on file   Depression: Not at risk (5/3/2023)    Received from [facility], [facility]    PHQ-2     PHQ-2 Score: 0   Housing Stability: Not on file   Tobacco Use: Medium Risk (9/16/2024)    Patient History     Smoking Tobacco Use: Former     Smokeless Tobacco Use: Former     Passive Exposure: Not on file   Financial Resource Strain: Not on file   Alcohol Use: Not on file   Transportation Needs: Not on file    Physical Activity: Not on file   Interpersonal Safety: Not on file   Stress: Not on file   Social Connections: Not on file   Health Literacy: Not on file       Functional Status:  Prior to admission patient needed assistance:   Dependent ADLs:: Ambulation-cane, Ambulation-walker  Dependent IADLs:: Transportation                   Discussed  Partnership in Safe Discharge Planning  document with patient/family: No    Additional Information:  Met with patient in the ED. Says he lives in a town house with wife and daughter. Says he is independent at baseline. Says he recently had home care services but those have ended. Says they have been thinking about hospice but nothing is currently in place for hospice. He uses a cane or walker as needed. PCP verified. Explained that CM would follow and assist with discharge planning as needed. Possible IV abx needed- referral sent to [facility].     Next Steps: continue to follow     Keshia Wheeler RN

## 2024-09-17 NOTE — PROGRESS NOTES
[facility]    Medicine Progress Note - Hospitalist Service    Date of Admission:  9/16/2024    Assessment & Plan   84 year old male with h/o metastatic prostate cancer, hypothyroidism, rectal bleeding, chronic spinal compression fractures and hyponatremia presents 9/16/2024 with acute cystitis, acute on chronic anemia, acute renal failure, pelvic abscesses, and acute blood loss anemia requiring PRBC transfusion.   Stabilized post blood transfusion.   ID and urology following.   Per urology, palliative care consultation would be appropriate given limited treatment options.       Cystitis: UA/UC pending  -- continue zosyn/vanco  -- hold home nitrofurantoin     Pelvic abscess: CT shows gas and fluid containing 4.0 cm right pelvic abscess, which also contains multiple brachytherapy seeds   -- IR feels this is not amenable to drainage  -- appreciate ID consult to guide appropriate antibiotics  -- continue current zosyn and vanco  -- follow up any further urology recommendations   -- follow up blood cultures      JORJE: improved after PRBC transfusion  CT shows appropriately positioned suprapubic catheter, nonobstructin renal calculit and no evidence of hydronephrosis  -- start gentle IVF  -- trend in AM      Metastatic prostate cancer:   Ct shows compression fracture of the T12 vertebral body has worsened since 8/18/2024. Multiple lumbar spinal compression fractures have not significantly changed. Sclerotic skeletal metastases have not significantly changed.   -- continue home megace  -- pain control as needed, continue tylenol and home oxycodone and have dilaudid as needed also available  -- per urology the patient is unsure about further goals of care and would like a palliative care consult this admission, consult placed      ABL anemia  Hematuria from suprapubic cath  Denies any GI blood loss  CT shows wo new migrated brachytherapy seeds in the region of the bulbar urethra, which could be a  "cause of hematuria. There is otherwise appropriately positioned suprapubic catheter, mild bladder wall thickening, no large intraluminal bladder clots   -- urology consult  -- Transfused 2U PRBC on admission and Hgb improved from 5.5 to 8.2  -- trend Hgb now and in AM if stable  -- transfuse as needed  -- Iron slightly low, will start PO iron replacement  -- continue home B12 replacement      GERD: continue home pepcid      Chronic hyponatremia: continue home salt tabs      Chronic hypomagnesemia: continue home replacement, place on replacement protocol, trend      Hypokalemia: replace and recheck, start potassium containing IVF      Chronic constipation: continue home lactulose      Hypothyroidism: continue home replacement       Mood disorder: continue home lexapro and prn ativan             Diet: Regular Diet Adult    DVT Prophylaxis: Pneumatic Compression Devices  Forte Catheter: Not present  Lines: None     Cardiac Monitoring: None  Code Status: No CPR- Do NOT Intubate      Clinically Significant Risk Factors Present on Admission        # Hypokalemia: Lowest K = 2.6 mmol/L in last 2 days, will replace as needed  # Hyponatremia: Lowest Na = 133 mmol/L in last 2 days, will monitor as appropriate    # Hypomagnesemia: Lowest Mg = 1.6 mg/dL in last 2 days, will replace as needed   # Hypoalbuminemia: Lowest albumin = 2.8 g/dL at 9/17/2024  7:57 AM, will monitor as appropriate  # Coagulation Defect: INR = 1.20 (Ref range: 0.85 - 1.15) and/or PTT = 35 Seconds (Ref range: 22 - 38 Seconds), will monitor for bleeding       # Anemia: based on hgb <11       # Cachexia: Estimated body mass index is 17.01 kg/m  as calculated from the following:    Height as of this encounter: 1.676 m (5' 6\").    Weight as of this encounter: 47.8 kg (105 lb 6.4 oz).       # Financial/Environmental Concerns:                 Disposition Plan   Medically Ready for Discharge: Anticipated in 2-4 Days      Miguelito Solitario DO  Hospitalist " Service  [facility]  Securely message with [employer] (more info)  Text page via [email or web address] Paging/Directory   ______________________________________________________________________    Interval History   NAD. Denies any current complaints. Confirms DNR status and he is not full code.     Physical Exam   Vital Signs: Temp: 97.9  F (36.6  C) Temp src: Oral BP: 136/70 Pulse: 55   Resp: 13 SpO2: 99 % O2 Device: None (Room air)    Weight: 105 lbs 6.4 oz  General: NAD  RESPIRATORY: Breathing nonlabored  CARDIOVASCULAR: No le edema bilat.   ABDOMEN: soft and non-tender  NEUROLOGIC: Motor and sensory intact, speech clear         >50 MINUTES SPENT BY ME on the date of service doing chart review, history, exam, documentation & further activities per the note.  Data

## 2024-09-17 NOTE — PHARMACY-VANCOMYCIN DOSING SERVICE
"Pharmacy Vancomycin Initial Note  Date of Service 2024  Patient's  1940  84 year old, male    Indication: Intra-abdominal infection    Current estimated CrCl = Estimated Creatinine Clearance: 28.2 mL/min (A) (based on SCr of 1.4 mg/dL (H)).    Creatinine for last 3 days  2024:  4:48 PM Creatinine 1.40 mg/dL    Recent Vancomycin Level(s) for last 3 days  No results found for requested labs within last 3 days.      Vancomycin IV Administrations (past 72 hours)                     vancomycin (VANCOCIN) 1,250 mg in sodium chloride 0.9 % 250 mL intermittent infusion (mg) 1,250 mg New Bag 24                    Nephrotoxins and other renal medications (From now, onward)      Start     Dose/Rate Route Frequency Ordered Stop    24 030  piperacillin-tazobactam (ZOSYN) 3.375 g vial to attach to  mL bag        Note to Pharmacy: For [facility], [facility] and [facility]: For Zosyn-naive patients, use the \"Zosyn initial dose + extended infusion\" order panel.    3.375 g  over 240 Minutes Intravenous EVERY 8 HOURS 24  vancomycin (VANCOCIN) 1,250 mg in sodium chloride 0.9 % 250 mL intermittent infusion         1,250 mg  over 90 Minutes Intravenous ONCE 24              Contrast Orders - past 72 hours (72h ago, onward)      Start     Dose/Rate Route Frequency Stop    24 193  iopamidol (ISOVUE-370) solution 75 mL         75 mL Intravenous ONCE 24 190            [email or web address] Prediction of Planned Initial Vancomycin Regimen    Loading dose: N/A  Regimen: 750 mg IV every 24 hours.  Start time: 16:00 on 2024  Exposure target: AUC24 (range)400-600 mg/L.hr   AUC24,ss: 511 mg/L.hr  Probability of AUC24 > 400: 77 %  Ctrough,ss: 16.5 mg/L  Probability of Ctrough,ss > 20: 31 %  Probability of nephrotoxicity (Lodise HAYLEY ): 12 %          Plan:  Start vancomycin  750 mg IV q24h tomorrow. Loading dose given in ER today.   Vancomycin monitoring method: " AUC  Vancomycin therapeutic monitoring goal: 400-600 mg*h/L  Pharmacy will check vancomycin levels as appropriate in 1-3 Days.    Serum creatinine levels will be ordered daily for the first week of therapy and at least twice weekly for subsequent weeks.      Zenia Toscano, YURI

## 2024-09-17 NOTE — PLAN OF CARE
Problem: Adult Inpatient Plan of Care  Goal: Readiness for Transition of Care  Outcome: Progressing     Problem: Skin Injury Risk Increased  Goal: Skin Health and Integrity  Outcome: Progressing  Intervention: Plan: Nurse Driven Intervention: Friction and Shear  Recent Flowsheet Documentation  Taken 9/17/2024 0200 by Lei Murphy RN  Friction/Shear Interventions: HOB 30 degrees or less  Intervention: Optimize Skin Protection  Recent Flowsheet Documentation  Taken 9/17/2024 0200 by Lei Murphy RN  Activity Management: activity adjusted per tolerance  Head of Bed (HOB) Positioning: HOB at 20-30 degrees     Problem: Infection  Goal: Absence of Infection Signs and Symptoms  Outcome: Progressing   Goal Outcome Evaluation:         A/O, A1 with walker. VSS. Tele: NSR. Afebrile. On Ra. SP catheter, adequate UOP; dark crescencio/brown. No BM overnight. Still need a stool sample. NPO since midnight. K/Mag prot. No significant events overnight.

## 2024-09-17 NOTE — CONSULTS
"  Palliative Care Consultation Note  [facility]      Patient: Kilo Montiel  Date of Admission:  9/16/2024    Requesting Clinician / Team: Dr Solitario/[facility]  Reason for consult: \"Goals of care, possible hospice discussion\" Goals of care  Patient and family support       Recommendations & Counseling   Kiol is considering whether he would assent to percutaneous nephrostomy tubes.  He had several insightful questinos about nephrostomy tubes, the pelvic fluid collection, etc.  He would like to discuss more with the urology team (is the pelvic fluid collection something that may be drained (discussed less likely desirae w IR unable to place drain)?      GOALS OF CARE:   Life-prolonging with limits (DNR/DNI).  Continue antibiotics, all present treatments in hopes of improving symptoms of fatigue, pain, hematuria.  Kilo voices awareness that urology team favors life expectancy of weeks-short months (2-3).  He is considering a pivot to comfort focused goals at discharge and is open to a hospice informational consult (order placed for you), has [facility] primary clinic, inclined toward  hospice and may be interested in [facility] community based palliative should he and his family not be ready to pivot fully to comfort focused goals.    ADVANCE CARE PLANNING:  No health care directive on file. Per system policy, Surrogate Decision-makers for Patients With Diminished Decision-making Capacity offers guidance on possible decision-makers. Wife Lizet and daughter Casie has been identified as a surrogate decision maker.   There is a POLST form on file, this was reviewed and current. (DNR/selective from 5/30/24)  Code status: No CPR- Do NOT Intubate    MEDICAL MANAGEMENT:   We are not actively managing symptoms at this time.  #Pain,acute on chronic , cancer related and suspect some pain also could be attributable to fluid collection?urinoma vs abscess in pelvis.  Recommend scheduled APAP  Presently on " oxycodone IR 2.5mg q4H prn (agree)  Has IV hydromorphone 0.2 (mod)-0.4(severe) mg IV q2h prn pain (agree)   Continue to treat infection  Heat and repositioning     #General Weakness/Debility / Cancer asthenia plus acute on chronic blood loss anemia  Did PT/OT post ORIF of femur fracture in June post discharge.  ECOG =3    #Constipation,Functional constipation  Continue lactulose BID  On senna-docusate 2 tabs daily--consider stopping docusate and using senna alone.    Mood disorder (depression)  - continue escitalopram 20mg daily  - was on lorazepam before    Anorexia/cachexia, cancer related ; protein calorie malnutrition  - on megace prior to admission.  Unclear likelihood of benefit, anticipatory guidance given regarding cancer cachexia and limited treatments, prognostic implications.  - diet as tolerated (soft diet--difficulty chewing steak/meats due to a partial)       PSYCHOSOCIAL/SPIRITUAL SUPPORT:  Family Spouse Lizet and daughter Casie live locally.  There eis also a son in [place] who is a pediatric emergency physician.   [place] community: Nondenominational   Would appreciate Spiritual Health Services, Consult has been placed for support     Palliative Care will continue to follow. Thank you for the consult and allowing us to aid in the care of Kilo Montiel.       Vanessa Ness MD  [facility], Palliative Care  Securely message with the [employer] Web Console (learn more here) or  Text page via [facility] Paging/Directory         Assessment      Kilo Montiel is a 84 year old male with a past medical history of anxiety, chronic constipation, melanoma, metastatic prostate cancer (status post prostatectomy and brachytherapy/radiotherapy, bone metastases on the ribs, spine, and pelvis ), urinary retention withsuprapubic catheter,  hypothyroidism, recent L hip fracture (repaired) due to mechanical fall in late May 2024, and protein calorie malnutrition who presented on 9/16/24 with acute UTI, acute on chronic anemia,  JORJE and pelvic abscess not amenable to drainage.      Today, the patient was seen for:  Palliative care reintroduction, establish rapport, goals of care conversation, anticipatory guidance    History of Present Illness   Met with Kilo in his room in ED 36.      I introduced our role as an extra layer of support and how we help patients and families dealing with serious, potentially life-limiting illnesses. I explained the composition of the palliative care team.  Palliative care helps patients and families navigate their care while focusing on the whole person; providing emotional, social and spiritual support  Palliative care often assists with symptom management, information sharing about what to expect from the illness, available treatment options and what effect those options may have on the disease course, and provide effective communication and caring support. Kilo recalled his visits with the [facility] palliative team during his last hospitalization.    He notes since returning home from TCU and doing PT/OT, he has had progressive fatigue and troubles with decreased appetite and weight loss.  Had more hematuria and fatigue and presented to ED for this hospital stay.    He met with urology and is aware of their estimation he has life expectancy of weeks- to 2-3 months; life prolonging treatments would include interventions like CT cystogram through suprapubic catheter to assess for fistula and potentially bilateral perc neph tubes.   - many questions about the fluid collection, the perc neph tubes.   Comfort focused treatments would be symptom management to maximize QoL w hospice.  We discussed hospice support vs palliative care support (very limited in outpatient setting--w [facility] would be just virtual or in-person monthly visits and routine home care; with  Community based palliative care would be intermittent home visits but nothing like the visits occurring several times a week from a hospice agency.  He  hasn't had experience with hospice with other friends/family members.    Kilo voiced coherent understanding that his cancer has progressed.  He's been on Megace without gaining weight, appetite is low.  He is in a chair/bed >50% of day.  Lives with his wife Lizet and daughter Casie (is an ).  He's inclined to avoid coming back to hospital.    With permission, called daughter Casie to review conversation with the patient, provided prognosis per urology, reviewed medical issues/decisions to be made and that Kilo is open to hospice consult to get information.      Prognosis, Goals, & Planning:   Functional Status just prior to this current hospitalization:  ECOG3 (Capable of only limited self-care; needs help with ADLs; in bed/chair >50% of waking hours)  Has had ER visits 6/21, 8/18, 9/7 for hematuria, receiving blood transfusions in June and then with present hospitalization.    Prognosis, Goals, and/or Advance Care Planning:  We discussed general treatment options (full/restorative, selective/conservatives, and comfort only/hospice). 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  Based on this discussion, Kilo has decided to have conversation with his family.  He is considering having hospice support in outpatient setting and is open to hospice informational consult.  He is also considering whether to assent to PNT placement if it might help symptoms from the pelvic fluid collection and would welcome further input from urology.  Discussed what continuing restorative/life-prolonging care entails, including continued (re)admissions to the hospital, continuing with preventative and primary care, seeing disease/organ specific specialty consultations for medical treatments in hopes to prolong  life for as long as possible.    Education provided regarding hospice philosophy, prognostic,and eligibility criteria. Discussed what services are provided and those that are not,  Discussed common misconceptions. We explored the various disposition options where they can receive hospice care (home, residential hospice homes, LTC with hospice) including subsequent financial and familial implications. Discussed typical anticipated timing of discharge.      Code Status was addressed today:   Yes, We discussed potential risks and rationale of attempting cardiac resuscitation, intubation, and mechanical ventilation.  We also discussed probability of survival as well as quality of life implications.  Based on this discussion, patient or surrogate response/decision: remain DNR/DNI      Patient's decision making preferences: shared with support from loved ones        Patient has decision-making capacity today for complex decisions: Intact          Coping, Meaning, & Spirituality:   Mood, coping, and/or meaning in the context of serious illness were addressed today: Yes enjoys time with family, time outside/walking.  Would like to have more time, not ready for end of life but also strongly considering not pursuing rehospitalization.    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       Medications:  Reviewed this patient's medication profile and medications from this hospitalization. Notable medications:  Megace  Zosyn, vancomycin  Ferrous sulfate  Lexapro  IVF  APAP  Dilaudid (0 doses in past 24 hrs)  Oxycodone (0 doses in past 24 hours)     [place] Board of Pharmacy Data Base Reviewed: Yes:   reviewed - had meds prescribed following his  "hip fracture, not receiving more since.    ROS:  Comprehensive ROS is reviewed and is negative except as here & per HPI:     Physical Exam   Vital Signs with Ranges  Temp:  [97.9  F (36.6  C)-98.8  F (37.1  C)] 97.9  F (36.6  C)  Pulse:  [55-68] 55  Resp:  [10-28] 13  BP: ()/(56-90) 136/70  SpO2:  [98 %-100 %] 99 %  Wt Readings from Last 10 Encounters:   09/17/24 47.8 kg (105 lb 6.4 oz)   09/07/24 49.9 kg (110 lb)   08/18/24 51.7 kg (114 lb)   07/18/24 51.7 kg (114 lb)   05/31/24 51.9 kg (114 lb 6.7 oz)   05/07/24 49.9 kg (110 lb)   10/13/23 57.2 kg (126 lb)   08/22/16 68.9 kg (152 lb)   08/26/14 69.5 kg (153 lb 3.2 oz)   04/30/14 69.5 kg (153 lb 3.2 oz)     105 lbs 6.4 oz  Body mass index is 17.01 kg/m .    Intake/Output Summary (Last 24 hours) at 9/17/2024 1511  Last data filed at 9/17/2024 1400  Gross per 24 hour   Intake 735 ml   Output 1075 ml   Net -340 ml       PHYSICAL EXAM:  Alert 85 yo male, supine in bed w HOB elevated.  Appears cachectic and sarcopenic w temporal muscle wasting.  Breathing is nonlabored.  No JVD.  Abdomen nondistended.  No myoclonus, no tremor.  Affect full, fluent speech, oriented x4.  Coherent historian.    Data reviewed:  Last Comprehensive Metabolic Panel:  Lab Results   Component Value Date     09/17/2024    POTASSIUM 3.2 (L) 09/17/2024    CHLORIDE 108 (H) 09/17/2024    CO2 16 (L) 09/17/2024    ANIONGAP 12 09/17/2024    GLC 84 09/17/2024    BUN 18.0 09/17/2024    CR 1.24 (H) 09/17/2024    GFRESTIMATED 58 (L) 09/17/2024    SOPHIA 7.7 (L) 09/17/2024       CBC RESULTS:   Recent Labs   Lab Test 09/17/24  1429 09/17/24  0757   WBC  --  4.4   RBC  --  2.60*   HGB 8.7* 8.2*   HCT  --  24.8*   MCV  --  95   MCH  --  31.5   MCHC  --  33.1   RDW  --  18.4*   PLT  --  140*     Lab Results   Component Value Date    AST 32 09/17/2024     Lab Results   Component Value Date    ALT 8 09/17/2024     No results found for: \"BILICONJ\"   Lab Results   Component Value Date    BILITOTAL 0.4 " 09/17/2024     Lab Results   Component Value Date    ALBUMIN 2.8 09/17/2024     Lab Results   Component Value Date    PROTTOTAL 5.5 09/17/2024      Lab Results   Component Value Date    ALKPHOS 103 09/17/2024     Blood culture prelim- negative post first 12 hours  Urine culture in process    CT Urogram wo and w contrast  9/16/24  IMPRESSION:     1. Two new migrated brachytherapy seeds in the region of the bulbar urethra, which could be a cause of hematuria.     2.  Gas and fluid containing 4.0 cm right pelvic abscess, which also contains multiple brachytherapy seeds.     3.  Appropriately positioned suprapubic catheter. Mild bladder wall thickening could reflect cystitis. No large intraluminal bladder clots, although evaluation of the bladder is limited by artifact from left hip arthroplasty hardware.     4.  Two nonobstructing left renal calculi measuring up to 2 mm. No right urinary calculi.     5.  No solid renal mass or evidence of upper tract urothelial malignancy.     6.  Compression fracture of the T12 vertebral body has worsened since 8/18/2024. Multiple lumbar spinal compression fractures have not significantly changed.     7.  Sclerotic skeletal metastases have not significantly changed.     83 minutes spent on the date of the encounter doing chart review, history and exam, documentation and further activities per the note.    Vanessa Ness MD  [facility], Palliative Care  Securely message with the [employer] Web Console (learn more here) or  Text page via [facility] Paging/Directory

## 2024-09-17 NOTE — CONSULTS
[facility] CONSULTATION    Type of Consult: inpatient  Place of Service: [facility]   Reason for consult: Pelvic abscesses  Request for consult by: Dr. Aguila    History of present illness:   Kilo Montiel is a 84 year old male with hx of anxiety, hypothyroid, melanoma, met prostate CA s/p prostatectomy and radiotherapy, urinary retention s/p SP catheter;  Admitted 9/16 for acute cystitis, acute on chronic anemia, JORJE and pelvic abscesses. Urology is being consulted for pelvic abscesses. History obtained through patient and chart review.     Pt follows with Dr. Schmidt ([facility]) for his met prostate CA, last visit 9/16/24, noted poorly draining SP tube with hematuria, cystoscopy attempted and may be a fistula with large defect near prostate fossa, difficult to irrigate either from urethra or sp tube, Hgb 6.7. Pt referred to ED for further management of anemia and possible fistula.     Pt reports at least 5 months of purulent/malodorous drainage from urethra and intermittent abdominal and back pain. Currently denies pain, fever, chills, N/V. Reports feeling better since PRBC transfusion on 9/16.     CT Urogram 9/16 showed bladder obscured by streak artifact from left hip arthroplasty hardware, SP cath present in bladder with expected intraluminal gas, mildly thickened bladder wall, fluid and gas containing right pelvic abscess (4 x 2.5 x 2.8 cm) partially obscured by artifact, brachyseeds within the abscess, 2 new brachy seeds in the region of the bulbar urethra, single see in the region of left penile bulb, numerous seeds in the prostate, no bilateral hydro, worsened T12 compression fracture, stable sclerotic skeletal mets.     CT rad reports pelvic abscess would be difficult to reach with a drain.     UA+, UC pending. Creat 1.24 (base 0.83-1.15). WBC 4.4. Hgb 8.2 today s/p PRBC transfusion 9/16 for Hgb 5.5 on admission.       Past Medical History:  Past Medical History:   Diagnosis Date     Anxiety     Hypothyroid     Melanoma (H)     excised from abdomen and wrist    Melanoma of wrist (H)     Prostate cancer (H)     Thyroid disease        Past Surgical History:  Past Surgical History:   Procedure Laterality Date    ANKLE SURGERY      left    COLONOSCOPY      HIP SURGERY Left     INSERT RADIATION SEEDS PROSTATE  2013    Procedure: INSERT RADIATION SEEDS PROSTATE;  Insert Radiation Seeds Prostate ;  Surgeon: Sahil Franco MD;  Location: [facility] OR    IR SUPRAPUBIC CATHETER CHANGE  2024    IR SUPRAPUBIC CATHETER CHANGE  2024    IR SUPRAPUBIC CATHETER PLACMENT  2024    OPEN REDUCTION INTERNAL FIXATION HIP BIPOLAR Left 2024    Procedure: HEMIARTHROPLASTY, LEFT HIP, BIPOLAR;  Surgeon: Everett Sherman MD;  Location: [facility]    SURGICAL PATHOLOGY EXAM      melanoma removal       Social History:  Social History     Socioeconomic History    Marital status:      Spouse name: Not on file    Number of children: Not on file    Years of education: Not on file    Highest education level: Not on file   Occupational History    Not on file   Tobacco Use    Smoking status: Former     Current packs/day: 0.00     Average packs/day: 0.5 packs/day for 8.0 years (4.0 ttl pk-yrs)     Types: Cigarettes     Start date: 1974     Quit date: 1982     Years since quittin.3    Smokeless tobacco: Former     Quit date: 1976   Substance and Sexual Activity    Alcohol use: Yes     Comment: 2-3 per week    Drug use: Not on file    Sexual activity: Not on file   Other Topics Concern    Parent/sibling w/ CABG, MI or angioplasty before 65F 55M? Not Asked   Social History Narrative    Not on file     Social Determinants of Health     Financial Resource Strain: Not on file   Food Insecurity: Not on file   Transportation Needs: Not on file   Physical Activity: Not on file   Stress: Not on file   Social Connections: Not on file   Interpersonal Safety: Not on file   Housing Stability: Not  on file       Medications:  Current Facility-Administered Medications   Medication Dose Route Frequency Provider Last Rate Last Admin    acetaminophen (TYLENOL) tablet 650 mg  650 mg Oral Q4H PRN Milagros Aguila MD        calcium carbonate (TUMS) chewable tablet 1,000 mg  1,000 mg Oral 4x Daily PRN Milagros Aguila MD        chlorhexidine (PERIDEX) 0.12 % solution 15 mL  15 mL Swish & Spit BID PRN Milagros Aguila MD        cyanocobalamin (VITAMIN B-12) tablet 1,000 mcg  1,000 mcg Oral Daily Milagros Aguila MD   1,000 mcg at 09/17/24 0818    escitalopram (LEXAPRO) tablet 20 mg  20 mg Oral Daily Milagros Aguila MD   20 mg at 09/17/24 0818    famotidine (PEPCID) injection 20 mg  20 mg Intravenous Q24H Milagros Aguila MD        ferrous sulfate (FEROSUL) tablet 325 mg  325 mg Oral BID Miguelito Solitario DO   325 mg at 09/17/24 0914    HYDROmorphone (DILAUDID) injection 0.2 mg  0.2 mg Intravenous Q2H PRN Milagros Aguila MD        HYDROmorphone (DILAUDID) injection 0.4 mg  0.4 mg Intravenous Q2H PRN Milagros Aguila MD        lactulose (CHRONULAC) solution 15 mL  15 mL Oral BID PRN Milagros Aguila MD        levothyroxine (SYNTHROID/LEVOTHROID) tablet 100 mcg  100 mcg Oral QAM AC Milagros Aguila MD   100 mcg at 09/17/24 0913    lidocaine (LMX4) cream   Topical Q1H PRN Milagros Aguila MD        lidocaine 1 % 0.1-1 mL  0.1-1 mL Other Q1H PRN Milagros Aguila MD        LORazepam (ATIVAN) tablet 0.5 mg  0.5 mg Oral Q8H PRN Milagros Aguila MD        magnesium oxide (MAG-OX) half-tab 200 mg  200 mg Oral Daily Milagros Aguila MD   200 mg at 09/17/24 0818    megestrol (MEGACE) suspension 400 mg  400 mg Oral At Bedtime Milagros Aguila MD   400 mg at 09/16/24 2251    melatonin tablet 5 mg  5 mg Oral At Bedtime PRN Milagros Aguila MD   5 mg at 09/17/24 0318    naloxone (NARCAN) injection 0.2 mg  0.2 mg Intravenous Q2 Min PRN Milagros Aguila MD        Or    naloxone (NARCAN) injection 0.4 mg  0.4 mg Intravenous Q2 Min PRN Milagros Aguila,  MD        Or    naloxone (NARCAN) injection 0.2 mg  0.2 mg Intramuscular Q2 Min PRN Milagros Aguila MD        Or    naloxone (NARCAN) injection 0.4 mg  0.4 mg Intramuscular Q2 Min PRN Milagros Aguila MD        ondansetron (ZOFRAN ODT) ODT tab 4 mg  4 mg Oral Q6H PRN Milagros Aguila MD        Or    ondansetron (ZOFRAN) injection 4 mg  4 mg Intravenous Q6H PRN Milagros Aguila MD        oxyCODONE IR (ROXICODONE) half-tab 2.5 mg  2.5 mg Oral Q4H PRN Milagros Aguila MD        piperacillin-tazobactam (ZOSYN) 3.375 g vial to attach to  mL bag  3.375 g Intravenous Q8H Milagros Aguila MD   3.375 g at 09/17/24 0951    senna-docusate (SENOKOT-S/PERICOLACE) 8.6-50 MG per tablet 2 tablet  2 tablet Oral Daily Milagros Aguila MD   1 tablet at 09/17/24 0913    sodium chloride (PF) 0.9% PF flush 3 mL  3 mL Intracatheter Q8H Milagros Aguila MD   3 mL at 09/16/24 2306    sodium chloride (PF) 0.9% PF flush 3 mL  3 mL Intracatheter q1 min prn Milagros Aguila MD        sodium chloride 0.9% BOLUS 1-250 mL  1-250 mL Intravenous Q1H PRN Erick Resendiz MD        sodium chloride tablet 1 g  1 g Oral TID w/meals Miguelito Solitario DO   1 g at 09/17/24 1128    vancomycin (VANCOCIN) 750 mg in sodium chloride 0.9 % 250 mL intermittent infusion  750 mg Intravenous Q24H Milagros Aguila MD         Current Outpatient Medications   Medication Sig Dispense Refill    acetaminophen (TYLENOL) 325 MG tablet Take 2 tablets (650 mg) by mouth every 4 hours as needed for other (For optimal non-opioid multimodal pain management to improve pain control.) 100 tablet 0    chlorhexidine (PERIDEX) 0.12 % solution Swish and spit 15 mLs in mouth 2 times daily as needed (PRN)      cyanocobalamin (VITAMIN B-12) 1000 MCG tablet Take 1,000 mcg by mouth daily      escitalopram (LEXAPRO) 20 MG tablet Take 20 mg by mouth daily.      famotidine (PEPCID) 20 MG tablet Take 20 mg by mouth daily      lactulose (CHRONULAC) 10 GM/15ML solution Take 15 mLs by mouth 2  times daily as needed      levothyroxine (SYNTHROID/LEVOTHROID) 100 MCG tablet Take 100 mcg by mouth daily.      LORazepam (ATIVAN) 0.5 MG tablet Take 1 tablet by mouth every 8 hours as needed      magnesium 250 MG tablet Take 1 tablet by mouth daily      megestrol (MEGACE) 40 MG/ML suspension Take 10 mLs by mouth daily      nitroFURantoin macrocrystal-monohydrate (MACROBID) 100 MG capsule Take 100 mg by mouth 2 times daily.      ondansetron (ZOFRAN) 8 MG tablet Take 8 mg by mouth every 8 hours as needed      oxyCODONE (ROXICODONE) 5 MG tablet Take 0.5 tablets (2.5 mg) by mouth every 4 hours as needed for severe pain 10 tablet 0    senna-docusate (SENOKOT-S/PERICOLACE) 8.6-50 MG tablet Take 2 tablets by mouth daily.      sodium chloride 1 GM tablet Take 1 tablet (1 g) by mouth 3 times daily (with meals)      Turmeric 500 MG CAPS Take 1 capsule by mouth daily         Allergies:   No Known Allergies    Review of Systems:   A full 12 point review of systems was taken and is negative aside from what is noted above in the HPI    Physical Exam:  Temp:  [97.9  F (36.6  C)-98.8  F (37.1  C)] 97.9  F (36.6  C)  Pulse:  [55-68] 55  Resp:  [10-28] 13  BP: ()/(56-90) 136/70  SpO2:  [98 %-100 %] 99 %  General: NAD, alert, cooperative  Head: normocephalic, without abnormality / atraumatic  Abdomen: soft, non tender, non distended. No suprapubic fullness/tenderness. No bilateral CVA tenderness noted. SP catheter draining crescencio colored urine.   Geniturinary: Penis without erythema, tenderness, edema. Small amount of purulent malodorous drainage noted from urethral meatus. Wearing pull-up which has some burgundy bloody and purulent fluid saturation.   Skin: no rashes or lesions  Musculoskeletal: moves all extremities equally.   Psychological: alert and oriented, answers questions appropriately.     Labs:   Creatinine   Date Value Ref Range Status   09/17/2024 1.24 (H) 0.67 - 1.17 mg/dL Final     Lab Results   Component Value  Date    WBC 4.4 09/17/2024     Lab Results   Component Value Date    HGB 8.2 09/17/2024     Lab Results   Component Value Date     09/17/2024       UA RESULTS:  Recent Labs   Lab Test 09/17/24  0832   COLOR Yellow   APPEARANCE Cloudy*   URINEGLC Negative   URINEBILI Negative   URINEKETONE Negative   SG 1.015   UBLD >1.0 mg/dL*   URINEPH 6.5   PROTEIN 300*   NITRITE Negative   LEUKEST 500 Lei/uL*   RBCU >182*   WBCU >182*     Urine culture: pending  Blood culture 9/16: pending     Lab Results: personally reviewed     Imaging:  EXAM: CT UROGRAM WO and W CONTRAST  LOCATION: [facility]  DATE: 9/16/2024     INDICATION: Status post suprapubic catheter. Ongoing bloody urine. Anemia.   COMPARISON: CT abdomen pelvis 8/18/2024.   TECHNIQUE: CT scan of the abdomen and pelvis using urogram technique with pre contrast, post contrast, and delayed images. Multiplanar reformats were obtained. Dose reduction techniques were used.   CONTRAST: 75ml isovue 370     FINDINGS:      LOWER CHEST: Unchanged small consolidative opacity within the medial right lower lobe, peripheral right lower lobe bronchiectasis, and scattered areas of scarring at both lung bases. No pleural effusions.      HEPATOBILIARY: Gallbladder is contracted. No biliary ductal dilation. Scattered punctate hepatic granulomas. No suspicious liver lesions.      PANCREAS: Normal.     SPLEEN: Innumerable calcified granulomas.      ADRENAL GLANDS: Normal.     RIGHT KIDNEY/URETER: No renal or ureteral calculi. Small hypodense cortical cyst at the lower pole the right kidney is unchanged and does not require follow-up. No solid renal mass. No hydronephrosis or evidence of upper tract urothelial malignancy.      LEFT KIDNEY/URETER: Two nonobstructing left renal calculi measuring up to 2 mm. No ureteral calculi. Tiny subcentimeter cystic lesion is too small to characterize but is likely a cyst and does not require follow-up. No solid renal mass.  No hydronephrosis   or evidence of upper tract urothelial malignancy.      BLADDER: Bladder is obscured by streak artifact from left hip arthroplasty hardware. A suprapubic catheter is present within the bladder, with expected intraluminal gas. Bladder wall appears mildly thickened. No definite intraluminal bladder clot is   visualized.      BOWEL: No bowel obstruction or inflammation.      LYMPH NODES: No enlarged lymph nodes.      VASCULATURE: Patent portal, splenic, and superior mesenteric veins. Moderate aortobiiliac atherosclerosis. No abdominal aortic aneurysm.      PELVIC ORGANS: Numerous brachytherapy seeds within the prostate. Fluid and gas containing right pelvic abscess measuring 4.0 x 2.5 x 2.8 cm (6/#152, 7/#42), which is partially obscured by artifact. Multiple brachytherapy seeds are present within the   abscess. There are also 2 new brachytherapy seeds in the region of the bulbar urethra (8/#57, 6/#166). A single brachytherapy seed in the region of the left penile bulb (6/#164) is unchanged.      MUSCULOSKELETAL: Left hip arthroplasty hardware. Numerous sclerotic metastases throughout the visualized spine, lower ribs, and pelvis have not significantly changed. Compression fracture of the T12 vertebral body has worsened. Additional compression   fractures of the L1-L5 vertebral bodies have not significantly changed.                                                                       IMPRESSION:     1.  Two new migrated brachytherapy seeds in the region of the bulbar urethra, which could be a cause of hematuria.      2.  Gas and fluid containing 4.0 cm right pelvic abscess, which also contains multiple brachytherapy seeds.     3.  Appropriately positioned suprapubic catheter. Mild bladder wall thickening could reflect cystitis. No large intraluminal bladder clots, although evaluation of the bladder is limited by artifact from left hip arthroplasty hardware.     4.  Two nonobstructing left renal  calculi measuring up to 2 mm. No right urinary calculi.     5.  No solid renal mass or evidence of upper tract urothelial malignancy.     6.  Compression fracture of the T12 vertebral body has worsened since 8/18/2024. Multiple lumbar spinal compression fractures have not significantly changed.     7.  Sclerotic skeletal metastases have not significantly changed.     I have personally reviewed the imaging reports above.     Assessment / Plan : Kilo Montiel is being seen by [facility] for pelvic abscesses    - 84 yr old male with hx of met prostate CA s/p prostatectomy and brachytherapy, chronic SP catheter, now with 4 cm pelvic abscess on CT and cystoscopy/clinical findings concerning for fistula with large defect near the prostate  - Unlikely to be able to reach abscess with drain per CT radiologist, not attempted.   - WBC 4.4. Afebrile. UA concerning for infection. UC/BC pending. Continue broad spectrum IV antibiotic for now, adjust as needed pending final cultures/sensitivities.   - Hgb 8.2 today following PRBC transfusion 9/16. Small amount of blood noted on pull up from urethral meatus. Urine from SP cath yellow.  Monitor.  - Per Dr. Schmidt, with disease progression as it is, life expectancy is likely weeks to maybe 2-3 months. Discussed this again with patient (Dr. Schmidt did in clinic as well on 9/16) and discussed options of aggressive care which could include CT cystogram through SP tube to help define anatomy. If fistulous communication with bladder or difficulties maintaining SP patency, consider bilateral PNTs. Alternatively could consider Palliative or Hospice. Pt would like further discussion with Palliative care. Is interested in continuing antibiotics but maybe not pursuing additional imaging/procedures. Updated Dr. Solitario, plans to consult Palliative   - Will continue to follow.   - Old records reviewed - [facility], [facility], [place]     This case was discussed with:  Dr Marilyn Schmidt and  Dr. Solitario    Thank you for consulting [facility] regarding this patient's care. Please contact us with questions or concerns.     DARÍO Hays, [facility]   [phone]

## 2024-09-17 NOTE — H&P
[facility]    History and Physical - Hospitalist Service       Date of Admission:  9/16/2024    Assessment & Plan   Kilo Montiel is a 84 year old male with a medical history significant for prostate cancer status post prostatectomy radiotherapy, thyroid disease, status post suprapubic catheter in place who is being admitted for further management of acute cystitis, acute on chronic anemia, acute renal failure and pelvic abscesses    Patient Active Problem List   Diagnosis    Malignant neoplasm of prostate (H)    Thyroid disease    Prostate cancer (H)    Melanoma (H)    Hypokalemia    Episode of loss of consciousness    MSSA bacteremia    Hip pain, left    Closed subcapital fracture of left femur, initial encounter (H)    Fall, initial encounter    Anemia, unspecified type    Gross hematuria    Iron deficiency    Normocytic anemia    Intra-abdominal abscess (H)    History of prostate cancer      Acute cystitis-urine and blood cultures done.  Continue Zosyn.  Appreciate urology input    Acute renal failure. baseline creatinine was -Avoid NSAID's except,Start IV fluids, Repeat BMP in AM likely due to.  Prerenal  cause but  at risk of obstruction.    Acute severe anemia-check iron panel, B12 and folic acid.  Likely due to blood loss however.  Follow-up on anemia workup and treat accordingly.  Transfuse to keep hemoglobin greater than 8    Pelvic abscess-consult IR for drainage in the morning.  Maintain patient n.p.o. after midnight.  Ice chips and medications okay. Vanc and zosyn ongoing    History of prostate cancer-status post brachytherapy, with mets to the spine and T12 compression fracture.  Pain control for now.  Resume oxycodone.  Dilaudid as needed    T12/L1 and L5 compression fractures- pain control.  If symptomatic consult urology    Hypokalemia-repeat and check magnesium    Renal calculi -defer to urology    Recent patient's medical problems management remains unchanged      Diet:  "NPO for Medical/Clinical Reasons Except for: No Exceptions  NPO per Anesthesia Guidelines for Procedure/Surgery Except for: Meds    DVT Prophylaxis: Pneumatic Compression Devices  Forte Catheter: Not present  Lines: None     Cardiac Monitoring: None  Code Status:  Full    Clinically Significant Risk Factors Present on Admission        # Hypokalemia: Lowest K = 2.6 mmol/L in last 2 days, will replace as needed   # Hypocalcemia: Lowest Ca = 8.3 mg/dL in last 2 days, will monitor and replace as appropriate      # Coagulation Defect: INR = 1.20 (Ref range: 0.85 - 1.15) and/or PTT = 35 Seconds (Ref range: 22 - 38 Seconds), will monitor for bleeding       # Anemia: based on hgb <11       # Cachexia: Estimated body mass index is 17.75 kg/m  as calculated from the following:    Height as of this encounter: 1.676 m (5' 6\").    Weight as of this encounter: 49.9 kg (110 lb).       # Financial/Environmental Concerns:                 Disposition Plan     Medically Ready for Discharge: Anticipated in 2-4 Days           Milagros Aguila MD  Hospitalist Service  [facility]  Securely message with [employer] (more info)  Text page via [facility] Paging/Directory     ______________________________________________________________________    Chief Complaint   Severe anemia        History of Present Illness   Kilo Montiel is a 84 year old male with a medical history significant for prostate cancer status post prostatectomy radiotherapy, thyroid disease, status post suprapubic catheter in place who is being admitted for further management of acute cystitis, acute on chronic anemia, acute renal failure and pelvic abscesses    Patient  presented to the emergency department with hematuria associated with his suprapubic catheter, which had been placed 4-5 weeks ago. He had experienced intermittent hematuria leading up to the past week, during which the bleeding became more pronounced. Today, the catheter was exchanged in the " clinic, and the patient s hemoglobin was found to be critically low, in the 6s. He reports feeling  tired but good,  denies any pain, and has no blood in his stool. He has a history of blood transfusions and does not have any other complaints at this time.    The patient was brought in by his wife and daughter due to his symptoms of weakness and fatigue at home. He underwent preliminary lab tests showing:    Basic Metabolic Panel (BMP): Sodium 133, Potassium 2.6, Bicarbonate 19, Creatinine 1.40 (new elevation)  Liver Function Tests (LFTs): Unremarkable  Complete Blood Count (CBC): White blood cell count 5.8, Hemoglobin 5.5 (down from a baseline of 8.5 on September 7, 2024), Platelet count 228, INR 1.20  In response to the severe anemia, the patient was typed and crossmatched for blood transfusion and received 2 units of red blood cells. Blood cultures were also collected.    A CT urogram with contrast was performed, indicating:    Lower Chest: Unchanged small consolidative opacity in the right lower lobe; otherwise, no pleural effusions.  Hepatobiliary: Contraction of gallbladder with no biliary duct dilation; scattered hepatic granulomas without suspicious liver lesions.  Kidney Findings: The right kidney has a small cyst; the left kidney shows two nonobstructing calculi, no hydronephrosis observed.  Bladder: Presence of a suprapubic catheter; bladder wall is mildly thickened, with no significant intraluminal clots observed, although evaluation is limited due to artifacts from left hip arthroplasty.  Pelvic Area: Notable findings include a fluid and gas-containing pelvic abscess measuring 4.0 x 2.5 x 2.8 cm, containing multiple brachytherapy seeds, which may correlate with the hematuria. Additionally, two new brachytherapy seeds were discovered near the bulbar urethra.  Musculoskeletal Findings: Worsening compression fracture of the T12 vertebral body and existing compression fractures of L1 through L5 were noted,  alongside sclerotic metastases in the spine, lower ribs, and pelvis that did not significantly change.  Impression:    New brachytherapy seeds in the bulbar urethra potentially contributing to hematuria.  Right pelvic abscess, also containing brachytherapy seeds.  Suprapubic catheter in place with mild bladder wall thickening.  Nonobstructive left renal calculi.  No evidence of upper urinary tract malignancy or solid renal masses.  Worsening T12 compression fracture; stable lumbar compression fractures.  Metastatic disease remains unchanged.      ER Intervention:  After discussion with the urology team, it was recommended that the abscess be drained by interventional radiology. The patient received doses of vancomycin and Zosyn in the ER and has been admitted for further inpatient care.    Patient did not have much complaints when seen except that he was having a hard time sleeping.  Melatonin was ordered.        Past Medical History    Past Medical History:   Diagnosis Date    Anxiety     Hypothyroid     Melanoma (H)     excised from abdomen and wrist    Melanoma of wrist (H)     Prostate cancer (H)     Thyroid disease        Past Surgical History   Past Surgical History:   Procedure Laterality Date    ANKLE SURGERY      left    COLONOSCOPY      HIP SURGERY Left     INSERT RADIATION SEEDS PROSTATE  11/08/2013    Procedure: INSERT RADIATION SEEDS PROSTATE;  Insert Radiation Seeds Prostate ;  Surgeon: Sahil Franco MD;  Location: [facility] OR    IR SUPRAPUBIC CATHETER CHANGE  7/18/2024    IR SUPRAPUBIC CATHETER CHANGE  8/18/2024    IR SUPRAPUBIC CATHETER PLACMENT  7/12/2024    OPEN REDUCTION INTERNAL FIXATION HIP BIPOLAR Left 05/24/2024    Procedure: HEMIARTHROPLASTY, LEFT HIP, BIPOLAR;  Surgeon: Everett Sherman MD;  [place] [facility] [place]    SURGICAL PATHOLOGY EXAM      melanoma removal       Prior to Admission Medications   Prior to Admission Medications   Prescriptions Last Dose Informant Patient Reported? Taking?    LORazepam (ATIVAN) 0.5 MG tablet Unknown at prn  Yes Yes   Sig: Take 1 tablet by mouth every 8 hours as needed   Turmeric 500 MG CAPS 9/16/2024 at am  Yes Yes   Sig: Take 1 capsule by mouth daily   acetaminophen (TYLENOL) 325 MG tablet 9/16/2024 at am  No Yes   Sig: Take 2 tablets (650 mg) by mouth every 4 hours as needed for other (For optimal non-opioid multimodal pain management to improve pain control.)   chlorhexidine (PERIDEX) 0.12 % solution Unknown at prn  Yes Yes   Sig: Swish and spit 15 mLs in mouth 2 times daily as needed (PRN)   cyanocobalamin (VITAMIN B-12) 1000 MCG tablet 9/16/2024 at am  Yes Yes   Sig: Take 1,000 mcg by mouth daily   escitalopram (LEXAPRO) 20 MG tablet 9/15/2024 at pm  Yes Yes   Sig: Take 20 mg by mouth daily.   famotidine (PEPCID) 20 MG tablet 9/16/2024 at am  Yes Yes   Sig: Take 20 mg by mouth daily   lactulose (CHRONULAC) 10 GM/15ML solution Unknown at prn  Yes Yes   Sig: Take 15 mLs by mouth 2 times daily as needed   levothyroxine (SYNTHROID/LEVOTHROID) 100 MCG tablet 9/16/2024 at am  Yes Yes   Sig: Take 100 mcg by mouth daily.   magnesium 250 MG tablet 9/15/2024 at pm  Yes Yes   Sig: Take 1 tablet by mouth daily   megestrol (MEGACE) 40 MG/ML suspension 9/15/2024 at pm  Yes Yes   Sig: Take 10 mLs by mouth daily   nitroFURantoin macrocrystal-monohydrate (MACROBID) 100 MG capsule 9/16/2024 at am  Yes Yes   Sig: Take 100 mg by mouth 2 times daily.   ondansetron (ZOFRAN) 8 MG tablet Past Week at prn  Yes Yes   Sig: Take 8 mg by mouth every 8 hours as needed   oxyCODONE (ROXICODONE) 5 MG tablet Unknown at prn has supply  No Yes   Sig: Take 0.5 tablets (2.5 mg) by mouth every 4 hours as needed for severe pain   senna-docusate (SENOKOT-S/PERICOLACE) 8.6-50 MG tablet 9/16/2024 at am  Yes Yes   Sig: Take 2 tablets by mouth daily.   sodium chloride 1 GM tablet 9/16/2024 at am  No Yes   Sig: Take 1 tablet (1 g) by mouth 3 times daily (with meals)      Facility-Administered Medications:  None        Review of Systems    The 10 point Review of Systems is negative other than noted in the HPI or here.     Social History   I have reviewed this patient's social history and updated it with pertinent information if needed.  Social History     Tobacco Use    Smoking status: Former     Current packs/day: 0.00     Average packs/day: 0.5 packs/day for 8.0 years (4.0 ttl pk-yrs)     Types: Cigarettes     Start date: 1974     Quit date: 1982     Years since quittin.3    Smokeless tobacco: Former     Quit date: 1976   Substance Use Topics    Alcohol use: Yes     Comment: 2-3 per week         Family History   I have reviewed this patient's family history and updated it with pertinent information if needed.  Family History   Problem Relation Age of Onset    Breast Cancer Mother 75         Allergies   No Known Allergies     Physical Exam   Vital Signs: Temp: 98.2  F (36.8  C) Temp src: Oral BP: (!) 158/80 Pulse: 61   Resp: 14 SpO2: 100 % O2 Device: None (Room air)    Weight: 110 lbs 0 oz      General Aox3, appropriate affect, NAD, on RA  HEENT  MMM, EOMI, PERRL  Chest Adeq E b/l, No wheezing  Heart RRR, No M/R/G  Abd- Soft, not much discomfort  BS+  - + SPC with dark urine  Extremity- Moving all extremities, No digital clubbing,   No edema  Neuro- Aox3, grossly nonfocal gait not checked  Skin  Has no tattoo, No skin rash     Medical Decision Making       85 MINUTES SPENT BY ME on the date of service doing chart review, history, exam, documentation & further activities per the note.      ------------------ MEDICAL DECISION MAKING ------------------------------------------------------------------------------------------------------  MANAGEMENT DISCUSSED with the following over the past 24 hours: patient and care team       Data   ------------------------- PAST 24 HR DATA REVIEWED -----------------------------------------------    I have personally reviewed the following data over the past 24  hrs:    5.8  \   5.5 (LL)   / 228     133 (L) 100 20.8 /  102 (H)   2.6 (LL) 19 (L) 1.40 (H) \     ALT: 11 AST: 39 AP: 113 TBILI: 0.3   ALB: 3.6 TOT PROTEIN: 6.4 LIPASE: N/A     INR:  1.20 (H) PTT:  N/A   D-dimer:  N/A Fibrinogen:  N/A     Ferritin:  N/A % Retic:  3.5 (H) LDH:  N/A       Imaging results reviewed over the past 24 hrs:   Recent Results (from the past 24 hour(s))   CT Urogram wo & w Contrast    Narrative    EXAM: CT UROGRAM WO and W CONTRAST  LOCATION: [facility]  DATE: 9/16/2024    INDICATION: Status post suprapubic catheter. Ongoing bloody urine. Anemia.   COMPARISON: CT abdomen pelvis 8/18/2024.   TECHNIQUE: CT scan of the abdomen and pelvis using urogram technique with pre contrast, post contrast, and delayed images. Multiplanar reformats were obtained. Dose reduction techniques were used.   CONTRAST: 75ml isovue 370    FINDINGS:     LOWER CHEST: Unchanged small consolidative opacity within the medial right lower lobe, peripheral right lower lobe bronchiectasis, and scattered areas of scarring at both lung bases. No pleural effusions.     HEPATOBILIARY: Gallbladder is contracted. No biliary ductal dilation. Scattered punctate hepatic granulomas. No suspicious liver lesions.     PANCREAS: Normal.    SPLEEN: Innumerable calcified granulomas.     ADRENAL GLANDS: Normal.    RIGHT KIDNEY/URETER: No renal or ureteral calculi. Small hypodense cortical cyst at the lower pole the right kidney is unchanged and does not require follow-up. No solid renal mass. No hydronephrosis or evidence of upper tract urothelial malignancy.     LEFT KIDNEY/URETER: Two nonobstructing left renal calculi measuring up to 2 mm. No ureteral calculi. Tiny subcentimeter cystic lesion is too small to characterize but is likely a cyst and does not require follow-up. No solid renal mass. No hydronephrosis   or evidence of upper tract urothelial malignancy.     BLADDER: Bladder is obscured by streak artifact  from left hip arthroplasty hardware. A suprapubic catheter is present within the bladder, with expected intraluminal gas. Bladder wall appears mildly thickened. No definite intraluminal bladder clot is   visualized.     BOWEL: No bowel obstruction or inflammation.     LYMPH NODES: No enlarged lymph nodes.     VASCULATURE: Patent portal, splenic, and superior mesenteric veins. Moderate aortobiiliac atherosclerosis. No abdominal aortic aneurysm.     PELVIC ORGANS: Numerous brachytherapy seeds within the prostate. Fluid and gas containing right pelvic abscess measuring 4.0 x 2.5 x 2.8 cm (6/#152, 7/#42), which is partially obscured by artifact. Multiple brachytherapy seeds are present within the   abscess. There are also 2 new brachytherapy seeds in the region of the bulbar urethra (8/#57, 6/#166). A single brachytherapy seed in the region of the left penile bulb (6/#164) is unchanged.     MUSCULOSKELETAL: Left hip arthroplasty hardware. Numerous sclerotic metastases throughout the visualized spine, lower ribs, and pelvis have not significantly changed. Compression fracture of the T12 vertebral body has worsened. Additional compression   fractures of the L1-L5 vertebral bodies have not significantly changed.       Impression    IMPRESSION:    1.  Two new migrated brachytherapy seeds in the region of the bulbar urethra, which could be a cause of hematuria.     2.  Gas and fluid containing 4.0 cm right pelvic abscess, which also contains multiple brachytherapy seeds.    3.  Appropriately positioned suprapubic catheter. Mild bladder wall thickening could reflect cystitis. No large intraluminal bladder clots, although evaluation of the bladder is limited by artifact from left hip arthroplasty hardware.    4.  Two nonobstructing left renal calculi measuring up to 2 mm. No right urinary calculi.    5.  No solid renal mass or evidence of upper tract urothelial malignancy.    6.  Compression fracture of the T12 vertebral body  has worsened since 8/18/2024. Multiple lumbar spinal compression fractures have not significantly changed.    7.  Sclerotic skeletal metastases have not significantly changed.

## 2024-09-18 ENCOUNTER — HOME INFUSION (PRE-WILLOW HOME INFUSION) (OUTPATIENT)
Dept: PHARMACY | Facility: CLINIC | Age: 84
End: 2024-09-18
Payer: COMMERCIAL

## 2024-09-18 LAB
ANION GAP SERPL CALCULATED.3IONS-SCNC: 9 MMOL/L (ref 7–15)
BACTERIA UR CULT: NORMAL
BUN SERPL-MCNC: 16.1 MG/DL (ref 8–23)
CALCIUM SERPL-MCNC: 7.7 MG/DL (ref 8.8–10.4)
CHLORIDE SERPL-SCNC: 109 MMOL/L (ref 98–107)
CREAT SERPL-MCNC: 1.17 MG/DL (ref 0.67–1.17)
EGFRCR SERPLBLD CKD-EPI 2021: 62 ML/MIN/1.73M2
ERYTHROCYTE [DISTWIDTH] IN BLOOD BY AUTOMATED COUNT: 19 % (ref 10–15)
GLUCOSE SERPL-MCNC: 90 MG/DL (ref 70–99)
HCO3 SERPL-SCNC: 18 MMOL/L (ref 22–29)
HCT VFR BLD AUTO: 23.7 % (ref 40–53)
HGB BLD-MCNC: 8 G/DL (ref 13.3–17.7)
MAGNESIUM SERPL-MCNC: 1.5 MG/DL (ref 1.7–2.3)
MAGNESIUM SERPL-MCNC: 2.1 MG/DL (ref 1.7–2.3)
MCH RBC QN AUTO: 31.5 PG (ref 26.5–33)
MCHC RBC AUTO-ENTMCNC: 33.8 G/DL (ref 31.5–36.5)
MCV RBC AUTO: 93 FL (ref 78–100)
PLATELET # BLD AUTO: 177 10E3/UL (ref 150–450)
POTASSIUM SERPL-SCNC: 3.7 MMOL/L (ref 3.4–5.3)
RBC # BLD AUTO: 2.54 10E6/UL (ref 4.4–5.9)
SODIUM SERPL-SCNC: 136 MMOL/L (ref 135–145)
VANCOMYCIN SERPL-MCNC: 10.7 UG/ML
WBC # BLD AUTO: 4.9 10E3/UL (ref 4–11)

## 2024-09-18 PROCEDURE — 99232 SBSQ HOSP IP/OBS MODERATE 35: CPT | Performed by: INTERNAL MEDICINE

## 2024-09-18 PROCEDURE — 80202 ASSAY OF VANCOMYCIN: CPT | Performed by: INTERNAL MEDICINE

## 2024-09-18 PROCEDURE — 120N000001 HC R&B MED SURG/OB

## 2024-09-18 PROCEDURE — 83735 ASSAY OF MAGNESIUM: CPT | Performed by: INTERNAL MEDICINE

## 2024-09-18 PROCEDURE — 80048 BASIC METABOLIC PNL TOTAL CA: CPT | Performed by: INTERNAL MEDICINE

## 2024-09-18 PROCEDURE — 36415 COLL VENOUS BLD VENIPUNCTURE: CPT | Performed by: INTERNAL MEDICINE

## 2024-09-18 PROCEDURE — 250N000011 HC RX IP 250 OP 636: Performed by: INTERNAL MEDICINE

## 2024-09-18 PROCEDURE — 99232 SBSQ HOSP IP/OBS MODERATE 35: CPT | Performed by: FAMILY MEDICINE

## 2024-09-18 PROCEDURE — 999N000147 HC STATISTIC PT IP EVAL DEFER

## 2024-09-18 PROCEDURE — 250N000013 HC RX MED GY IP 250 OP 250 PS 637: Performed by: INTERNAL MEDICINE

## 2024-09-18 PROCEDURE — 85027 COMPLETE CBC AUTOMATED: CPT | Performed by: INTERNAL MEDICINE

## 2024-09-18 RX ORDER — VANCOMYCIN HYDROCHLORIDE 1 G/200ML
1000 INJECTION, SOLUTION INTRAVENOUS EVERY 24 HOURS
Status: DISCONTINUED | OUTPATIENT
Start: 2024-09-18 | End: 2024-09-20

## 2024-09-18 RX ORDER — MAGNESIUM SULFATE HEPTAHYDRATE 40 MG/ML
2 INJECTION, SOLUTION INTRAVENOUS ONCE
Status: COMPLETED | OUTPATIENT
Start: 2024-09-18 | End: 2024-09-18

## 2024-09-18 RX ADMIN — PIPERACILLIN AND TAZOBACTAM 3.38 G: 3; .375 INJECTION, POWDER, FOR SOLUTION INTRAVENOUS at 09:55

## 2024-09-18 RX ADMIN — POTASSIUM CHLORIDE AND SODIUM CHLORIDE: 900; 150 INJECTION, SOLUTION INTRAVENOUS at 15:55

## 2024-09-18 RX ADMIN — MEGESTROL ACETATE 400 MG: 40 SUSPENSION ORAL at 21:23

## 2024-09-18 RX ADMIN — PIPERACILLIN AND TAZOBACTAM 3.38 G: 3; .375 INJECTION, POWDER, FOR SOLUTION INTRAVENOUS at 18:49

## 2024-09-18 RX ADMIN — SODIUM CHLORIDE TAB 1 GM 1 G: 1 TAB at 09:55

## 2024-09-18 RX ADMIN — VANCOMYCIN HYDROCHLORIDE 1000 MG: 1 INJECTION, SOLUTION INTRAVENOUS at 15:52

## 2024-09-18 RX ADMIN — SODIUM CHLORIDE TAB 1 GM 1 G: 1 TAB at 13:11

## 2024-09-18 RX ADMIN — SENNOSIDES AND DOCUSATE SODIUM 2 TABLET: 8.6; 5 TABLET ORAL at 09:55

## 2024-09-18 RX ADMIN — MEGESTROL ACETATE 400 MG: 40 SUSPENSION ORAL at 00:26

## 2024-09-18 RX ADMIN — MAGNESIUM OXIDE TAB 400 MG (241.3 MG ELEMENTAL MG) 200 MG: 400 (241.3 MG) TAB at 09:55

## 2024-09-18 RX ADMIN — POTASSIUM CHLORIDE AND SODIUM CHLORIDE: 900; 150 INJECTION, SOLUTION INTRAVENOUS at 03:43

## 2024-09-18 RX ADMIN — FAMOTIDINE 20 MG: 10 INJECTION, SOLUTION INTRAVENOUS at 21:24

## 2024-09-18 RX ADMIN — ESCITALOPRAM OXALATE 20 MG: 20 TABLET ORAL at 09:55

## 2024-09-18 RX ADMIN — Medication 325 MG: at 09:55

## 2024-09-18 RX ADMIN — SODIUM CHLORIDE TAB 1 GM 1 G: 1 TAB at 18:49

## 2024-09-18 RX ADMIN — Medication 325 MG: at 19:32

## 2024-09-18 RX ADMIN — LEVOTHYROXINE SODIUM 100 MCG: 100 TABLET ORAL at 06:04

## 2024-09-18 RX ADMIN — CYANOCOBALAMIN TAB 1000 MCG 1000 MCG: 1000 TAB at 09:55

## 2024-09-18 RX ADMIN — MAGNESIUM SULFATE HEPTAHYDRATE 2 G: 40 INJECTION, SOLUTION INTRAVENOUS at 04:38

## 2024-09-18 RX ADMIN — PIPERACILLIN AND TAZOBACTAM 3.38 G: 3; .375 INJECTION, POWDER, FOR SOLUTION INTRAVENOUS at 02:11

## 2024-09-18 ASSESSMENT — ACTIVITIES OF DAILY LIVING (ADL)
ADLS_ACUITY_SCORE: 30
VISION_MANAGEMENT: GLASSES
DIFFICULTY_COMMUNICATING: NO
CHANGE_IN_FUNCTIONAL_STATUS_SINCE_ONSET_OF_CURRENT_ILLNESS/INJURY: NO
ADLS_ACUITY_SCORE: 44
EQUIPMENT_CURRENTLY_USED_AT_HOME: WALKER, STANDARD;WALKER, ROLLING
ADLS_ACUITY_SCORE: 32
ADLS_ACUITY_SCORE: 30
DRESSING/BATHING_DIFFICULTY: NO
DOING_ERRANDS_INDEPENDENTLY_DIFFICULTY: NO
HEARING_DIFFICULTY_OR_DEAF: NO
ADLS_ACUITY_SCORE: 32
ADLS_ACUITY_SCORE: 29
ADLS_ACUITY_SCORE: 44
FALL_HISTORY_WITHIN_LAST_SIX_MONTHS: NO
DIFFICULTY_EATING/SWALLOWING: NO
ADLS_ACUITY_SCORE: 32
ADLS_ACUITY_SCORE: 30
ADLS_ACUITY_SCORE: 32
WEAR_GLASSES_OR_BLIND: YES
ADLS_ACUITY_SCORE: 44
ADLS_ACUITY_SCORE: 44
ADLS_ACUITY_SCORE: 29
ADLS_ACUITY_SCORE: 44
ADLS_ACUITY_SCORE: 32
ADLS_ACUITY_SCORE: 32
TOILETING_ISSUES: NO
ADLS_ACUITY_SCORE: 44
ADLS_ACUITY_SCORE: 30
ADLS_ACUITY_SCORE: 32
ADLS_ACUITY_SCORE: 32
ADLS_ACUITY_SCORE: 29
ADLS_ACUITY_SCORE: 32
CONCENTRATING,_REMEMBERING_OR_MAKING_DECISIONS_DIFFICULTY: NO
WALKING_OR_CLIMBING_STAIRS_DIFFICULTY: NO
ADLS_ACUITY_SCORE: 44

## 2024-09-18 NOTE — CARE PLAN
Dual Skin Assessment Note:    Patient: ED admit    Comprehensive skin inspection completed by myself and Ramakrishna MALONEY    LDA Initiated for skin breakdown/non-blanchable redness:NA    Provider notified: NA     If yes, WOC Consult order obtained: KIMBERLYN.

## 2024-09-18 NOTE — PROGRESS NOTES
Therapy: IV abx  Insurance: [employer] Adv    Pt does not have coverage for IV abx with their [employer] Medicare Advantage plan. Drug would be billed to the part D and supplies would be self-pay. Based on Vanco 750mg q24h and Zosyn 3.375gm q8h total cost is $77.49/day for drug and supplies.     For nursing, patient should have coverage if homebound, however [facility] is not contracted with Medicare and an outside nursing agency would be utilized instead. If patient is not homebound, there is no coverage and [facility] can see patient if patient agrees to self-pay for $90 per visit.      In reference to admission on 9/16/24-[facility]     Please contact Intake with any questions, [phone] or In Basket pool,  Home Infusion ([phone]).

## 2024-09-18 NOTE — PLAN OF CARE
Problem: Adult Inpatient Plan of Care  Goal: Absence of Hospital-Acquired Illness or Injury  Outcome: Progressing     Problem: Adult Inpatient Plan of Care  Goal: Optimal Comfort and Wellbeing  Outcome: Progressing     Problem: Infection  Goal: Absence of Infection Signs and Symptoms  Outcome: Progressing    Patient is afebrile. Pt receiving zosyn and vancomycin. On IVF. Magnesium was recheck  and did not need further replacement

## 2024-09-18 NOTE — PHARMACY-VANCOMYCIN DOSING SERVICE
"Pharmacy Vancomycin Note  Date of Service 2024  Patient's  1940   84 year old, male    Indication: Intra-abdominal infection  Day of Therapy: 2  Current vancomycin regimen:  750 mg IV q18h  Current vancomycin monitoring method: AUC  Current vancomycin therapeutic monitoring goal: 400-600 mg*h/L    InsightRX Prediction of Current Vancomycin Regimen  Regimen: 750 mg IV every 18 hours.  Start time: 16:31 on 2024  Exposure target: AUC24 (range)400-600 mg/L.hr   AUC24,ss: 532 mg/L.hr  Probability of AUC24 > 400: 94 %  Ctrough,ss: 17.4 mg/L  Probability of Ctrough,ss > 20: 30 %    Current estimated CrCl = Estimated Creatinine Clearance: 32.3 mL/min (based on SCr of 1.17 mg/dL).    Creatinine for last 3 days  2024:  4:48 PM Creatinine 1.40 mg/dL  2024:  7:57 AM Creatinine 1.24 mg/dL  2024:  2:16 AM Creatinine 1.17 mg/dL    Recent Vancomycin Levels (past 3 days)  2024: 11:45 AM Vancomycin 10.7 ug/mL    Vancomycin IV Administrations (past 72 hours)                     vancomycin (VANCOCIN) 750 mg in sodium chloride 0.9 % 250 mL intermittent infusion (mg) 750 mg New Bag 24 1631    vancomycin (VANCOCIN) 1,250 mg in sodium chloride 0.9 % 250 mL intermittent infusion (mg) 1,250 mg New Bag 24 2130                    Nephrotoxins and other renal medications (From now, onward)      Start     Dose/Rate Route Frequency Ordered Stop    24 1600  vancomycin (VANCOCIN) 1,000 mg in 200 mL dextrose intermittent infusion         1,000 mg  200 mL/hr over 1 Hours Intravenous EVERY 24 HOURS 24 1349      24 0300  piperacillin-tazobactam (ZOSYN) 3.375 g vial to attach to  mL bag        Note to Pharmacy: For [facility], [facility] and [facility]: For Zosyn-naive patients, use the \"Zosyn initial dose + extended infusion\" order panel.    3.375 g  over 240 Minutes Intravenous EVERY 8 HOURS 24 2132                 Contrast Orders - past 72 hours (72h ago, onward)      Start     " Dose/Rate Route Frequency Stop    09/16/24 1930  iopamidol (ISOVUE-370) solution 75 mL         75 mL Intravenous ONCE 09/16/24 1905            Interpretation of levels and current regimen:  Vancomycin level is reflective of -600    Has serum creatinine changed greater than 50% in last 72 hours: No    Urine output:  good urine output    Renal Function: Improving    InsightRX Prediction of Planned New Vancomycin Regimen  Regimen: 1000 mg IV every 24 hours.  Start time: 16:31 on 09/18/2024  Exposure target: AUC24 (range)400-600 mg/L.hr   AUC24,ss: 531 mg/L.hr  Probability of AUC24 > 400: 95 %  Ctrough,ss: 16.3 mg/L  Probability of Ctrough,ss > 20: 23 %  Probability of nephrotoxicity (Lodise HAYLEY 2009): 12 %      Plan:  Change dose to vancomycin 1000 mg IV q 24h  Vancomycin monitoring method: AUC  Vancomycin therapeutic monitoring goal: 400-600 mg*h/L  Pharmacy will check vancomycin levels as appropriate in 1-3 Days.  Serum creatinine levels will be ordered daily for the first week of therapy and at least twice weekly for subsequent weeks.    Musa Blake [facility]

## 2024-09-18 NOTE — PLAN OF CARE
Goal Outcome Evaluation:  Patient denies pain. Patient on potassium protocol. Potassium 3.4. Patient to receive dose of potassium. Report called to P2 nurse.

## 2024-09-18 NOTE — PROGRESS NOTES
[facility]    Referral received  from Keshia Wheeler RN CM, for IV antibiotics.    Benefits verified. Pt does not have full coverage for IV antibiotics under his [employer] Advantage plan.  The drug would be billed to the Part D and the supplies and copay would be self-pay.  Based on both the current Vanco and Zosyn doses, the total cost is $77.49/day for drug and supplies.    For nursing, the patient should have coverage if he is homebound.  If pt is not homebound, [facility] could provide home nurse visits for $90 per visit.  Patient may have coverage in a TCU or infusion center, if pt meets criteria.    If pt requires and chooses to do home infusions, liaison will speak with pt to review benefits, home infusion and to offer choice of agency/home infusion provider.    Thank you for the referral.    Joan Moeller RN, BSN  [facility] Liaison  [phone] (Mon through Fri, 8:00 am-5:00 pm)  [phone] (Office)

## 2024-09-18 NOTE — PROGRESS NOTES
[facility]    Medicine Progress Note - Hospitalist Service    Date of Admission:  9/16/2024    Assessment & Plan   84 year old male with h/o metastatic prostate cancer, hypothyroidism, rectal bleeding, chronic spinal compression fractures and hyponatremia presents 9/16/2024 with acute cystitis, acute on chronic anemia, acute renal failure, R pelvic abscesses, and acute blood loss anemia requiring PRBC transfusion. Pelvic abscess not amenable for drainage so urology and ID involved. Palliative care brought on 9/17/2024 for goals of care in which patient was amenable for hospice consultation    R Pelvic abscess  - CT shows gas and fluid containing 4.0 cm right pelvic abscess, which also contains multiple brachytherapy seeds   -- IR feels this is not amenable to drainage  Plan  -- ID with plans of PICC and IV abx x2 weeks followed by reimage vs palliative approach  -- continue current zosyn and vanco  -- CT cystogram per urology pending    Metastatic prostate cancer s/p prostatectomy and brachytherapy with chronic SP catheter  -- Ct shows compression fracture of the T12 vertebral body has worsened since 8/18/2024. Multiple lumbar spinal compression fractures have not significantly changed. Sclerotic skeletal metastases have not significantly changed.   -- continue home megace  -- pain control as needed, continue tylenol and home oxycodone and have dilaudid as needed also available  -- urology following   -- Palliative care consulted 9/17/2024: Patient amenable with hospice consult with potential comfort care on discharge    JORJE - improved  CT shows appropriately positioned suprapubic catheter, nonobstructing renal calculit and no evidence of hydronephrosis  - NS 75cc/hr IV    ABL anemia  Hematuria from suprapubic cath  Denies any GI blood loss  CT shows wo new migrated brachytherapy seeds in the region of the bulbar urethra, which could be a cause of hematuria. There is otherwise appropriately  "positioned suprapubic catheter, mild bladder wall thickening, no large intraluminal bladder clots   -- urology consult  -- Transfused 2U PRBC on admission and Hgb improved from 5.5 to 8.2  -- trend Hgb now and in AM if stable  -- transfuse as needed  -- Iron slightly low, will start PO iron replacement  -- continue home B12 replacement    GERD: continue home pepcid  Chronic hyponatremia: continue home salt tabs  Chronic hypomagnesemia: continue home replacement, place on replacement protocol  Hypokalemia: replace and recheck, start potassium containing IVF  Chronic constipation: continue home lactulose  Hypothyroidism: continue home replacement   Mood disorder: continue home lexapro and prn ativan       Diet: Regular Diet Adult    DVT Prophylaxis: Pneumatic Compression Devices  Forte Catheter: Not present  Lines: None     Cardiac Monitoring: None  Code Status: No CPR- Do NOT Intubate      Clinically Significant Risk Factors        # Hypokalemia: Lowest K = 2.6 mmol/L in last 2 days, will replace as needed  # Hyponatremia: Lowest Na = 133 mmol/L in last 2 days, will monitor as appropriate    # Hypomagnesemia: Lowest Mg = 1.5 mg/dL in last 2 days, will replace as needed   # Hypoalbuminemia: Lowest albumin = 2.8 g/dL at 9/17/2024  7:57 AM, will monitor as appropriate    # Coagulation Defect: INR = 1.20 (Ref range: 0.85 - 1.15) and/or PTT = 35 Seconds (Ref range: 22 - 38 Seconds), will monitor for bleeding              # Cachexia: Estimated body mass index is 17.01 kg/m  as calculated from the following:    Height as of this encounter: 1.676 m (5' 6\").    Weight as of this encounter: 47.8 kg (105 lb 6.4 oz)., PRESENT ON ADMISSION       # Financial/Environmental Concerns:                 Disposition Plan   Medically Ready for Discharge: Anticipated in 2-4 Days      Rusty Garcia MD  Hospitalist Service  [facility]  Securely message with [employer] (more info)  Text page via [facility] Paging/Directory "   ______________________________________________________________________    Interval History   No acute events overnight. On evaluation this morning, he was returning to his room after ambulating with bedside RN. Denied any dizziness, blurred vision or pre-syncopal events during ambulation. Currently without any fevers, chills, hematochezia, hematuria. Forte bag with clear yellow urine. He was updated of the need to await for CT cystogram per urology.    Physical Exam   Vital Signs: Temp: 98.1  F (36.7  C) Temp src: Oral BP: (!) 152/74 Pulse: 64   Resp: 18 SpO2: 100 % O2 Device: None (Room air)    Weight: 105 lbs 6.4 oz  General: NAD  RESPIRATORY: Breathing nonlabored  CARDIOVASCULAR: No le edema bilat.   ABDOMEN: soft and non-tender  NEUROLOGIC: Motor and sensory intact, speech clear         >50 MINUTES SPENT BY ME on the date of service doing chart review, history, exam, documentation & further activities per the note.  Data

## 2024-09-18 NOTE — PROGRESS NOTES
Physical Therapy: Orders received. Chart reviewed and discussed with care team.? Physical Therapy not indicated due to patient plans to return home with hospice.? Defer discharge recommendations to treatment team..? Will complete orders.

## 2024-09-18 NOTE — PLAN OF CARE
Problem: Infection  Goal: Absence of Infection Signs and Symptoms  Outcome: Progressing     Problem: Electrolyte Imbalance  Goal: Electrolyte Balance  Outcome: Progressing   Goal Outcome Evaluation:    Patient is afebrile. Given iv Zosyn. On IVF. K 3.7 recheck tomorrow. Mg 1.5, replaced. To be rechecked at 0910.

## 2024-09-18 NOTE — CONSULTS
Care Management Follow Up    Length of Stay (days): 2    Expected Discharge Date: 09/19/2024     Concerns to be Addressed:     hospice consult  Patient plan of care discussed at interdisciplinary rounds: Yes    Anticipated Discharge Disposition: Home with hospice        Anticipated Discharge Services:  hospice, palliative   Anticipated Discharge DME:  per hospice    Patient/family educated on Medicare website which has current facility and service quality ratings:  yes  Education Provided on the Discharge Plan:  yes  Patient/Family in Agreement with the Plan:  yes    Referrals Placed by CM/SW:  hospice  Private pay costs discussed: Not applicable    Discussed  Partnership in Safe Discharge Planning  document with patient/family: N/A, patient is appropriately participating in discharge planning     Handoff Completed: No, handoff not indicated or clinically appropriate    Additional Information:  CM received consult from Palliative Care Provider for informational hospice consult.    CM met with patient in patient's room, discussed options.  Patient would like more information from [facility] hospice.  Patient would like his daughter to be apart of this conversation, she is available after 4:30 during the week and anytime on Saturday.  CM submitted referral to [facility] () [facility] and Palliative care.      Nat from [facility] called.  Hospice is not able to sign patient on in the evening, though they could come to the hospital or patients home on Saturday (sometime between 9:30 AM - 1 PM).  Hospice could also deliver equipment to the home prior to singing on, if needed.   CM spoke with patient, patient would prefer a Saturday home visit from [facility] as patient had been told by the provider that he will likely discharge Friday.     Hospice: Nat [phone]    Per [facility] ([facility]), patent does not have full coverage for IV abx home infusion.    Next Steps: [facility] home  visit for Saturday.    Dona Rockwell RN

## 2024-09-18 NOTE — PROGRESS NOTES
"[facility] Progress Note    SUBJECTIVE:  Wife here.  Her arm is in a sling.  He has poor medical insight I think.        REVIEW OF SYSTEMS:  Negative unless as listed above.  Social history, Family history, Medications: reviewed.    OBJECTIVE:  BP (!) 152/74 (BP Location: Right arm)   Pulse 64   Temp 98.1  F (36.7  C) (Oral)   Resp 18   Ht 1.676 m (5' 6\")   Wt 47.8 kg (105 lb 6.4 oz)   SpO2 100%   BMI 17.01 kg/m                PHYSICAL EXAM:  Alert, awake  Vitals tabulated above, reviewed  Neck supple without lymphadenopathy  Sclera normal color, not injected  CARDIOVASCULAR regular rate and rhythm, no murmur  Lungs CLEAR TO AUSCULTATION   Abdomen soft, NT/ND, absent HEPATOSPLENOMEGALY  Skin normal  Joints normal  Neurologic exam non focal    Antibiotics:zosyn, vanco    Pertinent labs:    Recent Labs   Lab Test 09/18/24  0216 09/17/24  1429 09/17/24  0757 09/17/24  0102 09/16/24  1648   WBC 4.9  --  4.4  --  5.8   HGB 8.0* 8.7* 8.2*   < > 5.5*   HCT 23.7*  --  24.8*  --  16.2*   MCV 93  --  95  --  99     --  140*  --  228    < > = values in this interval not displayed.       Lab Results   Component Value Date    RBC 2.54 09/18/2024     Lab Results   Component Value Date    HGB 8.0 09/18/2024     Lab Results   Component Value Date    HCT 23.7 09/18/2024     No components found for: \"MCT\"  Lab Results   Component Value Date    MCV 93 09/18/2024     Lab Results   Component Value Date    MCH 31.5 09/18/2024     Lab Results   Component Value Date    MCHC 33.8 09/18/2024     Lab Results   Component Value Date    RDW 19.0 09/18/2024     Lab Results   Component Value Date     09/18/2024       Last Comprehensive Metabolic Panel:  Sodium   Date Value Ref Range Status   09/18/2024 136 135 - 145 mmol/L Final     Potassium   Date Value Ref Range Status   09/18/2024 3.7 3.4 - 5.3 mmol/L Final     Chloride   Date Value Ref Range Status   09/18/2024 109 (H) 98 - 107 mmol/L Final     Carbon " "Dioxide (CO2)   Date Value Ref Range Status   09/18/2024 18 (L) 22 - 29 mmol/L Final     Anion Gap   Date Value Ref Range Status   09/18/2024 9 7 - 15 mmol/L Final     Glucose   Date Value Ref Range Status   09/18/2024 90 70 - 99 mg/dL Final     GLUCOSE BY METER POCT   Date Value Ref Range Status   05/26/2024 135 (H) 70 - 99 mg/dL Final     Urea Nitrogen   Date Value Ref Range Status   09/18/2024 16.1 8.0 - 23.0 mg/dL Final     Creatinine   Date Value Ref Range Status   09/18/2024 1.17 0.67 - 1.17 mg/dL Final     GFR Estimate   Date Value Ref Range Status   09/18/2024 62 >60 mL/min/1.73m2 Final     Comment:     eGFR calculated using 2021 CKD-EPI equation.     GFR, ESTIMATED POCT   Date Value Ref Range Status   09/30/2021 52 (L) >60 mL/min/1.73m2 Final     Calcium   Date Value Ref Range Status   09/18/2024 7.7 (L) 8.8 - 10.4 mg/dL Final     Comment:     Reference intervals for this test were updated on 7/16/2024 to reflect our healthy population more accurately. There may be differences in the flagging of prior results with similar values performed with this method. Those prior results can be interpreted in the context of the updated reference intervals.       Liver Function Studies -   Recent Labs   Lab Test 09/17/24  0757   PROTTOTAL 5.5*   ALBUMIN 2.8*   BILITOTAL 0.4   ALKPHOS 103   AST 32   ALT 8       No results found for: \"SED\"    No results found for: \"CRP\"            MICROBIOLOGY DATA:  Nothing + to date      IMAGING/RADIOLOGY:          ASSESSMENT:  Gas and fluid containing 4.0 cm right pelvic abscess, which also contains multiple brachytherapy seeds.   Advancing (I think) cancerous process as reported on MRI also    RECOMMENDATION:  On abx  My plan would be picc and IV abx for 2 wks then reimage, but I would also very much support palliative approach and just not doing much more.  Could try empiric oral abx and see how that goes.  May need other services to each get on one or the other side of this balance " beam.  Urology staff input?    ANNIE Peterson MD  Office [phone] option 2 to desk staff

## 2024-09-18 NOTE — PROGRESS NOTES
Place of Service:  [facility]     Reason for follow up: Pelvic abscess    SUBJECTIVE:  Events: no acute events overnight    Patient reports he is feeling well. Denies abdominal and bilateral flank pain, fever, chills. SP draining well.    OBJECTIVE:  PHYSICAL EXAM:  Temp: 98.1  F (36.7  C) Temp src: Oral BP: (!) 152/74 Pulse: 64   Resp: 18 SpO2: 100 % O2 Device: None (Room air)    General: NAD, alert, cooperative, sitting at side of bed  Head: normocephalic, without abnormality / atraumatic  Abdomen: soft, non tender, non distended. no suprapubic fullness, no suprapubic tenderness. No bilateral CVA tenderness. SP exit site is dry, SP draining cloudy crescencio colored urine, good output.   Genitourinary: Deferred.   Skin: No rashes or lesions  Musculoskeletal: moves all four extremities equally.   Psychological: alert and oriented, answers questions appropriately    LABS:  Creatinine   Date Value Ref Range Status   09/18/2024 1.17 0.67 - 1.17 mg/dL Final     WBC Count   Date Value Ref Range Status   09/18/2024 4.9 4.0 - 11.0 10e3/uL Final     Hemoglobin   Date Value Ref Range Status   09/18/2024 8.0 (L) 13.3 - 17.7 g/dL Final     Platelet Count   Date Value Ref Range Status   09/18/2024 177 150 - 450 10e3/uL Final       UA:  UA RESULTS:  Recent Labs   Lab Test 09/17/24  0832   COLOR Yellow   APPEARANCE Cloudy*   URINEGLC Negative   URINEBILI Negative   URINEKETONE Negative   SG 1.015   UBLD >1.0 mg/dL*   URINEPH 6.5   PROTEIN 300*   NITRITE Negative   LEUKEST 500 Lei/uL*   RBCU >182*   WBCU >182*       Cultures:  Urine 9/17: 10-50k mix of urogenital sierra     Blood 9/16: NGTD    Lab Results: personally reviewed.     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   - WBC 4.9. Afebrile. UC: 10-50k mix of urogenital sierra. BC: NGTD. Continue broad spectrum IV antibiotic for now, adjust as needed pending final cultures/sensitivities.   - Hgb 8.0 today from 8.7 on 9/17, s/p PRBC transfusion 9/16. Small amount of blood noted on pull up from urethral meatus 9/16. Urine from SP cath crescencio.  Monitor.  - Per Dr. Schmidt, with disease progression as it is, life expectancy is likely weeks to maybe 2-3 months. Palliative now following with patient, appreciate patient guidance/recs. At patient's request, re-discussed options of discharge on hospice with or without po antibiotics vs more aggressive evaluation/care which would include CT cystogram through SP tube to help define anatomy. If fistulous communication with bladder, would then consider bilateral PNT placement in effort to improve (but unlikely to resolve) the fistula/abscess. Discussed with patient given disease progression, age, anemia, etc, pt would be poor surgical candidate for fistula repair. Pt would like to better understand anatomy and wishes to proceed with CT cystogram on 9/19 (ordered). He reports he has an outpatient Palliative visit planned with his family that he intends to keep.   - Will continue to follow.     Updated:  Dr Marilyn Schmidt and D/w Dr. Grover Miranda, APRN, CNP  [facility]   [phone]

## 2024-09-18 NOTE — PLAN OF CARE
Problem: Adult Inpatient Plan of Care  Goal: Optimal Comfort and Wellbeing  Outcome: Progressing   Goal Outcome Evaluation:       ED admit, arrived on the unit via wheelchair, was oriented to room, call light, bed and tv controls, dual skin check done, alert and oriented, denies pain and discomfort, carlton intact and patent

## 2024-09-18 NOTE — PROGRESS NOTES
"  PALLIATIVE CARE PROGRESS NOTE  [facility]     Patient Name: Kilo Montiel  Date of Admission: 9/16/2024   Today the patient was seen for: support, follow up goals of care discussion in context of metastatic prostate cancer, UTI, pelvic fluid collection, cancer cachexia and anorexia and asthenia.     Recommendations & Counseling     Use of the term \"palliative\" can be confusing to patients and families.  Today we reviewed \"palliative care\" is a subspecialty (like cardiology, nephrology, ID, etc) and not a goal of care.    I also noted a treatments may be curative-intent vs palliative-intent--and that the nephrostomy tubes (if needed due to fistula or recurrent occlusion of the SP catheter) would be a palliative-intent procedure to lessen likelihood of pain/discomfort from bladder spasms or enlargement of the pelvic fluid collection.    Recommend further discussion with urology and Kilo/wife Lizet re: next steps.  I suspect he will assent to CT cystogram + PNTs if recommended if it would manage pain symptoms and lessen potential need for opioids.  Mental clarity is an important value to him.    GOALS OF CARE:   Life-prolonging with limits (DNR/DNI).  Continue antibiotics, all present treatments in hopes of improving symptoms of fatigue, pain, hematuria.   Today I also noted escalation to ICU may not provide meaningful benefit--Kilo and his family will discuss and inform medical team if he would prefer to pivot to comfort if life expectancy contracted to hours-short days and usual path would be going to the ICU/escalating care.  Kilo voices awareness that urology team favors life expectancy of weeks-short months (2-3), and we have discussed with daughter Casie and wife Lizet.  He is considering a pivot to comfort focused goals at discharge and care management is assisting with coordinating an informational consult.  He has [facility], inclined toward [facility] and may be " interested in [facility] community based palliative should he and his family not be ready to pivot fully to comfort focused goals.  Hospice typically will not cover IV antibiotics (sometimes will make exceptions for time-limited period of treatment) but will cover oral antibiotics.      ADVANCE CARE PLANNING:  No health care directive on file. Per system policy, Surrogate Decision-makers for Patients With Diminished Decision-making Capacity offers guidance on possible decision-makers. Wife Lizet and daughter Casie has been identified as a surrogate decision maker.   Kiol does have decisional capacity based on my assessment today and is able to voice a coherent understanding of his medical situation.  The medical system/familiarity with home care, hospice, EOL care is not familiar to Kilo or Lizet.  There is a POLST form on file, this was reviewed and current. (DNR/selective from 5/30/24)  Code status: No CPR- Do NOT Intubate    MEDICAL MANAGEMENT:   We are not actively managing symptoms at this time.  #Pain,acute on chronic , cancer related and suspect some pain also could be attributable to fluid collection?urinoma vs abscess in pelvis.  Recommend scheduled APAP  Presently on oxycodone IR 2.5mg q4H prn (agree)  Has IV hydromorphone 0.2 (mod)-0.4(severe) mg IV q2h prn pain (agree)   Continue to treat infection  Heat and repositioning     #General Weakness/Debility / Cancer asthenia plus acute on chronic blood loss anemia  Did PT/OT post ORIF of femur fracture in June post discharge.  ECOG =3     #Constipation,Functional constipation  Continue lactulose BID  On senna-docusate 2 tabs daily--consider stopping docusate and using senna alone.     Mood disorder (depression)  - continue escitalopram 20mg daily  - was on lorazepam before     Anorexia/cachexia, cancer related ; protein calorie malnutrition  - on megace prior to admission.  Unclear likelihood of benefit, anticipatory guidance given regarding cancer cachexia  and limited treatments, prognostic implications.  - diet as tolerated (soft diet--difficulty chewing steak/meats due to a partial)    PSYCHOSOCIAL/SPIRITUAL:  Family Spouse Lizet and daughter Casie lives with them and is an .  There son Herb lives in [place] and is a pediatric emergency physician.   [facility]: [employer]   Would appreciate Spiritual Health Services, Consult has been placed for support     Palliative Care will continue to follow. Thank you for the consult and allowing us to aid in the care of Kilo Montiel.     Discussed with Ramakrishna Miranda CNP-Urology    Vanessa Ness MD  [facility], Palliative Care  Securely message with the [employer] (learn more here) or  Text page via [facility] Paging/Directory        Assessment          Kilo Montiel is a 84 year old male with a past medical history of anxiety, chronic constipation, melanoma, metastatic prostate cancer (status post prostatectomy and brachytherapy/radiotherapy, bone metastases on the ribs, spine, and pelvis ), urinary retention withsuprapubic catheter,  hypothyroidism, recent L hip fracture (repaired) due to mechanical fall in late May 2024, and protein calorie malnutrition who presented on 9/16/24 with acute UTI, acute on chronic anemia, JORJE and pelvic fluid collection (presume abscess) not amenable to drainage.        Interval History:     Multidisciplinary collaboration:  Chart reviewed, no events overnight.    Patient/family narrative  Kilo and wife Lizet present this morning.  He recalls our conversation yesterday.  Kilo notes no pain, no dyspnea.  Has had rectal/anal pain with prolonged standing; reviewed location of abscess and suspect this is causing pain when he is up for longer period.  Kilo is feeling better after his PRBC transfusion yesterday.    Kilo is noting inclination toward having PNTs placed if needed--reviewed with Lizet options of life prolonging interventions / more aggressive  "interventions to manage symptoms (CT cystogram + PNTs) and expectation of urology that fluid collection is unlikely to be cured or resolved, antibiotics would be palliative-intent.  Reviewed comfort focused goals at home would be with hospice, using medications (could use oral antibiotics) to manage symptoms and generally avoiding rehospitalization.    He is working through decision of having PNTs to lessen pain from bladder spasm if SP cath reoccludes and to lessen likelihood of fluid collection in pelvis from enlarging and    Review of Systems:     Besides above, ROS was reviewed and is unremarkable        Physical Exam:   Temp:  [97.6  F (36.4  C)-98.8  F (37.1  C)] 98.1  F (36.7  C)  Pulse:  [60-64] 64  Resp:  [18] 18  BP: (136-152)/(67-74) 152/74  SpO2:  [99 %-100 %] 100 %  105 lbs 6.4 oz    Body mass index is 17.01 kg/m .  Vitals:    09/16/24 1559 09/17/24 1100   Weight: 49.9 kg (110 lb) 47.8 kg (105 lb 6.4 oz)       Physical Exam  Alert 83 yo male, oriented x 3  Temporal muscle wasting, cachectic and sarcopenic.  Speech fluent, NAD.  Breathing nonlabored  Affect full  No JVD.  SP catheter collection bag with yellow urine.  No myoclonus or tremor noted.          Data Reviewed:     Last Comprehensive Metabolic Panel:  Lab Results   Component Value Date     09/18/2024    POTASSIUM 3.7 09/18/2024    CHLORIDE 109 (H) 09/18/2024    CO2 18 (L) 09/18/2024    ANIONGAP 9 09/18/2024    GLC 90 09/18/2024    BUN 16.1 09/18/2024    CR 1.17 09/18/2024    GFRESTIMATED 62 09/18/2024    SOPHIA 7.7 (L) 09/18/2024       CBC RESULTS:   Recent Labs   Lab Test 09/18/24  0216   WBC 4.9   RBC 2.54*   HGB 8.0*   HCT 23.7*   MCV 93   MCH 31.5   MCHC 33.8   RDW 19.0*        Lab Results   Component Value Date    AST 32 09/17/2024     Lab Results   Component Value Date    ALT 8 09/17/2024     No results found for: \"BILICONJ\"   Lab Results   Component Value Date    BILITOTAL 0.4 09/17/2024     Lab Results   Component Value Date "    ALBUMIN 2.8 09/17/2024     Lab Results   Component Value Date    PROTTOTAL 5.5 09/17/2024      Lab Results   Component Value Date    ALKPHOS 103 09/17/2024     40 minutes spent on the date of the encounter doing chart review, history and exam, documentation and further activities per the note.      Vanessa Ness MD  [facility], Palliative Care  Securely message with the [employer] (learn more here) or  Text page via [facility] Paging/Directory

## 2024-09-19 ENCOUNTER — APPOINTMENT (OUTPATIENT)
Dept: CT IMAGING | Facility: HOSPITAL | Age: 84
DRG: 919 | End: 2024-09-19
Attending: NURSE PRACTITIONER
Payer: COMMERCIAL

## 2024-09-19 LAB
CREAT SERPL-MCNC: 0.93 MG/DL (ref 0.67–1.17)
EGFRCR SERPLBLD CKD-EPI 2021: 81 ML/MIN/1.73M2
HGB BLD-MCNC: 8.4 G/DL (ref 13.3–17.7)
MAGNESIUM SERPL-MCNC: 1.8 MG/DL (ref 1.7–2.3)
POTASSIUM SERPL-SCNC: 3.9 MMOL/L (ref 3.4–5.3)

## 2024-09-19 PROCEDURE — 250N000013 HC RX MED GY IP 250 OP 250 PS 637: Performed by: INTERNAL MEDICINE

## 2024-09-19 PROCEDURE — 82565 ASSAY OF CREATININE: CPT | Performed by: INTERNAL MEDICINE

## 2024-09-19 PROCEDURE — 72194 CT PELVIS W/O & W/DYE: CPT

## 2024-09-19 PROCEDURE — 250N000011 HC RX IP 250 OP 636: Performed by: INTERNAL MEDICINE

## 2024-09-19 PROCEDURE — 36415 COLL VENOUS BLD VENIPUNCTURE: CPT | Performed by: INTERNAL MEDICINE

## 2024-09-19 PROCEDURE — 250N000011 HC RX IP 250 OP 636: Performed by: NURSE PRACTITIONER

## 2024-09-19 PROCEDURE — 83735 ASSAY OF MAGNESIUM: CPT | Performed by: INTERNAL MEDICINE

## 2024-09-19 PROCEDURE — 120N000001 HC R&B MED SURG/OB

## 2024-09-19 PROCEDURE — 84132 ASSAY OF SERUM POTASSIUM: CPT | Performed by: INTERNAL MEDICINE

## 2024-09-19 PROCEDURE — 85018 HEMOGLOBIN: CPT | Performed by: INTERNAL MEDICINE

## 2024-09-19 PROCEDURE — 99207 PR NO CHARGE LOS: CPT | Performed by: INTERNAL MEDICINE

## 2024-09-19 PROCEDURE — 99232 SBSQ HOSP IP/OBS MODERATE 35: CPT | Performed by: INTERNAL MEDICINE

## 2024-09-19 PROCEDURE — 99232 SBSQ HOSP IP/OBS MODERATE 35: CPT | Performed by: STUDENT IN AN ORGANIZED HEALTH CARE EDUCATION/TRAINING PROGRAM

## 2024-09-19 RX ORDER — IOPAMIDOL 755 MG/ML
15 INJECTION, SOLUTION INTRAVASCULAR ONCE
Status: COMPLETED | OUTPATIENT
Start: 2024-09-19 | End: 2024-09-19

## 2024-09-19 RX ADMIN — SODIUM CHLORIDE TAB 1 GM 1 G: 1 TAB at 12:05

## 2024-09-19 RX ADMIN — ESCITALOPRAM OXALATE 20 MG: 20 TABLET ORAL at 09:07

## 2024-09-19 RX ADMIN — FAMOTIDINE 20 MG: 10 INJECTION, SOLUTION INTRAVENOUS at 22:04

## 2024-09-19 RX ADMIN — PIPERACILLIN AND TAZOBACTAM 3.38 G: 3; .375 INJECTION, POWDER, FOR SOLUTION INTRAVENOUS at 01:47

## 2024-09-19 RX ADMIN — SODIUM CHLORIDE TAB 1 GM 1 G: 1 TAB at 09:07

## 2024-09-19 RX ADMIN — PIPERACILLIN AND TAZOBACTAM 3.38 G: 3; .375 INJECTION, POWDER, FOR SOLUTION INTRAVENOUS at 18:20

## 2024-09-19 RX ADMIN — Medication 325 MG: at 20:43

## 2024-09-19 RX ADMIN — PIPERACILLIN AND TAZOBACTAM 3.38 G: 3; .375 INJECTION, POWDER, FOR SOLUTION INTRAVENOUS at 12:04

## 2024-09-19 RX ADMIN — Medication 325 MG: at 09:07

## 2024-09-19 RX ADMIN — CYANOCOBALAMIN TAB 1000 MCG 1000 MCG: 1000 TAB at 09:07

## 2024-09-19 RX ADMIN — MEGESTROL ACETATE 400 MG: 40 SUSPENSION ORAL at 22:04

## 2024-09-19 RX ADMIN — SODIUM CHLORIDE TAB 1 GM 1 G: 1 TAB at 18:24

## 2024-09-19 RX ADMIN — VANCOMYCIN HYDROCHLORIDE 1000 MG: 1 INJECTION, SOLUTION INTRAVENOUS at 16:00

## 2024-09-19 RX ADMIN — MAGNESIUM OXIDE TAB 400 MG (241.3 MG ELEMENTAL MG) 200 MG: 400 (241.3 MG) TAB at 09:07

## 2024-09-19 RX ADMIN — IOPAMIDOL 15 ML: 755 INJECTION, SOLUTION INTRAVENOUS at 08:25

## 2024-09-19 RX ADMIN — LEVOTHYROXINE SODIUM 100 MCG: 100 TABLET ORAL at 06:16

## 2024-09-19 RX ADMIN — POTASSIUM CHLORIDE AND SODIUM CHLORIDE: 900; 150 INJECTION, SOLUTION INTRAVENOUS at 06:16

## 2024-09-19 ASSESSMENT — ACTIVITIES OF DAILY LIVING (ADL)
ADLS_ACUITY_SCORE: 32

## 2024-09-19 NOTE — PROGRESS NOTES
PALLIATIVE CARE PROGRESS NOTE  [facility]     Patient Name: Kilo Montiel  Date of Admission: 9/16/2024   Today the patient was seen for: support, follow up goals of care discussion in context of metastatic prostate cancer, UTI, pelvic fluid collection, cancer cachexia and anorexia and asthenia.     Recommendations & Counseling     Recommend further discussion with urology and Kilo/wife Lizet regarding the next steps and recommendations after having consented to cystogram.    GOALS OF CARE:   Life-prolonging with limits (DNR/DNI).  Continue antibiotics, all present treatments in hopes of improving symptoms of fatigue, pain, hematuria.   Kilo and his family plan to discuss amongst themselves whether they would prefer to pivot to comfort if life expectancy grew dramatically shorter versus the usual path of going to the ICU/escalating care.  Kilo voices awareness that urology team favors life expectancy of weeks-short months (2-3), and we have discussed with daughter Casie and wife Lizet.  He is considering a pivot to comfort focused goals at discharge and care management is assisting with coordinating an informational consult.  He has [facility], inclined toward [facility] and may be interested in  community based palliative should he and his family not be ready to pivot fully to comfort focused goals.  Question whether patient will require IV antibiotics postdischarge, or whether there is a reasonable oral antibiotic regimen.    ADVANCE CARE PLANNING:  No health care directive on file. Per system policy, Surrogate Decision-makers for Patients With Diminished Decision-making Capacity offers guidance on possible decision-makers. Wife Lizet and daughter Casie has been identified as a surrogate decision maker.   Kilo does have decisional capacity based on my assessment today and is able to voice a coherent understanding of his medical situation.  The medical  system/familiarity with home care, hospice, EOL care is not familiar to Kilo or Lizet.  There is a POLST form on file, this was reviewed and current. (DNR/selective from 5/30/24)  Code status: No CPR- Do NOT Intubate    MEDICAL MANAGEMENT:   We are not actively managing symptoms at this time.  #Pain,acute on chronic , cancer related and suspect some pain also could be attributable to fluid collection?urinoma vs abscess in pelvis.  Consider scheduled APAP in the context of active infection/abscess  Presently on oxycodone IR 2.5mg q4H prn (none within past 24 hours)  Has IV hydromorphone 0.2 (mod)-0.4(severe) mg IV q2h prn pain (none)  Heat and repositioning     #General Weakness/Debility / Cancer asthenia plus acute on chronic blood loss anemia  Did PT/OT post ORIF of femur fracture in June post discharge.  ECOG =3     #Constipation,Functional constipation  Continue lactulose BID  On senna-docusate 2 tabs daily--consider stopping docusate and using senna alone.     Mood disorder (depression)  - continue escitalopram 20mg daily  - was on lorazepam before     Anorexia/cachexia, cancer related ; protein calorie malnutrition  - on megace prior to admission.  Unclear likelihood of benefit, anticipatory guidance given regarding cancer cachexia and limited treatments, prognostic implications.  - diet as tolerated (soft diet--difficulty chewing steak/meats due to a partial)    PSYCHOSOCIAL/SPIRITUAL:  Family Spouse Lizet and daughter Casie lives with them and is an .  There son Herb lives in [place] and is a pediatric emergency physician.   [place] community: [employer]   Would appreciate Spiritual Health Services, Consult has been placed for support     Palliative Care will continue to follow. Thank you for the consult and allowing us to aid in the care of Kilo Montiel.     Discussed with team.    Austin Bartlett, DO  [facility], Palliative Care  Securely message with the Vocera Web Console (learn more  "here) or  Text page via [facility] Paging/Directory        Assessment          Kilo Montiel is a 84 year old male with a past medical history of anxiety, chronic constipation, melanoma, metastatic prostate cancer (status post prostatectomy and brachytherapy/radiotherapy, bone metastases on the ribs, spine, and pelvis ), urinary retention withsuprapubic catheter,  hypothyroidism, recent L hip fracture (repaired) due to mechanical fall in late May 2024, and protein calorie malnutrition who presented on 9/16/24 with acute UTI, acute on chronic anemia, JORJE and pelvic fluid collection (presume abscess) not amenable to drainage.        Interval History:     Multidisciplinary collaboration:  Independently reviewed notes within EMR.  Communicated directly with  and bedside RN regarding care management.  Patient/family narrative  Met and visited with Kilo today, he says that he recently ate breakfast and is feeling quite well today.  He has no active pain, symptoms or complaints.  He says that he completed his cystogram earlier today and called urology team informing him that intervention is unlikely to be beneficial, yet no documentation within EMR yet.  Patient anticipates continuing with antibiotics to prevent spread of infection, hopes to contain left focusing on quality of life over the next few months per his understanding of prognosis.  He does request spiritual guidance,  consultation order has been placed.  Otherwise all questions have been answered and concerns addressed today.  Patient continues to look forward to his palliative care consult with [facility] as scheduled over the weekend.      Review of Systems:     Besides above, ROS was reviewed and is unremarkable        Physical Exam:   /72 (BP Location: Left arm)   Pulse 64   Temp 98.6  F (37  C) (Oral)   Resp 18   Ht 1.676 m (5' 6\")   Wt 47.8 kg (105 lb 6.4 oz)   SpO2 98%   BMI 17.01 kg/m    General:  Appears stated age.  NAD.   HENT: "  Atraumatic.  Hearing intact.  Moist mucous membranes.    Eyes:  Conjunctivae are clear.  Sclera is nonicteric.    Cardiovascular:  Without cyanosis or mottling.  Respiratory:  Breathing comfortably without increased work of breathing or signs of respiratory distress.  Able to speak in complete sentences.    Skin:  Warm, dry without diaphoresis.   Musculoskeletal:  Moving spontaneously.   Neuro:  Alert, attentive, speech clear and fluent.    Psych:  Appropriate mood and affect.  No sign of confusion or delusion.     Wt Readings from Last 8 Encounters:   09/17/24 47.8 kg (105 lb 6.4 oz)   09/07/24 49.9 kg (110 lb)   08/18/24 51.7 kg (114 lb)   07/18/24 51.7 kg (114 lb)   05/31/24 51.9 kg (114 lb 6.7 oz)   05/07/24 49.9 kg (110 lb)   10/13/23 57.2 kg (126 lb)   08/22/16 68.9 kg (152 lb)             Data Reviewed:     Recent Labs   Lab Test 09/19/24 0622 09/18/24 0216 09/17/24 1429 09/17/24 0757 09/17/24  0102 09/16/24  1648 09/07/24  1136   WBC  --  4.9  --  4.4  --  5.8 6.3   HGB 8.4* 8.0* 8.7* 8.2*   < > 5.5* 8.5*   MCV  --  93  --  95  --  99 98   PLT  --  177  --  140*  --  228 251   NEUTROPHIL  --   --   --  83  --  84 86    < > = values in this interval not displayed.     Recent Labs   Lab Test 09/19/24 0622 09/18/24 0216 09/17/24 2026 09/17/24  1429 09/17/24  0757 09/16/24  1648   NA  --  136  --   --  136 133*   POTASSIUM 3.9 3.7 3.4   < > 2.8* 2.6*   CHLORIDE  --  109*  --   --  108* 100   CO2  --  18*  --   --  16* 19*   ANIONGAP  --  9  --   --  12 14   BUN  --  16.1  --   --  18.0 20.8   CR 0.93 1.17  --   --  1.24* 1.40*   SOPHIA  --  7.7*  --   --  7.7* 8.3*    < > = values in this interval not displayed.     Recent Labs   Lab Test 09/17/24  0757 09/16/24 1648 05/27/24  0604 05/23/24  1359   AST 32 39  --  21   ALT 8 11  --  7   ALKPHOS 103 113  --  58   BILITOTAL 0.4 0.3  --  0.2   ALBUMIN 2.8* 3.6 2.6* 3.1*   PROTTOTAL 5.5* 6.4  --  6.1*     Recent Labs   Lab Test 09/16/24 1648 09/07/24  1159  07/12/24  0737 05/23/24  1417 05/01/24  1238   INR 1.20* 1.29* 1.15 1.10 1.16*   PTT  --  35  --  30 30       Medical Decision Making       35 MINUTES SPENT BY ME on the date of service doing chart review, history, exam, documentation & further activities per the note.

## 2024-09-19 NOTE — PLAN OF CARE
Problem: Electrolyte Imbalance  Goal: Electrolyte Balance  Outcome: Progressing     Problem: Adult Inpatient Plan of Care  Goal: Absence of Hospital-Acquired Illness or Injury  Intervention: Prevent Skin Injury  Recent Flowsheet Documentation  Taken 9/19/2024 0400 by Joan Hoyt RN  Body Position: position changed independently  Device Skin Pressure Protection: tubing/devices free from skin contact  Taken 9/18/2024 2109 by Joan Hoyt, RN  Device Skin Pressure Protection: tubing/devices free from skin contact      Goal Outcome Evaluation:    Patients A/O 1 assist on RA L lower PIV cont. Running NS at 75 mL/hr, abx given. Suprapubic cath dressing changed X2 during shift. Still on contact precautions. K & Mag protocols, recheck in AM. VSS stable and will let their needs be known.     Joan Hoyt, RN

## 2024-09-19 NOTE — PROGRESS NOTES
Place of Service:  [facility]     Reason for follow up: Pelvic abscess    SUBJECTIVE:  Events: no acute events overnight. CT cystogram this AM showed communication between urethra and 3.7 x 2.5 x 3.2 cm right pelvic fluid collection which contains air and fluid.     Patient reports he is feeling well. Denies abdominal and bilateral flank pain, fever, chills. SP draining well, yellow urine with small amount of white/yellow sediment.     OBJECTIVE:  PHYSICAL EXAM:  Temp: 98.6  F (37  C) Temp src: Oral BP: 138/72 Pulse: 64   Resp: 18 SpO2: 98 % O2 Device: None (Room air)    General: NAD, alert, cooperative, sitting at side of bed  Head: normocephalic, without abnormality / atraumatic  Abdomen: soft, non tender, non distended. no suprapubic fullness, no suprapubic tenderness. No bilateral CVA tenderness. SP exit site is dry, SP draining clear yellow colored urine with small amount of fine yellow/white sediment, good output.   Genitourinary: Deferred.   Skin: No rashes or lesions  Musculoskeletal: moves all four extremities equally.   Psychological: alert and oriented, answers questions appropriately    LABS:  Creatinine   Date Value Ref Range Status   09/19/2024 0.93 0.67 - 1.17 mg/dL Final     WBC Count   Date Value Ref Range Status   09/18/2024 4.9 4.0 - 11.0 10e3/uL Final     Hemoglobin   Date Value Ref Range Status   09/19/2024 8.4 (L) 13.3 - 17.7 g/dL Final     Platelet Count   Date Value Ref Range Status   09/18/2024 177 150 - 450 10e3/uL Final       UA:  UA RESULTS:  Recent Labs   Lab Test 09/17/24  0832   COLOR Yellow   APPEARANCE Cloudy*   URINEGLC Negative   URINEBILI Negative   URINEKETONE Negative   SG 1.015   UBLD >1.0 mg/dL*   URINEPH 6.5   PROTEIN 300*   NITRITE Negative   LEUKEST 500 Lei/uL*   RBCU >182*   WBCU >182*       Cultures:  Urine 9/17: 10-50k mix of urogenital sierra     Blood 9/16: NGTD    Lab Results: personally reviewed.     ASSESSMENT/PLAN:  Kilo Montiel is being seen by  [facility] for pelvic abscess with communication to urethra    - 84 yr old male with hx of met prostate CA s/p prostatectomy and brachytherapy, chronic SP catheter, now with 4 cm pelvic abscess on CT and cystoscopy/clinical findings concerning for fistula with large defect near the prostate  - Unlikely to be able to reach abscess with drain per CT radiologist, not attempted.   - WBC 4.9 on 9/18. Afebrile. UC: 10-50k mix of urogenital sierra. BC: NGTD. ID following, planning 2 weeks antibiotic and re-image. Appreciate recs.   - Hgb 8.4 today. No blood noted at urethral meatus today. SP draining yellow urine.   - Per Dr. Schmidt, with disease progression as it is, life expectancy is likely weeks to maybe 2-3 months. Palliative now following with patient, appreciate patient guidance/recs.   - CT cystogram showed communication between urethra and pelvic fluid collection. Per Dr. Schmidt, as long as SP catheter remains patent and patient remains without active S&S infection, benefit of continuing urine drainage with SP catheter outweighs the benefits/risks of placing PNTs. Recommends PNT placement if patient develops S&S active infection and/or difficulty maintaining SP catheter patency, and desires aggressive cares. Given disease progression, age, anemia, etc, pt would be poor surgical candidate for fistula repair.   - Home Palliative Care visit planned with patient and his family on 9/21.   - Pt will be in contact with Dr. Schmidt as needed.   - [facility] will sign off. Please re-consult with any new or worsening urologic concerns.      Discussed with:  Dr Marilyn Miranda, APRN, CNP  [facility]   [phone]

## 2024-09-19 NOTE — PROGRESS NOTES
[facility]    Medicine Progress Note - Hospitalist Service    Date of Admission:  9/16/2024    Assessment & Plan   84 year old male with h/o metastatic prostate cancer, hypothyroidism, rectal bleeding, chronic spinal compression fractures and hyponatremia presents 9/16/2024 with acute cystitis, acute on chronic anemia, acute renal failure, R pelvic abscesses, and acute blood loss anemia requiring PRBC transfusion. Pelvic abscess not amenable for drainage so urology and ID involved. Palliative care brought on 9/17/2024 who has plans for in house palliative visit on 9/21/2024. Currently awaiting ID options for PO abx before discharge home    R Pelvic abscess  - CT shows gas and fluid containing 4.0 cm right pelvic abscess, which also contains multiple brachytherapy seeds   - IR feels this is not amenable to drainage  - CT cystogram W WO 9/19/2024: Leak from urethra to R pelvic fluid collection  Plan  - Personally talked with ID. Will keep on vanc/Zosyn today. Tomorrow, they will come up with PO abx for discharge home    Metastatic prostate cancer s/p prostatectomy and brachytherapy with chronic SP catheter  -- Ct shows compression fracture of the T12 vertebral body has worsened since 8/18/2024. Multiple lumbar spinal compression fractures have not significantly changed. Sclerotic skeletal metastases have not significantly changed.   -- continue home megace  -- pain control as needed, continue tylenol and home oxycodone and have dilaudid as needed also available  -- urology following   -- Palliative care consulted 9/17/2024: Patient will have in-home palliative visit on 9/21/2024    JORJE - resolved  CT shows appropriately positioned suprapubic catheter, nonobstructing renal calculit and no evidence of hydronephrosis  - Discontinue NS 75cc/hr IV today    ABL anemia  Hematuria from suprapubic cath  Denies any GI blood loss  CT shows wo new migrated brachytherapy seeds in the region of the bulbar  "urethra, which could be a cause of hematuria. There is otherwise appropriately positioned suprapubic catheter, mild bladder wall thickening, no large intraluminal bladder clots   -- urology consult  -- Transfused 2U PRBC on admission and Hgb improved from 5.5 to 8.2  -- trend Hgb now and in AM if stable  -- transfuse as needed  -- Iron slightly low, will start PO iron replacement  -- continue home B12 replacement    GERD: continue home pepcid  Chronic hyponatremia: continue home salt tabs  Chronic hypomagnesemia: continue home replacement, place on replacement protocol  Hypokalemia: replace and recheck, start potassium containing IVF  Chronic constipation: continue home lactulose  Hypothyroidism: continue home replacement   Mood disorder: continue home lexapro and prn ativan       Diet: Regular Diet Adult    DVT Prophylaxis: Pneumatic Compression Devices  Forte Catheter: Not present  Lines: None     Cardiac Monitoring: None  Code Status: No CPR- Do NOT Intubate      Clinically Significant Risk Factors        # Hypokalemia: Lowest K = 2.8 mmol/L in last 2 days, will replace as needed     # Hypomagnesemia: Lowest Mg = 1.5 mg/dL in last 2 days, will replace as needed   # Hypoalbuminemia: Lowest albumin = 2.8 g/dL at 9/17/2024  7:57 AM, will monitor as appropriate                 # Cachexia: Estimated body mass index is 17.01 kg/m  as calculated from the following:    Height as of this encounter: 1.676 m (5' 6\").    Weight as of this encounter: 47.8 kg (105 lb 6.4 oz)., PRESENT ON ADMISSION       # Financial/Environmental Concerns:                 Disposition Plan   Medically Ready for Discharge: Pending ID decision of PO abx      Rusty Garcia MD  Hospitalist Service  [facility]  Securely message with [employer] (more info)  Text page via [employer] Paging/Directory   ______________________________________________________________________    Interval History   No acute events overnight.     On " evaluation this morning, he is resting on the bed comfortably. Is in a good mood today and smiling. No fevers, chills, lethargy. Able to eat breakfast and use without any issues. He was updated of plans to await abx plans per ID.    Physical Exam   Vital Signs: Temp: 98.6  F (37  C) Temp src: Oral BP: 138/72 Pulse: 64   Resp: 18 SpO2: 98 % O2 Device: None (Room air)    Weight: 105 lbs 6.4 oz  General: NAD  RESPIRATORY: Breathing nonlabored  CARDIOVASCULAR: No le edema bilat.   ABDOMEN: soft and non-tender  NEUROLOGIC: Motor and sensory intact, speech clear         >50 MINUTES SPENT BY ME on the date of service doing chart review, history, exam, documentation & further activities per the note.  Data

## 2024-09-19 NOTE — PROGRESS NOTES
Sleeping.  No reason to wake up.   Urology plans imaging.     Same meds from my perspective.     Will see tomorrow    ANNIE Peterson MD

## 2024-09-19 NOTE — PLAN OF CARE
Problem: Adult Inpatient Plan of Care  Goal: Absence of Hospital-Acquired Illness or Injury  Outcome: Progressing  Intervention: Identify and Manage Fall Risk  Recent Flowsheet Documentation  Taken 9/19/2024 0800 by Marge Tamayo RN  Safety Promotion/Fall Prevention:   activity supervised   assistive device/personal items within reach   clutter free environment maintained   nonskid shoes/slippers when out of bed     Problem: Adult Inpatient Plan of Care  Goal: Optimal Comfort and Wellbeing  Outcome: Progressing     Problem: Infection  Goal: Absence of Infection Signs and Symptoms  Outcome: Progressing  Intervention: Prevent or Manage Infection  Recent Flowsheet Documentation  Taken 9/19/2024 0800 by Marge Tamayo RN  Isolation Precautions: contact precautions maintained     Problem: Electrolyte Imbalance  Goal: Electrolyte Balance  Outcome: Progressing   Pt had CT cystogram this am.   Pt wanted to see hospitalist about discharge planning, MD will be in to discuss.    Pt denies pain and nausea.  Stated that he would like to go home.

## 2024-09-19 NOTE — PROGRESS NOTES
"Care Management Follow Up    Length of Stay (days): 3    Expected Discharge Date: 09/20/2024     Concerns to be Addressed:     medical progression  Patient plan of care discussed at interdisciplinary rounds: Yes    Anticipated Discharge Disposition: Home, Home Hospice        Anticipated Discharge Services:  hospice   Anticipated Discharge DME:  patient states he has everything he needs    Patient/family educated on Medicare website which has current facility and service quality ratings:  yes  Education Provided on the Discharge Plan:  yes  Patient/Family in Agreement with the Plan:  yes    Referrals Placed by CM/SW:  community hospice and palliative  Private pay costs discussed: Not applicable    Discussed  Partnership in Safe Discharge Planning  document with patient/family: No    Handoff Completed: No, handoff not indicated or clinically appropriate    Additional Information:   ([facility]osp[facility] will visit patient in patient's home on Saturday at 0930 for an informational visit.  Patient is not sure if he is ready to sign on yet though reports \"it's getting close\".        Hospice: [facility] [phone]       Next Steps: Watch for final antibiotic plan  Need hospice ordered at discharge.     Dona Rockwell RN      "

## 2024-09-19 NOTE — PROGRESS NOTES
"CLINICAL NUTRITION SERVICES - ASSESSMENT NOTE     Nutrition Prescription    RECOMMENDATIONS FOR MDs/PROVIDERS TO ORDER:  Multivitamin and thiamine for likely malnutrition, weight loss/underweight    Malnutrition Status:    TBD-suspect at least moderate in the context of chronic illness    Recommendations already ordered by Registered Dietitian (RD):  Magic cup-lunch and dinner    Future/Additional Recommendations:  Monitor intake, weight, labs, supplement tolerance  Obtain NFPE, nutrition hx     REASON FOR ASSESSMENT  Kilo Montiel is a/an 84 year old male assessed by the dietitian for Admission Nutrition Risk Screen for positive poor appetite and weight loss.    HPI:  84 year old male with a medical history significant for prostate cancer status post prostatectomy radiotherapy, thyroid disease, status post suprapubic catheter in place who is being admitted for further management of acute cystitis, acute on chronic anemia, acute renal failure and pelvic abscesses     NUTRITION HISTORY  Attempted interview, pt sleeping, did not wake. Per RD notes from previous visits does not like Ensure, likes [employer], has eaten magic cup in the past. Moderate malnutrition in the past, likely continues to meet criteria.     CURRENT NUTRITION ORDERS  Diet: Regular  Intake/Tolerance: 50-75% of meals per flowsheets    LABS  Labs reviewed    MEDICATIONS  Medications reviewed    ANTHROPOMETRICS  Height: 167.6 cm (5' 6\")  Most Recent Weight: 47.8 kg (105 lb 6.4 oz)    IBW: 64.5 kg  BMI: Underweight BMI <18.5  Weight History:   Wt Readings from Last 20 Encounters:   09/17/24 47.8 kg (105 lb 6.4 oz)   09/07/24 49.9 kg (110 lb)   08/18/24 51.7 kg (114 lb)   07/18/24 51.7 kg (114 lb)   05/31/24 51.9 kg (114 lb 6.7 oz)   05/07/24 49.9 kg (110 lb)   10/13/23 57.2 kg (126 lb)   08/22/16 68.9 kg (152 lb)   08/26/14 69.5 kg (153 lb 3.2 oz)   04/30/14 69.5 kg (153 lb 3.2 oz)   11/08/13 66.5 kg (146 lb 9.7 oz)   10/24/13 68.9 kg (152 lb) "   10/09/13 67.9 kg (149 lb 11.2 oz)   08/15/13 69.4 kg (153 lb)   06/21/13 69.9 kg (154 lb)   05/13/13 69.9 kg (154 lb)   05/01/13 69.3 kg (152 lb 11.2 oz)   01/23/13 68.7 kg (151 lb 6.4 oz)   11/05/12 68.6 kg (151 lb 4.8 oz)   07/11/12 69.1 kg (152 lb 6.4 oz)   Weight loss of 9# (8%) x 3+ months    Dosing Weight: 47.8 kg    ASSESSED NUTRITION NEEDS  Estimated Energy Needs: 1450+ kcals/day (30+ kcals/kg )  Justification: Repletion and Underweight  Estimated Protein Needs: 57+ grams protein/day (1.2+ grams of pro/kg)  Justification: Repletion  Estimated Fluid Needs: 2522-4950 mL/day (25 - 30 mL/kg)   Justification: Maintenance    PHYSICAL FINDINGS  See malnutrition section below.  Diarrhea   Intermittent nausea    MALNUTRITION:  % Weight Loss:  7.5% in 3 months (moderate malnutrition)  % Intake:  need nutrition hx  Subcutaneous Fat Loss:  unable  Muscle Loss:  unable  Fluid Retention:  None noted    Malnutrition Diagnosis: Unable to determine due to need nutrition hx and NFPE    NUTRITION DIAGNOSIS  Unintended weight loss related to inadequate po intake as evidenced by weight loss of 8% x 3+ months, BMI < 18.5      INTERVENTIONS  Implementation  Nutrition Education: No education needs assessed at this time   Medical food supplement therapy     Goals  Maintain/gain weight  Patient to consume % of nutritionally adequate meals three times per day, or the equivalent with supplements/snacks.     Monitoring/Evaluation  Progress toward goals will be monitored and evaluated per protocol.

## 2024-09-20 VITALS
RESPIRATION RATE: 18 BRPM | HEIGHT: 66 IN | WEIGHT: 114.4 LBS | SYSTOLIC BLOOD PRESSURE: 125 MMHG | TEMPERATURE: 98.5 F | BODY MASS INDEX: 18.39 KG/M2 | HEART RATE: 68 BPM | OXYGEN SATURATION: 98 % | DIASTOLIC BLOOD PRESSURE: 75 MMHG

## 2024-09-20 LAB
CREAT SERPL-MCNC: 0.99 MG/DL (ref 0.67–1.17)
EGFRCR SERPLBLD CKD-EPI 2021: 76 ML/MIN/1.73M2
VANCOMYCIN SERPL-MCNC: 16.2 UG/ML

## 2024-09-20 PROCEDURE — 99239 HOSP IP/OBS DSCHRG MGMT >30: CPT | Performed by: STUDENT IN AN ORGANIZED HEALTH CARE EDUCATION/TRAINING PROGRAM

## 2024-09-20 PROCEDURE — 99232 SBSQ HOSP IP/OBS MODERATE 35: CPT | Performed by: INTERNAL MEDICINE

## 2024-09-20 PROCEDURE — 250N000013 HC RX MED GY IP 250 OP 250 PS 637: Performed by: INTERNAL MEDICINE

## 2024-09-20 PROCEDURE — 82565 ASSAY OF CREATININE: CPT | Performed by: INTERNAL MEDICINE

## 2024-09-20 PROCEDURE — 80202 ASSAY OF VANCOMYCIN: CPT | Performed by: INTERNAL MEDICINE

## 2024-09-20 PROCEDURE — 250N000011 HC RX IP 250 OP 636: Performed by: INTERNAL MEDICINE

## 2024-09-20 PROCEDURE — 36415 COLL VENOUS BLD VENIPUNCTURE: CPT | Performed by: INTERNAL MEDICINE

## 2024-09-20 RX ORDER — DOXYCYCLINE 100 MG/1
100 CAPSULE ORAL EVERY 12 HOURS SCHEDULED
Status: DISCONTINUED | OUTPATIENT
Start: 2024-09-20 | End: 2024-09-20 | Stop reason: HOSPADM

## 2024-09-20 RX ORDER — FERROUS SULFATE 325(65) MG
325 TABLET ORAL EVERY OTHER DAY
Qty: 30 TABLET | Refills: 2 | Status: SHIPPED | OUTPATIENT
Start: 2024-09-20

## 2024-09-20 RX ORDER — DOXYCYCLINE 100 MG/1
100 CAPSULE ORAL EVERY 12 HOURS
Qty: 28 CAPSULE | Refills: 0 | Status: SHIPPED | OUTPATIENT
Start: 2024-09-20 | End: 2024-10-04

## 2024-09-20 RX ADMIN — DOXYCYCLINE 100 MG: 100 CAPSULE ORAL at 12:02

## 2024-09-20 RX ADMIN — SODIUM CHLORIDE TAB 1 GM 1 G: 1 TAB at 12:02

## 2024-09-20 RX ADMIN — PIPERACILLIN AND TAZOBACTAM 3.38 G: 3; .375 INJECTION, POWDER, FOR SOLUTION INTRAVENOUS at 09:36

## 2024-09-20 RX ADMIN — SENNOSIDES AND DOCUSATE SODIUM 2 TABLET: 8.6; 5 TABLET ORAL at 08:09

## 2024-09-20 RX ADMIN — LEVOTHYROXINE SODIUM 100 MCG: 100 TABLET ORAL at 06:05

## 2024-09-20 RX ADMIN — PIPERACILLIN AND TAZOBACTAM 3.38 G: 3; .375 INJECTION, POWDER, FOR SOLUTION INTRAVENOUS at 02:23

## 2024-09-20 RX ADMIN — MAGNESIUM OXIDE TAB 400 MG (241.3 MG ELEMENTAL MG) 200 MG: 400 (241.3 MG) TAB at 08:09

## 2024-09-20 RX ADMIN — AMOXICILLIN AND CLAVULANATE POTASSIUM 1 TABLET: 875; 125 TABLET, FILM COATED ORAL at 12:02

## 2024-09-20 RX ADMIN — Medication 325 MG: at 08:09

## 2024-09-20 RX ADMIN — SODIUM CHLORIDE TAB 1 GM 1 G: 1 TAB at 08:09

## 2024-09-20 RX ADMIN — ESCITALOPRAM OXALATE 20 MG: 20 TABLET ORAL at 08:09

## 2024-09-20 RX ADMIN — CYANOCOBALAMIN TAB 1000 MCG 1000 MCG: 1000 TAB at 08:09

## 2024-09-20 ASSESSMENT — ACTIVITIES OF DAILY LIVING (ADL)
ADLS_ACUITY_SCORE: 31
ADLS_ACUITY_SCORE: 31
ADLS_ACUITY_SCORE: 32
ADLS_ACUITY_SCORE: 32
ADLS_ACUITY_SCORE: 31
ADLS_ACUITY_SCORE: 32
ADLS_ACUITY_SCORE: 31
ADLS_ACUITY_SCORE: 31
ADLS_ACUITY_SCORE: 32
ADLS_ACUITY_SCORE: 32
ADLS_ACUITY_SCORE: 31
ADLS_ACUITY_SCORE: 32

## 2024-09-20 NOTE — PROGRESS NOTES
"CLINICAL NUTRITION SERVICES - REASSESSMENT NOTE     Nutrition Prescription    RECOMMENDATIONS FOR MDs/PROVIDERS TO ORDER:  Multivitamin with minerals and thiamine d/t malnutrition dx    Malnutrition Status:    Moderate in context of chronic illness     Recommendations already ordered by Registered Dietitian (RD):  Ensure Enlive 10 am snack    Future/Additional Recommendations:  Monitor intake, weight, labs, supplement tolerance     EVALUATION OF THE PROGRESS TOWARD GOALS   Diet: Regular  Supplement: magic cup lunch and dinner  Intake: % of meals per flowsheets       NEW FINDINGS   Met with pt, ok appetite, had been down for 4 days PTA. Typically eats 3 meals per day and was eating a little less than usual. Sometimes has issues with vomiting and diarrhea. Does not drink supplements at home but feels like he should, will send an Ensure for pt to have here. Wondering what he should be eating, encouraged a balanced diet, frequent calorie dense meals.    ANTHROPOMETRICS  Height: 167.6 cm (5' 6\")  Most Recent Weight: 47.8 kg (105 lb 6.4 oz)    IBW: 64.5 kg  BMI: Underweight BMI <18.5  Weight History:   Wt Readings from Last 20 Encounters:   09/17/24 47.8 kg (105 lb 6.4 oz)   09/07/24 49.9 kg (110 lb)   08/18/24 51.7 kg (114 lb)   07/18/24 51.7 kg (114 lb)   05/31/24 51.9 kg (114 lb 6.7 oz)   05/07/24 49.9 kg (110 lb)   10/13/23 57.2 kg (126 lb)   08/22/16 68.9 kg (152 lb)   08/26/14 69.5 kg (153 lb 3.2 oz)   04/30/14 69.5 kg (153 lb 3.2 oz)   11/08/13 66.5 kg (146 lb 9.7 oz)   10/24/13 68.9 kg (152 lb)   10/09/13 67.9 kg (149 lb 11.2 oz)   08/15/13 69.4 kg (153 lb)   06/21/13 69.9 kg (154 lb)   05/13/13 69.9 kg (154 lb)   05/01/13 69.3 kg (152 lb 11.2 oz)   01/23/13 68.7 kg (151 lb 6.4 oz)   11/05/12 68.6 kg (151 lb 4.8 oz)   07/11/12 69.1 kg (152 lb 6.4 oz)     PHYSICAL FINDINGS  See malnutrition section below.    GI CONCERNS  Diarrhea   Intermittent " nausea    LABS  Reviewed    MEDICATIONS  Reviewed    MALNUTRITION:  % Weight Loss:  > 7.5% in 3 months (severe malnutrition)  % Intake:  Decreased intake does not meet criteria for malnutrition   Subcutaneous Fat Loss:  Orbital region mild depletion and Upper arm region mild-moderate depletion  Muscle Loss:  Clavicle bone region moderate depletion and Dorsal hand region moderate depletion  Fluid Retention:  None noted    Malnutrition Diagnosis: Moderate malnutrition  In Context of:  Chronic illness or disease    Previous Nutrition Diagnosis  Unintended weight loss related to inadequate po intake as evidenced by weight loss of 8% x 3+ months, BMI < 18.5    Evaluation: No change    CURRENT NUTRITION DIAGNOSIS  Malnutrition related to inadequate po intake as evidenced by weight loss of 8% x 3+ months, loss of muscle and fat      INTERVENTIONS  Implementation  Medical food supplement therapy  Encouraged frequent, calorie dense meals    Goals  Maintain/gain weight -no new weight  Patient to consume % of nutritionally adequate meals three times per day, or the equivalent with supplements/snacks. -progressing    Monitoring/Evaluation  Progress toward goals will be monitored and evaluated per protocol.

## 2024-09-20 NOTE — CONSULTS
This patient no longer requires Contact Precautions because the patient tested negative for CP- and C auris in the prior 6 months and did not have a high-risk exposure during that time..    September 20, 2024  Chelsey Kirkland, Infection Prevention

## 2024-09-20 NOTE — PROGRESS NOTES
Care Management Discharge Note    Discharge Date: 09/20/2024       Discharge Disposition: Hospice    Discharge Services: Other (see comment) (Hospice and Palliative)    Discharge DME: None (per patient)    Discharge Transportation: family or friend will provide    Private pay costs discussed: Not applicable    Does the patient's insurance plan have a 3 day qualifying hospital stay waiver?  No    PAS Confirmation Code: NA  Patient/family educated on Medicare website which has current facility and service quality ratings:      Education Provided on the Discharge Plan: Yes  Persons Notified of Discharge Plans: patient,  Hospice, MD, RN  Patient/Family in Agreement with the Plan: yes    Handoff Referral Completed: No, handoff not indicated or clinically appropriate    Additional Information:  Patient discharging home today.      CM called  ([facility]) and confirmed discharge plans today with a hospice home visit 9/21.  Discharge orders faxed to .     Hospice will visit patient in patient's home on Saturday at 0930 for an informational visit.      Hospice: [facility] [phone]     CM confirmed plans with patient.  Daughter will transport.    Dona Rockwell RN

## 2024-09-20 NOTE — DISCHARGE SUMMARY
[facility]  Hospitalist Discharge Summary      Date of Admission:  9/16/2024  Date of Discharge:  9/20/2024  2:29 PM  Discharging Provider: DOMINGUEZ LIPSCOMB MD  Discharge Service: Hospitalist Service    Discharge Diagnoses     #Right-sided pelvic abscess  #Metastatic prostate cancer s/p prostatectomy and brachytherapy with chronic SP tube  #JORJE  #Acute blood loss anemia  #Chronic gross hematuria  #GERD  #Chronic hyponatremia  #Chronic hypomagnesemia  #Hypokalemia  #Chronic constipation  #Hypothyroidism  #Mood disorder    Clinically Significant Risk Factors     # Moderate Malnutrition: based on nutrition assessment      Follow-ups Needed After Discharge   Follow-up Appointments     Follow-up and recommended labs and tests       Follow up with primary care provider, Denzel Jones, within 7 days   for hospital follow- up.  The following labs/tests are recommended: CBC.      Follow up with urology. They will call you for outpt appointment            Unresulted Labs Ordered in the Past 30 Days of this Admission       Date and Time Order Name Status Description    9/16/2024  8:08 PM Blood Culture Line, venous Preliminary         These results will be followed up by [facility]    Discharge Disposition   Discharged to home  Condition at discharge: Stable    Hospital Course   84 year old male with h/o metastatic prostate cancer, hypothyroidism, rectal bleeding, chronic spinal compression fractures and hyponatremia presents 9/16/2024 with acute cystitis, acute on chronic anemia, acute renal failure, R pelvic abscesses, and acute blood loss anemia requiring PRBC transfusion. Pelvic abscess not amenable for drainage so urology and ID involved. Palliative care brought on 9/17/2024 who has plans for hospice visit at home to discuss possible enrollment.    #Right-sided pelvic abscess  - CT shows gas and fluid containing 4.0 cm right pelvic abscess, which also contains multiple brachytherapy seeds    - IR feels this is not amenable to drainage  - CT cystogram W WO 9/19/2024: Leak from urethra to R pelvic fluid collection  Plan  - Situation discussed with Urology and ID and unfortunately there doesn't seem to be an intervention the patient is a candidate for that could help  - Palliative care was consulted and the patient was leaning towards hospice but wanted to hear more about it - a plan for home hospice visit after discharge was planned for   - S/p Vanc/Zosyn here; discharged on 2 weeks of PO doxycycline and Augmentin per ID    Metastatic prostate cancer with mets to bone s/p prostatectomy and brachytherapy with chronic SP catheter  -- Ct shows compression fracture of the T12 vertebral body has worsened since 8/18/2024. Multiple lumbar spinal compression fractures have not significantly changed. Sclerotic skeletal metastases have not significantly changed.   -- continue home megace  -- pain control as needed, continue tylenol and home oxycodone and have dilaudid as needed also available  -- urology following   -- Palliative care consulted 9/17/2024: Patient will have in-home home hospice visit on 9/21/2024 to discuss enrollment    JORJE - resolved  CT shows appropriately positioned suprapubic catheter, nonobstructing renal calculit and no evidence of hydronephrosis  - S/p IVF    #ABL anemia  #Chronic gross hematuria from suprapubic cath  Denies any GI blood loss  CT shows wo new migrated brachytherapy seeds in the region of the bulbar urethra, which could be a cause of hematuria. There is otherwise appropriately positioned suprapubic catheter, mild bladder wall thickening, no large intraluminal bladder clots   -- urology consult  -- Transfused 2U PRBC on admission and Hgb improved from 5.5 to 8.2  -- Hgb stable  -- Iron slightly low, will start PO iron replacement  -- continue home B12 replacement    GERD: continue home pepcid  Chronic hyponatremia: continue home salt tabs  Chronic hypomagnesemia: continue  home replacement, place on replacement protocol  Hypokalemia: replace and recheck, start potassium containing IVF  Chronic constipation: continue home lactulose  Hypothyroidism: continue home replacement   Mood disorder: continue home lexapro and prn ativan    Consultations This Hospital Stay   PHARMACY TO DOSE VANCO  PHARMACY TO DOSE VANCO  UROLOGY IP CONSULT  INTERVENTIONAL RADIOLOGY ADULT/PEDS IP CONSULT  INFECTIOUS DISEASES IP CONSULT  CARE MANAGEMENT / SOCIAL WORK IP CONSULT  PALLIATIVE CARE ADULT IP CONSULT  PHYSICAL THERAPY ADULT IP CONSULT  CARE MANAGEMENT / SOCIAL WORK IP CONSULT  INFECTION PREVENTION IP CONSULT  SPIRITUAL HEALTH SERVICES IP CONSULT    Code Status   No CPR- Do NOT Intubate    Time Spent on this Encounter   I, DOMINGUEZ LIPSCOMB MD, personally saw the patient today and spent greater than 30 minutes discharging this patient.       DOMINGUEZ LIPSCOMB MD  [place] [id number]  Phone: [phone]  Fax: [phone]  ______________________________________________________________________    Physical Exam   Vital Signs: Temp: 98.5  F (36.9  C) Temp src: Oral BP: 125/75 Pulse: 68   Resp: 18 SpO2: 98 % O2 Device: None (Room air)    Weight: 114 lbs 6.4 oz    General: NAD  RESPIRATORY: Breathing nonlabored  CARDIOVASCULAR: No le edema bilat.   ABDOMEN: soft and non-tender  : SP tube draining bloody urine  NEUROLOGIC: Motor and sensory intact, speech clear       Primary Care Physician   Denzel Jones    Discharge Orders      Hospice Referral      Reason for your hospital stay    weakness     Follow-up and recommended labs and tests     Follow up with primary care provider, Denzel Jones, within 7 days for hospital follow- up.  The following labs/tests are recommended: CBC.      Follow up with urology. They will call you for outpt appointment     Activity    Your activity upon discharge: activity as tolerated     No CPR- Do NOT  Intubate     Diet    Follow this diet upon discharge: Current Diet:Orders Placed This Encounter      Snacks/Supplements Adult: Magic Cup; With Meals      Regular Diet Adult       Significant Results and Procedures   Most Recent 3 CBC's:  Recent Labs   Lab Test 09/19/24 0622 09/18/24 0216 09/17/24 1429 09/17/24 0757 09/17/24  0102 09/16/24  1648   WBC  --  4.9  --  4.4  --  5.8   HGB 8.4* 8.0* 8.7* 8.2*   < > 5.5*   MCV  --  93  --  95  --  99   PLT  --  177  --  140*  --  228    < > = values in this interval not displayed.     Most Recent 3 BMP's:  Recent Labs   Lab Test 09/20/24  0519 09/19/24 0622 09/18/24 0216 09/17/24 2026 09/17/24 1429 09/17/24 0757 09/16/24  1648   NA  --   --  136  --   --  136 133*   POTASSIUM  --  3.9 3.7 3.4   < > 2.8* 2.6*   CHLORIDE  --   --  109*  --   --  108* 100   CO2  --   --  18*  --   --  16* 19*   BUN  --   --  16.1  --   --  18.0 20.8   CR 0.99 0.93 1.17  --   --  1.24* 1.40*   ANIONGAP  --   --  9  --   --  12 14   SOPHIA  --   --  7.7*  --   --  7.7* 8.3*   GLC  --   --  90  --   --  84 102*    < > = values in this interval not displayed.   ,   Results for orders placed or performed during the hospital encounter of 09/16/24   CT Urogram wo & w Contrast    Narrative    EXAM: CT UROGRAM WO and W CONTRAST  LOCATION: [facility]  DATE: 9/16/2024    INDICATION: Status post suprapubic catheter. Ongoing bloody urine. Anemia.   COMPARISON: CT abdomen pelvis 8/18/2024.   TECHNIQUE: CT scan of the abdomen and pelvis using urogram technique with pre contrast, post contrast, and delayed images. Multiplanar reformats were obtained. Dose reduction techniques were used.   CONTRAST: 75ml isovue 370    FINDINGS:     LOWER CHEST: Unchanged small consolidative opacity within the medial right lower lobe, peripheral right lower lobe bronchiectasis, and scattered areas of scarring at both lung bases. No pleural effusions.     HEPATOBILIARY: Gallbladder is contracted.  No biliary ductal dilation. Scattered punctate hepatic granulomas. No suspicious liver lesions.     PANCREAS: Normal.    SPLEEN: Innumerable calcified granulomas.     ADRENAL GLANDS: Normal.    RIGHT KIDNEY/URETER: No renal or ureteral calculi. Small hypodense cortical cyst at the lower pole the right kidney is unchanged and does not require follow-up. No solid renal mass. No hydronephrosis or evidence of upper tract urothelial malignancy.     LEFT KIDNEY/URETER: Two nonobstructing left renal calculi measuring up to 2 mm. No ureteral calculi. Tiny subcentimeter cystic lesion is too small to characterize but is likely a cyst and does not require follow-up. No solid renal mass. No hydronephrosis   or evidence of upper tract urothelial malignancy.     BLADDER: Bladder is obscured by streak artifact from left hip arthroplasty hardware. A suprapubic catheter is present within the bladder, with expected intraluminal gas. Bladder wall appears mildly thickened. No definite intraluminal bladder clot is   visualized.     BOWEL: No bowel obstruction or inflammation.     LYMPH NODES: No enlarged lymph nodes.     VASCULATURE: Patent portal, splenic, and superior mesenteric veins. Moderate aortobiiliac atherosclerosis. No abdominal aortic aneurysm.     PELVIC ORGANS: Numerous brachytherapy seeds within the prostate. Fluid and gas containing right pelvic abscess measuring 4.0 x 2.5 x 2.8 cm (6/#152, 7/#42), which is partially obscured by artifact. Multiple brachytherapy seeds are present within the   abscess. There are also 2 new brachytherapy seeds in the region of the bulbar urethra (8/#57, 6/#166). A single brachytherapy seed in the region of the left penile bulb (6/#164) is unchanged.     MUSCULOSKELETAL: Left hip arthroplasty hardware. Numerous sclerotic metastases throughout the visualized spine, lower ribs, and pelvis have not significantly changed. Compression fracture of the T12 vertebral body has worsened. Additional  compression   fractures of the L1-L5 vertebral bodies have not significantly changed.       Impression    IMPRESSION:    1.  Two new migrated brachytherapy seeds in the region of the bulbar urethra, which could be a cause of hematuria.     2.  Gas and fluid containing 4.0 cm right pelvic abscess, which also contains multiple brachytherapy seeds.    3.  Appropriately positioned suprapubic catheter. Mild bladder wall thickening could reflect cystitis. No large intraluminal bladder clots, although evaluation of the bladder is limited by artifact from left hip arthroplasty hardware.    4.  Two nonobstructing left renal calculi measuring up to 2 mm. No right urinary calculi.    5.  No solid renal mass or evidence of upper tract urothelial malignancy.    6.  Compression fracture of the T12 vertebral body has worsened since 8/18/2024. Multiple lumbar spinal compression fractures have not significantly changed.    7.  Sclerotic skeletal metastases have not significantly changed.    CT Cystogram wo & w Contrast    Narrative    EXAM: CT CYSTOGRAM WO and W CONTRAST  LOCATION: [facility]  DATE: 9/19/2024    INDICATION: 84 yr old male with met prostate CA, chronic SP catheter, suspected fistula, large defect near prostate fossa on attempted cysto via urethra, 4 cm pelvic abscess, . Please do CT cystogram through SP catheter to better define anatomy eval for   fistula.  COMPARISON: 9/16/24.  TECHNIQUE: CT pelvis without IV contrast using cystogram technique. Images without, and with filling of the bladder with 100 mL of dilute contrast. Contrast was instilled through the suprapubic catheter. Post drain images were obtained. Multiplanar   reformats were obtained. Dose reduction techniques were used.  CONTRAST: isovue 370 15 ml plus 500 mnl saline    FINDINGS:   BLADDER: Multiple bladder diverticula are present. There is a suprapubic catheter. There is contrast in the bladder, in the majority of the  urethra, and extending into the right lower pelvis in the region of a previously described fluid and air   collection. This is best seen on coronal series 6 images 30-40.    ADDITIONAL FINDINGS: Multiple metallic prostate seeds are again seen. There is a 3.7 x 2.5 x 3.2 cm air and fluid collection to the right of the prostate gland. No significant change from the prior study. Again seen is a metallic object in the pelvis to   the right of the prostate gland in this region. Anasarca is present. Left hip arthroplasty. Osteopenia is present. There are sclerotic bone lesions in the field-of-view. Atherosclerotic disease is present.      Impression    IMPRESSION:  1.  There is a leak from the urethra to the right pelvic fluid collection.  2.  A fluid and air collection to the right of the prostate gland is unchanged.  3.  Otherwise no change.       Discharge Medications   Discharge Medication List as of 9/20/2024 12:09 PM        START taking these medications    Details   amoxicillin-clavulanate (AUGMENTIN) 875-125 MG tablet Take 1 tablet by mouth every 12 hours for 14 days., Disp-28 tablet, R-0, E-Prescribe      doxycycline monohydrate (MONODOX) 100 MG capsule Take 1 capsule (100 mg) by mouth every 12 hours for 14 days., Disp-28 capsule, R-0, E-Prescribe      ferrous sulfate (FEROSUL) 325 (65 Fe) MG tablet Take 1 tablet (325 mg) by mouth every other day., Disp-30 tablet, R-2, E-Prescribe           CONTINUE these medications which have NOT CHANGED    Details   acetaminophen (TYLENOL) 325 MG tablet Take 2 tablets (650 mg) by mouth every 4 hours as needed for other (For optimal non-opioid multimodal pain management to improve pain control.), Disp-100 tablet, R-0, E-Prescribe      chlorhexidine (PERIDEX) 0.12 % solution Swish and spit 15 mLs in mouth 2 times daily as needed (PRN), Historical      cyanocobalamin (VITAMIN B-12) 1000 MCG tablet Take 1,000 mcg by mouth daily, Historical      escitalopram (LEXAPRO) 20 MG tablet  Take 20 mg by mouth daily., Historical      famotidine (PEPCID) 20 MG tablet Take 20 mg by mouth daily, Historical      lactulose (CHRONULAC) 10 GM/15ML solution Take 15 mLs by mouth 2 times daily as needed, Historical      levothyroxine (SYNTHROID/LEVOTHROID) 100 MCG tablet Take 100 mcg by mouth daily., Historical      LORazepam (ATIVAN) 0.5 MG tablet Take 1 tablet by mouth every 8 hours as needed, Historical      magnesium 250 MG tablet Take 1 tablet by mouth daily, Historical      megestrol (MEGACE) 40 MG/ML suspension Take 10 mLs by mouth daily, Historical      ondansetron (ZOFRAN) 8 MG tablet Take 8 mg by mouth every 8 hours as needed, Historical      oxyCODONE (ROXICODONE) 5 MG tablet Take 0.5 tablets (2.5 mg) by mouth every 4 hours as needed for severe pain, Disp-10 tablet, R-0, Local Print      senna-docusate (SENOKOT-S/PERICOLACE) 8.6-50 MG tablet Take 2 tablets by mouth daily., Historical      sodium chloride 1 GM tablet Take 1 tablet (1 g) by mouth 3 times daily (with meals), Transitional      Turmeric 500 MG CAPS Take 1 capsule by mouth daily, Historical           STOP taking these medications       nitroFURantoin macrocrystal-monohydrate (MACROBID) 100 MG capsule Comments:   Reason for Stopping:             Allergies   No Known Allergies

## 2024-09-20 NOTE — PROGRESS NOTES
"[facility] Progress Note    SUBJECTIVE:  Looking pretty good.  See Ruben BOYCE note about the CT scan, reviewed.        REVIEW OF SYSTEMS:  Negative unless as listed above.  Social history, Family history, Medications: reviewed.    OBJECTIVE:  BP (!) 159/60 (BP Location: Left arm)   Pulse 66   Temp 98.4  F (36.9  C) (Oral)   Resp 18   Ht 1.676 m (5' 6\")   Wt 47.8 kg (105 lb 6.4 oz)   SpO2 100%   BMI 17.01 kg/m                PHYSICAL EXAM:  Alert, awake  Vitals tabulated above, reviewed  Neck supple without lymphadenopathy  Sclera normal color, not injected  CARDIOVASCULAR regular rate and rhythm, no murmur  Lungs CLEAR TO AUSCULTATION   Abdomen soft, NT/ND, absent HEPATOSPLENOMEGALY  Skin normal  Joints normal  Neurologic exam non focal    Antibiotics:zosyn, vanco    Pertinent labs:    Recent Labs   Lab Test 09/19/24  0622 09/18/24  0216 09/17/24  1429 09/17/24  0757 09/17/24  0102 09/16/24  1648   WBC  --  4.9  --  4.4  --  5.8   HGB 8.4* 8.0* 8.7* 8.2*   < > 5.5*   HCT  --  23.7*  --  24.8*  --  16.2*   MCV  --  93  --  95  --  99   PLT  --  177  --  140*  --  228    < > = values in this interval not displayed.       Lab Results   Component Value Date    RBC 2.54 09/18/2024     Lab Results   Component Value Date    HGB 8.0 09/18/2024     Lab Results   Component Value Date    HCT 23.7 09/18/2024     No components found for: \"MCT\"  Lab Results   Component Value Date    MCV 93 09/18/2024     Lab Results   Component Value Date    MCH 31.5 09/18/2024     Lab Results   Component Value Date    MCHC 33.8 09/18/2024     Lab Results   Component Value Date    RDW 19.0 09/18/2024     Lab Results   Component Value Date     09/18/2024       Last Comprehensive Metabolic Panel:  Sodium   Date Value Ref Range Status   09/18/2024 136 135 - 145 mmol/L Final     Potassium   Date Value Ref Range Status   09/19/2024 3.9 3.4 - 5.3 mmol/L Final     Chloride   Date Value Ref Range Status   09/18/2024 109 (H) " "98 - 107 mmol/L Final     Carbon Dioxide (CO2)   Date Value Ref Range Status   09/18/2024 18 (L) 22 - 29 mmol/L Final     Anion Gap   Date Value Ref Range Status   09/18/2024 9 7 - 15 mmol/L Final     Glucose   Date Value Ref Range Status   09/18/2024 90 70 - 99 mg/dL Final     GLUCOSE BY METER POCT   Date Value Ref Range Status   05/26/2024 135 (H) 70 - 99 mg/dL Final     Urea Nitrogen   Date Value Ref Range Status   09/18/2024 16.1 8.0 - 23.0 mg/dL Final     Creatinine   Date Value Ref Range Status   09/20/2024 0.99 0.67 - 1.17 mg/dL Final     GFR Estimate   Date Value Ref Range Status   09/20/2024 76 >60 mL/min/1.73m2 Final     Comment:     eGFR calculated using 2021 CKD-EPI equation.     GFR, ESTIMATED POCT   Date Value Ref Range Status   09/30/2021 52 (L) >60 mL/min/1.73m2 Final     Calcium   Date Value Ref Range Status   09/18/2024 7.7 (L) 8.8 - 10.4 mg/dL Final     Comment:     Reference intervals for this test were updated on 7/16/2024 to reflect our healthy population more accurately. There may be differences in the flagging of prior results with similar values performed with this method. Those prior results can be interpreted in the context of the updated reference intervals.       Liver Function Studies -   Recent Labs   Lab Test 09/17/24  0757   PROTTOTAL 5.5*   ALBUMIN 2.8*   BILITOTAL 0.4   ALKPHOS 103   AST 32   ALT 8       No results found for: \"SED\"    No results found for: \"CRP\"            MICROBIOLOGY DATA:  Nothing + to date      IMAGING/RADIOLOGY:          ASSESSMENT:  Gas and fluid containing 4.0 cm right pelvic abscess, which also contains multiple brachytherapy seeds.   Advancing (I think) cancerous process as reported on MRI also    RECOMMENDATION:  2 wks augmentin 875 BID and doxy 100 BID for discharge to home  Will order those today and sign off    ANNIE Peterson MD  Office [phone] option 2 to desk staff  "

## 2024-09-20 NOTE — PLAN OF CARE
Problem: Infection  Goal: Absence of Infection Signs and Symptoms  Outcome: Progressing  Intervention: Prevent or Manage Infection  Recent Flowsheet Documentation  Taken 9/20/2024 0100 by Odilia Mustafa RN  Isolation Precautions: contact precautions maintained  Taken 9/19/2024 2109 by Odilia Mustafa RN  Isolation Precautions: contact precautions maintained              Problem: Electrolyte Imbalance  Goal: Electrolyte Balance  Outcome: Progressing        Pt AxO x4, VSS on RA, denies pain, up SBA w/walker in room, catheter patent, x1 loose stool over night. No significant events.

## 2024-09-20 NOTE — PLAN OF CARE
Goal Outcome Evaluation:      Problem: Adult Inpatient Plan of Care  Goal: Absence of Hospital-Acquired Illness or Injury  Outcome: Progressing  Intervention: Identify and Manage Fall Risk  Recent Flowsheet Documentation  Taken 9/20/2024 0800 by Bela Sky RN  Safety Promotion/Fall Prevention:   activity supervised   assistive device/personal items within reach   clutter free environment maintained   nonskid shoes/slippers when out of bed   patient and family education   patient video monitoring   safety round/check completed     Problem: Skin Injury Risk Increased  Goal: Skin Health and Integrity  Outcome: Progressing  Intervention: Plan: Nurse Driven Intervention: Moisture Management  Recent Flowsheet Documentation  Taken 9/20/2024 0800 by Bela kSy RN  Moisture Interventions: Encourage regular toileting  Intervention: Plan: Nurse Driven Intervention: Friction and Shear  Recent Flowsheet Documentation  Taken 9/20/2024 0800 by Bela Sky RN  Friction/Shear Interventions: HOB 30 degrees or less    Pt denied pain this shift. VSS.   Pt PIV removed. Discharge instructions and medication given to patient and wife; verbalized understanding. Pt discharged at 2:19 pm; with wheelchair assist.

## 2024-09-20 NOTE — PHARMACY-VANCOMYCIN DOSING SERVICE
"Pharmacy Vancomycin Note  Date of Service 2024  Patient's  1940   84 year old, male    Indication: Intra-abdominal infection  Day of Therapy: 4  Current vancomycin regimen:  1000mg IV q 24h  Current vancomycin monitoring method: AUC  Current vancomycin therapeutic monitoring goal: 400-600 mg*h/L    [employer] Prediction of Current Vancomycin Regimen  Regimen: 1000 mg IV every 24 hours.  Start time: 16:00 on 2024  Exposure target: AUC24 (range)400-600 mg/L.hr   AUC24,ss: 469 mg/L.hr  Probability of AUC24 > 400: 87 %  Ctrough,ss: 13.6 mg/L  Probability of Ctrough,ss > 20: 5 %  Probability of nephrotoxicity (Lodise HAYLEY ): 9 %    Current estimated CrCl = Estimated Creatinine Clearance: 38.2 mL/min (based on SCr of 0.99 mg/dL).    Creatinine for last 3 days  2024:  2:16 AM Creatinine 1.17 mg/dL  2024:  6:22 AM Creatinine 0.93 mg/dL  2024:  5:19 AM Creatinine 0.99 mg/dL    Recent Vancomycin Levels (past 3 days)  2024: 11:45 AM Vancomycin 10.7 ug/mL  2024:  5:19 AM Vancomycin 16.2 ug/mL    Vancomycin IV Administrations (past 72 hours)                     vancomycin (VANCOCIN) 1,000 mg in 200 mL dextrose intermittent infusion (mg) 1,000 mg New Bag 24 1600     1,000 mg New Bag 24 1552    vancomycin (VANCOCIN) 750 mg in sodium chloride 0.9 % 250 mL intermittent infusion (mg) 750 mg New Bag 24 1631                    Nephrotoxins and other renal medications (From now, onward)      Start     Dose/Rate Route Frequency Ordered Stop    24 1600  vancomycin (VANCOCIN) 1,000 mg in 200 mL dextrose intermittent infusion         1,000 mg  200 mL/hr over 1 Hours Intravenous EVERY 24 HOURS 24 1349      24 0300  piperacillin-tazobactam (ZOSYN) 3.375 g vial to attach to  mL bag        Note to Pharmacy: For [facility], [facility] and [facility]: For Zosyn-naive patients, use the \"Zosyn initial dose + extended infusion\" order panel.    3.375 g  over 240 Minutes " Intravenous EVERY 8 HOURS 09/16/24 2132                 Contrast Orders - past 72 hours (72h ago, onward)      Start     Dose/Rate Route Frequency Stop    09/19/24 0830  iopamidol (ISOVUE-370) solution 15 mL         15 mL Urethral ONCE 09/19/24 0825            Interpretation of levels and current regimen:  Vancomycin level is reflective of -600    Has serum creatinine changed greater than 50% in last 72 hours: No    Urine output:  unable to determine    Renal Function: Stable      Plan:  Continue Current Dose  Vancomycin monitoring method: AUC  Vancomycin therapeutic monitoring goal: 400-600 mg*h/L  Pharmacy will check vancomycin levels as appropriate in 1-3 Days.  Serum creatinine levels will be ordered daily for the first week of therapy and at least twice weekly for subsequent weeks.    Ildefonso Ocampo RPH

## 2024-09-21 ENCOUNTER — PATIENT OUTREACH (OUTPATIENT)
Dept: CARE COORDINATION | Facility: CLINIC | Age: 84
End: 2024-09-21
Payer: COMMERCIAL

## 2024-09-21 NOTE — PROGRESS NOTES
Connected Care Resource Center: [facility]    Background: Transitional Care Management program identified per system criteria and reviewed by [facility] Resource [place] team for possible outreach.    Assessment: Upon chart review, CCR Team member will not proceed with patient outreach related to this episode of Transitional Care Management program due to reason below:    Patient has active communication with a nurse, provider or care team for reason of post-hospital follow up plan.  Outreach call by [facility] team not indicated to minimize duplicative efforts.     Patient had home care visit today by RN and SW.     Plan: Transitional Care Management episode addressed appropriately per reason noted above.      Shayy eVga RN  [facility]    *Connected Care Resource Team does NOT follow patient ongoing. Referrals are identified based on internal discharge reports and the outreach is to ensure patient has an understanding of their discharge instructions.

## 2024-09-22 LAB — BACTERIA BLD CULT: NO GROWTH

## 2024-09-23 DIAGNOSIS — Z09 HOSPITAL DISCHARGE FOLLOW-UP: ICD-10-CM

## 2024-09-24 ENCOUNTER — TELEPHONE (OUTPATIENT)
Dept: PHARMACY | Facility: OTHER | Age: 84
End: 2024-09-24
Payer: COMMERCIAL

## 2024-09-24 NOTE — TELEPHONE ENCOUNTER
MTM referral from: Transitions of Care (recent hospital discharge or ED visit)    MTM referral outreach attempt #1 on September 24, 2024 at 10:37 AM      Outcome: Patient is not interested at this time because he feels he is okay     Use claude/hp part d MAP for the carrier/Plan on the flowsheet      Kerri Cook CMA  MTM
